# Patient Record
Sex: FEMALE | Race: WHITE | NOT HISPANIC OR LATINO | Employment: FULL TIME | URBAN - METROPOLITAN AREA
[De-identification: names, ages, dates, MRNs, and addresses within clinical notes are randomized per-mention and may not be internally consistent; named-entity substitution may affect disease eponyms.]

---

## 2017-05-26 ENCOUNTER — ALLSCRIPTS OFFICE VISIT (OUTPATIENT)
Dept: OTHER | Facility: OTHER | Age: 32
End: 2017-05-26

## 2017-05-26 DIAGNOSIS — F32.9 MAJOR DEPRESSIVE DISORDER, SINGLE EPISODE: ICD-10-CM

## 2017-05-26 DIAGNOSIS — E03.9 HYPOTHYROIDISM: ICD-10-CM

## 2017-05-26 DIAGNOSIS — F41.9 ANXIETY DISORDER: ICD-10-CM

## 2017-06-09 ENCOUNTER — GENERIC CONVERSION - ENCOUNTER (OUTPATIENT)
Dept: OTHER | Facility: OTHER | Age: 32
End: 2017-06-09

## 2017-07-07 DIAGNOSIS — F32.9 MAJOR DEPRESSIVE DISORDER, SINGLE EPISODE: ICD-10-CM

## 2017-07-07 DIAGNOSIS — E03.9 HYPOTHYROIDISM: ICD-10-CM

## 2017-07-07 DIAGNOSIS — F41.9 ANXIETY DISORDER: ICD-10-CM

## 2017-07-10 ENCOUNTER — GENERIC CONVERSION - ENCOUNTER (OUTPATIENT)
Dept: OTHER | Facility: OTHER | Age: 32
End: 2017-07-10

## 2017-08-10 LAB
EXTERNAL ABO GROUPING: NORMAL
EXTERNAL HEPATITIS B SURFACE ANTIGEN: NEGATIVE
EXTERNAL HIV-1 ANTIBODY: NEGATIVE
EXTERNAL RH FACTOR: POSITIVE
EXTERNAL RUBELLA IGG QUANTITATION: NORMAL
EXTERNAL SYPHILIS RPR SCREEN: NORMAL

## 2017-08-29 ENCOUNTER — GENERIC CONVERSION - ENCOUNTER (OUTPATIENT)
Dept: OTHER | Facility: OTHER | Age: 32
End: 2017-08-29

## 2017-08-29 ENCOUNTER — GENERIC CONVERSION - ENCOUNTER (OUTPATIENT)
Dept: OBGYN CLINIC | Facility: CLINIC | Age: 32
End: 2017-08-29

## 2017-09-12 ENCOUNTER — GENERIC CONVERSION - ENCOUNTER (OUTPATIENT)
Dept: OTHER | Facility: OTHER | Age: 32
End: 2017-09-12

## 2017-09-12 ENCOUNTER — ALLSCRIPTS OFFICE VISIT (OUTPATIENT)
Dept: PERINATAL CARE | Facility: CLINIC | Age: 32
End: 2017-09-12
Payer: COMMERCIAL

## 2017-09-12 PROCEDURE — 76811 OB US DETAILED SNGL FETUS: CPT | Performed by: OBSTETRICS & GYNECOLOGY

## 2017-09-14 ENCOUNTER — ALLSCRIPTS OFFICE VISIT (OUTPATIENT)
Dept: PERINATAL CARE | Facility: CLINIC | Age: 32
End: 2017-09-14
Payer: COMMERCIAL

## 2017-09-14 PROCEDURE — 59070 TRANSABDOM AMNIOINFUS W/US: CPT | Performed by: OBSTETRICS & GYNECOLOGY

## 2017-09-14 PROCEDURE — 76946 ECHO GUIDE FOR AMNIOCENTESIS: CPT | Performed by: OBSTETRICS & GYNECOLOGY

## 2017-09-18 ENCOUNTER — GENERIC CONVERSION - ENCOUNTER (OUTPATIENT)
Dept: OTHER | Facility: OTHER | Age: 32
End: 2017-09-18

## 2017-09-18 ENCOUNTER — ALLSCRIPTS OFFICE VISIT (OUTPATIENT)
Dept: PERINATAL CARE | Facility: CLINIC | Age: 32
End: 2017-09-18
Payer: COMMERCIAL

## 2017-09-18 PROCEDURE — 76816 OB US FOLLOW-UP PER FETUS: CPT | Performed by: OBSTETRICS & GYNECOLOGY

## 2017-09-21 ENCOUNTER — GENERIC CONVERSION - ENCOUNTER (OUTPATIENT)
Dept: OTHER | Facility: OTHER | Age: 32
End: 2017-09-21

## 2017-09-28 ENCOUNTER — GENERIC CONVERSION - ENCOUNTER (OUTPATIENT)
Dept: OTHER | Facility: OTHER | Age: 32
End: 2017-09-28

## 2017-09-28 DIAGNOSIS — R35.0 FREQUENCY OF MICTURITION: ICD-10-CM

## 2017-09-28 DIAGNOSIS — O41.02X0: ICD-10-CM

## 2017-09-29 ENCOUNTER — GENERIC CONVERSION - ENCOUNTER (OUTPATIENT)
Dept: OTHER | Facility: OTHER | Age: 32
End: 2017-09-29

## 2017-10-02 ENCOUNTER — GENERIC CONVERSION - ENCOUNTER (OUTPATIENT)
Dept: OTHER | Facility: OTHER | Age: 32
End: 2017-10-02

## 2017-10-03 ENCOUNTER — GENERIC CONVERSION - ENCOUNTER (OUTPATIENT)
Dept: OTHER | Facility: OTHER | Age: 32
End: 2017-10-03

## 2017-10-05 ENCOUNTER — GENERIC CONVERSION - ENCOUNTER (OUTPATIENT)
Dept: OTHER | Facility: OTHER | Age: 32
End: 2017-10-05

## 2017-10-12 ENCOUNTER — GENERIC CONVERSION - ENCOUNTER (OUTPATIENT)
Dept: OTHER | Facility: OTHER | Age: 32
End: 2017-10-12

## 2017-10-19 ENCOUNTER — GENERIC CONVERSION - ENCOUNTER (OUTPATIENT)
Dept: OTHER | Facility: OTHER | Age: 32
End: 2017-10-19

## 2017-10-25 ENCOUNTER — ALLSCRIPTS OFFICE VISIT (OUTPATIENT)
Dept: PERINATAL CARE | Facility: CLINIC | Age: 32
End: 2017-10-25
Payer: COMMERCIAL

## 2017-10-25 ENCOUNTER — GENERIC CONVERSION - ENCOUNTER (OUTPATIENT)
Dept: OTHER | Facility: OTHER | Age: 32
End: 2017-10-25

## 2017-10-25 PROCEDURE — 76816 OB US FOLLOW-UP PER FETUS: CPT | Performed by: OBSTETRICS & GYNECOLOGY

## 2017-11-07 ENCOUNTER — GENERIC CONVERSION - ENCOUNTER (OUTPATIENT)
Dept: OTHER | Facility: OTHER | Age: 32
End: 2017-11-07

## 2017-11-14 ENCOUNTER — GENERIC CONVERSION - ENCOUNTER (OUTPATIENT)
Dept: OTHER | Facility: OTHER | Age: 32
End: 2017-11-14

## 2017-11-21 ENCOUNTER — ALLSCRIPTS OFFICE VISIT (OUTPATIENT)
Dept: OTHER | Facility: OTHER | Age: 32
End: 2017-11-21

## 2017-11-21 ENCOUNTER — DOCUMENTATION (OUTPATIENT)
Dept: OTHER | Facility: HOSPITAL | Age: 32
End: 2017-11-21

## 2017-11-21 LAB
BILIRUB UR QL STRIP: NORMAL
CLARITY UR: NORMAL
COLOR UR: YELLOW
GLUCOSE (HISTORICAL): NORMAL
HGB UR QL STRIP.AUTO: NORMAL
KETONES UR STRIP-MCNC: NORMAL MG/DL
LEUKOCYTE ESTERASE UR QL STRIP: NORMAL
NITRITE UR QL STRIP: NORMAL
PH UR STRIP.AUTO: 6 [PH]
PROT UR STRIP-MCNC: NORMAL MG/DL
SP GR UR STRIP.AUTO: 1.01
UROBILINOGEN UR QL STRIP.AUTO: 0.2

## 2017-11-21 NOTE — PROGRESS NOTES
Neonatology Consult    26 yo [id number] mother presents at 31 weeks gestation for fetus with lethal congenital anomalies (BL renal agenesis, anhydramnios, and possible absent bladder )  Mother has seen MFM on multiple occasions  Mother's insurance did not approve  prior to 24 weeks so mother continued with pregnancy  Mother expresses understanding of incompatibility with life and wants no resuscitative measures at birth  She and her  desire comfort care only  Mother would like to do IOL prior to her due date  OB is discussing with ethics whether this will be possible  I spoke with the mother and father for ~40 minutes  Mother expressed understanding of diagnosis and her strong desire to deliver as soon as possible given prognosis  She has anxiety and expresses how difficult this pregnancy has been given baby's diagnosis  She has sought help with support groups from mothers who have had a diagnosis of Potter's sequence  Mother also has hashimoto's thyroiditis  Prenatal US on 10/25/17 shows DALI of 18  Fetus is in breech presentation and US shows anhydramnios, BL renal agenesis, pericardial effusion, and pulmonary hypoplasia     g

## 2017-11-29 ENCOUNTER — GENERIC CONVERSION - ENCOUNTER (OUTPATIENT)
Dept: OTHER | Facility: OTHER | Age: 32
End: 2017-11-29

## 2017-12-12 ENCOUNTER — ALLSCRIPTS OFFICE VISIT (OUTPATIENT)
Dept: OTHER | Facility: OTHER | Age: 32
End: 2017-12-12

## 2017-12-12 LAB
CLUE CELL (HISTORICAL): NORMAL
HYPHAL YEAST (HISTORICAL): NORMAL
KOH PREP (HISTORICAL): NORMAL
PH UR STRIP.AUTO: 4 [PH]
TRICHOMONAS (HISTORICAL): NORMAL
YEAST (HISTORICAL): NORMAL

## 2017-12-16 ENCOUNTER — HOSPITAL ENCOUNTER (OUTPATIENT)
Facility: HOSPITAL | Age: 32
Discharge: HOME | End: 2017-12-16
Attending: OBSTETRICS & GYNECOLOGY | Admitting: OBSTETRICS & GYNECOLOGY
Payer: COMMERCIAL

## 2017-12-16 VITALS
BODY MASS INDEX: 36.96 KG/M2 | OXYGEN SATURATION: 99 % | HEART RATE: 94 BPM | HEIGHT: 66 IN | RESPIRATION RATE: 18 BRPM | SYSTOLIC BLOOD PRESSURE: 131 MMHG | WEIGHT: 230 LBS | DIASTOLIC BLOOD PRESSURE: 61 MMHG | TEMPERATURE: 98.4 F

## 2017-12-16 PROCEDURE — 99203 OFFICE O/P NEW LOW 30 MIN: CPT

## 2017-12-16 RX ORDER — LORAZEPAM 0.5 MG/1
1 TABLET ORAL 3 TIMES DAILY PRN
COMMUNITY
Start: 2017-08-29 | End: 2021-05-19 | Stop reason: ALTCHOICE

## 2017-12-16 RX ORDER — LEVOTHYROXINE SODIUM 0.15 MG/1
150 TABLET ORAL
COMMUNITY
Start: 2017-11-20 | End: 2018-01-24

## 2017-12-16 NOTE — PROGRESS NOTES
Progress Note - OB/GYN   Kandis Acuna 27 y o  female MRN: [id number]  Unit/Bed#: L&D 323-01 Encounter: [id number]    Assessment:  Patient is a 28yo [id number] @ 33 5wga w/ known lethal fetal anomaly presents with contractions  Not in  labor  Plan:  D/C home with labor precautions  PO hydration  Tylenol PRN  Routine office appointment  D/W Dr Tracey Rodgers  Subjective/Objective   Chief Complaint: contractions    Subjective:   -contractions 4 times in a hour  -denies LOF, VB  -feels baby moving    Pregnancy pertinent hx:  -lethal fetal anomaly (anhydramnios, pericardial effusion, pulmonary hypoplasia, bilateral renal agenesis)    Objective:   -SSE: white-yellow discharge  -wet mount/KOH wnl  -TVUS: CL 2 2-2 6cm, breech  -SVE: 3-4/thick/high  -FHT: 120'sbpm reactive  -[place]: quiet    Vitals: Blood pressure 131/61, pulse 94, temperature 98 4 °F (36 9 °C), temperature source Oral, resp  rate 18, height 5' 6" (1 676 m), weight 104 kg (230 lb), SpO2 99 %  ,Body mass index is 37 12 kg/m²      No intake or output data in the 24 hours ending 17 0558    Invasive Devices          No matching active lines, drains, or airways

## 2017-12-21 ENCOUNTER — ANESTHESIA EVENT (INPATIENT)
Dept: LABOR AND DELIVERY | Facility: HOSPITAL | Age: 32
End: 2017-12-21
Payer: COMMERCIAL

## 2017-12-21 ENCOUNTER — ANESTHESIA (INPATIENT)
Dept: LABOR AND DELIVERY | Facility: HOSPITAL | Age: 32
End: 2017-12-21
Payer: COMMERCIAL

## 2017-12-21 ENCOUNTER — GENERIC CONVERSION - ENCOUNTER (OUTPATIENT)
Dept: OTHER | Facility: OTHER | Age: 32
End: 2017-12-21

## 2017-12-21 ENCOUNTER — HOSPITAL ENCOUNTER (INPATIENT)
Facility: HOSPITAL | Age: 32
LOS: 1 days | Discharge: HOME | End: 2017-12-22
Attending: OBSTETRICS & GYNECOLOGY | Admitting: OBSTETRICS & GYNECOLOGY
Payer: COMMERCIAL

## 2017-12-21 ENCOUNTER — HOSPITAL ENCOUNTER (OUTPATIENT)
Facility: HOSPITAL | Age: 32
Discharge: HOME | End: 2017-12-21
Attending: OBSTETRICS & GYNECOLOGY | Admitting: OBSTETRICS & GYNECOLOGY
Payer: COMMERCIAL

## 2017-12-21 VITALS
RESPIRATION RATE: 18 BRPM | WEIGHT: 230 LBS | DIASTOLIC BLOOD PRESSURE: 76 MMHG | SYSTOLIC BLOOD PRESSURE: 145 MMHG | HEART RATE: 103 BPM | TEMPERATURE: 98.7 F | BODY MASS INDEX: 36.1 KG/M2 | HEIGHT: 67 IN

## 2017-12-21 DIAGNOSIS — Z3A.34 34 WEEKS GESTATION OF PREGNANCY: ICD-10-CM

## 2017-12-21 DIAGNOSIS — Z3A.34 34 WEEKS GESTATION OF PREGNANCY: Primary | ICD-10-CM

## 2017-12-21 PROBLEM — O35.EXX0 RENAL AGENESIS, FETAL, AFFECTING CARE OF MOTHER, ANTEPARTUM: Status: ACTIVE | Noted: 2017-12-21

## 2017-12-21 PROBLEM — O35.8XX0 RENAL AGENESIS, FETAL, AFFECTING CARE OF MOTHER, ANTEPARTUM: Status: ACTIVE | Noted: 2017-12-21

## 2017-12-21 PROBLEM — O41.00X0 ANHYDRAMNIOS: Status: ACTIVE | Noted: 2017-12-21

## 2017-12-21 LAB
ABO GROUP BLD: NORMAL
ANISOCYTOSIS BLD QL SMEAR: PRESENT
BASE EXCESS BLDCOA CALC-SCNC: -1.2 MMOL/L (ref 3–11)
BASE EXCESS BLDCOV CALC-SCNC: -0.6 MMOL/L (ref 1–9)
BASOPHILS # BLD MANUAL: 0 THOUSAND/UL (ref 0–0.1)
BASOPHILS NFR MAR MANUAL: 0 % (ref 0–1)
BLD GP AB SCN SERPL QL: NEGATIVE
DACRYOCYTES BLD QL SMEAR: PRESENT
EOSINOPHIL # BLD MANUAL: 0.11 THOUSAND/UL (ref 0–0.4)
EOSINOPHIL NFR BLD MANUAL: 1 % (ref 0–6)
ERYTHROCYTE [DISTWIDTH] IN BLOOD BY AUTOMATED COUNT: 16.9 % (ref 11.6–15.1)
HCO3 BLDCOA-SCNC: 25.7 MMOL/L (ref 17.3–27.3)
HCO3 BLDCOV-SCNC: 22.9 MMOL/L (ref 12.2–28.6)
HCT VFR BLD AUTO: 33.4 % (ref 34.8–46.1)
HGB BLD-MCNC: 10.6 G/DL (ref 11.5–15.4)
LYMPHOCYTES # BLD AUTO: 1.51 THOUSAND/UL (ref 0.6–4.47)
LYMPHOCYTES # BLD AUTO: 14 % (ref 14–44)
MCH RBC QN AUTO: 25 PG (ref 26.8–34.3)
MCHC RBC AUTO-ENTMCNC: 31.7 G/DL (ref 31.4–37.4)
MCV RBC AUTO: 79 FL (ref 82–98)
MONOCYTES # BLD AUTO: 0.32 THOUSAND/UL (ref 0–1.22)
MONOCYTES NFR BLD: 3 % (ref 4–12)
NEUTROPHILS # BLD MANUAL: 8.87 THOUSAND/UL (ref 1.85–7.62)
NEUTS BAND NFR BLD MANUAL: 14 % (ref 0–8)
NEUTS SEG NFR BLD AUTO: 68 % (ref 43–75)
O2 CT VFR BLDCOA CALC: 4.9 ML/DL
OVALOCYTES BLD QL SMEAR: PRESENT
OXYHGB MFR BLDCOA: 28.8 %
OXYHGB MFR BLDCOV: 91 %
PCO2 BLDCOA: 52.5 MM[HG] (ref 30–60)
PCO2 BLDCOV: 34.1 MM HG (ref 27–43)
PH BLDCOA: 7.31 [PH] (ref 7.23–7.43)
PH BLDCOV: 7.45 [PH] (ref 7.19–7.49)
PLATELET # BLD AUTO: 199 THOUSANDS/UL (ref 149–390)
PLATELET BLD QL SMEAR: ADEQUATE
PMV BLD AUTO: 10.3 FL (ref 8.9–12.7)
PO2 BLDCOA: 16 MM HG (ref 5–25)
PO2 BLDCOV: 46.3 MM HG (ref 15–45)
RBC # BLD AUTO: 4.24 MILLION/UL (ref 3.81–5.12)
RH BLD: POSITIVE
SAO2 % BLDCOV: 15.6 ML/DL
SPECIMEN EXPIRATION DATE: NORMAL
TOTAL CELLS COUNTED SPEC: 100
WBC # BLD AUTO: 10.82 THOUSAND/UL (ref 4.31–10.16)

## 2017-12-21 PROCEDURE — 99203 OFFICE O/P NEW LOW 30 MIN: CPT

## 2017-12-21 PROCEDURE — 86901 BLOOD TYPING SEROLOGIC RH(D): CPT | Performed by: OBSTETRICS & GYNECOLOGY

## 2017-12-21 PROCEDURE — 10D17Z9 MANUAL EXTRACTION OF PRODUCTS OF CONCEPTION, RETAINED, VIA NATURAL OR ARTIFICIAL OPENING: ICD-10-PCS | Performed by: OBSTETRICS & GYNECOLOGY

## 2017-12-21 PROCEDURE — 86850 RBC ANTIBODY SCREEN: CPT | Performed by: OBSTETRICS & GYNECOLOGY

## 2017-12-21 PROCEDURE — 86592 SYPHILIS TEST NON-TREP QUAL: CPT | Performed by: OBSTETRICS & GYNECOLOGY

## 2017-12-21 PROCEDURE — 82805 BLOOD GASES W/O2 SATURATION: CPT | Performed by: OBSTETRICS & GYNECOLOGY

## 2017-12-21 PROCEDURE — 86900 BLOOD TYPING SEROLOGIC ABO: CPT | Performed by: OBSTETRICS & GYNECOLOGY

## 2017-12-21 PROCEDURE — 0UC97ZZ EXTIRPATION OF MATTER FROM UTERUS, VIA NATURAL OR ARTIFICIAL OPENING: ICD-10-PCS | Performed by: OBSTETRICS & GYNECOLOGY

## 2017-12-21 PROCEDURE — 88307 TISSUE EXAM BY PATHOLOGIST: CPT | Performed by: OBSTETRICS & GYNECOLOGY

## 2017-12-21 PROCEDURE — 85027 COMPLETE CBC AUTOMATED: CPT | Performed by: OBSTETRICS & GYNECOLOGY

## 2017-12-21 PROCEDURE — 85007 BL SMEAR W/DIFF WBC COUNT: CPT | Performed by: OBSTETRICS & GYNECOLOGY

## 2017-12-21 RX ORDER — ACETAMINOPHEN 325 MG/1
650 TABLET ORAL EVERY 6 HOURS PRN
Status: DISCONTINUED | OUTPATIENT
Start: 2017-12-21 | End: 2017-12-22 | Stop reason: HOSPADM

## 2017-12-21 RX ORDER — OXYTOCIN/RINGER'S LACTATE 30/500 ML
PLASTIC BAG, INJECTION (ML) INTRAVENOUS
Status: DISPENSED
Start: 2017-12-21 | End: 2017-12-22

## 2017-12-21 RX ORDER — ROPIVACAINE HYDROCHLORIDE 2 MG/ML
INJECTION, SOLUTION EPIDURAL; INFILTRATION; PERINEURAL AS NEEDED
Status: DISCONTINUED | OUTPATIENT
Start: 2017-12-21 | End: 2017-12-21 | Stop reason: SURG

## 2017-12-21 RX ORDER — DOCUSATE SODIUM 100 MG/1
100 CAPSULE, LIQUID FILLED ORAL 2 TIMES DAILY
COMMUNITY
End: 2018-01-24

## 2017-12-21 RX ORDER — OXYTOCIN/RINGER'S LACTATE 30/500 ML
62.5 PLASTIC BAG, INJECTION (ML) INTRAVENOUS CONTINUOUS
Status: ACTIVE | OUTPATIENT
Start: 2017-12-21 | End: 2017-12-22

## 2017-12-21 RX ORDER — SODIUM CHLORIDE, SODIUM LACTATE, POTASSIUM CHLORIDE, CALCIUM CHLORIDE 600; 310; 30; 20 MG/100ML; MG/100ML; MG/100ML; MG/100ML
125 INJECTION, SOLUTION INTRAVENOUS CONTINUOUS
Status: DISCONTINUED | OUTPATIENT
Start: 2017-12-21 | End: 2017-12-22 | Stop reason: HOSPADM

## 2017-12-21 RX ORDER — METHYLERGONOVINE MALEATE 0.2 MG/ML
INJECTION INTRAVENOUS
Status: COMPLETED
Start: 2017-12-21 | End: 2017-12-21

## 2017-12-21 RX ORDER — OXYTOCIN/RINGER'S LACTATE 30/500 ML
PLASTIC BAG, INJECTION (ML) INTRAVENOUS
Status: COMPLETED
Start: 2017-12-21 | End: 2017-12-21

## 2017-12-21 RX ORDER — OXYCODONE HYDROCHLORIDE AND ACETAMINOPHEN 5; 325 MG/1; MG/1
1 TABLET ORAL EVERY 4 HOURS PRN
Status: DISCONTINUED | OUTPATIENT
Start: 2017-12-21 | End: 2017-12-22 | Stop reason: HOSPADM

## 2017-12-21 RX ORDER — ONDANSETRON 2 MG/ML
4 INJECTION INTRAMUSCULAR; INTRAVENOUS EVERY 8 HOURS PRN
Status: DISCONTINUED | OUTPATIENT
Start: 2017-12-21 | End: 2017-12-22 | Stop reason: HOSPADM

## 2017-12-21 RX ORDER — CALCIUM CARBONATE 200(500)MG
1000 TABLET,CHEWABLE ORAL DAILY PRN
Status: DISCONTINUED | OUTPATIENT
Start: 2017-12-21 | End: 2017-12-22 | Stop reason: HOSPADM

## 2017-12-21 RX ORDER — IBUPROFEN 600 MG/1
600 TABLET ORAL EVERY 6 HOURS PRN
Status: DISCONTINUED | OUTPATIENT
Start: 2017-12-21 | End: 2017-12-22 | Stop reason: HOSPADM

## 2017-12-21 RX ORDER — LORAZEPAM 0.5 MG/1
0.5 TABLET ORAL 3 TIMES DAILY PRN
Status: DISCONTINUED | OUTPATIENT
Start: 2017-12-21 | End: 2017-12-22 | Stop reason: HOSPADM

## 2017-12-21 RX ORDER — LEVOTHYROXINE SODIUM 0.05 MG/1
150 TABLET ORAL
Status: DISCONTINUED | OUTPATIENT
Start: 2017-12-22 | End: 2017-12-22 | Stop reason: HOSPADM

## 2017-12-21 RX ORDER — DIAPER,BRIEF,INFANT-TODD,DISP
1 EACH MISCELLANEOUS AS NEEDED
Status: DISCONTINUED | OUTPATIENT
Start: 2017-12-21 | End: 2017-12-22 | Stop reason: HOSPADM

## 2017-12-21 RX ORDER — DIPHENHYDRAMINE HCL 25 MG
25 TABLET ORAL EVERY 6 HOURS PRN
Status: DISCONTINUED | OUTPATIENT
Start: 2017-12-21 | End: 2017-12-22 | Stop reason: HOSPADM

## 2017-12-21 RX ORDER — DOCUSATE SODIUM 100 MG/1
100 CAPSULE, LIQUID FILLED ORAL 2 TIMES DAILY
Status: DISCONTINUED | OUTPATIENT
Start: 2017-12-21 | End: 2017-12-22 | Stop reason: HOSPADM

## 2017-12-21 RX ORDER — ONDANSETRON 2 MG/ML
4 INJECTION INTRAMUSCULAR; INTRAVENOUS EVERY 6 HOURS PRN
Status: DISCONTINUED | OUTPATIENT
Start: 2017-12-21 | End: 2017-12-21

## 2017-12-21 RX ADMIN — IBUPROFEN 600 MG: 600 TABLET, FILM COATED ORAL at 20:50

## 2017-12-21 RX ADMIN — HYDROCORTISONE 1 APPLICATION: 1 CREAM TOPICAL at 20:50

## 2017-12-21 RX ADMIN — GENTAMICIN SULFATE 90 MG: 40 INJECTION, SOLUTION INTRAMUSCULAR; INTRAVENOUS at 18:16

## 2017-12-21 RX ADMIN — ROPIVACAINE HYDROCHLORIDE 5 ML: 2 INJECTION, SOLUTION EPIDURAL; INFILTRATION at 14:30

## 2017-12-21 RX ADMIN — DOCUSATE SODIUM 100 MG: 100 CAPSULE, LIQUID FILLED ORAL at 21:11

## 2017-12-21 RX ADMIN — METHYLERGONOVINE MALEATE 0.2 MG: 0.2 INJECTION, SOLUTION INTRAMUSCULAR; INTRAVENOUS at 17:07

## 2017-12-21 RX ADMIN — ROPIVACAINE HYDROCHLORIDE 5 ML: 2 INJECTION, SOLUTION EPIDURAL; INFILTRATION at 14:32

## 2017-12-21 RX ADMIN — SODIUM CHLORIDE, SODIUM LACTATE, POTASSIUM CHLORIDE, AND CALCIUM CHLORIDE 125 ML/HR: .6; .31; .03; .02 INJECTION, SOLUTION INTRAVENOUS at 14:12

## 2017-12-21 RX ADMIN — Medication 62.5 MILLI-UNITS/MIN: at 17:06

## 2017-12-21 RX ADMIN — BENZOCAINE AND MENTHOL: 20; .5 SPRAY TOPICAL at 20:50

## 2017-12-21 RX ADMIN — AMPICILLIN SODIUM 2000 MG: 2 INJECTION, POWDER, FOR SOLUTION INTRAMUSCULAR; INTRAVENOUS at 17:41

## 2017-12-21 RX ADMIN — WITCH HAZEL 1 PAD: 500 SOLUTION RECTAL; TOPICAL at 20:50

## 2017-12-21 NOTE — ANESTHESIA POSTPROCEDURE EVALUATION
Post-Op Assessment Note      CV Status:  Stable    Mental Status:  Alert and awake    Hydration Status:  Euvolemic    PONV Controlled:  Controlled    Airway Patency:  Patent    Post Op Vitals Reviewed: Yes          Staff: Anesthesiologist     Post-op block assessment: no complications and catheter intact        BP      Temp      Pulse     Resp      SpO2

## 2017-12-21 NOTE — L&D DELIVERY NOTE
DELIVERY NOTE  Elier Mullen 27 y o  female MRN: [id number]  Unit/Bed#: L&D 324-01 Encounter: [id number]    Obstetrician:   Marlon Michele    Assistant: Katie Snwo    Pre-Delivery Diagnosis:   Pregnancy at 34w3d  Known lethal fetal anomalies (renal agenesis, anhydramnios, pulmonary hypoplasia)  Breech presentation    Post-Delivery Diagnosis:   Same as above - Delivered  Suspected chorioamniotis    Procedure: Spontaneous vaginal delivery    Indications for instrumental delivery: none    Estimated Blood Loss:  831            Complications:  None    Description of Delivery:   Purulent, thick, foul smelling amniotic fluid was noted on examination suspicious for chorioamnionitis upon pushing  The buttocks, followed by the legs, delivered spontaneously  At the level of the fetal scapula, the left arm spontaneously delivered, the right arm was gently swept for delivery  The fetal head spontaneously delivered  After delivery of the , the umbilical cord was doubly clamped and cut  Umbilical cord blood and umbilical artery and venous gases were collected  With gentle traction of the umbilical cord, the cord spontaneously avulsed  On examination, the cord segment attached to the placenta was not able to be palpated therefore the placenta was manually extracted  Active management of the third stage of labor was undertaken with IV pitocin  Mild vaginal bleeding was noted, intrauterine examination revealed small clots at the lower uterine segment which were evacuated, and a single dose of 0 2mg Methergine was administered  Bleeding was noted to be stable  The uterus was noted to be firm, palpated at the umbilicus  Inspection of the perineum, vagina, labia, and urethra revealed a right periurethral abrasion which was hemostatic and did not require repair      APGARS: 2 (1 minute), 2 (5 minutes), 2 (10 minutes), 2 (15 minutes), 2 (20 minutes)    Blood gases: Arterial pH:7 307, Base excess: -1 2 ; Venous pH:7 445, Base excess: -0 6

## 2017-12-21 NOTE — H&P
[address] 27 y o  female MRN: [id number]  Unit/Bed#: L&D 324-01 Encounter: [id number]    Assessment: 27 y o  [id number] at 34w3d with known lethal fetal anomalies (bilateral renal agenesis, anhydramnios, absent bladder) admitted for labor  SVE on admission 10/100/+1, breech    Plan:   · Admit  · CBC, RPR, Type & Screen  · Epidural for pain management  · Expectant management    Patient seen & evaluated with Dr Makayla Mullins      Chief Complaint: Contractions    HPI: Aline Hoang is a 27 y o  [id number] with an DALI of 2018, by Patient Reported at 34w3d, with known lethal fetal anomalies (bilateral renal agenesis, anhydramnios, absent bladder) who is being admitted for labor  She reports worsening contractions "every 10 minutes", was evaluated last evening in Triage, cervix noted to be closed  She was seen in clinic today for evaluation and cervix was noted to be 6-7/70/-1 and she was sent to L&D for admission  She was extensively counseled in clinic regarding the lethal nature of the fetal anomalies and she understands that the baby will not survive  Of note, the fetus is breech position  The patient was counseled on the risks of breech vaginal delivery  and accepts the risks  Please see clinic noted for full details  Patient Active Problem List   Diagnosis    34 weeks gestation of pregnancy    Renal agenesis, fetal, affecting care of mother, antepartum    Anhydramnios       Baby complications/comments: none    Review of Systems   Constitutional: Negative for chills and fever  Respiratory: Negative for shortness of breath  Cardiovascular: Negative for chest pain  Gastrointestinal: Negative for abdominal pain, diarrhea, nausea and vomiting  Genitourinary: Negative for dysuria and flank pain  Neurological: Negative for numbness and headaches         OB History    Para Term  AB Living   8 6 5 1 1 6   SAB TAB Ectopic Multiple Live Births   1 0 0 1 6      # Outcome Date GA Lbr Janes/2nd Weight Sex Delivery Anes PTL Lv   8A             8B Current            7 Term 16 39w0d   M Vag-Spont   JURGEN   6 Term 14    M Vag-Spont   JURGEN   5 Term 12     Vag-Spont   JURGEN   4 Term 08/10/09 38w0d   F Vag-Spont   JURGEN   3 Term 04 39w0d   M Vag-Spont   JURGEN   2 SAB 02           1  10/17/01 36w0d   F Vag-Spont   JURGEN          Past Medical History:   Diagnosis Date    Anxiety     Depression     Gestational diabetes     Gestational hypertension     previous pregnancy    Hashimoto's thyroiditis     HPV (human papilloma virus) infection     Retained placenta        Past Surgical History:   Procedure Laterality Date    APPENDECTOMY      CHOLECYSTECTOMY      DILATION AND CURETTAGE OF UTERUS      HERNIA REPAIR      UMBILICAL HERNIA REPAIR         Social History   Substance Use Topics    Smoking status: Former Smoker    Smokeless tobacco: Never Used    Alcohol use No       Allergies   Allergen Reactions    Bactrim [Sulfamethoxazole-Trimethoprim] Hives    Other Hives     seafood       Prescriptions Prior to Admission   Medication    docusate sodium (COLACE) 100 mg capsule    Ferrous Fumarate-Vitamin C ER (TEMITOPE-SEQUELS) 65-25 MG TBCR    levothyroxine (SYNTHROID) 150 mcg tablet    LORazepam (ATIVAN) 0 5 mg tablet    Prenatal Multivit-Min-Fe-FA (PRENATAL #2 PO)       Objective:  Temp:  [98 7 °F (37 1 °C)] 98 7 °F (37 1 °C)  HR:  [103] 103  Resp:  [18] 18  BP: (145)/(76) 145/76  There is no height or weight on file to calculate BMI       Physical Exam:  Gen: NAD, AAOx3  Cv: RRR, No M/R/G  Resp: CTAB, No W/R/R  Abdomen: Gravid, non-tender  Ext: Symmetric, non-tender    SVE:  Dilation: 10  Effacement (%): 100  Station: 2   Breech, right sacrum posterior    TOCO: irregular, Q10 minutes       Lab Results   Component Value Date    WBC 10 82 (H) 2017    HGB 10 6 (L) 2017    HCT 33 4 (L) 2017     2017     No results found for: NA, K, CL, CO2, BUN, CREATININE, GLUCOSE, AST, ALT  Prenatal Labs: reviewed     Blood type: O +  Antibody: Negative  GBS: Unknown  HIV:Negative  Rubella: Immune  VDRL/RPR: Non reactive  HBsAg: Negative  Chlamydia: Negative  Gonorrhea: Negative  Platelets: 659  Hgb: 10 6  >2 Midnights  INPATIENT     Signature/Title: Edd Sweeney MD  Date: 12/21/2017  Time: 3:03 PM

## 2017-12-21 NOTE — PROGRESS NOTES
L&D Triage Note - OB/GYN  Chantale Branch 27 y o  female MRN: [id number]  Unit/Bed#: L&D 324-01 Encounter: [id number]    Patient is seen by [facility] [place]    SUBJECTIVE:  Chantale Branch 27 y o  [id number] at 34w3d with an Estimated Date of Delivery: 18 presents with increasingly frequent and strong contractions  Called resident phone stating "my baby is going to die" and "I can't take these contractions any longer"  She also states she may have leaked some fluid  She was told her fetus has no kidneys and that she has been diagnosed with "no fluid in her belly"  Her current obstetrical history is significant for known lethal fetal anomaly, renal agenesis, pulmonary hypoplasia, anhydramnios, breech presentation  Contractions: Present, every 6-8min  Leakage of fluid: Present, see above  Vaginal Bleeding: None  Fetal movement: present    OBJECTIVE:  Vitals:    17 0309   BP: 145/76   Pulse: 103   Resp: 18   Temp: 98 7 °F (37 1 °C)       Speculum: Cervical os is closed  SVE:  / 50% / -2; breech presentation    KOH/WTMT:     Infection:   - no clue cells    - no hyphae   - no trichomonads present    Membrane status   - negative ferning   - negative nitrazene   - no pooling    FHT:  145 / Moderate 6 - 25 bpm / reactive accelerations, no decelerations  [place]: q9min    ASSESSMENT:  Chantale Branch is a 27 y o   [id number] at 34w3d r/o  labor & rupture    PLAN:  1) R/o  labor and premature rupture in lethal fetal anomaly   - See above  2) Cervical exam   - /-2, breech presentation  3) Plan to continue with planned c/s on 18   - Pt will meet with Dr Warner Branham in clinic today at 80 for scheduled appointment, to discuss matters further  4) Continue routine prenatal care   - Pt advised to take her Ativan PRN as this will help treat her anxiety levels with this pregnancy  5) [place] from [place]age with  labor precautions   - Case discussed with Dr Jayla Nolan,   PGY-1 OB/GYN Resident 12/21/2017 4:16 AM

## 2017-12-21 NOTE — DISCHARGE INSTRUCTIONS
Vaginal Delivery   WHAT YOU SHOULD KNOW:   A vaginal delivery is the birth of your baby through your vagina (birth canal)  AFTER YOU LEAVE:   Medicines:  · NSAIDs  help decrease swelling and pain or fever  This medicine is available with or without a doctor's order  NSAIDs can cause stomach bleeding or kidney problems in certain people  If you take blood thinner medicine, always ask your healthcare provider if NSAIDs are safe for you  Always read the medicine label and follow directions  · Take your medicine as directed  Call your healthcare provider if you think your medicine is not helping or  ·   ·  if you have side effects  Tell him if you are allergic to any medicine  Keep a list of the medicines, vitamins, and herbs you take  Include the amounts, and when and why you take them  Bring the list or the pill bottles to follow-up visits  Carry your medicine list with you in case of an emergency  Follow up with your primary healthcare provider:  Most women need to return 6 weeks after a vaginal delivery  Ask about how to care for your wounds or stitches  Write down your questions so you remember to ask them during your visits  Activity:  Rest as much as possible  Try to keep all activities short  You may be able to do some exercise soon after you have your baby  Talk with your primary healthcare provider before you start exercising  If you work outside the home, ask when you can return to your job  Kegel exercises:  Kegel exercises may help your vaginal and rectal muscles heal faster  You can do Kegel exercises by tightening and relaxing the muscles around your vagina  Kegel exercises help make the muscles stronger  Breast care:  When your milk comes in, your breasts may feel full and hard  Ask how to care for your breasts, even if you are not breastfeeding  Constipation:  Do not try to push the bowel movement out if it is too hard   High-fiber foods, extra liquids, and regular exercise can help you prevent constipation  Examples of high-fiber foods are fruit and bran  Prune juice and water are good liquids to drink  Regular exercise helps your digestive system work  You may also be told to take over-the-counter fiber and stool softener medicines  Take these items as directed  Hemorrhoids:  Pregnancy can cause severe hemorrhoids  You may have rectal pain because of the hemorrhoids  Ask how to prevent or treat hemorrhoids  Perineum care: Your perineum is the area between your vagina and anus  Keep the area clean and dry to help it heal and to prevent infection  Wash the area gently with soap and water when you bathe or shower  Rinse your perineum with warm water when you use the toilet  Your primary healthcare provider may suggest you use a warm sitz bath to help decrease pain  A sitz bath is a bathtub or basin filled to hip level  Stay in the sitz bath for 20 to 30 minutes, or as directed  Vaginal discharge: You will have vaginal discharge, called lochia, after your delivery  The lochia is bright red the first day or two after the birth  By the fourth day, the amount decreases, and it turns red-brown  Use a sanitary pad rather than a tampon to prevent a vaginal infection  It is normal to have lochia up to 8 weeks after your baby is born  Monthly periods: Your period may start again within 7 to 12 weeks after your baby is born  If you are breastfeeding, it may take longer for your period to start again  You can still get pregnant again even though you do not have your monthly period  Talk with your primary healthcare provider about a birth control method that will be good for you if you do not want to get pregnant  Mood changes: Many new mothers have some kind of mood changes after delivery  Some of these changes occur because of lack of sleep, hormone changes, and caring for a new baby  Some mood changes can be more serious, such as postpartum depression   Talk with your primary healthcare provider if you feel unable to care for yourself or your baby  Sexual activity:  You may need to avoid sex for 6 to 7 weeks after you have your baby  You may notice you have a decreased desire for sex, or sex may be painful  You may need to use a vaginal lubricant (gel) to help make sex more comfortable  Contact your primary healthcare provider if:   · You have heavy vaginal bleeding that fills 1 or more sanitary pads in 1 hour  · You have a fever  · Your pain does not go away, or gets worse  · The skin between your vagina and rectum is swollen, warm, or red  · You have swollen, hard, or painful breasts  · You feel very sad or depressed  · You feel more tired than usual      · You have questions or concerns about your condition or care  Seek care immediately or call 911 if:   · You have pus or yellow drainage coming from your vagina or wound  · You are urinating very little, or not at all  · Your arm or leg feels warm, tender, and painful  It may look swollen and red  · You feel lightheaded, have sudden and worsening chest pain, or trouble breathing  You may have more pain when you take deep breaths or cough, or you may cough up blood  © 2014 [address] is for End User's use only and may not be sold, redistributed or otherwise used for commercial purposes  All illustrations and images included in CareNotes® are the copyrighted property of A D [employer] M [employer]  or [employer]  The above information is an  only  It is not intended as medical advice for individual conditions or treatments  Talk to your doctor, nurse or pharmacist before following any medical regimen to see if it is safe and effective for you  Postpartum Bleeding   WHAT YOU NEED TO KNOW:   Postpartum bleeding is vaginal bleeding after childbirth  This bleeding is normal, whether your baby was born vaginally or by    It contains blood and the tissue that lined the inside of your uterus when you were pregnant  DISCHARGE INSTRUCTIONS:   What to expect with postpartum bleeding:  Postpartum bleeding usually lasts at least 10 days, and may last longer than 6 weeks  Your bleeding may range from light (barely staining a pad) to heavy (soaking a pad in 1 hour)  Usually, you have heavier bleeding right after childbirth, which slows over the next few weeks until it stops  The bleeding is red or dark brown with clots for the first 1 to 3 days  It then turns pink for several days, and then becomes a white or yellow discharge until it ends  Follow up with your obstetrician as directed:  Do not have sex until your obstetrician says it is okay  Write down your questions so you remember to ask them during your visits  Contact your healthcare provider or obstetrician if:   Your bleeding increases, or you have heavy bleeding that soaks a pad in 1 hour for 2 hours in a row  You pass large blood clots  You are breathing faster than normal, or your heart is beating faster than normal     You are urinating less than usual, or not at all  You feel dizzy  You have questions or concerns about your condition or care  Seek immediate care or call 911 if:   You are suddenly short of breath and feel lightheaded  You have sudden chest pain  © 2017 [address] Information is for End User's use only and may not be sold, redistributed or otherwise used for commercial purposes  All illustrations and images included in CareNotes® are the copyrighted property of A D [employer] M [employer]  or [employer]  The above information is an  only  It is not intended as medical advice for individual conditions or treatments  Talk to your doctor, nurse or pharmacist before following any medical regimen to see if it is safe and effective for you

## 2017-12-21 NOTE — DISCHARGE INSTRUCTIONS
Pregnancy at [address] to [address]:   You are considered full term at the beginning of 37 weeks  Your breathing may be easier if your baby has moved down into a head-down position  You may need to urinate more often because the baby may be pressing on your bladder  You may also feel more discomfort and get tired easily  DISCHARGE INSTRUCTIONS:   Seek care immediately if:   · You develop a severe headache that does not go away  · You have new or increased vision changes, such as blurred or spotted vision  · You have new or increased swelling in your face or hands  · You have vaginal spotting or bleeding  · Your water broke or you feel warm water gushing or trickling from your vagina  Contact your healthcare provider if:   · You have more than 5 contractions in 1 hour  · You notice any changes in your baby's movements  · You have abdominal cramps, pressure, or tightening  · You have a change in vaginal discharge  · You have chills or a fever  · You have vaginal itching, burning, or pain  · You have yellow, green, white, or foul-smelling vaginal discharge  · You have pain or burning when you urinate, less urine than usual, or pink or bloody urine  · You have questions or concerns about your condition or care  How to care for yourself at this stage of your pregnancy:   · Eat a variety of healthy foods  Healthy foods include fruits, vegetables, whole-grain breads, low-fat dairy foods, beans, lean meats, and fish  Drink liquids as directed  Ask how much liquid to drink each day and which liquids are best for you  Limit caffeine to less than 200 milligrams each day  Limit your intake of fish to 2 servings each week  Choose fish low in mercury such as canned light tuna, shrimp, salmon, cod, or tilapia  Do not  eat fish high in mercury such as swordfish, tilefish, manpreet mackerel, and shark  · Take prenatal vitamins as directed    Your need for certain vitamins and minerals, such as folic acid, increases during pregnancy  Prenatal vitamins provide some of the extra vitamins and minerals you need  Prenatal vitamins may also help to decrease the risk of certain birth defects  · Rest as needed  Put your feet up if you have swelling in your ankles and feet  · Do not smoke  If you smoke, it is never too late to quit  Smoking increases your risk of a miscarriage and other health problems during your pregnancy  Smoking can cause your baby to be born too early or weigh less at birth  Ask your healthcare provider for information if you need help quitting  · Do not drink alcohol  Alcohol passes from your body to your baby through the placenta  It can affect your baby's brain development and cause fetal alcohol syndrome (FAS)  FAS is a group of conditions that causes mental, behavior, and growth problems  · Talk to your healthcare provider before you take any medicines  Many medicines may harm your baby if you take them when you are pregnant  Do not take any medicines, vitamins, herbs, or supplements without first talking to your healthcare provider  Never use illegal or street drugs (such as marijuana or cocaine) while you are pregnant  · Talk to your healthcare provider before you travel  You may not be able to travel in an airplane after 36 weeks  He may also recommend that you avoid long road trips  Safety tips:   · Avoid hot tubs and saunas  Do not use a hot tub or sauna while you are pregnant, especially during your first trimester  Hot tubs and saunas may raise your baby's temperature and increase the risk of birth defects  · Avoid toxoplasmosis  This is an infection caused by eating raw meat or being around infected cat feces  It can cause birth defects, miscarriages, and other problems  Wash your hands after you touch raw meat  Make sure any meat is well-cooked before you eat it  Avoid raw eggs and unpasteurized milk   Use gloves or ask someone else to clean your cat's litter box while you are pregnant  · Ask your healthcare provider about travel  The most comfortable time to travel is during the second trimester  Ask your healthcare provider if you can travel after 36 weeks  You may not be able to travel in an airplane after 36 weeks  He may also recommend that you avoid long road trips  Changes that are happening with your baby:  By 38 weeks, your baby may weigh between 6 and 9 pounds  Your baby may be about 14 inches long from the top of the head to the rump (baby's bottom)  Your baby hears well enough to know your voice  As your baby gets larger, you may feel fewer kicks and more stretching and rolling  Your baby may move into a head-down position  Your baby will also rest lower in your abdomen  What you need to know about prenatal care: Your healthcare provider will check your blood pressure and weight  You may also need the following:  · A urine test  may also be done to check for sugar and protein  These can be signs of gestational diabetes or infection  Protein in your urine may also be a sign of preeclampsia  Preeclampsia is a condition that can develop during week 20 or later of your pregnancy  It causes high blood pressure, and it can cause problems with your kidneys and other organs  · A blood test  may be done to check for anemia (low iron level)  · A Tdap vaccine  may be recommended by your healthcare provider  · A group B strep test  is a test that is done to check for group B strep infection  Group B strep is a type of bacteria that may be found in the vagina or rectum  It can be passed to your baby during delivery if you have it  Your healthcare provider will take swab your vagina or rectum and send the sample to the lab for tests  · Fundal height  is a measurement of your uterus to check your baby's growth  This number is usually the same as the number of weeks that you have been pregnant   Your healthcare provider may also check your baby's position  · Your baby's heart rate  will be checked  © 2017 [facility] Information is for End User's use only and may not be sold, redistributed or otherwise used for commercial purposes  All illustrations and images included in CareNotes® are the copyrighted property of A D A M , Inc  or Gary Blanco  The above information is an  only  It is not intended as medical advice for individual conditions or treatments  Talk to your doctor, nurse or pharmacist before following any medical regimen to see if it is safe and effective for you

## 2017-12-21 NOTE — ANESTHESIA PROCEDURE NOTES
Epidural Block    Patient location during procedure: OB  Start time: 12/21/2017 2:23 PM  Reason for block: at surgeon's request and primary anesthetic  Staffing  Anesthesiologist: Amparo Lackey  Performed: anesthesiologist   Preanesthetic Checklist  Completed: patient identified, site marked, surgical consent, pre-op evaluation, timeout performed, IV checked, risks and benefits discussed and monitors and equipment checked  Epidural  Patient position: sitting  Prep: Betadine  Patient monitoring: frequent blood pressure checks  Approach: midline  Location: lumbar (1-5)  Injection technique: JULISSA saline  Needle  Needle type: Tuohy   Needle gauge: 18 G  Catheter type: side hole  Catheter size: 20 G  Test dose: negative and lidocaine 1 5% with epinephrine 1-to-200,000  Assessment  Sensory level: [id number] aspiration for CSF, negative aspiration for heme and no paresthesia on injection  patient tolerated the procedure well with no immediate complications

## 2017-12-21 NOTE — DISCHARGE SUMMARY
Discharge Summary - OB/GYN  Balta Ye 27 y o  female MRN: [id number]  Unit/Bed#: L&D 324-01 Encounter: [id number]    Admission Date: 2017     Discharge Date: 2017    Admitting / Delivery Attending: Adamaris Kowalski MD   Discharge Attending: Jorge Alberto Crook MD    Principal Diagnosis: Pregnancy at 34w3d    Secondary Diagnosis: Known lethal fetal anomalies (renal agenesis, anhydramnios, pulmonary hypoplasia) ; Breech presentation    Procedures: spontaneous vaginal delivery    Anesthesia: epidural    Hospital course: Balta Ye is a 27 y o  [id number] at 34w3d who was initially admitted for labor  SVE on admission 10/100/+1     She delivered a viable female  on 17 at 1543  Weight 6lbs 5oz via breech vaginal delivery  The  passed away @1754  She sustained right periurethral abrasion which did not require repair  Her post-delivery course was uncomplicated  Case management consult was ordered  Her postpartum pain was well controlled with oral analgesics  On day of discharge, she was ambulating and able to reasonably perform all ADLs  She was voiding and had appropriate bowel function  Pain was well controlled  She was discharged home on postpartum day #1 without complications  Patient was instructed to follow up with her OB as an outpatient and was given appropriate warnings to call provider if she develops signs of infection or uncontrolled pain  Complications: none apparent    Condition at discharge: stable     Discharge instructions/Information to patient and family:   See after visit summary for information provided to patient and family  Provisions for Follow-Up Care:  See after visit summary for information related to follow-up care and any pertinent home health orders  Disposition: See After Visit Summary for discharge disposition information  Planned Readmission: No    Discharge Medications:   For a complete list of the patient's medications, please refer to her med rec      Elidia Kern DO  PGY-1 OB/GYN   12/22/2017 9:10 AM

## 2017-12-21 NOTE — ANESTHESIA PREPROCEDURE EVALUATION
Review of Systems/Medical History          Cardiovascular   Pulmonary       GI/Hepatic            Endo/Other  History of thyroid disease , hypothyroidism,      GYN       Hematology   Musculoskeletal       Neurology   Psychology   Anxiety, Depression ,            Physical Exam    Airway    Mallampati score: I  TM Distance: >3 FB  Neck ROM: full     Dental       Cardiovascular  Cardiovascular exam normal    Pulmonary  Pulmonary exam normal     Other Findings        Anesthesia Plan  ASA Score- 2     Anesthesia Type- epidural with ASA Monitors  Additional Monitors:   Airway Plan:         Plan Factors-    Induction-     Postoperative Plan-     Informed Consent- Anesthetic plan and risks discussed with patient

## 2017-12-22 VITALS
HEIGHT: 67 IN | WEIGHT: 238 LBS | BODY MASS INDEX: 37.35 KG/M2 | TEMPERATURE: 97.4 F | RESPIRATION RATE: 16 BRPM | DIASTOLIC BLOOD PRESSURE: 65 MMHG | HEART RATE: 81 BPM | SYSTOLIC BLOOD PRESSURE: 110 MMHG

## 2017-12-22 LAB — RPR SER QL: NORMAL

## 2017-12-22 RX ORDER — LEVOTHYROXINE SODIUM 0.1 MG/1
100 TABLET ORAL
Qty: 30 TABLET | Refills: 1 | Status: SHIPPED | OUTPATIENT
Start: 2017-12-23 | End: 2018-07-04

## 2017-12-22 RX ORDER — ACETAMINOPHEN AND CODEINE PHOSPHATE 120; 12 MG/5ML; MG/5ML
1 SOLUTION ORAL DAILY
Qty: 28 TABLET | Refills: 3 | Status: SHIPPED | OUTPATIENT
Start: 2017-12-22 | End: 2017-12-22

## 2017-12-22 RX ORDER — ACETAMINOPHEN AND CODEINE PHOSPHATE 120; 12 MG/5ML; MG/5ML
1 SOLUTION ORAL DAILY
Qty: 28 TABLET | Refills: 2 | Status: SHIPPED | OUTPATIENT
Start: 2017-12-22 | End: 2018-01-24

## 2017-12-22 RX ADMIN — IBUPROFEN 600 MG: 600 TABLET, FILM COATED ORAL at 05:12

## 2017-12-22 RX ADMIN — LEVOTHYROXINE SODIUM 150 MCG: 50 TABLET ORAL at 06:30

## 2017-12-22 NOTE — PLAN OF CARE
COPING     Pt/Family able to verbalize concerns and demonstrate effective coping strategies Progressing     Will report anxiety at manageable levels Progressing        DISCHARGE PLANNING     Discharge to home or other facility with appropriate resources Progressing        INFECTION - ADULT     Absence or prevention of progression during hospitalization Progressing        PAIN - ADULT     Verbalizes/displays adequate comfort level or baseline comfort level Progressing

## 2017-12-22 NOTE — PROGRESS NOTES
Progress Note - OB/GYN   Elsy Vargas 27 y o  female MRN: [id number]  Unit/Bed#: L&D 324-01 Encounter: [id number]    Elsy Vargas is a patient of [facility]    Assessment:  Post partum day #1 s/p  (known lethal fetal anomaly; Potter sequence), stable, and doing well    Plan:  1  Continue routine post partum care   - Encourage ambulation   - Encourage breastfeeding  2  Continue current meds   - See list below   - Pain well controlled with oral analgesics  3  Social work   - Inpatient consult to case management   - Pending review  4  Future contraception   - Patient desires Micronor, considering permanent sterilization tubal  5  Disposition   - Stable, vitals WNL, s/p Gentamicin/Ampicillin for suspected chorioamnionitis (noticeable prurulent discharge at delivery)    - Anticipate discharge home after seen by case management      Subjective/Objective     Chief Complaint:     Post partum day 1 from a  with no complaints today; pt denies suicidal/homicidal ideation, symptoms of depression, is eating and drinking well, otherwise feeling well    is in room with patient  Patient states she has made  plans already      Subjective:   Pain: no  Tolerating Oral Intake: yes  Voiding: yes  Flatus: yes  Bowel Movement: no  Ambulating: yes  Breastfeeding: n/a  Chest Pain: no  Shortness of Breath: no  Leg Pain/Discomfort: no  Lochia: minimal    Objective:   Vitals:    17 0045   BP: 116/57   Pulse: 74   Resp: 16   Temp: (!) 97 4 °F (36 3 °C)       Intake/Output Summary (Last 24 hours) at 17 0630  Last data filed at 17 0501   Gross per 24 hour   Intake                0 ml   Output             1500 ml   Net            -1500 ml       Physical Exam:  General: in no apparent distress, well developed and well nourished and non-toxic  Cardiovascular: Cor RRR  Lungs: clear to auscultation bilaterally  Abdomen: abdomen is soft without significant tenderness, masses, organomegaly or guarding  Fundus: Firm and non-tender, 2 below the umbilicus  Lower extremeties: nontender    Lexx Schilling,   PGY-1 OB/GYN   12/22/2017 6:30 AM

## 2017-12-26 ENCOUNTER — GENERIC CONVERSION - ENCOUNTER (OUTPATIENT)
Dept: OTHER | Facility: OTHER | Age: 32
End: 2017-12-26

## 2018-01-10 ENCOUNTER — GENERIC CONVERSION - ENCOUNTER (OUTPATIENT)
Dept: OTHER | Facility: OTHER | Age: 33
End: 2018-01-10

## 2018-01-10 NOTE — MISCELLANEOUS
Reason For Visit  Reason For Visit Free Text Note Form: Pregnancy with genetic abnormalities  Case Management Documentation [facility]:   Information obtained from the patient and patient's spouse  She is also dealing with additional issues such as family crisis  Patient is participating in [place] [employer]  Action Plan: supportive counseling/advocacy and information provided  plan reviewed  Progress Note  Met with pt and FOB  Pt  reports that after her level 2 U/S she was advised that her fetus has no kidneys or amniotic fluid and likely will die shortly after birth  Pt  is appropriately tearful during interview  She reports she is feeling angry and frustrated as she has been told that it is too late in her pregnancy to abort the baby and she will have to carry to full term and deliver her baby  Pt  reports she is seeking a second opinion regarding her situation  She reports that she is feeling depressed and anxious in regard to her situation and FOB reports this is affecting him and their other 6 children  Reviewed outpatient counseling as an option and pt  is agreeable and reports she has been previously involved with [facility] and will contact her counselor to resume treatment  FOB reports he will do the same and he has been involved with [facility] services  Pt  reports she has also alerted her children's school and her children will be receiving additional support at this time  Supportive counseling provided to pt  She denies further needs at this time  Will continue to be available to provide support  Active Problems    1  Abnormal Papanicolaou smear of cervix (795 00) (R87 619)   2  Anhydramnios (658 00) (O41 00X0)   3  Anxiety (300 00) (F41 9)   4  Anxiety and depression (300 00,311) (F41 8)   5  Cervical dysplasia (622 10) (N87 9)   6  Cervical intraepithelial neoplasia I (622 11) (N87 0)   7  Chlamydial infection (079 98) (A74 9)   8   Depression (311) (F32 9)   9  Encounter for fetal anatomic survey (V28 81) (Z36 89)   10  Fetal renal anomaly (655 80) (O35 8XX0)   11  Finding of frequency of urination (788 41) (R35 0)   12  Hashimoto's thyroiditis (245 2) (E06 3)   13  History of dysthymic disorder (V11 9) (Z86 59)   14  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)   15  Hyperglycemia (790 29) (R73 9)   16  Hypothyroidism (244 9) (E03 9)   17  Iron deficiency anemia (280 9) (D50 9)   18  Medication management (V58 69) (Z79 899)   19  Mitral valve prolapse (424 0) (I34 1)   20  Obesity complicating pregnancy, childbirth, or puerperium, antepartum (649 13,278 00)    (O99 210,E66 9)   21  Oligohydramnios in galvez pregnancy in second trimester (658 03) (O41 02X0)   22  Personal history of nicotine dependence (V15 82) (Z87 891)   23  Pregnancy Complicated By Thyroid Dysfunction - Antepartum Condition Or Prior    Complicated Delivery (048 02)   24  Pregnancy, obstetrical care (V22 1) (Z34 90)   25  Prenatal care in second trimester (V22 1) (Z34 92)   26  Renal agenesis, fetal, affecting care of mother, antepartum (655 83) (O35 8XX0)   32  Schlatter-Osgood disease (732 4) (M92 50)   28  Second trimester pregnancy (V22 2) (Z34 92)   29  Thyroid nodule (241 0) (E04 1)   30  Tuberculosis (011 90) (A15 9)   31  Urogenital trichomoniasis (131 00) (A59 00)   32  Visit: Prenatal Exam High-risk W/ History Of Pre-term Labor (V23 41)   33  Vulvovaginitis candida albicans (112 1) (B37 3)    Current Meds   1  [facility] Miconazole 7 2 % Vaginal Cream; INSERT 1 APPLICATORFUL INTRAVAGINALLY AT   BEDTIME NIGHTLY; Therapy: 52AYL5179 to ([phone])  Requested for: 19Oct2017; Last   Rx:19Oct2017 Ordered   2  Escitalopram Oxalate 5 MG Oral Tablet (Lexapro); TAKE 1 TABLET DAILY; Therapy: 76FDC3827 to (Evaluate:11Nov2017)  Requested for: 65LZP0865; Last   Rx:12Oct2017 Ordered   3  LORazepam 0 5 MG Oral Tablet; TAKE 1 TABLET 3 TIMES DAILY AS NEEDED;    Therapy: 89Tvb3300 to (03 17 74 30 53); Last Rx:87Eph2107 Ordered   4  Prenatal Vitamins TABS; Therapy: (Recorded:44Xod6017) to Recorded   5  Synthroid 150 MCG Oral Tablet (Levothyroxine Sodium); Therapy: (Netoneas Migdalia) to Recorded   6  Synthroid 75 MCG Oral Tablet (Levothyroxine Sodium); Therapy: 40RII4822 to (Last Rx:17Oct2011)  Requested for: 17Oct2011 Ordered    Allergies    1  Bactrim TABS   2  Bactrim DS TABS    3  Seasonal   4  Shellfish   5   Shellfish    Future Appointments    Date/Time Provider Specialty Site   11/16/2017 10:00 AM [facility] [address]     Signatures   Electronically signed by : ANT Faulkner; Oct 19 2017 12:14PM EST                       (Author)

## 2018-01-10 NOTE — MISCELLANEOUS
Message   Date: 09 Jun 2017 12:48 PM EST, Recorded By: Dale Grace For: Alejandro Pittman: Patrick Cantu, Self   Phone: [phone] (Home)   Reason: Other   New patient called to report +HPT, LMP 4/24/17, Gvii Pvi  taking PNV  Takes Lexapro for anxiety  Advised check with PCP  Has Hashimoto's syndrome but already informed endocrinologist about +HPT and had her medication dose adjusted  Advised transfer records from prior pregnancies  Advised schedule US and cc for end of June  Active Problems    1  Abnormal Papanicolaou smear of cervix (795 00) (R87 619)   2  Anemia complicating childbirth (648 21,285 9) (O99 02)   3  Anxiety (300 00) (F41 9)   4  Cervical dysplasia (622 10) (N87 9)   5  Cervical intraepithelial neoplasia I (622 11) (N87 0)   6  Chlamydial infection (079 98) (A74 9)   7  Chronic rhinitis (472 0) (J31 0)   8  Depression (311) (F32 9)   9  Fetal anomaly (759 7) (Q89 7)   10  Gestational diabetes (648 80) (O24 419)   11  History of dysthymic disorder (V11 9) (Z86 59)   12  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)   13  Hyperglycemia (790 29) (R73 9)   14  Hypothyroidism (244 9) (E03 9)   15  Iron deficiency anemia (280 9) (D50 9)   16  Mitral valve prolapse (424 0) (I34 1)   17  Personal history of nicotine dependence (V15 82) (Z87 891)   18  Schlatter-Osgood disease (732 4) (M92 50)   19  Single current episode of major depressive disorder, unspecified depression episode    severity (296 20) (F32 9)   20  Thyroid nodule (241 0) (E04 1)   21  Tuberculosis (011 90) (A15 9)   22  Urogenital trichomoniasis (131 00) (A59 00)    Current Meds   1  Levothyroxine Sodium 125 MCG Oral Tablet; TAKE 1 TABLET DAILY AS DIRECTED; Therapy: 01BCM2146 to (Evaluate:74Ctn2924)  Requested for: 88NXX3538; Last   Rx:14Zmn7666 Ordered   2  Mirtazapine 7 5 MG Oral Tablet; TAKE 1 TABLET AT BEDTIME;    Therapy: 37TMA6069 to (Evaluate:25Jun2017)  Requested for: 29VQK8253; Last UF:71IIZ7775 Ordered    Allergies    1  Bactrim TABS    2   Shellfish    Signatures   Electronically signed by : Gary Mcmullen, ; Jun 9 2017 12:54PM EST                       (Author)

## 2018-01-11 NOTE — MISCELLANEOUS
Message  NICU consult made for Tuesday 11/21 around 2 pm following appointment at [facility]  Patient notified and records faxed to [facility] [phone]      Active Problems    1  Abnormal Papanicolaou smear of cervix (795 00) (R87 619)   2  Anhydramnios (658 00) (O41 00X0)   3  Anxiety (300 00) (F41 9)   4  Anxiety and depression (300 00,311) (F41 8)   5  Cervical dysplasia (622 10) (N87 9)   6  Cervical intraepithelial neoplasia I (622 11) (N87 0)   7  Chlamydial infection (079 98) (A74 9)   8  Depression (311) (F32 9)   9  Encounter for fetal anatomic survey (V28 81) (Z36 89)   10  Fetal renal anomaly (655 80) (O35 8XX0)   11  Finding of frequency of urination (788 41) (R35 0)   12  Hashimoto's thyroiditis (245 2) (E06 3)   13  History of dysthymic disorder (V11 9) (Z86 59)   14  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)   15  Hyperglycemia (790 29) (R73 9)   16  Hypothyroidism (244 9) (E03 9)   17  Iron deficiency anemia (280 9) (D50 9)   18  Medication management (V58 69) (Z79 899)   19  Mitral valve prolapse (424 0) (I34 1)   20  Obesity complicating pregnancy, childbirth, or puerperium, antepartum (649 13,278 00)    (O99 210,E66 9)   21  Oligohydramnios in galvez pregnancy in second trimester (658 03) (O41 02X0)   22  Personal history of nicotine dependence (V15 82) (Z87 891)   23  Pregnancy Complicated By Thyroid Dysfunction - Antepartum Condition Or Prior    Complicated Delivery (632 91)   24  Pregnancy, obstetrical care (V22 1) (Z34 90)   25  Prenatal care in second trimester (V22 1) (Z34 92)   26  Prenatal care in third trimester (V22 1) (Z34 93)   27  Renal agenesis, fetal, affecting care of mother, antepartum (655 83) (O35 8XX0)   29  Schlatter-Osgood disease (732 4) (M92 50)   29  Second trimester pregnancy (V22 2) (Z34 92)   30  Thyroid nodule (241 0) (E04 1)   31  Tuberculosis (011 90) (A15 9)   32  Urogenital trichomoniasis (131 00) (A59 00)   33   Visit: Prenatal Exam High-risk W/ History Of Pre-term Labor (V23 41)   34  Vulvovaginitis candida albicans (112 1) (B37 3)    Current Meds   1  CVS Miconazole 7 2 % Vaginal Cream; INSERT 1 APPLICATORFUL INTRAVAGINALLY   AT BEDTIME NIGHTLY; Therapy: 31RBQ7262 to [phone])  Requested for: 19Oct2017; Last   Rx:19Oct2017 Ordered   2  Escitalopram Oxalate 5 MG Oral Tablet (Lexapro); TAKE 1 TABLET DAILY; Therapy: 19WHX0680 to (Evaluate:13Tzl6727)  Requested for: 17HBA6697; Last   Rx:07Nov2017 Ordered   3  Oleksandr-Sequels 65-25 MG Oral Tablet Extended Release; Take 1 tablet daily; Therapy: 38HQZ4177 to ([phone])  Requested for: 08BTF8017; Last   Rx:31Oct2017 Ordered   4  LORazepam 0 5 MG Oral Tablet; TAKE 1 TABLET 3 TIMES DAILY AS NEEDED; Therapy: 31Vrj3023 to (Evaluate:28Oct2017); Last Rx:94Kam3139 Ordered   5  LORazepam 1 MG Oral Tablet (Ativan); TAKE 1 TABLET 3 TIMES DAILY AS NEEDED; Therapy: 00BYW6268 to (Evaluate:17Nov2017); Last Rx:07Nov2017 Ordered   6  Prenatal Vitamins TABS; Therapy: (Recorded:02Apr2014) to Recorded   7  Synthroid 150 MCG Oral Tablet (Levothyroxine Sodium); Therapy: (Chani Cure) to Recorded   8  Synthroid 75 MCG Oral Tablet (Levothyroxine Sodium); Therapy: 10CVB6391 to (Last Rx:17Oct2011)  Requested for: 17Oct2011 Ordered    Allergies    1  Bactrim TABS   2  Bactrim DS TABS    3  Seasonal   4  Shellfish   5   Shellfish    Signatures   Electronically signed by : Mynor Rao OM; Nov 14 2017  1:19PM EST                       (Author)

## 2018-01-12 VITALS
BODY MASS INDEX: 33.49 KG/M2 | HEIGHT: 68 IN | WEIGHT: 221 LBS | DIASTOLIC BLOOD PRESSURE: 85 MMHG | SYSTOLIC BLOOD PRESSURE: 138 MMHG

## 2018-01-12 VITALS
WEIGHT: 225 LBS | HEIGHT: 68 IN | SYSTOLIC BLOOD PRESSURE: 134 MMHG | DIASTOLIC BLOOD PRESSURE: 81 MMHG | BODY MASS INDEX: 34.1 KG/M2

## 2018-01-12 VITALS
SYSTOLIC BLOOD PRESSURE: 130 MMHG | HEIGHT: 68 IN | DIASTOLIC BLOOD PRESSURE: 78 MMHG | BODY MASS INDEX: 33.49 KG/M2 | WEIGHT: 221 LBS

## 2018-01-12 NOTE — PROGRESS NOTES
Preliminary Nursing Report           Patient Information   Initial Encounter Entry Date:   10/2/2017 4:23 PM EST (Automated Transmission Automated Transmission)     Last Modified:   {Mely Jc}          Legal Name: Lorenzo Justin      Social  Number:      YOB: 1985      Age (years): 27      Gender: F      Body Mass Index (BMI): 34 kg/m2      Height: 67 in  Weight: 220 lbs (100 kgs)        Address:   [address] [place] [address]           Phone: [phone]   (consent to leave messages)      Email:      Ethnicity: Decline to State      Nondenominational:      Marital Status:      Preferred Language: English      Race: Other Race              Patient Insurance Information      Primary Insurance InformationCarrier Name: {Primary  CarrierName}        Carrier Address:   {Primary  CarrierAddress}           Carrier Phone: {Primary  CarrierPhone}        Group Number: {Primary  GroupNumber}        Policy Number: {Primary  PolicyNumber}        Insured Name: {Primary  InsuredName}        Insured : {Primary  InsuredDOB}        Relationship to Insured: {Primary  RelationshiptoInsured}          Secondary Insurance InformationCarrier Name: {Secondary  CarrierName}        Carrier Address:   {Secondary  CarrierAddress}           Carrier Phone: {Secondary  CarrierPhone}        Group Number: {Secondary  GroupNumber}        Policy Number: {Secondary  PolicyNumber}        Insured Name: {Secondary  InsuredName}        Insured : {Secondary  InsuredDOB}        Relationship to Insured: {Secondary  RelationshiptoInsured}                Health Profile   Booking #:   Jasen Cha #: [phone]           DOS: 10/05/2017    Surgery : Treatment of missed , completed surgically; second trimester     Add'l Procedures/Notes:     Surgery Risk: Minor       Precautions   Mitral valve prolapse               Allergies   Bactrim DS TABS     Clinical Comments: Reaction Type: , Reaction: , Severity:      Bactrim TABS     Clinical Comments: Reaction Type: , Reaction: , Severity:      Seasonal     Clinical Comments: Reaction Type: , Reaction: , Severity:      Shellfish          Medications   Iron TABS     LORazepam 0 5 MG Oral Tablet     Prenatal Vitamins TABS     Protonix 40 MG Oral Tablet Delayed Release     Sertraline HCl - 25 MG Oral Tablet     Synthroid 150 MCG Oral Tablet     Synthroid 75 MCG Oral Tablet            Conditions   Abnormal Papanicolaou smear of cervix     Anhydramnios     Anxiety     Anxiety and depression     Cervical intraepithelial neoplasia I     Chlamydial infection     Depression     Encounter for fetal anatomic survey     Fetal renal anomaly     Finding of frequency of urination     History of dysthymic disorder     HSV-2 seropositive     Hyperglycemia     Hypothyroidism     Iron deficiency anemia     Mitral valve prolapse     Obesity complicating pregnancy, childbirth, or puerperium, antepartum     Oligohydramnios in galvez pregnancy in second trimester     Personal history of nicotine dependence     Pregnancy Complicated By Thyroid Dysfunction - Antepartum Condition Or Prior Complicated Delivery     Pregnancy, obstetrical care     Renal agenesis, fetal, affecting care of mother, antepartum     Schlatter-Osgood disease     Second trimester pregnancy     Thyroid nodule     Tuberculosis     Urogenital trichomoniasis     Visit: Prenatal Exam High-risk W/ History Of Pre-term Labor            Family History   None          Surgical History   None          Social History   Former smoker     No alcohol use     No illicit drug use                       Patient Instructions     Medical Procedure Risk  NPO Instructions   The day before surgery it is recommended to have a light dinner at your usual time and you are allowed a light snack  early in the evening  Do not eat anything heavy or eat a big meal after 7pm  Do not eat or drink anything after midnight prior to your surgery   If you are supposed to take any of your medications, do so with a sip of water  Failure to follow these instructions  can lead to an increased risk of lung complications and may result in a delay or cancellation of your procedure  If you have any questions, contact your institution for further instructions  No candy, no gum, no mints, no chewing tobacco        LORazepam 0 5 MG Oral Tablet  Medication Instruction (Benzodiazepine)  15  Please continue the following medications, if needed, up to and including the day of surgery (with a sip of water)  Protonix 40 MG Oral Tablet Delayed Release  Medication Instruction (Antacids)  34, 35  Please continue to take this medication on your normal schedule  If this is an oral  medication and you take in the morning, you may do so with a sip of water  Sertraline HCl - 25 MG Oral Tablet  Medication Instruction (SSRI - Antidepressants)  80  Please continue to take this medication on your normal schedule  If this is an oral medication and you take in the morning, you may do so with a sip of water  Synthroid 150 MCG Oral Tablet  Medication Instruction (Thyroxine - Synthroid)   Please continue to take this medication on your normal schedule  If this is  an oral medication and you take in the morning, you may do so with a sip of water  Testing Considerations     No recommendations for this classification  Consultations      Cardiac Consult (Major Valve)   If the patient has moderate or severe valvular stenosis or regurgitation then  a cardiac consult is indicated   It is recommended that the patient undergo a preoperative echocardiography if there has been either:  1) no prior echocardiography within 1 year or   2) a significant change in clinical status  or physical examination since last evaluation   - For adults who meet standard indications for valvular intervention (replacement and repair) on the basis of symptoms and severity of stenosis or regurgitation, valvular intervention  before elective non-cardiac surgery is effective in reducing perioperative risk  - appropriate intraoperative and postoperative hemodynamic monitoring is reasonable in adults with asymptomatic severe valvular disease   - Antibiotic  prophylaxis will likely be required  Triggered by: Mitral valve prolapse      Pregnancy   Patient should be evaluated by OB/GYN in order to determine readiness and optimal timing of procedure  Special precautions may also be required, including fetal heart monitoring  Triggered by: Oligohydramnios in galvez pregnancy in second trimester, Anhydramnios, Renal agenesis, fetal, affecting care of mother, antepartum, Fetal renal anomaly        Miscellaneous Questions      Question: Are you able to walk up a flight of stairs, walk up a hill or do heavy housework WITHOUT having chest pain or shortness of breath? Answer: YES             Allergies/Conditions/Medications Not Found      The following were not recognized by our system when generating the recommendations  Please consider if this would impact any preoperative protocols  No alcohol use      No illicit drug use      Pregnancy Complicated By Thyroid Dysfunction - Antepartum Condition Or Prior Complicated Delivery      Visit: Prenatal Exam High-risk W/ History Of Pre-term Labor      Iron TABS      Prenatal Vitamins TABS      Synthroid 75 MCG Oral Tablet             Appointment Information      Date:    10/05/2017      Location:    [place]      Address:         Directions:                 Footnotes revision 14     Denotes a free-text entry  Legal Disclaimer: Any and all recommendations and services provided herein are designed to assist in the preoperative care of the patient  Nothing contained herein is designed to replace,  eliminate or alleviate the responsibility of the attending physician to supervise and determine the patients preoperative care and course of treatment   Failure to provide complete, accurate information may negatively impact the system s ability to recommend the proper preoperative protocol  THE ATTENDING PHYSICIAN IS RESPONSIBLE TO REVIEW THE SUGGESTED PREOPERATIVE PROTOCOLS/COURSE OF TREATMENT AND PRESCRIBE THE FINAL COURSE OF PREOPERATIVE TREATMENT IN CONSULTATION  WITH THE PATIENT  THE ePREOP SYSTEM AND ITS MATERIALS ARE PROVIDED AS IS WITHOUT WARRANTY OF ANY KIND, EXPRESS OR IMPLIED, INCLUDING, BUT NOT LIMITED TO, WARRANTIES OF PERFORMANCE OR MERCHANTABILITY OR FITNESS FOR  A PARTICULAR PURPOSE  PATIENT AND PHYSICIANS HEREBY AGREE THAT THEIR USE OF THE MATERIALS AND RESOURCES ACT AS A CONSENT TO RELEASE AND WAIVE ePREOP FROM ANY AND ALL CLAIMS OF WARRANTY, TORT OR CONTRACT LAW OF ANY KIND  Electronically signed Benigno WETZEL    Oct  4 2017  1:42PM EST Acknowledgement

## 2018-01-12 NOTE — PROGRESS NOTES
OCT 25 2017         RE: Alize Suarez                                   To: [address]   MR#: [id number]                                     [address] [place] [address]   : [address] [place] [address]  ENC: [id number]                             [place], [address]   (Exam #: [id number])                           Fax: [phone]      The LMP of this 27year old,  G8, P5-1-1-6 patient was 2017, giving   her an DALI of 2018 and a current gestational age of 29 weeks 2 days   by dates  A sonographic examination was performed on OCT 25 2017 using   real time equipment  The ultrasound examination was performed using   abdominal technique  The patient has a BMI of 34 2  Her blood pressure   today was 134/81  Earliest US on record: [facility] 17      Cardiac motion was observed at 129 bpm       INDICATIONS      oligohydramnios   renal anomaly      Exam Types      Level I      RESULTS      Fetus # 1 of 1   Breech presentation   Fetal growth appeared normal   Placenta Location = Anterior   No placenta previa   Placenta Grade = I      MEASUREMENTS (* Included In Average GA)      AC              22 3 cm        26 weeks 4 days* (52%)   BPD              6 6 cm        26 weeks 4 days* (48%)   HC              25 4 cm        27 weeks 2 days* (60%)   Femur            4 8 cm        26 weeks 2 days* (39%)      Cerebellum       3 2 cm        28 weeks 6 days      HC/AC           1 14   FL/AC           0 22   FL/BPD          0 73   EFW (Ac/Fl/Hc)   966 grams - 2 lbs 2 oz                 (52%)      THE AVERAGE GESTATIONAL AGE is 26 weeks 5 days +/- 14 days        AMNIOTIC FLUID         Amniotic Fluid: ABSENT      FETAL VESSELS                                     S/D   PI    RI    PSV   AEDV RF                                                    cm/s       Umbilical Artery           3 04  1 13  0 67       Middle Cerebral Artery     3 95  1 33  0 75 ANATOMY DETAILS      Visualized Appearing Sonographically Normal:   HEAD: (Calvarium, BPD Level, Cavum, Lateral Ventricles, Cerebellum);      TH  CAV : (Diaphragm);    ABD  CAV : (Liver)      Not Visualized:   HEAD: (Choroid Plexus, Cisterna Magna);    STOMACH      Abnormal:   TH  CAV  : (Lungs); HEART: (Four Chamber View)      IMPRESSION      Mireles IUP   26 weeks and 5 days by this ultrasound  (DALI=2018)   Breech presentation   Fetal growth appeared normal   Regular fetal heart rate of 129 bpm   Anhydramnios   Pericardial effusion   Pulmonary hypoplasia   Anterior placenta   No placenta previa      GENERAL COMMENT      Janette presents today for a follow-up ultrasound  She is carrying a fetus   known to have bilateral renal agenesis and anhydramnios  Today's   ultrasounds are consistent with bilateral renal agenesis and anhydramnios  The cardiothoracic ratio was abnormal which is suggestive of evolving   pulmonary hypoplasia  Ingrid Chase had desired a termination of pregnancy but   unfortunately it was not covered by her insurance and she had difficulty   in making that decision by the lawful gestational age of 23 weeks  She is   aware of the availability to seek care at another state with differing   laws  We discussed in detail the potential outcomes with regard to    fetal demise versus  demise  We discussed that this is a lethal   condition and that the  would not be expected to live  In that   regard, we discussed not performing  surveillance or performing a    section  Regardless of fetal position, she should be able to   deliver vaginally  To help the patient better understand and to provide    services, I recommend a  ICU hospice consult  We   discussed the need for further follow-up, and I recommend she follow-up as   clinically indicated  She is well aware and understanding of the   difficult situation        Thank you very much for allowing us to participate in the care of this   very nice patient  Should you have any questions, please do not hesitate   to contact our office  Please note, in addition to the time spent discussing the results of the   ultrasound, I spent approximately 15 minutes of face-to-face time with the   patient, greater than 50% of which was spent in counseling and the   coordination of care for this patient  Ferdinand Severs, R D M S Corrin Jude, M D     Electronically signed 10/26/17 12:15

## 2018-01-13 VITALS
HEIGHT: 68 IN | WEIGHT: 219.25 LBS | TEMPERATURE: 99 F | HEART RATE: 92 BPM | OXYGEN SATURATION: 99 % | BODY MASS INDEX: 33.23 KG/M2

## 2018-01-13 NOTE — MISCELLANEOUS
Message  spoke to patient on the phone, she decided she does not want to have the D&E on Thursday and would like to continue the pregnancy  patient to come in on Thursday 10/5 to have counseling with Dr Lyndsey Bonner    1  Abnormal Papanicolaou smear of cervix (795 00) (R87 619)   2  Anhydramnios (658 00) (O41 00X0)   3  Anxiety (300 00) (F41 9)   4  Anxiety and depression (300 00,311) (F41 8)   5  Cervical dysplasia (622 10) (N87 9)   6  Cervical intraepithelial neoplasia I (622 11) (N87 0)   7  Chlamydial infection (079 98) (A74 9)   8  Depression (311) (F32 9)   9  Encounter for fetal anatomic survey (V28 81) (Z36 89)   10  Fetal renal anomaly (655 80) (O35 8XX0)   11  Finding of frequency of urination (788 41) (R35 0)   12  History of dysthymic disorder (V11 9) (Z86 59)   13  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)   14  Hyperglycemia (790 29) (R73 9)   15  Hypothyroidism (244 9) (E03 9)   16  Iron deficiency anemia (280 9) (D50 9)   17  Mitral valve prolapse (424 0) (I34 1)   18  Obesity complicating pregnancy, childbirth, or puerperium, antepartum (649 13,278 00)    (O99 210,E66 9)   19  Oligohydramnios in galvez pregnancy in second trimester (658 03) (O41 02X0)   20  Personal history of nicotine dependence (V15 82) (Z87 891)   21  Pregnancy Complicated By Thyroid Dysfunction - Antepartum Condition Or Prior    Complicated Delivery (796 16)   22  Pregnancy, obstetrical care (V22 1) (Z34 90)   23  Renal agenesis, fetal, affecting care of mother, antepartum (655 83) (O35 8XX0)   25  Schlatter-Osgood disease (732 4) (M92 50)   25  Second trimester pregnancy (V22 2) (Z34 92)   26  Thyroid nodule (241 0) (E04 1)   27  Tuberculosis (011 90) (A15 9)   28  Urogenital trichomoniasis (131 00) (A59 00)   29  Visit: Prenatal Exam High-risk W/ History Of Pre-term Labor (V23 41)    Current Meds   1  Iron TABS; Therapy: (Recorded:04Jun2014) to Recorded   2   LORazepam 0 5 MG Oral Tablet; TAKE 1 TABLET 3 TIMES DAILY AS NEEDED; Therapy: 30Hme3344 to (Evaluate:28Oct2017); Last Rx:29Aug2017 Ordered   3  Prenatal Vitamins TABS; Therapy: (Recorded:02Apr2014) to Recorded   4  Protonix 40 MG Oral Tablet Delayed Release (Pantoprazole Sodium); Therapy: (Recorded:04Jun2014) to Recorded   5  Sertraline HCl - 25 MG Oral Tablet (Zoloft); TAKE 1 TABLET DAILY AS DIRECTED; Therapy: 29Aug2017 to (Evaluate:27Nov2017)  Requested for: 29Aug2017; Last   Rx:29Aug2017 Ordered   6  Synthroid 150 MCG Oral Tablet (Levothyroxine Sodium); Therapy: ([facility]) to Recorded   7  Synthroid 75 MCG Oral Tablet (Levothyroxine Sodium); Therapy: 42VZJ6625 to (Last Rx:17Oct2011)  Requested for: 17Oct2011 Ordered    Allergies    1  Bactrim TABS   2  Bactrim DS TABS    3  Seasonal   4  Shellfish   5   Shellfish    Signatures   Electronically signed by : Elmira Nicole RN; Oct  3 2017 10:43AM EST                       (Author)

## 2018-01-14 VITALS — HEIGHT: 68 IN | WEIGHT: 221 LBS | BODY MASS INDEX: 33.49 KG/M2

## 2018-01-14 VITALS — WEIGHT: 228 LBS | SYSTOLIC BLOOD PRESSURE: 124 MMHG | DIASTOLIC BLOOD PRESSURE: 79 MMHG | BODY MASS INDEX: 34.67 KG/M2

## 2018-01-14 NOTE — PROGRESS NOTES
SEP 12 2017         RE: Guanaco Gini                                   To: Vik   MR#: [id number]                                     [facility]   :  [address]  ENC: [id number]                             [place], [address]   (Exam #: [id number])                           Fax: [phone]      The LMP of this 27year old,  G8, P5-1-1-6 patient was 2017, giving   her an DALI of 2018 and a current gestational age of 25 weeks 1 day   by dates  A sonographic examination was performed on SEP 12 2017 using   real time equipment  The ultrasound examination was performed using   abdominal & vaginal techniques  The patient has a BMI of 34 6  Her blood   pressure today was 138/85  Earliest US on record: [facility] 17      Cardiac motion was observed at 147 bpm       INDICATIONS      fetal anatomical survey   obesity      Exam Types      LEVEL II      RESULTS      Fetus # 1 of 1   Breech presentation   Fetal growth appeared normal   Placenta Location = Anterior   No placenta previa   Placenta Grade = I      MEASUREMENTS (* Included In Average GA)      AC              14 7 cm        19 weeks 5 days* (44%)   BPD              4 6 cm        19 weeks 6 days* (47%)   HC              18 6 cm        20 weeks 6 days* (70%)   Femur            2 9 cm        19 weeks 0 days* (20%)      Nuchal Fold     10 3 mm      Humerus          3 0 cm        19 weeks 5 days  (46%)      Cerebellum       2 1 cm        20 weeks 5 days   CisternaMagna    5 2 mm      HC/AC           1 27   FL/AC           0 20   FL/BPD          0 64   EFW (Ac/Fl/Hc)   306 grams - 0 lbs 11 oz      THE AVERAGE GESTATIONAL AGE is 19 weeks 6 days +/- 10 days  AMNIOTIC FLUID      Amniotic Fluid: ABSENT      CERVICAL EVALUATION      The cervix appeared normal (Ultrasound Examination)        SUPINE      Cervical Length: 3 70 cm OTHER TEST RESULTS           Funneling?: No             Dynamic Changes?: No        Resp  To TFP?: No                      Debris?: No      ANATOMY      Head                                    Normal   Face/Neck                               Abnormal   Th  Cav  Normal   Heart                                   See Details   Stomach                                 Normal   Right Kidney                            Abnormal   Left Kidney                             Abnormal   Bladder                                 Abnormal   Abd  Wall                               Normal   Spine                                   Not Visualized   Extrems                                 See Details   Genitalia                               Not Visualized   Placenta                                Normal   Umbl  Cord                              Normal   Uterus                                  Normal   PCI                                     Not Visualized      ANATOMY DETAILS      Visualized Appearing Sonographically Normal:   HEAD: (Calvarium, BPD Level, Cavum, Lateral Ventricles, Choroid Plexus,   Cerebellum, Cisterna Magna);    TH  CAV  : (Lungs, Diaphragm); HEART:   (Four Chamber View, Proximal Left Outflow, Proximal Right Outflow, 3   Vessel Trachea, Cardiac Axis, Cardiac Position);    STOMACH, ABD  WALL,   EXTREMS: (Lt Humerus, Rt Humerus, Lt Forearm, Rt Forearm, Lt Femur, Rt   Femur, Lt Low Leg, Rt Low Leg);    PLACENTA, UMBL  CORD, UTERUS      Suboptimally Visualized:   HEART: (3VV)      Not Visualized:   FACE/NECK: (Neck, Profile, Orbits, Nose/Lips, Palate, Face); HEART:   (Short Ensign of Greater Vessels, Ductal Arch, Aortic Arch, Interventricular   Septum, Interatrial Septum, IVC, SVC);    SPINE, EXTREMS: (Lt Hand, Rt   Hand, Lt Foot, Rt Foot);    GENITALIA, PCI      Abnormal:   FACE/NECK: (Nuchal Fold); RIGHT KIDNEY, LEFT KIDNEY, BLADDER      ADNEXA      The left ovary was not visualized  The right ovary was not visualized  IMPRESSION      Mireles IUP   19 weeks and 6 days by this ultrasound  (DALI=2018)   Breech presentation   Fetal growth appeared normal   Regular fetal heart rate of 147 bpm   Anhydramnios   Absent   Prominent nuchal skin fold   Renal agenesis   Renal agenesis   Anterior placenta   No placenta previa      CONSULT COMMENT      Thank you very much for your kind referral of Preston Kim to the [facility] in [facility] on 2017 for level II ultrasound   evaluation and MFM consult  Ajith Ly is a 68-year-old white female  8   para 5116 who is currently at 20-1/7 weeks gestation by an estimated due   date of 2018 which is based upon menstrual dating  With the   exception of a small amount first trimester vaginal bleeding, her prenatal   course has been unremarkable so far  Ajith Ly has no complaints  She reports   fetal movement  She did not have genetic screening obtained earlier in the   pregnancy  Ajith Ly denies leakage of amniotic fluid from the vagina  Ajith Ly has a history of 6 prior vaginal deliveries, 5 at term and the other   at 39 weeks gestation, following apparently uncomplicated prenatal   courses  Each of her children is currently healthy  She also has a history   of one first trimester spontaneous pregnancy loss  She has a history of   depression, Hashimoto's thyroiditis, and an anxiety disorder  Her current   medications include Lexapro, Synthroid, and Ativan  Her past surgical   history is significant for a cholecystectomy  She has allergies to Bactrim   and seafood  She denies tobacco, alcohol, or illicit drug use during the   pregnancy  Her mom has hypertension and thyroid disease  The family   medical history is negative with respect to first degree relatives with   diabetes or venous thromboembolism  The family genetic history is negative   with respect to genetic abnormalities, birth defects, or mental   retardation  Anyhdramnios is present  We cannot identify the fetal kidneys  The bladder   is not identified  The nuchal skin fold thickness is increased at 10 2   mm  No fetal structural abnormality or ultrasound marker for aneuploidy is   otherwise identified in a severely limited study secondary to absent   amniotic fluid volume  Multiple anatomic targets cannot be imaged  The   placenta is normal in appearance  The cervix is normal in appearance by transvaginal sonography  The   cervical length is normal   Cervical debris is not present  Cervical   funneling is not present  Neither provocative nor dynamic change is   appreciated  Today's ultrasound findings and suggested follow-up were discussed in   detail with Janette  We discussed my concerns related to anhydramnios and   suspected bilateral renal agenesis given inability to image the fetal   kidneys and bladder on today's study  We discussed the limitations of the   ultrasound study, including definitive diagnosis of bilateral renal   agenesis, given the presence of anhydramnios  We discussed that, if   present, bilateral renal agenesis is a lethal condition  We discussed my   recommendation for transabdominal amnioinfusion to normalize the amniotic   fluid volume and allow better visualization of fetal anatomy and confirm   whether or not the kidneys are present  Neri Glass has agreed to have   amnioinfusion performed, which has been scheduled for the [address] [place] [address]   with Yobany Sutton on Thursday, September 14, 2017, at 12:00 PM  If   technically possible, a placental biopsy might be helpful at the time of   amnioinfusion to rule out underlying aneuploidy which is not common in   association with bilateral renal agenesis  The increased nuchal skin fold   thickness, however, is associated with an increased risk for aneuploidy,   including Down syndrome        The face to face time, in addition to time spent discussing ultrasound   results, was approximately 15 minutes, greater than 50% of which was spent   during counseling and coordination of care  JANE Savage M D     Maternal-Fetal Medicine   Electronically signed 09/12/17 13:17

## 2018-01-15 NOTE — MISCELLANEOUS
Message   Date: 10 Jul 2017 11:05 AM EST, Recorded By: Jaydon Izquierdo For: Prem Cantor: Lisbeth Casas, Self   Phone: [phone] (Home)   Reason: Medical Complaint   New OB patient called, appointment today  C/o N&V, increased anxiety  Advised discuss with MD today  Active Problems    1  Abnormal Papanicolaou smear of cervix (795 00) (R87 619)   2  Anemia complicating childbirth (648 21,285 9) (O99 02)   3  Anxiety (300 00) (F41 9)   4  Cervical dysplasia (622 10) (N87 9)   5  Cervical intraepithelial neoplasia I (622 11) (N87 0)   6  Chlamydial infection (079 98) (A74 9)   7  Chronic rhinitis (472 0) (J31 0)   8  Depression (311) (F32 9)   9  Fetal anomaly (759 7) (Q89 7)   10  Gestational diabetes (648 80) (O24 419)   11  History of dysthymic disorder (V11 9) (Z86 59)   12  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)   13  Hyperglycemia (790 29) (R73 9)   14  Hypothyroidism (244 9) (E03 9)   15  Iron deficiency anemia (280 9) (D50 9)   16  Mitral valve prolapse (424 0) (I34 1)   17  Personal history of nicotine dependence (V15 82) (Z87 891)   18  Pregnancy (V22 2) (Z33 1)   19  Schlatter-Osgood disease (732 4) (M92 50)   20  Single current episode of major depressive disorder, unspecified depression episode    severity (296 20) (F32 9)   21  Thyroid nodule (241 0) (E04 1)   22  Tuberculosis (011 90) (A15 9)   23  Urogenital trichomoniasis (131 00) (A59 00)    Current Meds   1  Levothyroxine Sodium 125 MCG Oral Tablet; TAKE 1 TABLET DAILY AS DIRECTED; Therapy: 08OEW1453 to (Evaluate:98Udn7132)  Requested for: 35MFQ1627; Last   Rx:[id number] Ordered    Allergies    1  Bactrim TABS    2   Shellfish    Signatures   Electronically signed by : Frank Garay, ; Jul 10 2017 11:06AM EST                       (Author)

## 2018-01-15 NOTE — PROGRESS NOTES
SEP 18 2017         RE: Sandee Mccartney                                   To: Simona Hanks   MR#: [id number]                                     [facility]   :  [address]  ENC: [id number]                             [place], [address]   (Exam #: [id number])                           Fax: [phone]      The LMP of this 27year old,  G8, P5-1-1-6 patient was 2017, giving   her an DALI of 2018 and a current gestational age of 22 weeks 0 days   by dates  A sonographic examination was performed on SEP 18 2017 using   real time equipment  The ultrasound examination was performed using   abdominal technique  The patient has a BMI of 34 6  Her blood pressure   today was 130/78  Earliest US on record: [facility] 17      Beronica Asencio has no complaints today  She denies vaginal bleeding  An attempt at   transabdominal amnioinfusion, scheduled for the indication of anhydramnios   and suspected bilateral renal agenesis, was unsuccessful last week  Cardiac motion was observed at 153 bpm       INDICATIONS      oligohydramnios      Exam Types      Level I      RESULTS      Fetus # 1 of 1   Breech presentation   Placenta Location = Anterior   Placenta Grade = I      AMNIOTIC FLUID      Amniotic Fluid: ABSENT      IMPRESSION      Mireles IUP   Breech presentation   Regular fetal heart rate of 153 bpm   Anhydramnios   Anterior placenta      GENERAL COMMENT      Anhydramnios is present  The anterior placenta is normal in appearance  The kidneys appear absent bilaterally  The bladder is also not identified  Bilateral renal agenesis is suspected  Detailed evaluation of fetal growth   and anatomy was otherwise not performed at the visit today  Today's ultrasound findings were discussed in detail with Janette  She is   aware that bilateral renal agenesis is a lethal condition    She has an   appointment in the [facility] in [facility] later today  At this point,   she is strongly considering pregnancy termination given the recent   ultrasound findings  No further ultrasound appointment has been scheduled   for Janette in the [facility]  pending her discussion at her prenatal   visit later this afternoon  The face to face time, in addition to time spent discussing ultrasound   results, was approximately 10 minutes, greater than 50% of which was spent   during counseling and coordination of care  JANE Larson M D     Maternal-Fetal Medicine   Electronically signed 09/18/17 12:38

## 2018-01-17 NOTE — MISCELLANEOUS
Alex 8,  spoke with patient's insurance and they are willing to cover code [id number] and bill additional codes under that code  Rep said that KCL code was not on the fee schedule but to bill under amnio  Billing code is  and [address] number is [phone] and is good until 11/28/17  Ronald Leigh and patient to be notified  Tentative plan is to perform KCL on Monday at 8:30 in [place] is okay with Dr Viktoriya Barriga  Active Problems    1  Abnormal Papanicolaou smear of cervix (795 00) (R87 619)   2  Anhydramnios (658 00) (O41 00X0)   3  Anxiety (300 00) (F41 9)   4  Anxiety and depression (300 00,311) (F41 8)   5  Cervical dysplasia (622 10) (N87 9)   6  Cervical intraepithelial neoplasia I (622 11) (N87 0)   7  Chlamydial infection (079 98) (A74 9)   8  Depression (311) (F32 9)   9  Encounter for fetal anatomic survey (V28 81) (Z36)   10  Fetal renal anomaly (655 80) (O35 8XX0)   11  Finding of frequency of urination (788 41) (R35 0)   12  History of dysthymic disorder (V11 9) (Z86 59)   13  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)   14  Hyperglycemia (790 29) (R73 9)   15  Hypothyroidism (244 9) (E03 9)   16  Iron deficiency anemia (280 9) (D50 9)   17  Mitral valve prolapse (424 0) (I34 1)   18  Obesity complicating pregnancy, childbirth, or puerperium, antepartum (649 13,278 00)    (O99 210,E66 9)   19  Oligohydramnios in [place] pregnancy in second trimester (658 03) (O41 02X0)   20  Personal history of nicotine dependence (V15 82) (Z87 891)   21  Pregnancy Complicated By Thyroid Dysfunction - Antepartum Condition Or Prior    Complicated Delivery (249 61)   22  Pregnancy, obstetrical care (V22 1) (Z34 90)   23  Renal agenesis, fetal, affecting care of mother, antepartum (655 83) (O35 8XX0)   25  Schlatter-Osgood disease (732 4) (M92 50)   25  Second trimester pregnancy (V22 2) (Z34 92)   26  Thyroid nodule (241 0) (E04 1)   27  Tuberculosis (011 90) (A15 9)   28   Urogenital trichomoniasis (131 00) (A59 00)   29  Visit: Prenatal Exam High-risk W/ History Of Pre-term Labor (V23 41)    Current Meds   1  Iron TABS; Therapy: (Recorded:04Jun2014) to Recorded   2  LORazepam 0 5 MG Oral Tablet; TAKE 1 TABLET 3 TIMES DAILY AS NEEDED; Therapy: 69Tqi4429 to (Evaluate:28Oct2017); Last Rx:38Jhf7053 Ordered   3  Prenatal Vitamins TABS; Therapy: (Recorded:02Apr2014) to Recorded   4  Protonix 40 MG Oral Tablet Delayed Release (Pantoprazole Sodium); Therapy: (Recorded:04Jun2014) to Recorded   5  Sertraline HCl - 25 MG Oral Tablet (Zoloft); TAKE 1 TABLET DAILY AS DIRECTED; Therapy: 35Qeq6691 to (Evaluate:27Nov2017)  Requested for: 29Aug2017; Last   Rx:75Sbv6438 Ordered   6  Synthroid 150 MCG Oral Tablet (Levothyroxine Sodium); Therapy: (Annemarie Marts) to Recorded   7  Synthroid 75 MCG Oral Tablet (Levothyroxine Sodium); Therapy: 51JGD2529 to (Last Rx:17Oct2011)  Requested for: 17Oct2011 Ordered    Allergies    1  Bactrim TABS   2  Bactrim DS TABS    3  Seasonal   4  Shellfish   5   Shellfish    Signatures   Electronically signed by : Bianca Martins OM; Sep 29 2017 12:04PM EST                       (Author)

## 2018-01-17 NOTE — MISCELLANEOUS
Active Problems    1  Abnormal Papanicolaou smear of cervix (795 00) (R87 619)   2  Anhydramnios (658 00) (O41 00X0)   3  Anxiety (300 00) (F41 9)   4  Anxiety and depression (300 00,311) (F41 8)   5  Cervical dysplasia (622 10) (N87 9)   6  Cervical intraepithelial neoplasia I (622 11) (N87 0)   7  Chlamydial infection (079 98) (A74 9)   8  Depression (311) (F32 9)   9  Encounter for fetal anatomic survey (V28 81) (Z36 89)   10  Fetal renal anomaly (655 80) (O35 8XX0)   11  Finding of frequency of urination (788 41) (R35 0)   12  Hashimoto's thyroiditis (245 2) (E06 3)   13  History of dysthymic disorder (V11 9) (Z86 59)   14  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)   15  Hyperglycemia (790 29) (R73 9)   16  Hypothyroidism (244 9) (E03 9)   17  Iron deficiency anemia (280 9) (D50 9)   18  Medication management (V58 69) (Z79 899)   19  Mitral valve prolapse (424 0) (I34 1)   20  Obesity complicating pregnancy, childbirth, or puerperium, antepartum (649 13,278 00)    (O99 210,E66 9)   21  Oligohydramnios in [place] pregnancy in second trimester (658 03) (O41 02X0)   22  Personal history of nicotine dependence (V15 82) (Z87 891)   23  Pregnancy Complicated By Thyroid Dysfunction - Antepartum Condition Or Prior    Complicated Delivery (432 30)   24  Pregnancy, obstetrical care (V22 1) (Z34 90)   25  Prenatal care in second trimester (V22 1) (Z34 92)   26  Prenatal care in third trimester (V22 1) (Z34 93)   27  Renal agenesis, fetal, affecting care of mother, antepartum (655 83) (O35 8XX0)   29  Schlatter-Osgood disease (732 4) (M92 50)   29  Second trimester pregnancy (V22 2) (Z34 92)   30  Thyroid nodule (241 0) (E04 1)   31  Tuberculosis (011 90) (A15 9)   32  Urogenital trichomoniasis (131 00) (A59 00)   33  Visit: Prenatal Exam High-risk W/ History Of Pre-term Labor (V23 41)   34  Vulvovaginitis candida albicans (112 1) (B37 3)    Current Meds   1   [facility] Miconazole 7 2 % Vaginal Cream; INSERT 1 APPLICATORFUL INTRAVAGINALLY   AT BEDTIME NIGHTLY; Therapy: 90DER9203 to [phone])  Requested for: 19Oct2017; Last   Rx:19Oct2017 Ordered   2  Escitalopram Oxalate 5 MG Oral Tablet; TAKE 1 TABLET DAILY; Therapy: 62YLP5302 to (Evaluate:01Rsn3492)  Requested for: 02AZA2546; Last   Rx:07Nov2017 Ordered   3  Oleksandr-Sequels 65-25 MG Oral Tablet Extended Release; Take 1 tablet daily; Therapy: 01TSQ3697 to (Verlyn Landau)  Requested for: 08ARY7470; Last   Rx:31Oct2017 Ordered   4  LORazepam 0 5 MG Oral Tablet; TAKE 1 TABLET 3 TIMES DAILY AS NEEDED; Therapy: 63Eel2912 to (Evaluate:28Oct2017); Last Rx:43Bdm2982 Ordered   5  LORazepam 1 MG Oral Tablet; TAKE 1 TABLET 3 TIMES DAILY AS NEEDED; Therapy: 96ELR6710 to (Evaluate:17Nov2017); Last Rx:07Nov2017 Ordered   6  Prenatal Vitamins TABS; Therapy: (Recorded:02Apr2014) to Recorded   7  Synthroid 150 MCG Oral Tablet; Therapy: (Tiago Villegas) to Recorded   8  Synthroid 75 MCG Oral Tablet; Therapy: 88HOG7079 to (Last Rx:17Oct2011)  Requested for: 17Oct2011 Ordered    Allergies    1  Bactrim TABS   2  Bactrim DS TABS    3  Seasonal   4  Shellfish   5   Shellfish    Signatures   Electronically signed by : Nestor Finley OM; Nov 16 2017  8:58AM EST                       (Author)

## 2018-01-22 VITALS — HEIGHT: 68 IN | WEIGHT: 221 LBS | BODY MASS INDEX: 33.49 KG/M2

## 2018-01-22 VITALS — WEIGHT: 222 LBS | SYSTOLIC BLOOD PRESSURE: 130 MMHG | DIASTOLIC BLOOD PRESSURE: 80 MMHG | BODY MASS INDEX: 34.26 KG/M2

## 2018-01-22 VITALS
DIASTOLIC BLOOD PRESSURE: 87 MMHG | BODY MASS INDEX: 33.8 KG/M2 | HEIGHT: 68 IN | HEART RATE: 104 BPM | SYSTOLIC BLOOD PRESSURE: 140 MMHG | WEIGHT: 223 LBS

## 2018-01-22 VITALS
DIASTOLIC BLOOD PRESSURE: 74 MMHG | SYSTOLIC BLOOD PRESSURE: 147 MMHG | WEIGHT: 223.2 LBS | BODY MASS INDEX: 33.83 KG/M2 | HEART RATE: 96 BPM | HEIGHT: 68 IN

## 2018-01-22 VITALS — SYSTOLIC BLOOD PRESSURE: 127 MMHG | DIASTOLIC BLOOD PRESSURE: 72 MMHG

## 2018-01-22 VITALS
SYSTOLIC BLOOD PRESSURE: 136 MMHG | HEIGHT: 68 IN | HEART RATE: 102 BPM | BODY MASS INDEX: 34.86 KG/M2 | WEIGHT: 230 LBS | DIASTOLIC BLOOD PRESSURE: 85 MMHG

## 2018-01-22 VITALS — HEIGHT: 68 IN

## 2018-01-22 VITALS — BODY MASS INDEX: 33.95 KG/M2 | WEIGHT: 220 LBS

## 2018-01-22 VITALS — SYSTOLIC BLOOD PRESSURE: 140 MMHG | DIASTOLIC BLOOD PRESSURE: 80 MMHG

## 2018-01-23 VITALS — WEIGHT: 230 LBS | DIASTOLIC BLOOD PRESSURE: 79 MMHG | BODY MASS INDEX: 34.97 KG/M2 | SYSTOLIC BLOOD PRESSURE: 126 MMHG

## 2018-01-23 NOTE — MISCELLANEOUS
Message  spoke to patient on the phone  she had a  on , fetal demise due to anomalies  patient states she is doing well at this time, she sees a therapist weekly  if she would like to see Fareed Dow our  she can call here   she has a f/u apt on , if she needs to be sooner or to talk to me she is to call here for a sooner apt      Active Problems    1  Abnormal Papanicolaou smear of cervix (795 00) (R87 619)   2  Anhydramnios (658 00) (O41 00X0)   3  Anxiety (300 00) (F41 9)   4  Anxiety and depression (300 00,311) (F41 8)   5  Cervical dysplasia (622 10) (N87 9)   6  Cervical intraepithelial neoplasia I (622 11) (N87 0)   7  Chlamydial infection (079 98) (A74 9)   8  Depression (311) (F32 9)   9  Fetal renal anomaly (655 80) (O35 8XX0)   10  Finding of frequency of urination (788 41) (R35 0)   11  Hashimoto's thyroiditis (245 2) (E06 3)   12  History of dysthymic disorder (V11 9) (Z86 59)   13  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)   14  Hyperglycemia (790 29) (R73 9)   15  Hypothyroidism (244 9) (E03 9)   16  Iron deficiency anemia (280 9) (D50 9)   17  Medication management (V58 69) (Z79 899)   18  Mitral valve prolapse (424 0) (I34 1)   19  Obesity complicating pregnancy, childbirth, or puerperium, antepartum (649 13,278 00)    (O99 210,E66 9)   20  Oligohydramnios in [place] pregnancy in second trimester (658 03) (O41 02X0)   21  Personal history of nicotine dependence (V15 82) (Z87 891)   22  Pregnancy Complicated By Thyroid Dysfunction - Antepartum Condition Or Prior    Complicated Delivery (639 79)   23  Pregnancy, obstetrical care (V22 1) (Z34 90)   24  Prenatal care in second trimester (V22 1) (Z34 92)   25  Prenatal care in third trimester (V22 1) (Z34 93)   26  Renal agenesis, fetal, affecting care of mother, antepartum (655 83) (O35 8XX0)   32  Schlatter-Osgood disease (732 4) (M92 50)   28  Second trimester pregnancy (V22 2) (Z34 92)   29  Thyroid nodule (241 0) (E04 1)   30  Tuberculosis (011 90) (A15 9)   31  Urogenital trichomoniasis (131 00) (A59 00)   32  Visit: Prenatal Exam High-risk W/ History Of Pre-term Labor (V23 41)   33  Vulvovaginitis candida albicans (112 1) (B37 3)    Current Meds   1  CVS Miconazole 7 2 % Vaginal Cream; INSERT 1 APPLICATORFUL INTRAVAGINALLY   AT BEDTIME NIGHTLY; Therapy: 78BJY1584 to (03 17 74 30 53)  Requested for: 19Oct2017; Last   Rx:19Oct2017 Ordered   2  Escitalopram Oxalate 5 MG Oral Tablet (Lexapro); TAKE 1 TABLET DAILY; Therapy: 59BYX8000 to (Evaluate:85Nnr9919)  Requested for: 43GDV4217; Last   Rx:07Nov2017 Ordered   3  Oleksandr-Sequels 65-25 MG Oral Tablet Extended Release; Take 1 tablet daily; Therapy: 92WAK4355 to (Steph Chaudhary)  Requested for: 21PMB4361; Last   Rx:31Oct2017 Ordered   4  Fluconazole 150 MG Oral Tablet; TAKE 1 TABLET DAILY AS DIRECTED; Therapy: 17Imv3387 to (Evaluate:08Ghm1389)  Requested for: 37Gtt3727; Last   Rx:84Vgv3201 Ordered   5  LORazepam 0 5 MG Oral Tablet; TAKE 1 TABLET 3 TIMES DAILY AS NEEDED; Therapy: 90Gkt1785 to (Evaluate:28Oct2017); Last Rx:17Cbw7299 Ordered   6  LORazepam 1 MG Oral Tablet (Ativan); TAKE 1 TABLET 3 TIMES DAILY AS NEEDED; Therapy: 53MIB0921 to (Evaluate:17Nov2017); Last Rx:07Nov2017 Ordered   7  Monistat 7 Simply Cure 2 % Vaginal Cream; Apply thin coat to vulva BID as directed, for   external use only; Therapy: 73Jek7158 to (Evaluate:09Jan2018)  Requested for: 32Fis0608; Last   Rx:76Hrj8650 Ordered   8  Prenatal Vitamins TABS; Therapy: (Recorded:02Apr2014) to Recorded   9  Synthroid 150 MCG Oral Tablet (Levothyroxine Sodium); Therapy: (Rina Randolph) to Recorded   10  Synthroid 75 MCG Oral Tablet (Levothyroxine Sodium); Therapy: 26WDY1496 to (Last Rx:15Dhg5948)  Requested for: 17Oct2011 Ordered    Allergies    1  Bactrim TABS   2  Bactrim DS TABS    3  Seasonal   4  Shellfish   5   Shellfish    Signatures   Electronically signed by : Campbell Urena RN; Dec 26 2017 11: 30AM EST                       (Author)

## 2018-01-24 ENCOUNTER — HOSPITAL ENCOUNTER (EMERGENCY)
Facility: HOSPITAL | Age: 33
Discharge: HOME | End: 2018-01-24
Payer: COMMERCIAL

## 2018-01-24 VITALS
HEART RATE: 78 BPM | OXYGEN SATURATION: 97 % | RESPIRATION RATE: 18 BRPM | DIASTOLIC BLOOD PRESSURE: 65 MMHG | TEMPERATURE: 98.5 F | SYSTOLIC BLOOD PRESSURE: 125 MMHG

## 2018-01-24 VITALS — SYSTOLIC BLOOD PRESSURE: 125 MMHG | HEART RATE: 105 BPM | DIASTOLIC BLOOD PRESSURE: 79 MMHG

## 2018-01-24 DIAGNOSIS — J06.9 URI WITH COUGH AND CONGESTION: Primary | ICD-10-CM

## 2018-01-24 PROCEDURE — 99283 EMERGENCY DEPT VISIT LOW MDM: CPT

## 2018-01-24 RX ORDER — BENZONATATE 100 MG/1
100 CAPSULE ORAL 3 TIMES DAILY PRN
Qty: 15 CAPSULE | Refills: 0 | Status: SHIPPED | OUTPATIENT
Start: 2018-01-24 | End: 2018-01-29

## 2018-01-24 RX ORDER — ESCITALOPRAM OXALATE 10 MG/1
5 TABLET ORAL DAILY
COMMUNITY
End: 2018-11-06

## 2018-01-24 RX ORDER — ALBUTEROL SULFATE 90 UG/1
2 AEROSOL, METERED RESPIRATORY (INHALATION) EVERY 6 HOURS PRN
Qty: 1 INHALER | Refills: 0 | Status: SHIPPED | OUTPATIENT
Start: 2018-01-24 | End: 2018-07-04

## 2018-01-24 NOTE — DISCHARGE INSTRUCTIONS
Acute Bronchitis   WHAT YOU NEED TO KNOW:   What is acute bronchitis? Acute bronchitis is swelling and irritation in the air passages of your lungs  This irritation may cause you to cough or have other breathing problems  Acute bronchitis often starts because of another illness, such as a cold or the flu  The illness spreads from your nose and throat to your windpipe and airways  Bronchitis is often called a chest cold  Acute bronchitis lasts about 3 to 6 weeks and is usually not a serious illness  What causes acute bronchitis? · Infection  caused by a virus, bacteria, or a fungus    · Polluted air  caused by chemical fumes, dust, smoke, allergens, or pollution  What increases my risk for acute bronchitis? · Age, usually older adults    · Smoking cigarettes or being around cigarette smoke    · Chronic lung diseases or chronic sinus infections    · Weakened immune system    · Gastroesophageal reflux disease    · Allergies and environmental changes  What are the signs and symptoms of acute bronchitis? · A cough with sputum that may be clear, yellow, or green    · Feeling more tired than usual, and body aches    · A fever and chills    · Wheezing when you breathe    · A tight chest or pain when you breathe or cough  How is acute bronchitis diagnosed? Your healthcare provider may diagnose bronchitis by your symptoms  If he is not sure, you may need the following:  · Blood tests  will be done to see if your symptoms are caused by an infection  · X-ray  pictures of your lungs and heart may show signs of infection, such as pneumonia  Chest x-rays may also show fluid around your heart and lungs  How is acute bronchitis treated? Your healthcare provider will treat any condition that has caused your acute bronchitis  He may also give you any of the following:  · Ibuprofen or acetaminophen  are medicines that help lower your fever  They are available without a doctor's order   Ask your healthcare provider which medicine is right for you  Ask how much to take and how often to take it  Follow directions  These medicines can cause stomach bleeding if not taken correctly  Ibuprofen can cause kidney damage  Do not take ibuprofen if you have kidney disease, an ulcer, or allergies to aspirin  Acetaminophen can cause liver damage  Do not take more than 4,000 milligrams in 24 hours  · Decongestants  help loosen mucus in your lungs and make it easier to cough up  This can help you breathe easier  · Cough suppressants  decrease your urge to cough  If your cough produces mucus, do not take a cough suppressant unless your healthcare provider tells you to  Your healthcare provider may suggest that you take a cough suppressant at night so you can rest     · Inhalers  may be given  Your healthcare provider may give you one or more inhalers to help you breathe easier and cough less  An inhaler gives your medicine to open your airways  Ask your healthcare provider to show you how to use your inhaler correctly  How can I care for myself when I have acute bronchitis? · Get more rest   Rest helps your body to heal  Slowly start to do more each day  Rest when you feel it is needed  · Avoid irritants in the air  Avoid chemicals, fumes, and dust  Wear a face mask if you must work around dust or fumes  Stay inside on days when air pollution levels are high  If you have allergies, stay inside when pollen counts are high  Do not use aerosol products, such as spray-on deodorant, bug spray, and hair spray  · Do not smoke or be around others who smoke  Nicotine and other chemicals in cigarettes and cigars damages the cilia that move mucus out of your lungs  Ask your healthcare provider for information if you currently smoke and need help to quit  E-cigarettes or smokeless tobacco still contain nicotine  Talk to your healthcare provider before you use these products  · Drink liquids as directed    Liquids help keep your air passages moist and help you cough up mucus  You may need to drink more liquids when you have acute bronchitis  Ask how much liquid to drink each day and which liquids are best for you  · Use a humidifier or vaporizer  Use a cool mist humidifier or a vaporizer to increase air moisture in your home  This may make it easier for you to breathe and help decrease your cough  How can I decrease my risk for acute bronchitis? · Get the vaccinations you need  Ask your healthcare provider if you should get vaccinated against the flu or pneumonia  · Prevent the spread of germs  You can decrease your risk of acute bronchitis and other illnesses by doing the following:     [facility] your hands often with soap and water  Carry germ-killing hand lotion or gel with you  You can use the lotion or gel to clean your hands when soap and water are not available  ¨ Do not touch your eyes, nose, or mouth unless you have washed your hands first     ¨ Always cover your mouth when you cough to prevent the spread of germs  It is best to cough into a tissue or your shirt sleeve instead of into your hand  Ask those around you cover their mouths when they cough  ¨ Try to avoid people who have a cold or the flu  If you are sick, stay away from others as much as possible  When should I seek immediate care? · You cough up blood  · Your lips or fingernails turn blue  · You feel like you are not getting enough air when you breathe  When should I contact my healthcare provider? · You have a fever  · Your breathing problems do not go away or get worse  · Your cough does not get better within 4 weeks  · You have questions or concerns about your condition or care  CARE AGREEMENT:   You have the right to help plan your care  Learn about your health condition and how it may be treated  Discuss treatment options with your caregivers to decide what care you want to receive  You always have the right to refuse treatment  The above information is an  only  It is not intended as medical advice for individual conditions or treatments  Talk to your doctor, nurse or pharmacist before following any medical regimen to see if it is safe and effective for you  © 2017 [address] [employer] Information is for End User's use only and may not be sold, redistributed or otherwise used for commercial purposes  All illustrations and images included in CareNotes® are the copyrighted property of [employer]  or Gary Blanco  Upper Respiratory Infection   WHAT YOU NEED TO KNOW:   What is an upper respiratory infection? An upper respiratory infection is also called a common cold  It can affect your nose, throat, ears, and sinuses  What causes a cold? The common cold is caused by a virus  There are many different cold viruses, and each is contagious  This means the virus can be easily spread to another person when the sick person coughs or sneezes  The virus can also be spread if you touch something that a person with a cold has touched  You are more likely to get a cold in the winter  Your risk of getting a cold may be increased if you smoke cigarettes or have allergies, such as hay fever  What are the signs and symptoms of a cold? Cold symptoms are usually worst for the first 3 to 5 days  You may have any of the following:  · Runny or stuffy nose    · Sneezing and coughing    · Sore throat or hoarseness    · Red, watery, and sore eyes    · Fatigue     · Chills and fever    · Headache, body aches, or sore muscles  How is a cold treated? There is no cure for the common cold  Colds are caused by viruses and do not get better with antibiotics  Most people get better in 7 to 14 days  You may continue to cough for 2 to 3 weeks  The following may help decrease your symptoms:  · Decongestants  help reduce nasal congestion and help you breathe more easily   If you take decongestant pills, they may make you feel restless or cause problems with your sleep  Do not use decongestant sprays for more than a few days  · Cough suppressants  help reduce coughing  Ask your healthcare provider which type of cough medicine is best for you  · NSAIDs , such as ibuprofen, help decrease swelling, pain, and fever  NSAIDs can cause stomach bleeding or kidney problems in certain people  If you take blood thinner medicine, always ask your healthcare provider if NSAIDs are safe for you  Always read the medicine label and follow directions  · Acetaminophen  decreases pain and fever  It is available without a doctor's order  Ask how much to take and how often to take it  Follow directions  Read the labels of all other medicines you are using to see if they also contain acetaminophen, or ask your doctor or pharmacist  Acetaminophen can cause liver damage if not taken correctly  Do not use more than 4 grams (4,000 milligrams) total of acetaminophen in one day  How can I manage my cold? · Rest as much as possible  Slowly start to do more each day  · Drink more liquids as directed  Liquids will help thin and loosen mucus so you can cough it up  Liquids will also help prevent dehydration  Liquids that help prevent dehydration include water, fruit juice, and broth  Do not drink liquids that contain caffeine  Caffeine can increase your risk for dehydration  Ask your healthcare provider how much liquid to drink each day  · Soothe a sore throat  Gargle with warm salt water  This helps your sore throat feel better  Make salt water by dissolving ¼ teaspoon salt in 1 cup warm water  You may also suck on hard candy or throat lozenges  You may use a sore throat spray  · Use a humidifier or vaporizer  Use a cool mist humidifier or a vaporizer to increase air moisture in your home  This may make it easier for you to breathe and help decrease your cough  · Use saline nasal drops as directed  These help relieve congestion  · Apply petroleum-based jelly around the outside of your nostrils  This can decrease irritation from blowing your nose  · Do not smoke  Nicotine and other chemicals in cigarettes and cigars can make your symptoms worse  They can also cause infections such as bronchitis or pneumonia  Ask your healthcare provider for information if you currently smoke and need help to quit  E-cigarettes or smokeless tobacco still contain nicotine  Talk to your healthcare provider before you use these products  What can I do to prevent the spread of the common cold? · Try to stay away from other people during the first 2 to 3 days of your cold when it is more easily spread  · Do not share food or drinks  · Do not share hand towels with household members  · Wash your hands often, especially after you blow your nose  Turn away from other people and cover your mouth and nose with a tissue when you sneeze or cough  When should I seek immediate care? · You have chest pain or trouble breathing  When should I contact my healthcare provider? · You have a fever over 102ºF (39ºC)  · Your sore throat gets worse or you see white or yellow spots in your throat  · Your symptoms get worse after 3 to 5 days or your cold is not better in 14 days  · You have a rash anywhere on your skin  · You have large, tender lumps in your neck  · You have thick, green, or yellow drainage from your nose  · You cough up thick yellow, green, or bloody mucus  · You are vomiting for more than 24 hours and cannot keep fluids down  · You have a bad earache  · You have questions or concerns about your condition or care  CARE AGREEMENT:   You have the right to help plan your care  Learn about your health condition and how it may be treated  Discuss treatment options with your caregivers to decide what care you want to receive  You always have the right to refuse treatment   The above information is an  only  It is not intended as medical advice for individual conditions or treatments  Talk to your doctor, nurse or pharmacist before following any medical regimen to see if it is safe and effective for you  © 2017 [employer] [employer] Information is for End User's use only and may not be sold, redistributed or otherwise used for commercial purposes  All illustrations and images included in CareNotes® are the copyrighted property of [employer]  or Gary Blanco

## 2018-01-24 NOTE — ED PROVIDER NOTES
History  Chief Complaint   Patient presents with    Flu Symptoms     12month old diagnosed with pneumonia  3 days ago developed fever, sore throat, cough, nasal congestion  This is a 68-year-old female presents for evaluation of cough, nasal congestion and subjective fevers for the past 3 days  Patient reports that she is around similar symptoms at home including her son who was diagnosed with pneumonia a few weeks ago  Patient reports that she has been having a cough, using Robitussin DM with relief  Patient states that she is also having subjective fevers in taking Motrin  She denies nausea, vomiting, chest pain, difficulty breathing or shortness of breath  Does admit to occasional chest tightness  She is eating and drinking normally  Patient reports that she has Hashimoto's which is controlled as well as anxiety and depression which is also well controlled  Prior to Admission Medications   Prescriptions Last Dose Informant Patient Reported? Taking? LORazepam (ATIVAN) 0 5 mg tablet   Yes Yes   Sig: Take 1 tablet by mouth 3 (three) times a day as needed   escitalopram (LEXAPRO) 10 mg tablet   Yes Yes   Sig: Take 5 mg by mouth daily   levothyroxine 100 mcg tablet   No Yes   Sig: Take 1 tablet by mouth daily in the early morning      Facility-Administered Medications: None       Past Medical History:   Diagnosis Date    Anxiety     Depression     Gestational diabetes     Gestational hypertension     previous pregnancy    Hashimoto's thyroiditis     HPV (human papilloma virus) infection     Retained placenta        Past Surgical History:   Procedure Laterality Date    APPENDECTOMY      CHOLECYSTECTOMY      DILATION AND CURETTAGE OF UTERUS      HERNIA REPAIR      UMBILICAL HERNIA REPAIR         History reviewed  No pertinent family history  I have reviewed and agree with the history as documented      Social History   Substance Use Topics    Smoking status: Former Smoker    Smokeless tobacco: Never Used    Alcohol use No        Review of Systems   Constitutional: Positive for chills and fever  Negative for appetite change  HENT: Positive for congestion  Negative for rhinorrhea, sinus pain, sinus pressure and sore throat  Respiratory: Positive for cough and chest tightness  Cardiovascular: Negative for chest pain  Gastrointestinal: Negative for abdominal pain, constipation, diarrhea, nausea and vomiting  Genitourinary: Negative for dysuria  Musculoskeletal: Positive for myalgias  Skin: Negative  Physical Exam  ED Triage Vitals [01/24/18 1323]   Temperature Pulse Respirations Blood Pressure SpO2   98 5 °F (36 9 °C) 78 18 125/65 97 %      Temp src Heart Rate Source Patient Position - Orthostatic VS BP Location FiO2 (%)   -- Monitor Sitting -- --      Pain Score       5           Orthostatic Vital Signs  Vitals:    01/24/18 1323   BP: 125/65   Pulse: 78   Patient Position - Orthostatic VS: Sitting       Physical Exam   Constitutional: She is oriented to person, place, and time  Vital signs are normal  She appears well-developed and well-nourished  She is cooperative  No distress  HENT:   Head: Normocephalic and atraumatic  Right Ear: Tympanic membrane, external ear and ear canal normal    Left Ear: Tympanic membrane, external ear and ear canal normal    Mouth/Throat: Uvula is midline, oropharynx is clear and moist and mucous membranes are normal  No oropharyngeal exudate  Eyes: Conjunctivae are normal    Neck: Normal range of motion  Neck supple  Cardiovascular: Normal rate and normal heart sounds  No murmur heard  Pulmonary/Chest: Effort normal and breath sounds normal  She has no wheezes  She exhibits no tenderness  Musculoskeletal: Normal range of motion  Neurological: She is alert and oriented to person, place, and time  Skin: Skin is warm  No rash noted  She is not diaphoretic  No erythema  Psychiatric: She has a normal mood and affect  ED Medications  Medications - No data to display    Diagnostic Studies  Results Reviewed     None                 No orders to display              Procedures  Procedures       Phone Contacts  ED Phone Contact    ED Course  ED Course                                MDM  Number of Diagnoses or Management Options  Diagnosis management comments: This is a 75-year-old female presents with nasal congestion, fever and cough for the past 3 days  Patient is in no acute distress in room, vital signs not concerning  Will advise Tessalon, albuterol inhaler for supportive care  Follow up with family doctor if symptoms do not improve in 1 week  RTED precautions given  CritCare Time    Disposition  Final diagnoses:   URI with cough and congestion     Time reflects when diagnosis was documented in both MDM as applicable and the Disposition within this note     Time User Action Codes Description Comment    1/24/2018  5:01 PM Damaso Teresa Add [J06 [id number]] URI with cough and congestion       ED Disposition     ED Disposition Condition Comment    Discharge  Sherin Brendan discharge to home/self care  Condition at discharge: Good        Follow-up Information     Follow up With Specialties Details Why Contact Info Additional Gilberto Guerra MD Family Medicine Schedule an appointment as soon as possible for a visit in 1 week Follow up for recheck of symptoms [address]  [address] [facility] Emergency Department Emergency Medicine  If symptoms worsen such as difficulty breathing, shortness of breath [address] [facility] ED, [address], [place], [address]        Patient's Medications   Discharge Prescriptions    ALBUTEROL (PROVENTIL HFA,VENTOLIN HFA) 90 MCG/ACT INHALER    Inhale 2 puffs every 6 (six) hours as needed for wheezing       Start Date: 1/24/2018 End Date: -- Order Dose: 2 puffs       Quantity: 1 Inhaler    Refills: 0    BENZONATATE (TESSALON PERLES) 100 MG CAPSULE    Take 1 capsule by mouth 3 (three) times a day as needed for cough for up to 5 days       Start Date: 1/24/2018 End Date: 1/29/2018       Order Dose: 100 mg       Quantity: 15 capsule    Refills: 0     No discharge procedures on file      ED Provider  Electronically Signed by           Harrison Maynard PA-C  01/24/18 1102

## 2018-02-01 ENCOUNTER — OFFICE VISIT (OUTPATIENT)
Dept: OBGYN CLINIC | Facility: CLINIC | Age: 33
End: 2018-02-01
Payer: COMMERCIAL

## 2018-02-01 VITALS — WEIGHT: 208.6 LBS | BODY MASS INDEX: 32.67 KG/M2

## 2018-02-01 DIAGNOSIS — O36.4XX0 FETAL DEMISE AFFECTING DELIVERY: ICD-10-CM

## 2018-02-01 PROCEDURE — 99214 OFFICE O/P EST MOD 30 MIN: CPT | Performed by: NURSE PRACTITIONER

## 2018-02-01 RX ORDER — ETONOGESTREL AND ETHINYL ESTRADIOL 11.7; 2.7 MG/1; MG/1
1 INSERT, EXTENDED RELEASE VAGINAL
COMMUNITY
End: 2018-07-04

## 2018-02-01 RX ORDER — AZITHROMYCIN 1 G
1 PACKET (EA) ORAL ONCE
COMMUNITY
End: 2018-07-04

## 2018-02-01 NOTE — PROGRESS NOTES
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  She's generally doing well, denies current pain or bleeding issues  Pt was here 1/10/2018 and was prescribed Zoloft for depression  Pt states she did not like how she felt on Zolof and restarted the Lexapro she had at home and she feels better  Marisol Odell has an appointment with [facility] next week to start psychiatric care  Pt would like to continue Lexapro until that time  On Amanuel  10, 2018 she discussed contraception and wanted to start [employer]  She has resumed sexual activity  She was prescribed Nu[facility] by [facility] because of cost  Marisol Odell denies problems with BM's  She describes stress incontinence when she coughs, she has bronchititis and is being treated with antibiotic by her family Dr  Discussed kegal exercises and is to call with continued stress incontinence after bronchitis resolves      Physicial exam:    Pt is aler, orient and appears happier  Breast pt denies pain, redness or lumps  Lower extremities denies pain redness or edema    Assessment    Postpartum    Hx of Vero suárez, 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

## 2018-02-01 NOTE — PATIENT INSTRUCTIONS
Continue Lexapro as ordered  Return in 1 month for annual exam and PAP  Call with increased depression, other needs or concers

## 2018-03-19 ENCOUNTER — OFFICE VISIT (OUTPATIENT)
Dept: OBGYN CLINIC | Facility: CLINIC | Age: 33
End: 2018-03-19
Payer: COMMERCIAL

## 2018-03-19 VITALS
WEIGHT: 207 LBS | SYSTOLIC BLOOD PRESSURE: 116 MMHG | DIASTOLIC BLOOD PRESSURE: 77 MMHG | HEART RATE: 93 BPM | HEIGHT: 67 IN | BODY MASS INDEX: 32.49 KG/M2

## 2018-03-19 DIAGNOSIS — Z01.419 ENCOUNTER FOR ROUTINE GYNECOLOGICAL EXAMINATION WITH PAPANICOLAOU SMEAR OF CERVIX: ICD-10-CM

## 2018-03-19 DIAGNOSIS — Z30.09 BIRTH CONTROL COUNSELING: ICD-10-CM

## 2018-03-19 DIAGNOSIS — Z30.013 ENCOUNTER FOR INITIAL PRESCRIPTION OF INJECTABLE CONTRACEPTIVE: ICD-10-CM

## 2018-03-19 DIAGNOSIS — Z01.419 ENCOUNTER FOR ANNUAL ROUTINE GYNECOLOGICAL EXAMINATION: Primary | ICD-10-CM

## 2018-03-19 PROBLEM — O35.EXX0 RENAL AGENESIS, FETAL, AFFECTING CARE OF MOTHER, ANTEPARTUM: Status: RESOLVED | Noted: 2017-12-21 | Resolved: 2018-03-19

## 2018-03-19 PROBLEM — O35.8XX0 RENAL AGENESIS, FETAL, AFFECTING CARE OF MOTHER, ANTEPARTUM: Status: RESOLVED | Noted: 2017-12-21 | Resolved: 2018-03-19

## 2018-03-19 PROBLEM — Z3A.34 34 WEEKS GESTATION OF PREGNANCY: Status: RESOLVED | Noted: 2017-12-21 | Resolved: 2018-03-19

## 2018-03-19 PROBLEM — O41.00X0 ANHYDRAMNIOS: Status: RESOLVED | Noted: 2017-12-21 | Resolved: 2018-03-19

## 2018-03-19 LAB — SL AMB POCT URINE HCG: NEGATIVE

## 2018-03-19 PROCEDURE — 81025 URINE PREGNANCY TEST: CPT | Performed by: NURSE PRACTITIONER

## 2018-03-19 PROCEDURE — G0145 SCR C/V CYTO,THINLAYER,RESCR: HCPCS | Performed by: NURSE PRACTITIONER

## 2018-03-19 PROCEDURE — 99395 PREV VISIT EST AGE 18-39: CPT | Performed by: NURSE PRACTITIONER

## 2018-03-19 PROCEDURE — 87624 HPV HI-RISK TYP POOLED RSLT: CPT | Performed by: NURSE PRACTITIONER

## 2018-03-19 RX ORDER — MEDROXYPROGESTERONE ACETATE 150 MG/ML
150 INJECTION, SUSPENSION INTRAMUSCULAR
Qty: 1 ML | Refills: 0 | Status: SHIPPED | OUTPATIENT
Start: 2018-03-19 | End: 2018-07-04

## 2018-03-19 NOTE — PATIENT INSTRUCTIONS
No unprotected intercourse x2 weeks  Return in 2 weeks for a follow up pregnancy test and Depoprovera if negative  Discuss Medical necessity for Sterilization with Psychiatrist and Endocrinologist  Call with needs or concerns

## 2018-03-19 NOTE — PROGRESS NOTES
Assessment   Patient Active Problem List   Diagnosis    Encounter for annual routine gynecological examination    Encounter for initial prescription of injectable contraceptive     Thin Prep PAP with HPV testing ordered  Depoprovera ordered          Plan      All questions answered  Breast self exam technique reviewed and patient encouraged to perform self-exam monthly  Contraception: none  Discussed healthy lifestyle modifications  Follow up in 1 year  Thin prep Pap smear  No unprotected intercourse x2 weeks  Return in 2 weeks for a follow up pregnancy test and Depoprovera if negative  Discuss Medical necessity for Sterilization with Psychiatrist and Endocrinologist  Call with needs or concerns  Pt verbalized understanding of all discussed  Subjective      Estephania Bryan is a 27 y o  female who presents for annual exam  Periods are regular every 28-30 days, lasting 6 days  Dysmenorrhea:mild, occurring premenstrually  Cyclic symptoms include bloating and pelvic pain  No intermenstrual bleeding, spotting, or discharge  The patient's partner was present and so domestic violence was not assessed  Ernestina Gagnon has been having unprotected intercourse since she stopped Nuva Ring she had a period 3/14/2018, pt stopped Nuva Ring mid cycle, had unprotected intercourse 2 days ago  Discussed pregnancy risk  Partner hates condoms and is allergic to Latex  He was present during the visit  Discussed vasectomy, he refuses  Ernestina Gagnon was on Depo in the past and states she did well  Her insurance requires a letter of medical need for a Sterilization, she plans to discuss with her psychiatrist and endoctrinologist  She met with Nora Cox from [facility] today      Current contraception: none, was on Nuva Ring, noted increased depression, wants Depoprovera and then a Sterilization  History of abnormal Pap smear: yes - 2016 Normal pos    High Risk HPV 18 , states WNL colpo  Family history of uterine or ovarian cancer: no  Regular self breast exam: yes  History of abnormal mammogram: N/A  Family history of breast cancer: no  History of abnormal lipids: unknown  Menstrual History:  OB History      Para Term  AB Living    8 7 5 2 1 6    SAB TAB Ectopic Multiple Live Births    1 0 0 0 7         Menarche age: 4  Patient's last menstrual period was 2018 (approximate)  The following portions of the patient's history were reviewed and updated as appropriate: allergies, current medications, past family history, past medical history, past social history, past surgical history and problem list     Review of Systems  Pertinent items are noted in HPI  Objective      /77   Pulse 93   Ht 5' 7 32" (1 71 m)   Wt 93 9 kg (207 lb)   LMP 2018 (Approximate)   BMI 32 11 kg/m²     General:   alert and oriented, in no acute distress, alert, appears stated age and cooperative   Heart: regular rate and rhythm, S1, S2 normal, no murmur, click, rub or gallop   Lungs: clear to auscultation bilaterally   Abdomen: soft, non-tender, without masses or organomegaly   Vulva: normal   Vagina: normal mucosa   Cervix: no bleeding following Pap, no cervical motion tenderness and no lesions   Uterus: normal size, normal shape and consistency   Adnexa: normal adnexa   Breasts NT, negative dimpling, masses and small amt   Of breast milk noted

## 2018-03-22 LAB — HPV RRNA GENITAL QL NAA+PROBE: NORMAL

## 2018-03-23 LAB
LAB AP GYN PRIMARY INTERPRETATION: NORMAL
LAB AP LMP: NORMAL
Lab: NORMAL

## 2018-04-03 ENCOUNTER — CLINICAL SUPPORT (OUTPATIENT)
Dept: OBGYN CLINIC | Facility: CLINIC | Age: 33
End: 2018-04-03
Payer: COMMERCIAL

## 2018-04-03 DIAGNOSIS — Z30.013 ENCOUNTER FOR INITIAL PRESCRIPTION OF INJECTABLE CONTRACEPTIVE: Primary | ICD-10-CM

## 2018-04-03 LAB — SL AMB POCT URINE HCG: NORMAL

## 2018-04-03 PROCEDURE — 81025 URINE PREGNANCY TEST: CPT

## 2018-04-03 PROCEDURE — 96372 THER/PROPH/DIAG INJ SC/IM: CPT

## 2018-04-03 RX ORDER — MEDROXYPROGESTERONE ACETATE 150 MG/ML
150 INJECTION, SUSPENSION INTRAMUSCULAR ONCE
Status: COMPLETED | OUTPATIENT
Start: 2018-04-03 | End: 2018-04-03

## 2018-04-03 RX ADMIN — MEDROXYPROGESTERONE ACETATE 150 MG: 150 INJECTION, SUSPENSION INTRAMUSCULAR at 14:15

## 2018-04-03 NOTE — PROGRESS NOTES
Patient is her for first depo injection , UPT negative today   PT had sex 3 days ago but used condoms   Patient has her menses today

## 2018-05-24 ENCOUNTER — TELEPHONE (OUTPATIENT)
Dept: OBGYN CLINIC | Facility: CLINIC | Age: 33
End: 2018-05-24

## 2018-05-24 NOTE — TELEPHONE ENCOUNTER
I spoke to Dr Deepali Hahn and Val Hahn ([facility]) suggests SISI instead of Depo due to less risk of low bone mineral density  Val Bailey says she would prefer SISI  I explained safety of Depo in the short term, benefits of ease of use and reliability  Val Bailey still prefers SISI  I will therefore arrange her 6/19/18 appointment to be WITH DOCTOR to discuss contraception options instead of just a Depo shot     Kael Whiting MD  OB/GYN  5/24/2018 4:00 PM

## 2018-06-19 ENCOUNTER — OFFICE VISIT (OUTPATIENT)
Dept: OBGYN CLINIC | Facility: CLINIC | Age: 33
End: 2018-06-19
Payer: COMMERCIAL

## 2018-06-19 VITALS
WEIGHT: 209 LBS | DIASTOLIC BLOOD PRESSURE: 84 MMHG | HEART RATE: 74 BPM | SYSTOLIC BLOOD PRESSURE: 134 MMHG | BODY MASS INDEX: 32.42 KG/M2

## 2018-06-19 DIAGNOSIS — Z30.42 ENCOUNTER FOR SURVEILLANCE OF INJECTABLE CONTRACEPTIVE: Primary | ICD-10-CM

## 2018-06-19 LAB — SL AMB POCT URINE HCG: NORMAL

## 2018-06-19 PROCEDURE — 99213 OFFICE O/P EST LOW 20 MIN: CPT | Performed by: NURSE PRACTITIONER

## 2018-06-19 PROCEDURE — 81025 URINE PREGNANCY TEST: CPT | Performed by: NURSE PRACTITIONER

## 2018-06-19 PROCEDURE — 96372 THER/PROPH/DIAG INJ SC/IM: CPT | Performed by: NURSE PRACTITIONER

## 2018-06-19 RX ORDER — MEDROXYPROGESTERONE ACETATE 150 MG/ML
150 INJECTION, SUSPENSION INTRAMUSCULAR
Qty: 1 ML | Refills: 5 | Status: SHIPPED | OUTPATIENT
Start: 2018-06-19 | End: 2018-11-06

## 2018-06-19 RX ORDER — MEDROXYPROGESTERONE ACETATE 150 MG/ML
150 INJECTION, SUSPENSION INTRAMUSCULAR ONCE
Status: COMPLETED | OUTPATIENT
Start: 2018-06-19 | End: 2018-06-19

## 2018-06-19 RX ADMIN — MEDROXYPROGESTERONE ACETATE 150 MG: 150 INJECTION, SUSPENSION INTRAMUSCULAR at 14:11

## 2018-06-19 NOTE — PROGRESS NOTES
Assessment/Plan:         Diagnoses and all orders for this visit:    Encounter for surveillance of injectable contraceptive  -     medroxyPROGESTERone (DEPO-PROVERA) 150 mg/mL injection; Inject 1 mL (150 mg total) into the shoulder, thigh, or buttocks every 3 (three) months      Plan  Return for next Depoprovera  Remember daily Calcium  Call with needs or concerns  Pt verbalized understanding of all discussed  Subjective:      Patient ID: Jenn Ashraf is a 27 y o  female  HPI   Pt presents today to continue Depoprovera  Pt states her Thyroid hormone levels changed since she started Depo her endocriologist at first thought she should switch to OCP's pt states she will not remember pills  Her doctor stated she can continue Depo as long as she stays on the same form of birth control so her medication can be adjusted as needed  i    The following portions of the patient's history were reviewed and updated as appropriate: allergies, current medications, past family history, past medical history, past social history, past surgical history and problem list     Review of Systems    Negative today    Objective:      /84   Pulse 74   Wt 94 8 kg (209 lb)   BMI 32 42 kg/m²          Physical Exam    Alert and oriented  Denies pain  Annual exam and WNL PAP with negative HPV was completed in March 2018

## 2018-07-04 ENCOUNTER — HOSPITAL ENCOUNTER (EMERGENCY)
Facility: HOSPITAL | Age: 33
Discharge: HOME | End: 2018-07-04
Attending: EMERGENCY MEDICINE | Admitting: EMERGENCY MEDICINE
Payer: COMMERCIAL

## 2018-07-04 VITALS
OXYGEN SATURATION: 100 % | TEMPERATURE: 96.9 F | WEIGHT: 207.45 LBS | HEART RATE: 70 BPM | SYSTOLIC BLOOD PRESSURE: 120 MMHG | BODY MASS INDEX: 32.18 KG/M2 | RESPIRATION RATE: 16 BRPM | DIASTOLIC BLOOD PRESSURE: 64 MMHG

## 2018-07-04 DIAGNOSIS — N76.0 BACTERIAL VAGINOSIS: ICD-10-CM

## 2018-07-04 DIAGNOSIS — N39.0 UTI (URINARY TRACT INFECTION): Primary | ICD-10-CM

## 2018-07-04 DIAGNOSIS — B96.89 BACTERIAL VAGINOSIS: ICD-10-CM

## 2018-07-04 LAB
BACTERIA UR QL AUTO: ABNORMAL /HPF
BILIRUB UR QL STRIP: NEGATIVE
CLARITY UR: ABNORMAL
COLOR UR: ABNORMAL
EXT PREG TEST URINE: NEGATIVE
GLUCOSE UR STRIP-MCNC: NEGATIVE MG/DL
HGB UR QL STRIP.AUTO: 250
KETONES UR STRIP-MCNC: NEGATIVE MG/DL
LEUKOCYTE ESTERASE UR QL STRIP: 500
NITRITE UR QL STRIP: NEGATIVE
NON-SQ EPI CELLS URNS QL MICRO: ABNORMAL /HPF
PH UR STRIP.AUTO: 6 [PH] (ref 4.5–8)
PROT UR STRIP-MCNC: ABNORMAL MG/DL
RBC #/AREA URNS AUTO: ABNORMAL /HPF
SP GR UR STRIP.AUTO: 1.02 (ref 1–1.04)
UROBILINOGEN UA: NEGATIVE MG/DL
WBC #/AREA URNS AUTO: ABNORMAL /HPF

## 2018-07-04 PROCEDURE — 87660 TRICHOMONAS VAGIN DIR PROBE: CPT | Performed by: PHYSICIAN ASSISTANT

## 2018-07-04 PROCEDURE — 87186 SC STD MICRODIL/AGAR DIL: CPT | Performed by: PHYSICIAN ASSISTANT

## 2018-07-04 PROCEDURE — 87077 CULTURE AEROBIC IDENTIFY: CPT | Performed by: PHYSICIAN ASSISTANT

## 2018-07-04 PROCEDURE — 87086 URINE CULTURE/COLONY COUNT: CPT | Performed by: PHYSICIAN ASSISTANT

## 2018-07-04 PROCEDURE — 81001 URINALYSIS AUTO W/SCOPE: CPT | Performed by: PHYSICIAN ASSISTANT

## 2018-07-04 PROCEDURE — 81003 URINALYSIS AUTO W/O SCOPE: CPT | Performed by: PHYSICIAN ASSISTANT

## 2018-07-04 PROCEDURE — 87491 CHLMYD TRACH DNA AMP PROBE: CPT | Performed by: PHYSICIAN ASSISTANT

## 2018-07-04 PROCEDURE — 87480 CANDIDA DNA DIR PROBE: CPT | Performed by: PHYSICIAN ASSISTANT

## 2018-07-04 PROCEDURE — 87591 N.GONORRHOEAE DNA AMP PROB: CPT | Performed by: PHYSICIAN ASSISTANT

## 2018-07-04 PROCEDURE — 87510 GARDNER VAG DNA DIR PROBE: CPT | Performed by: PHYSICIAN ASSISTANT

## 2018-07-04 PROCEDURE — 81025 URINE PREGNANCY TEST: CPT | Performed by: PHYSICIAN ASSISTANT

## 2018-07-04 PROCEDURE — 87147 CULTURE TYPE IMMUNOLOGIC: CPT | Performed by: PHYSICIAN ASSISTANT

## 2018-07-04 PROCEDURE — 99283 EMERGENCY DEPT VISIT LOW MDM: CPT

## 2018-07-04 RX ORDER — NITROFURANTOIN 25; 75 MG/1; MG/1
100 CAPSULE ORAL 2 TIMES DAILY
Qty: 20 CAPSULE | Refills: 0 | Status: SHIPPED | OUTPATIENT
Start: 2018-07-04 | End: 2018-11-06

## 2018-07-04 RX ORDER — METRONIDAZOLE 500 MG/1
500 TABLET ORAL EVERY 12 HOURS SCHEDULED
Qty: 14 TABLET | Refills: 0 | Status: SHIPPED | OUTPATIENT
Start: 2018-07-04 | End: 2018-07-11

## 2018-07-04 RX ORDER — LEVOTHYROXINE SODIUM 125 UG/1
137 TABLET ORAL DAILY
Refills: 3 | COMMUNITY
Start: 2018-05-21

## 2018-07-04 NOTE — ED PROVIDER NOTES
History  Chief Complaint   Patient presents with    Possible UTI     pt states that she has burning and urgency when she urinates  has had symptoms for 2 days  History provided by:  Patient   used: No    Medical Problem   Location:  Pt with burning with urination and vaginal d/c  Quality:  Mild  Severity:  Mild  Onset quality:  Gradual  Duration:  3 days  Timing:  Constant  Chronicity:  New  Associated symptoms: no abdominal pain, no chest pain, no congestion, no cough, no diarrhea, no ear pain, no fatigue, no fever, no headaches, no loss of consciousness, no myalgias, no nausea, no rash, no rhinorrhea, no shortness of breath, no sore throat, no vomiting and no wheezing        Prior to Admission Medications   Prescriptions Last Dose Informant Patient Reported? Taking? LORazepam (ATIVAN) 0 5 mg tablet   Yes No   Sig: Take 1 tablet by mouth 3 (three) times a day as needed   SYNTHROID 125 MCG tablet   Yes No   Sig: Take 125 mcg by mouth daily   escitalopram (LEXAPRO) 10 mg tablet   Yes No   Sig: Take 5 mg by mouth daily   medroxyPROGESTERone (DEPO-PROVERA) 150 mg/mL injection   No No   Sig: Inject 1 mL (150 mg total) into the shoulder, thigh, or buttocks every 3 (three) months      Facility-Administered Medications: None       Past Medical History:   Diagnosis Date    Anxiety     Depression     Gestational diabetes     Gestational hypertension     previous pregnancy    Hashimoto's thyroiditis     HPV (human papilloma virus) infection     Retained placenta        Past Surgical History:   Procedure Laterality Date    CHOLECYSTECTOMY      DILATION AND CURETTAGE OF UTERUS      HERNIA REPAIR      UMBILICAL HERNIA REPAIR         Family History   Problem Relation Age of Onset    Anuerysm Mother     Thyroid disease Mother     Lung cancer Maternal Grandmother      I have reviewed and agree with the history as documented      Social History   Substance Use Topics    Smoking status: Former Smoker    Smokeless tobacco: Never Used    Alcohol use No        Review of Systems   Constitutional: Negative  Negative for fatigue and fever  HENT: Negative  Negative for congestion, ear pain, rhinorrhea and sore throat  Eyes: Negative  Respiratory: Negative  Negative for cough, shortness of breath and wheezing  Cardiovascular: Negative  Negative for chest pain  Gastrointestinal: Negative  Negative for abdominal pain, diarrhea, nausea and vomiting  Endocrine: Negative  Genitourinary: Positive for dysuria and vaginal discharge  Musculoskeletal: Negative  Negative for myalgias  Skin: Negative  Negative for rash  Allergic/Immunologic: Negative  Neurological: Negative  Negative for loss of consciousness and headaches  Hematological: Negative  Psychiatric/Behavioral: Negative  All other systems reviewed and are negative  Physical Exam  Physical Exam   Constitutional: She is oriented to person, place, and time  She appears well-developed and well-nourished  HENT:   Head: Normocephalic and atraumatic  Right Ear: External ear normal    Left Ear: External ear normal    Nose: Nose normal    Mouth/Throat: Oropharynx is clear and moist    Eyes: Conjunctivae and EOM are normal  Pupils are equal, round, and reactive to light  Neck: Normal range of motion  Neck supple  Cardiovascular: Normal rate, regular rhythm, normal heart sounds and intact distal pulses  Pulmonary/Chest: Effort normal and breath sounds normal    Abdominal: Soft  Bowel sounds are normal    Genitourinary:   Genitourinary Comments: External exam wnl  Minor white d/c  No cmt no uterine tender no ovary tend    Musculoskeletal: Normal range of motion  Neurological: She is alert and oriented to person, place, and time  Skin: Skin is warm  Psychiatric: She has a normal mood and affect  Her behavior is normal  Judgment and thought content normal    Nursing note and vitals reviewed        Vital Signs  ED Triage Vitals [07/04/18 0852]   Temperature Pulse Respirations Blood Pressure SpO2   (!) 96 9 °F (36 1 °C) 70 16 120/64 100 %      Temp Source Heart Rate Source Patient Position - Orthostatic VS BP Location FiO2 (%)   Temporal -- Sitting Left arm --      Pain Score       --           Vitals:    07/04/18 0852   BP: 120/64   Pulse: 70   Patient Position - Orthostatic VS: Sitting       Visual Acuity      ED Medications  Medications - No data to display    Diagnostic Studies  Results Reviewed     Procedure Component Value Units Date/Time    Urine culture [[id number]]  (Abnormal) Collected:  07/04/18 0922    Lab Status:  Preliminary result Specimen:  Urine from Urine, Clean Catch Updated:  07/06/18 1154     Urine Culture >100,000 cfu/ml Gram Negative Aldair Enteric Like (A)    VAGINOSIS DNA PROBE (AFFIRM) [[id number]] Collected:  07/04/18 0922    Lab Status:  Final result Specimen:  Genital from Vaginal Updated:  07/06/18 0818     Candida Species Negative     Gardnerella vaginalis Negative     Trichomonas vaginalis Negative    Narrative:       Performed at:  [address] [place] [address]  [id number]  : Celina Hong MD, Phone:  [phone]    LAB RESULTS [[id number]] Resulted:  07/05/18 1344    Lab Status:  Final result Updated:  07/05/18 1344    UA w Reflex to Microscopic w Reflex to Culture [[id number]]  (Abnormal) Collected:  07/04/18 8203    Lab Status:  Final result Specimen:  Urine from Urine, Clean Catch Updated:  07/04/18 1131     Color, UA Ekaterina (A)     Clarity, UA Cloudy (A)     Specific Gravity, UA 1 025     pH, UA 6 0     Leukocytes,  0 (A)     Nitrite, UA Negative     Protein,  (2+) (A) mg/dl      Glucose, UA Negative mg/dl      Ketones, UA Negative mg/dl      Bilirubin, UA Negative     Blood,  0 (A)     UROBILINOGEN UA Negative mg/dL     Urine Microscopic [[id number]]  (Abnormal) Collected:  07/04/18 0922    Lab Status:  Final result Specimen:  Urine from Urine, Clean Catch Updated:  07/04/18 1131     RBC, UA 30-50 (A) /hpf      WBC, UA 30-50 (A) /hpf      Epithelial Cells Occasional /hpf      Bacteria, UA Occasional /hpf     Chlamydia/GC amplified DNA by PCR [[id number]] Collected:  07/04/18 0923    Lab Status: In process Specimen:  Genital from Cervix Updated:  07/04/18 0938    POCT pregnancy, urine [[id number]]  (Normal) Resulted:  07/04/18 0923    Lab Status:  Final result Updated:  07/04/18 0923     EXT PREG TEST UR (Ref: Negative) negative                 No orders to display              Procedures  Procedures       Phone Contacts  ED Phone Contact    ED Course                               MDM  CritCare Time    Disposition  Final diagnoses:   UTI (urinary tract infection)   Bacterial vaginosis     Time reflects when diagnosis was documented in both MDM as applicable and the Disposition within this note     Time User Action Codes Description Comment    7/4/2018  9:18 AM Lakeisha Kirkpatrick Add [N39 0] UTI (urinary tract infection)     7/4/2018  9:18 AM Zhen Martinez Add [N76 0,  B96 89] Bacterial vaginosis       ED Disposition     ED Disposition Condition Comment    Discharge  Zulay Jane discharge to home/self care      Condition at discharge: Good        Follow-up Information     Follow up With Specialties Details Why [address] Medicine Schedule an appointment as soon as possible for a visit return if condition worsens  [address] [place] [address], [address] [place] [id number] [phone]          Discharge Medication List as of 7/4/2018  9:21 AM      START taking these medications    Details   metroNIDAZOLE (FLAGYL) 500 mg tablet Take 1 tablet (500 mg total) by mouth every 12 (twelve) hours for 7 days, Starting Wed 7/4/2018, Until Wed 7/11/2018, Print      nitrofurantoin (MACROBID) 100 mg capsule Take 1 capsule (100 mg total) by mouth 2 (two) times a day, Starting Wed 7/4/2018, Print         CONTINUE these medications which have NOT CHANGED    Details   escitalopram (LEXAPRO) 10 mg tablet Take 5 mg by mouth daily, Historical Med      LORazepam (ATIVAN) 0 5 mg tablet Take 1 tablet by mouth 3 (three) times a day as needed, Starting Tue 8/29/2017, Historical Med      medroxyPROGESTERone (DEPO-PROVERA) 150 mg/mL injection Inject 1 mL (150 mg total) into the shoulder, thigh, or buttocks every 3 (three) months, Starting Tue 6/19/2018, Normal      SYNTHROID 125 MCG tablet Take 125 mcg by mouth daily, Starting Mon 5/21/2018, Historical Med           No discharge procedures on file      ED Provider  Electronically Signed by           Mary Hoff PA-C  07/06/18 3458

## 2018-07-04 NOTE — DISCHARGE INSTRUCTIONS
Bacterial Vaginosis   WHAT YOU NEED TO KNOW:   Bacterial vaginosis (BV) is an infection in the vagina  It may cause vaginitis, which is irritation and inflammation of the vagina  DISCHARGE INSTRUCTIONS:   Medicines:   · Antibiotics: These are given to kill the bacteria that cause BV  They may be given as a pill or a cream to put in your vagina  Take or use as directed  · Take your medicine as directed  Contact your healthcare provider if you think your medicine is not helping or if you have side effects  Tell him of her if you are allergic to any medicine  Keep a list of the medicines, vitamins, and herbs you take  Include the amounts, and when and why you take them  Bring the list or the pill bottles to follow-up visits  Carry your medicine list with you in case of an emergency  Prevent bacterial vaginosis:   · Keep your vaginal area clean and dry:  Wear underwear and pantyhose with a cotton crotch  Wipe from front to back after you urinate or have a bowel movement  After bathing, rinse soap from your vaginal area to decrease your risk for irritation  · Do not use products that cause irritation:  Always use unscented tampons or sanitary pads  Do not use feminine sprays, powders, or scented tampons because they may cause irritation and increase your risk of BV  Detergents and fabric softeners may also cause irritation  · Do not douche: This can cause an imbalance in healthy vaginal bacteria  · Use latex condoms: This helps prevent another infection and keeps your partner from getting the infection  Contact your healthcare provider if:   · Your symptoms come back or do not improve with treatment  · You have vaginal bleeding that is not your monthly period  · You have questions or concerns about your condition or care  © 2017 Bette Maradiaga Information is for End User's use only and may not be sold, redistributed or otherwise used for commercial purposes   All illustrations and images included in CareNotes® are the copyrighted property of [employer]  or [address]  The above information is an  only  It is not intended as medical advice for individual conditions or treatments  Talk to your doctor, nurse or pharmacist before following any medical regimen to see if it is safe and effective for you  Urinary Tract Infection in Women, Misbah Nia Alvarez [address] of Obstetricians and Gynecologists: ACOG Practice Bulletin No  91: Treatment of urinary tract infections in nonpregnant women  Obstet Gynecol 2008; 111(3):785-794  [facility]: Recurrent Lower Urinary Tract Infections in Women  [facility]  [facility]  2008  Available from URL: [email or web address] [email or web address]  aspx  As accessed 2009-04-07  Apolinar LARA & Jasmina WETZEL : Use of Lactobacillus probiotics for bacterial genitourinary infections in women: a review  Clin Ther 2008; 30(3):453-468  Car J : Urinary tract infections in women: diagnosis and management in primary care  BMJ 2006; 332(1838):94-97  Falagas ME , Papo GI , Anaid T , et al: Probiotics for prevention of recurrent urinary tract infections in women: a review of the evidence from microbiological and clinical studies  Drugs 2006; 66(9):0743-8548  Foster RT : Uncomplicated urinary tract infections in women  Obstet Gynecol Clin [place] 2008; 35(2):235-48, viii  [facility] [employer]: Uncomplicated Urinary Tract Infrection in Women  [facility] [employer]  [place], [place]  2006  Available from URL: Morgan cameron  As accessed 2009-04-07  Robinson Christian MA : Evidence-based practice for evaluation and management of female urinary tract infection   Urol Nurs 2007; 27(2):133-136  Nette Telles , Yash Gutiérrez , et al: Cranberry or trimethoprim for the prevention of recurrent urinary tract infections? A randomized controlled trial in older women  J Antimicrob Chemother 2009; 63(2):389-395  Katerine DESHPANDE , Rylie J , et al: Uncomplicated urinary tract infection in women  Current practice and the effect of antibiotic resistance on empiric treatment  Can Fam Physician 2006; 05 14 56 71 73  Vinicius SINGH , Kosta M , Julio R , et al: Oestrogens for preventing recurrent urinary tract infection in postmenopausal women  Eduard Database Syst Rev 2008; 2008(2):1-  Luci C , Nato SILVA , [facility]  et al: Differences in the pattern of antibiotic prescription profile and recurrence rate for possible urinary tract infections in women with and without diabetes  Diabetes Care 2008; 31(7):9270-7119  [place] JS & Sonali FG: Urinary tract infection in women  Obstet Gynecol 2005; 106(5 Pt 1):4870-7540  © 2014 [address] is for End User's use only and may not be sold, redistributed or otherwise used for commercial purposes  All illustrations and images included in CareNotes® are the copyrighted property of [employer]  or Gary Blanco  The above information is an  only  It is not intended as medical advice for individual conditions or treatments  Talk to your doctor, nurse or pharmacist before following any medical regimen to see if it is safe and effective for you

## 2018-07-06 LAB
CANDIDA RRNA VAG QL PROBE: NEGATIVE
CHLAMYDIA DNA CVX QL NAA+PROBE: NORMAL
G VAGINALIS RRNA GENITAL QL PROBE: NEGATIVE
N GONORRHOEA DNA GENITAL QL NAA+PROBE: NORMAL
T VAGINALIS RRNA GENITAL QL PROBE: NEGATIVE

## 2018-07-07 LAB
BACTERIA UR CULT: ABNORMAL
BACTERIA UR CULT: ABNORMAL

## 2018-09-07 ENCOUNTER — OFFICE VISIT (OUTPATIENT)
Dept: OBGYN CLINIC | Facility: CLINIC | Age: 33
End: 2018-09-07
Payer: COMMERCIAL

## 2018-09-07 VITALS
WEIGHT: 207 LBS | SYSTOLIC BLOOD PRESSURE: 127 MMHG | BODY MASS INDEX: 32.11 KG/M2 | DIASTOLIC BLOOD PRESSURE: 77 MMHG | HEART RATE: 93 BPM

## 2018-09-07 DIAGNOSIS — Z30.011 ENCOUNTER FOR INITIAL PRESCRIPTION OF CONTRACEPTIVE PILLS: Primary | ICD-10-CM

## 2018-09-07 PROCEDURE — 99213 OFFICE O/P EST LOW 20 MIN: CPT | Performed by: NURSE PRACTITIONER

## 2018-09-07 RX ORDER — NORGESTIMATE AND ETHINYL ESTRADIOL 0.25-0.035
1 KIT ORAL DAILY
Qty: 28 TABLET | Refills: 3 | Status: SHIPPED | OUTPATIENT
Start: 2018-09-07 | End: 2021-04-18

## 2018-09-07 NOTE — PATIENT INSTRUCTIONS
Start birth control pills today  Missed or Late Pills: For combined (Estrogen and Progestin) pills:    If one hormonal pill is LATE (<24 hours since a pill should have been taken): Take the late or missed pill as soon as possible  Continue taking the remaining pills at the usual time (even if it means taking two pills on the same day)  No additional contraceptive protection is needed  Emergency contraception is not usually needed but can be considered if hormonal pills were missed earlier in the cycle or in the last week of the previous cycle  If one hormonal pill has been MISSED (24 to <48 hours since a pill should have been taken): Take the late or missed pill as soon as possible  Continue taking the remaining pills at the usual time (even if it means taking two pills on the same day)  No additional contraceptive protection is needed  Emergency contraception is not usually needed but can be considered if hormonal pills were missed earlier in the cycle or in the last week of the previous cycle  If two or more consecutive hormonal pills have been missed: (less than or equal to 48 hours since a pill should have been taken): Take the most recent missed pill as soon as possible  (Any other missed pills should be discarded ) Continue taking the remaining pills at the usual time (even if it means taking two pills on the same day)  Use back-up contraception (e g , condoms) or avoid sexual intercourse until hormonal pills have been taken for 7 consecutive days  If pills were missed in the last week of hormonal pills (e g , days 15-21 for 28-day pill packs): Omit the hormone-free interval by finishing the horomal pills in the current pack and starting a new pack the next day  If unable to start a new pack immediately, use back-up contraception (e g , condoms) or avoid sexual intercourse until hormonal pills from a new pack have been taken for 7 consecutive days   Emergency contraception should be considered if hormonal pills were missed during the first week and unprotected sexual intercourse occurred in the previous 5 days  Emergency contraception may also be considered at other times as appropriate  Source: U S  Selected Practice Recommendations for Contraceptive Use, 2013    Return in 3 months to follow up on birth control pill start  Annual exam is due after 3/19/2019

## 2018-09-07 NOTE — PROGRESS NOTES
Assessment/Plan:         Diagnoses and all orders for this visit:    Encounter for initial prescription of contraceptive pills  -     norgestimate-ethinyl estradiol (ORTHO-CYCLEN) 0 25-35 MG-MCG per tablet; Take 1 tablet by mouth daily    Other orders  -     sertraline (ZOLOFT) 50 mg tablet; TAKE HALF A TABLET BY MOUTH DAILY FOR 2 WEEKS THEN 1 TABLET EVERY DAY      Plan  Start birth control pills today  Missed or Late Pills: For combined (Estrogen and Progestin) pills:    If one hormonal pill is LATE (<24 hours since a pill should have been taken): Take the late or missed pill as soon as possible  Continue taking the remaining pills at the usual time (even if it means taking two pills on the same day)  No additional contraceptive protection is needed  Emergency contraception is not usually needed but can be considered if hormonal pills were missed earlier in the cycle or in the last week of the previous cycle  If one hormonal pill has been MISSED (24 to <48 hours since a pill should have been taken): Take the late or missed pill as soon as possible  Continue taking the remaining pills at the usual time (even if it means taking two pills on the same day)  No additional contraceptive protection is needed  Emergency contraception is not usually needed but can be considered if hormonal pills were missed earlier in the cycle or in the last week of the previous cycle  If two or more consecutive hormonal pills have been missed: (less than or equal to 48 hours since a pill should have been taken): Take the most recent missed pill as soon as possible  (Any other missed pills should be discarded ) Continue taking the remaining pills at the usual time (even if it means taking two pills on the same day)  Use back-up contraception (e g , condoms) or avoid sexual intercourse until hormonal pills have been taken for 7 consecutive days   If pills were missed in the last week of hormonal pills (e g , days 15-21 for 28-day pill packs): Omit the hormone-free interval by finishing the horomal pills in the current pack and starting a new pack the next day  If unable to start a new pack immediately, use back-up contraception (e g , condoms) or avoid sexual intercourse until hormonal pills from a new pack have been taken for 7 consecutive days  Emergency contraception should be considered if hormonal pills were missed during the first week and unprotected sexual intercourse occurred in the previous 5 days  Emergency contraception may also be considered at other times as appropriate  Source: U S  Selected Practice Recommendations for Contraceptive Use, 2013  Return in 3 months to follow up on birth control pill start  Annual exam is due after 3/19/2019  Pt verbalized understanding of all discussed  Subjective:      Patient ID: Jeremy Vazquez is a 27 y o  female  HPI  Pt presents to switch to OCP's  Pt states she is gaining too much weight on Depo  Explained her wegt is 2 lbs  less than her [facility] appointment  Pt states her doctor who is monitoring hr thyroid thought OCP's would be better, safe and effective use was provided verbally and in writing        The following portions of the patient's history were reviewed and updated as appropriate: allergies, current medications, past family history, past medical history, past social history, past surgical history and problem list     Review of Systems    Negative today    Objective:      /77 (BP Location: Right arm, Patient Position: Sitting, Cuff Size: Standard)   Pulse 93   Wt 93 9 kg (207 lb)   BMI 32 11 kg/m²          Physical Exam    Alert and oriented  Denies pain  Annual GYN exam is due after 3/2019

## 2018-09-14 ENCOUNTER — HOSPITAL ENCOUNTER (EMERGENCY)
Facility: HOSPITAL | Age: 33
Discharge: HOME | End: 2018-09-14
Attending: EMERGENCY MEDICINE | Admitting: EMERGENCY MEDICINE
Payer: COMMERCIAL

## 2018-09-14 VITALS
TEMPERATURE: 96.4 F | OXYGEN SATURATION: 100 % | RESPIRATION RATE: 18 BRPM | HEART RATE: 68 BPM | DIASTOLIC BLOOD PRESSURE: 64 MMHG | WEIGHT: 209.25 LBS | BODY MASS INDEX: 32.46 KG/M2 | SYSTOLIC BLOOD PRESSURE: 133 MMHG

## 2018-09-14 DIAGNOSIS — N39.0 URINARY TRACT INFECTION: Primary | ICD-10-CM

## 2018-09-14 LAB
BACTERIA UR QL AUTO: ABNORMAL /HPF
BILIRUB UR QL STRIP: NEGATIVE
CHLAMYDIA DNA CVX QL NAA+PROBE: NORMAL
CLARITY UR: ABNORMAL
COLOR UR: ABNORMAL
EXT PREG TEST URINE: NEGATIVE
GLUCOSE UR STRIP-MCNC: NEGATIVE MG/DL
HGB UR QL STRIP.AUTO: 150
KETONES UR STRIP-MCNC: NEGATIVE MG/DL
LEUKOCYTE ESTERASE UR QL STRIP: 500
N GONORRHOEA DNA GENITAL QL NAA+PROBE: NORMAL
NITRITE UR QL STRIP: NEGATIVE
NON-SQ EPI CELLS URNS QL MICRO: ABNORMAL /HPF
PH UR STRIP.AUTO: 5 [PH] (ref 4.5–8)
PROT UR STRIP-MCNC: ABNORMAL MG/DL
RBC #/AREA URNS AUTO: ABNORMAL /HPF
SP GR UR STRIP.AUTO: 1.02 (ref 1–1.04)
UROBILINOGEN UA: NEGATIVE MG/DL
WBC #/AREA URNS AUTO: ABNORMAL /HPF

## 2018-09-14 PROCEDURE — 87077 CULTURE AEROBIC IDENTIFY: CPT | Performed by: PHYSICIAN ASSISTANT

## 2018-09-14 PROCEDURE — 81025 URINE PREGNANCY TEST: CPT | Performed by: PHYSICIAN ASSISTANT

## 2018-09-14 PROCEDURE — 99284 EMERGENCY DEPT VISIT MOD MDM: CPT

## 2018-09-14 PROCEDURE — 87186 SC STD MICRODIL/AGAR DIL: CPT | Performed by: PHYSICIAN ASSISTANT

## 2018-09-14 PROCEDURE — 87591 N.GONORRHOEAE DNA AMP PROB: CPT | Performed by: PHYSICIAN ASSISTANT

## 2018-09-14 PROCEDURE — 87086 URINE CULTURE/COLONY COUNT: CPT | Performed by: PHYSICIAN ASSISTANT

## 2018-09-14 PROCEDURE — 81001 URINALYSIS AUTO W/SCOPE: CPT | Performed by: PHYSICIAN ASSISTANT

## 2018-09-14 PROCEDURE — 81003 URINALYSIS AUTO W/O SCOPE: CPT | Performed by: PHYSICIAN ASSISTANT

## 2018-09-14 PROCEDURE — 87491 CHLMYD TRACH DNA AMP PROBE: CPT | Performed by: PHYSICIAN ASSISTANT

## 2018-09-14 RX ORDER — CEPHALEXIN 500 MG/1
500 CAPSULE ORAL 4 TIMES DAILY
Qty: 28 CAPSULE | Refills: 0 | Status: SHIPPED | OUTPATIENT
Start: 2018-09-14 | End: 2018-09-21

## 2018-09-14 NOTE — DISCHARGE INSTRUCTIONS
Urinary Tract Infection in Women, Ambulatory Care   GENERAL INFORMATION:   A urinary tract infection (UTI)  is caused by bacteria that get inside your urinary tract  Most bacteria that enter your urinary tract are expelled when you urinate  If the bacteria stay in your urinary tract, you may get an infection  Your urinary tract includes your kidneys, ureters, bladder, and urethra  Urine is made in your kidneys, and it flows from the ureters to the bladder  Urine leaves the bladder through the urethra  A UTI is more common in your lower urinary tract, which includes your bladder and urethra  Common symptoms include the following:   · Urinating more often or waking from sleep to urinate    · Pain or burning when you urinate    · Pain or pressure in your lower abdomen     · Urine that smells bad    · Blood in your urine    · Leaking urine  Seek immediate care for the following symptoms:   · Urinating very little or not at all    · Vomiting    · Shaking chills with a fever    · Side or back pain that gets worse  Treatment for a UTI  may include medicines to treat a bacterial infection  You may also need medicines to decrease pain and burning, or decrease the urge to urinate often  Prevent a UTI:   · Urinate when you feel the urge  Do not hold your urine  Urinate as soon as you feel you have to  · Drink liquids as directed  Ask how much liquid to drink each day and which liquids are best for you  You may need to drink more fluids than usual to help flush out the bacteria  Do not drink alcohol, caffeine, and citrus juices  These can irritate your bladder and increase your symptoms  · Apply heat  on your abdomen for 20 to 30 minutes every 2 hours for as many days as directed  Heat helps decrease discomfort and pressure in your bladder  Follow up with your healthcare provider as directed:  Write down your questions so you remember to ask them during your visits     CARE AGREEMENT:   You have the right to help plan your care  Learn about your health condition and how it may be treated  Discuss treatment options with your caregivers to decide what care you want to receive  You always have the right to refuse treatment  The above information is an  only  It is not intended as medical advice for individual conditions or treatments  Talk to your doctor, nurse or pharmacist before following any medical regimen to see if it is safe and effective for you  © 2014 [address] is for End User's use only and may not be sold, redistributed or otherwise used for commercial purposes  All illustrations and images included in CareNotes® are the copyrighted property of [employer]  or Gary Blanco

## 2018-09-14 NOTE — ED PROVIDER NOTES
History  Chief Complaint   Patient presents with    Painful Urination     Sept 9th I started with burning with urination; I was also seen by OBGYN and urine was tested-states it was ok; s/s continue and are worse now  49-year-old female presenting with dysuria since 09/09  Patient reports she has recently seen a gynecologist office on 09/09 in told the doctor that she had complaints of dysuria  Patient states that they did not test her urine and told her that 'i would be fine, that im good'  Patient reports continued dysuria along with urinary urgency and frequency and suprapubic pressure  Denies hematuria fevers chills sweats  No upper abdominal pain nausea vomiting diarrhea  Patient is not trying get home for symptoms  Patient denies any vaginal discharge or vaginal bleeding  Patient recently changed birth control to OCP from depo  has been on Depo shot since December 2017 and not had menstrual period since that time  No concern for STD  Prior to Admission Medications   Prescriptions Last Dose Informant Patient Reported? Taking?    LORazepam (ATIVAN) 0 5 mg tablet   Yes No   Sig: Take 1 tablet by mouth 3 (three) times a day as needed   SYNTHROID 125 MCG tablet   Yes No   Sig: Take 125 mcg by mouth daily   escitalopram (LEXAPRO) 10 mg tablet   Yes No   Sig: Take 5 mg by mouth daily   medroxyPROGESTERone (DEPO-PROVERA) 150 mg/mL injection   No No   Sig: Inject 1 mL (150 mg total) into the shoulder, thigh, or buttocks every 3 (three) months   nitrofurantoin (MACROBID) 100 mg capsule   No No   Sig: Take 1 capsule (100 mg total) by mouth 2 (two) times a day   Patient not taking: Reported on 9/7/2018    norgestimate-ethinyl estradiol (ORTHO-CYCLEN) 0 25-35 MG-MCG per tablet   No No   Sig: Take 1 tablet by mouth daily   sertraline (ZOLOFT) 50 mg tablet   Yes No   Sig: TAKE HALF A TABLET BY MOUTH DAILY FOR 2 WEEKS THEN 1 TABLET EVERY DAY      Facility-Administered Medications: None       Past Medical History:   Diagnosis Date    Anxiety     Depression     Gestational diabetes     Gestational hypertension     previous pregnancy    Hashimoto's thyroiditis     HPV (human papilloma virus) infection     Retained placenta        Past Surgical History:   Procedure Laterality Date    CHOLECYSTECTOMY      DILATION AND CURETTAGE OF UTERUS      HERNIA REPAIR      UMBILICAL HERNIA REPAIR         Family History   Problem Relation Age of Onset    Anuerysm Mother     Thyroid disease Mother     Lung cancer Maternal Grandmother      I have reviewed and agree with the history as documented  Social History   Substance Use Topics    Smoking status: Former Smoker    Smokeless tobacco: Never Used    Alcohol use No        Review of Systems   All other systems reviewed and are negative  Physical Exam  Physical Exam   Constitutional: She is oriented to person, place, and time  She appears well-developed and well-nourished  No distress  HENT:   Head: Normocephalic and atraumatic  Eyes: Conjunctivae are normal    EOM grossly intact   Neck: Normal range of motion  Neck supple  No JVD present  Cardiovascular: Normal rate  Pulmonary/Chest: Effort normal    Abdominal: Soft  Genitourinary:   Genitourinary Comments: deferred   Musculoskeletal:   FROM, steady gait, cap refill brisk, strength and sensation grossly intact throughout   Neurological: She is alert and oriented to person, place, and time  Skin: Skin is warm and dry  Capillary refill takes less than 2 seconds  Psychiatric: She has a normal mood and affect  Her behavior is normal    Nursing note and vitals reviewed        Vital Signs  ED Triage Vitals [09/14/18 1019]   Temperature Pulse Respirations Blood Pressure SpO2   (!) 96 4 °F (35 8 °C) 68 18 133/64 100 %      Temp Source Heart Rate Source Patient Position - Orthostatic VS BP Location FiO2 (%)   Temporal Monitor Lying Left arm --      Pain Score       No Pain           Vitals: 09/14/18 1019   BP: 133/64   Pulse: 68   Patient Position - Orthostatic VS: Lying       Visual Acuity      ED Medications  Medications - No data to display    Diagnostic Studies  Results Reviewed     Procedure Component Value Units Date/Time    UA w Reflex to Microscopic w Reflex to Culture [[id number]]  (Abnormal) Collected:  09/14/18 1036    Lab Status:  Final result Specimen:  Urine from Urine, Clean Catch Updated:  09/14/18 1052     Color, UA Ekaterina (A)     Clarity, UA Cloudy (A)     Specific Gravity, UA 1 020     pH, UA 5 0     Leukocytes,  0 (A)     Nitrite, UA Negative     Protein,  (2+) (A) mg/dl      Glucose, UA Negative mg/dl      Ketones, UA Negative mg/dl      Bilirubin, UA Negative     Blood,  0 (A)     UROBILINOGEN UA Negative mg/dL     Urine Microscopic [[id number]] Collected:  09/14/18 1036    Lab Status: In process Specimen:  Urine from Urine, Clean Catch Updated:  09/14/18 1047    Chlamydia/GC amplified DNA by PCR [[id number]] Collected:  09/14/18 1036    Lab Status: In process Specimen:  Urine from Urine, Other Updated:  09/14/18 1038    POCT pregnancy, urine [[id number]]  (Normal) Resulted:  09/14/18 1034    Lab Status:  Final result Updated:  09/14/18 1035     EXT PREG TEST UR (Ref: Negative) negative                 No orders to display              Procedures  Procedures       Phone Contacts  ED Phone Contact    ED Course                               MDM  Number of Diagnoses or Management Options  Diagnosis management comments: A 75-year-old female presenting with dysuria times approximately 5 days, will treat patient for UTI, otherwise patient appears well sitting on the bed playing with son, nontoxic appearing, f/u with pcp    All labs and imaging discussed with patient, strict return to ED precautions discussed  Pt verbalizes understanding and agrees with plan  Pt is stable for discharge  Portions of the record may have been created with voice recognition software   Occasional wrong word or "sound a like" substitutions may have occurred due to the inherent limitations of voice recognition software  Read the chart carefully and recognize, using context, where substitutions have occurred  CritCare Time    Disposition  Final diagnoses:   Urinary tract infection     Time reflects when diagnosis was documented in both MDM as applicable and the Disposition within this note     Time User Action Codes Description Comment    9/14/2018 10:56 AM Anthony SINGH Add [N39 0] Urinary tract infection       ED Disposition     ED Disposition Condition Comment    Discharge  Verner Ebony discharge to home/self care  Condition at discharge: Good        Follow-up Information     Follow up With Specialties Details Why Contact Info    Doc Torres MD Internal Medicine Schedule an appointment as soon as possible for a visit If symptoms worsen [address] [place] [address]  [phone]            Patient's Medications   Discharge Prescriptions    No medications on file     No discharge procedures on file      ED Provider  Electronically Signed by           Leodan Lloyd PA-C  09/14/18 4583

## 2018-09-16 LAB
BACTERIA UR CULT: ABNORMAL
BACTERIA UR CULT: ABNORMAL

## 2018-11-06 ENCOUNTER — HOSPITAL ENCOUNTER (EMERGENCY)
Facility: HOSPITAL | Age: 33
Discharge: HOME | End: 2018-11-07
Attending: EMERGENCY MEDICINE | Admitting: EMERGENCY MEDICINE
Payer: COMMERCIAL

## 2018-11-06 VITALS
WEIGHT: 206.79 LBS | DIASTOLIC BLOOD PRESSURE: 72 MMHG | RESPIRATION RATE: 18 BRPM | OXYGEN SATURATION: 100 % | TEMPERATURE: 97.9 F | BODY MASS INDEX: 32.08 KG/M2 | SYSTOLIC BLOOD PRESSURE: 144 MMHG | HEART RATE: 90 BPM

## 2018-11-06 DIAGNOSIS — N39.0 URINARY TRACT INFECTION: Primary | ICD-10-CM

## 2018-11-06 LAB
BACTERIA UR QL AUTO: ABNORMAL /HPF
BILIRUB UR QL STRIP: NEGATIVE
CLARITY UR: ABNORMAL
COLOR UR: ABNORMAL
EXT PREG TEST URINE: NEGATIVE
GLUCOSE UR STRIP-MCNC: NEGATIVE MG/DL
HGB UR QL STRIP.AUTO: 250
KETONES UR STRIP-MCNC: NEGATIVE MG/DL
LEUKOCYTE ESTERASE UR QL STRIP: 500
NITRITE UR QL STRIP: NEGATIVE
NON-SQ EPI CELLS URNS QL MICRO: ABNORMAL /HPF
PH UR STRIP.AUTO: 5 [PH] (ref 4.5–8)
PROT UR STRIP-MCNC: ABNORMAL MG/DL
RBC #/AREA URNS AUTO: ABNORMAL /HPF
SP GR UR STRIP.AUTO: 1.01 (ref 1–1.04)
UROBILINOGEN UA: NEGATIVE MG/DL
WBC #/AREA URNS AUTO: ABNORMAL /HPF

## 2018-11-06 PROCEDURE — 99283 EMERGENCY DEPT VISIT LOW MDM: CPT

## 2018-11-06 PROCEDURE — 81003 URINALYSIS AUTO W/O SCOPE: CPT

## 2018-11-06 PROCEDURE — 81001 URINALYSIS AUTO W/SCOPE: CPT

## 2018-11-06 PROCEDURE — 87086 URINE CULTURE/COLONY COUNT: CPT

## 2018-11-06 PROCEDURE — 81025 URINE PREGNANCY TEST: CPT | Performed by: EMERGENCY MEDICINE

## 2018-11-06 PROCEDURE — 87077 CULTURE AEROBIC IDENTIFY: CPT

## 2018-11-07 RX ORDER — CEPHALEXIN 500 MG/1
500 CAPSULE ORAL EVERY 12 HOURS SCHEDULED
Qty: 14 CAPSULE | Refills: 0 | Status: SHIPPED | OUTPATIENT
Start: 2018-11-07 | End: 2018-11-14

## 2018-11-07 RX ORDER — CEPHALEXIN 500 MG/1
500 CAPSULE ORAL ONCE
Status: COMPLETED | OUTPATIENT
Start: 2018-11-07 | End: 2018-11-07

## 2018-11-07 RX ORDER — CEPHALEXIN 500 MG/1
CAPSULE ORAL
Status: COMPLETED
Start: 2018-11-07 | End: 2018-11-07

## 2018-11-07 RX ORDER — CEPHALEXIN 250 MG/5ML
500 POWDER, FOR SUSPENSION ORAL ONCE
Status: DISCONTINUED | OUTPATIENT
Start: 2018-11-07 | End: 2018-11-07

## 2018-11-07 RX ADMIN — CEPHALEXIN 500 MG: 500 CAPSULE ORAL at 00:09

## 2018-11-07 NOTE — ED PROVIDER NOTES
History  Chief Complaint   Patient presents with    Painful Urination     "I has been burning for a couple of days when I pee, and my back started hurting 2 days ago  I thought maybe I hurt my back from my son "     49-year-old female past medical history of depression presents for evaluation of dysuria x2 days  Patient states that she also has been having achy bilateral low back pain today which is worse with movement and better with rest   She did not take any medications for this  Otherwise denies fevers, chills denies nausea vomiting or diarrhea  She had a urinary tract infection 1 month ago for which he was treated with similar symptoms            Prior to Admission Medications   Prescriptions Last Dose Informant Patient Reported? Taking? LORazepam (ATIVAN) 0 5 mg tablet   Yes No   Sig: Take 1 tablet by mouth 3 (three) times a day as needed   SYNTHROID 125 MCG tablet   Yes No   Sig: Take 125 mcg by mouth daily   norgestimate-ethinyl estradiol (ORTHO-CYCLEN) 0 25-35 MG-MCG per tablet   No No   Sig: Take 1 tablet by mouth daily   sertraline (ZOLOFT) 50 mg tablet   Yes No   Sig: TAKE HALF A TABLET BY MOUTH DAILY FOR 2 WEEKS THEN 1 TABLET EVERY DAY      Facility-Administered Medications: None       Past Medical History:   Diagnosis Date    Anxiety     Depression     Gestational diabetes     Gestational hypertension     previous pregnancy    Hashimoto's thyroiditis     HPV (human papilloma virus) infection     Retained placenta        Past Surgical History:   Procedure Laterality Date    CHOLECYSTECTOMY      DILATION AND CURETTAGE OF UTERUS      HERNIA REPAIR      UMBILICAL HERNIA REPAIR         Family History   Problem Relation Age of Onset    Anuerysm Mother     Thyroid disease Mother     Lung cancer Maternal Grandmother      I have reviewed and agree with the history as documented      Social History   Substance Use Topics    Smoking status: Former Smoker    Smokeless tobacco: Never Used  Alcohol use No        Review of Systems   Constitutional: Negative for chills and fever  HENT: Negative for rhinorrhea and sore throat  Respiratory: Negative for cough  Cardiovascular: Negative for chest pain and palpitations  Gastrointestinal: Negative for abdominal pain, nausea and vomiting  Genitourinary: Positive for dysuria  Negative for frequency, urgency, vaginal bleeding, vaginal discharge and vaginal pain  Musculoskeletal: Positive for back pain  Neurological: Negative for weakness, light-headedness and headaches  Physical Exam  Physical Exam   Constitutional: She is oriented to person, place, and time  She appears well-developed and well-nourished  HENT:   Head: Normocephalic and atraumatic  Cardiovascular: Normal rate and regular rhythm  Exam reveals no gallop and no friction rub  No murmur heard  Pulmonary/Chest: Effort normal  She has no wheezes  She has no rales  She exhibits no tenderness  Abdominal: Soft  She exhibits no distension and no mass  There is no tenderness  There is no rebound and no guarding  Neurological: She is alert and oriented to person, place, and time  Skin: Skin is warm and dry  Psychiatric: She has a normal mood and affect  Nursing note and vitals reviewed        Vital Signs  ED Triage Vitals [11/06/18 2330]   Temperature Pulse Respirations Blood Pressure SpO2   97 9 °F (36 6 °C) 90 18 144/72 100 %      Temp Source Heart Rate Source Patient Position - Orthostatic VS BP Location FiO2 (%)   Tympanic Monitor Lying Left arm --      Pain Score       5           Vitals:    11/06/18 2330   BP: 144/72   Pulse: 90   Patient Position - Orthostatic VS: Lying       Visual Acuity      ED Medications  Medications   cephalexin (KEFLEX) oral suspension 500 mg (not administered)   cephalexin (KEFLEX) 500 mg capsule **ADS Override Pull** (500 mg  Given 11/7/18 0009)       Diagnostic Studies  Results Reviewed     Procedure Component Value Units Date/Time Urine Microscopic [[id number]]  (Abnormal) Collected:  11/06/18 2342    Lab Status:  Final result Specimen:  Urine from Urine, Clean Catch Updated:  11/06/18 2357     RBC, UA Innumerable (A) /hpf      WBC, UA 30-50 (A) /hpf      Epithelial Cells Occasional /hpf      Bacteria, UA Moderate (A) /hpf     Urine culture [[id number]] Collected:  11/06/18 2342    Lab Status: In process Specimen:  Urine from Urine, Clean Catch Updated:  11/06/18 2357    UA w Reflex to Microscopic w Reflex to Culture [[id number]]  (Abnormal) Collected:  11/06/18 2342    Lab Status:  Final result Specimen:  Urine from Urine, Clean Catch Updated:  11/06/18 2351     Color, UA Straw     Clarity, UA Cloudy (A)     Specific Gravity, UA 1 015     pH, UA 5 0     Leukocytes,  0 (A)     Nitrite, UA Negative     Protein, UA 30 (1+) (A) mg/dl      Glucose, UA Negative mg/dl      Ketones, UA Negative mg/dl      Bilirubin, UA Negative     Blood,  0 (A)     UROBILINOGEN UA Negative mg/dL     POCT pregnancy, urine [[id number]]  (Normal) Resulted:  11/06/18 2344    Lab Status:  Final result Updated:  11/06/18 2344     EXT PREG TEST UR (Ref: Negative) negative                 No orders to display              Procedures  Procedures       Phone Contacts  ED Phone Contact    ED Course                               MDM  Number of Diagnoses or Management Options  Urinary tract infection:   Diagnosis management comments: 78-year-old female presents for evaluation of dysuria, low back pain, evidence of UTI on urinalysis, pregnancy test negative, does not appear clinically to be pyelonephritis so will treat with short course of Keflex given her last culture grew pansensitive E coli    Did advise patient follow up with Urology given recurrent UTIs    CritCare Time    Disposition  Final diagnoses:   Urinary tract infection     Time reflects when diagnosis was documented in both MDM as applicable and the Disposition within this note     Time User Action Codes Description Comment    11/7/2018 12:02 AM Yas Bolanos Add [N39 0] Urinary tract infection       ED Disposition     ED Disposition Condition Comment    Discharge  Teodoro Reveal discharge to home/self care  Condition at discharge: Stable        Follow-up Information     Follow up With Specialties Details Why [address] Emergency Department Emergency Medicine  If symptoms worsen [address] [address]  MD Srinivasan Internal Medicine  As needed [address] [facility]  [phone]       [facility] Schedule an appointment as soon as possible for a visit  [address]  [phone]  9  [facility]rology [place],  [place] [facility] [place], [place], [address]          Patient's Medications   Discharge Prescriptions    CEPHALEXIN (KEFLEX) 500 MG CAPSULE    Take 1 capsule (500 mg total) by mouth every 12 (twelve) hours for 7 days       Start Date: 11/7/2018 End Date: 11/14/2018       Order Dose: 500 mg       Quantity: 14 capsule    Refills: 0     No discharge procedures on file      ED Provider  Electronically Signed by           Avni Hair MD  11/07/18 0010

## 2018-11-07 NOTE — DISCHARGE INSTRUCTIONS
Please follow-up with the primary care provider for further care, if symptoms worsen please return to the emergency department  Given that you had multiple UTIs please follow up with Urology at the number provided for further care  Urinary Tract Infection in Women   WHAT Lana:   A urinary tract infection (UTI) is caused by bacteria that get inside your urinary tract  Most bacteria that enter your urinary tract come out when you urinate  If the bacteria stay in your urinary tract, you may get an infection  Your urinary tract includes your kidneys, ureters, bladder, and urethra  Urine is made in your kidneys, and it flows from the ureters to the bladder  Urine leaves the bladder through the urethra  A UTI is more common in your lower urinary tract, which includes your bladder and urethra  DISCHARGE INSTRUCTIONS:   Return to the emergency department if:   · You are urinating very little or not at all  · You have a high fever with shaking chills  · You have side or back pain that gets worse  Contact your healthcare provider if:   · You have a fever  · You do not feel better after 2 days of taking antibiotics  · You are vomiting  · You have questions or concerns about your condition or care  Medicines:   · Antibiotics  help fight a bacterial infection  · Medicines  may be given to decrease pain and burning when you urinate  They will also help decrease the feeling that you need to urinate often  These medicines will make your urine orange or red  · Take your medicine as directed  Contact your healthcare provider if you think your medicine is not helping or if you have side effects  Tell him or her if you are allergic to any medicine  Keep a list of the medicines, vitamins, and herbs you take  Include the amounts, and when and why you take them  Bring the list or the pill bottles to follow-up visits  Carry your medicine list with you in case of an emergency    Follow up with your healthcare provider as directed:  Write down your questions so you remember to ask them during your visits  Prevent another UTI:   · Empty your bladder often  Urinate and empty your bladder as soon as you feel the need  Do not hold your urine for long periods of time  · Wipe from front to back after you urinate or have a bowel movement  This will help prevent germs from getting into your urinary tract through your urethra  · Drink liquids as directed  Ask how much liquid to drink each day and which liquids are best for you  You may need to drink more liquids than usual to help flush out the bacteria  Do not drink alcohol, caffeine, or citrus juices  These can irritate your bladder and increase your symptoms  Your healthcare provider may recommend cranberry juice to help prevent a UTI  · Urinate after you have sex  This can help flush out bacteria passed during sex  · Do not douche or use feminine deodorants  These can change the chemical balance in your vagina  · Change sanitary pads or tampons often  This will help prevent germs from getting into your urinary tract  · Do pelvic muscle exercises often  Pelvic muscle exercises may help you start and stop urinating  Strong pelvic muscles may help you empty your bladder easier  Squeeze these muscles tightly for 5 seconds like you are trying to hold back urine  Then relax for 5 seconds  Gradually work up to squeezing for 10 seconds  Do 3 sets of 15 repetitions a day, or as directed  © 2017 [address] Information is for End User's use only and may not be sold, redistributed or otherwise used for commercial purposes  All illustrations and images included in CareNotes® are the copyrighted property of A D A M [employer]  or [employer]anco  The above information is an  only  It is not intended as medical advice for individual conditions or treatments   Talk to your doctor, nurse or pharmacist before following any medical regimen to see if it is safe and effective for you

## 2018-11-08 LAB
BACTERIA UR CULT: ABNORMAL
BACTERIA UR CULT: ABNORMAL

## 2018-11-12 DIAGNOSIS — Z30.41 ENCOUNTER FOR SURVEILLANCE OF CONTRACEPTIVE PILLS: Primary | ICD-10-CM

## 2018-11-12 RX ORDER — NORGESTIMATE AND ETHINYL ESTRADIOL 0.25-0.035
1 KIT ORAL DAILY
Qty: 28 TABLET | Refills: 0 | Status: SHIPPED | OUTPATIENT
Start: 2018-11-12 | End: 2021-04-18

## 2019-01-04 ENCOUNTER — OFFICE VISIT (OUTPATIENT)
Dept: OBGYN CLINIC | Facility: CLINIC | Age: 34
End: 2019-01-04

## 2019-01-04 VITALS — WEIGHT: 209.6 LBS | BODY MASS INDEX: 32.51 KG/M2

## 2019-01-04 DIAGNOSIS — Z30.09 ENCOUNTER FOR COUNSELING REGARDING CONTRACEPTION: Primary | ICD-10-CM

## 2019-01-04 DIAGNOSIS — N92.6 MISSED MENSES: ICD-10-CM

## 2019-01-04 PROBLEM — Z30.42 ENCOUNTER FOR SURVEILLANCE OF INJECTABLE CONTRACEPTIVE: Status: RESOLVED | Noted: 2018-06-19 | Resolved: 2019-01-04

## 2019-01-04 PROBLEM — Z30.013 ENCOUNTER FOR INITIAL PRESCRIPTION OF INJECTABLE CONTRACEPTIVE: Status: RESOLVED | Noted: 2018-03-19 | Resolved: 2019-01-04

## 2019-01-04 LAB — SL AMB POCT URINE HCG: NEGATIVE

## 2019-01-04 PROCEDURE — 99213 OFFICE O/P EST LOW 20 MIN: CPT | Performed by: NURSE PRACTITIONER

## 2019-01-04 PROCEDURE — 81025 URINE PREGNANCY TEST: CPT | Performed by: NURSE PRACTITIONER

## 2019-01-04 NOTE — PATIENT INSTRUCTIONS
Start birth control pill if desired   Call with needs or concerns  Annual GYN exam is due after 3/2019

## 2019-01-04 NOTE — PROGRESS NOTES
Assessment/Plan:         Diagnoses and all orders for this visit:    Encounter for counseling regarding contraception    Missed menses  -     POCT urine HCG        Explained UPT was negative  Reinforced safe and effective use of OCP's if she decides to restart  Discussed possibility of missed periods when a contraceptive method is stopped  Pt plans another UPT if no period in the next 2 weeks   Subjective:     Plan  Start birth control pill if desired   Call with needs or concerns  Annual GYN exam is due after 3/2019  Pt verbalized understanding of all discussed  Patient ID: Mala Neri is a 26 y o  female  HPI  Pt states she did not  her last pack of OCP's because she could not afford them  Pt has been having unprotected intercourse and is considering another baby, pt is aware of risks  Last WNL PAP and negative HPV was 3/2018  The following portions of the patient's history were reviewed and updated as appropriate: allergies, current medications, past family history, past medical history, past social history, past surgical history and problem list     Review of Systems    Negative today except for her missed period x 1 month  Objective:       Wt 95 1 kg (209 lb 9 6 oz)   BMI 32 51 kg/m²          Physical Exam    Alert and oriented  Denies pain  UPT was negative  Annual exam due in March

## 2019-03-15 ENCOUNTER — APPOINTMENT (EMERGENCY)
Dept: RADIOLOGY | Facility: HOSPITAL | Age: 34
End: 2019-03-15
Payer: COMMERCIAL

## 2019-03-15 ENCOUNTER — HOSPITAL ENCOUNTER (EMERGENCY)
Facility: HOSPITAL | Age: 34
Discharge: HOME | End: 2019-03-15
Attending: EMERGENCY MEDICINE | Admitting: EMERGENCY MEDICINE
Payer: COMMERCIAL

## 2019-03-15 VITALS
DIASTOLIC BLOOD PRESSURE: 64 MMHG | OXYGEN SATURATION: 98 % | BODY MASS INDEX: 33.32 KG/M2 | RESPIRATION RATE: 14 BRPM | HEART RATE: 78 BPM | SYSTOLIC BLOOD PRESSURE: 134 MMHG | HEIGHT: 67 IN | WEIGHT: 212.3 LBS | TEMPERATURE: 98.6 F

## 2019-03-15 DIAGNOSIS — T14.8XXA WOUND INFECTION: ICD-10-CM
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
## 2019-03-15 DIAGNOSIS — L08.9 WOUND INFECTION: ICD-10-CM

## 2019-03-15 DIAGNOSIS — S06.0X9A CONCUSSION: ICD-10-CM

## 2019-03-15 PROCEDURE — 99283 EMERGENCY DEPT VISIT LOW MDM: CPT

## 2019-03-15 PROCEDURE — 73564 X-RAY EXAM KNEE 4 OR MORE: CPT

## 2019-03-15 RX ORDER — GINSENG 100 MG
1 CAPSULE ORAL ONCE
Status: COMPLETED | OUTPATIENT
Start: 2019-03-15 | End: 2019-03-15

## 2019-03-15 RX ORDER — CEPHALEXIN 500 MG/1
500 CAPSULE ORAL 4 TIMES DAILY
Qty: 40 CAPSULE | Refills: 0 | Status: SHIPPED | OUTPATIENT
Start: 2019-03-15 | End: 2019-03-25

## 2019-03-15 RX ORDER — ESCITALOPRAM OXALATE 10 MG/1
10 TABLET ORAL DAILY
COMMUNITY
End: 2021-04-18

## 2019-03-15 RX ADMIN — BACITRACIN ZINC 1 SMALL APPLICATION: 500 OINTMENT TOPICAL at 09:50

## 2019-03-15 NOTE — ED PROVIDER NOTES
History  Chief Complaint   Patient presents with    Fall     Patient fell 2 days ago and has left knee pain, swelling, laceration  Concerned it may be infected, difficult to walk     This is a 40-year-old female patient approximately 2 days ago she tripped and fell with her son in her arms landing on her left knee causing a deep abrasion over her tibial plateau  She states it was more swollen but today seems to be a little bit reddened and has discomfort when she walks but she is able  Last tetanus approximately 4 years ago  She is fearful that is getting infected  No numbness or tingling no weakness  Nothing makes it better or worse  At this time she will have an x-ray to rule out fracture and will consider antibiotics due to the redness of the wound  Prior to Admission Medications   Prescriptions Last Dose Informant Patient Reported? Taking?    LORazepam (ATIVAN) 0 5 mg tablet   Yes Yes   Sig: Take 1 tablet by mouth 3 (three) times a day as needed   SYNTHROID 125 MCG tablet   Yes Yes   Sig: Take 125 mcg by mouth daily   escitalopram (LEXAPRO) 10 mg tablet   Yes Yes   Sig: Take 10 mg by mouth daily   norgestimate-ethinyl estradiol (ORTHO-CYCLEN) 0 25-35 MG-MCG per tablet   No No   Sig: Take 1 tablet by mouth daily   Patient not taking: Reported on 1/4/2019    norgestimate-ethinyl estradiol (ORTHO-CYCLEN) 0 25-35 MG-MCG per tablet   No No   Sig: Take 1 tablet by mouth daily   Patient not taking: Reported on 1/4/2019    sertraline (ZOLOFT) 50 mg tablet   Yes No   Sig: TAKE HALF A TABLET BY MOUTH DAILY FOR 2 WEEKS THEN 1 TABLET EVERY DAY      Facility-Administered Medications: None       Past Medical History:   Diagnosis Date    Anxiety     Depression     Gestational diabetes     Gestational hypertension     previous pregnancy    Hashimoto's thyroiditis     HPV (human papilloma virus) infection     Retained placenta        Past Surgical History:   Procedure Laterality Date    CHOLECYSTECTOMY  DILATION AND CURETTAGE OF UTERUS      HERNIA REPAIR      UMBILICAL HERNIA REPAIR         Family History   Problem Relation Age of Onset   Bruce Bertin Anuerysm Mother     Thyroid disease Mother     Lung cancer Maternal Grandmother      I have reviewed and agree with the history as documented  Social History     Tobacco Use    Smoking status: Never Smoker    Smokeless tobacco: Never Used   Substance Use Topics    Alcohol use: No    Drug use: No        Review of Systems   All other systems reviewed and are negative  Physical Exam  Physical Exam   Constitutional: She appears well-developed and well-nourished  HENT:   Head: Normocephalic and atraumatic  Right Ear: External ear normal    Left Ear: External ear normal    Nose: Nose normal    Mouth/Throat: Oropharynx is clear and moist    Eyes: Pupils are equal, round, and reactive to light  Conjunctivae are normal    Neck: Normal range of motion  Neck supple  Cardiovascular: Normal rate and regular rhythm  Pulmonary/Chest: Effort normal and breath sounds normal    Abdominal: Soft  Bowel sounds are normal  There is no tenderness  Musculoskeletal: Normal range of motion  Legs:  Neurological: She is alert  Skin: Skin is warm  Psychiatric: She has a normal mood and affect  Her behavior is normal    Nursing note and vitals reviewed        Vital Signs  ED Triage Vitals [03/15/19 0854]   Temperature Pulse Respirations Blood Pressure SpO2   98 6 °F (37 °C) 78 14 134/64 98 %      Temp Source Heart Rate Source Patient Position - Orthostatic VS BP Location FiO2 (%)   Oral Monitor Lying Right arm --      Pain Score       6           Vitals:    03/15/19 0854   BP: 134/64   Pulse: 78   Patient Position - Orthostatic VS: Lying       qSOFA     Row Name 03/15/19 0854                Altered mental status GCS < 15  --        Respiratory Rate > / =72  0        Systolic BP < / =608  0        Q Sofa Score  0              Visual Acuity      ED Medications  Medications   bacitracin topical ointment 1 small application (has no administration in time range)       Diagnostic Studies  Results Reviewed     None                 XR knee 4+ views left injury   ED Interpretation by Kendra Rocha PA-C (03/15 2791)   nad                 Procedures  Procedures       Phone Contacts  ED Phone Contact    ED Course                               MDM    Disposition  Final diagnoses:   Fall, initial encounter   Concussion   Wound infection     Time reflects when diagnosis was documented in both MDM as applicable and the Disposition within this note     Time User Action Codes Description Comment    3/15/2019  9:32 AM Andrew Ornelas Add [C91  IKZP] Fall, initial encounter     3/15/2019  9:32 AM Araceli Marloncking Add [S06 0X9A] Concussion     3/15/2019  9:32 AM Andrew Ornelas Add [T14  8XXA,  L08 9] Wound infection       ED Disposition     ED Disposition Condition Date/Time Comment    Discharge Stable Fri Mar 15, 2019  9:32 AM Madeleine Isbell discharge to home/self care  Follow-up Information     Follow up With Specialties Details Why Contact Info Additional Information    [facility]  Schedule an appointment as soon as possible for a visit   [address] [place] [address]  [phone] [facility], [address] [place] [address], [place], [place], [address]          Patient's Medications   Discharge Prescriptions    CEPHALEXIN (KEFLEX) 500 MG CAPSULE    Take 1 capsule (500 mg total) by mouth 4 (four) times a day for 10 days       Start Date: 3/15/2019 End Date: 3/25/2019       Order Dose: 500 mg       Quantity: 40 capsule    Refills: 0     No discharge procedures on file      ED Provider  Electronically Signed by           Kendra Rocha PA-C  03/15/19 3660

## 2019-04-21 ENCOUNTER — HOSPITAL ENCOUNTER (EMERGENCY)
Facility: HOSPITAL | Age: 34
Discharge: HOME | End: 2019-04-21
Attending: EMERGENCY MEDICINE | Admitting: EMERGENCY MEDICINE
Payer: COMMERCIAL

## 2019-04-21 ENCOUNTER — APPOINTMENT (EMERGENCY)
Dept: RADIOLOGY | Facility: HOSPITAL | Age: 34
End: 2019-04-21
Payer: COMMERCIAL

## 2019-04-21 VITALS
SYSTOLIC BLOOD PRESSURE: 134 MMHG | RESPIRATION RATE: 20 BRPM | WEIGHT: 213 LBS | DIASTOLIC BLOOD PRESSURE: 78 MMHG | TEMPERATURE: 97.6 F | HEIGHT: 67 IN | BODY MASS INDEX: 33.43 KG/M2 | HEART RATE: 84 BPM

## 2019-04-21 DIAGNOSIS — B34.9 VIRAL SYNDROME: ICD-10-CM

## 2019-04-21 DIAGNOSIS — N39.0 UTI (URINARY TRACT INFECTION): ICD-10-CM

## 2019-04-21 DIAGNOSIS — J06.9 URI (UPPER RESPIRATORY INFECTION): ICD-10-CM

## 2019-04-21 DIAGNOSIS — R50.9 FEVER: Primary | ICD-10-CM

## 2019-04-21 LAB
ANION GAP SERPL CALCULATED.3IONS-SCNC: 9 MMOL/L (ref 5–14)
BACTERIA UR QL AUTO: ABNORMAL /HPF
BASOPHILS # BLD AUTO: 0 THOUSANDS/ΜL (ref 0–0.1)
BASOPHILS NFR BLD AUTO: 1 % (ref 0–1)
BILIRUB UR QL STRIP: NEGATIVE
BUN SERPL-MCNC: 8 MG/DL (ref 5–25)
CALCIUM SERPL-MCNC: 9.5 MG/DL (ref 8.4–10.2)
CHLORIDE SERPL-SCNC: 102 MMOL/L (ref 97–108)
CLARITY UR: CLEAR
CO2 SERPL-SCNC: 26 MMOL/L (ref 22–30)
COLOR UR: ABNORMAL
CREAT SERPL-MCNC: 0.6 MG/DL (ref 0.6–1.2)
EOSINOPHIL # BLD AUTO: 0.1 THOUSAND/ΜL (ref 0–0.4)
EOSINOPHIL NFR BLD AUTO: 2 % (ref 0–6)
ERYTHROCYTE [DISTWIDTH] IN BLOOD BY AUTOMATED COUNT: 15.5 %
EXT PREG TEST URINE: NEGATIVE
GFR SERPL CREATININE-BSD FRML MDRD: 118 ML/MIN/1.73SQ M
GLUCOSE SERPL-MCNC: 96 MG/DL (ref 70–99)
GLUCOSE UR STRIP-MCNC: NEGATIVE MG/DL
HCT VFR BLD AUTO: 38.4 % (ref 36–46)
HGB BLD-MCNC: 12.5 G/DL (ref 12–16)
HGB UR QL STRIP.AUTO: NEGATIVE
KETONES UR STRIP-MCNC: NEGATIVE MG/DL
LEUKOCYTE ESTERASE UR QL STRIP: 100
LYMPHOCYTES # BLD AUTO: 1.6 THOUSANDS/ΜL (ref 0.5–4)
LYMPHOCYTES NFR BLD AUTO: 24 % (ref 25–45)
MCH RBC QN AUTO: 25.7 PG (ref 26–34)
MCHC RBC AUTO-ENTMCNC: 32.5 G/DL (ref 31–36)
MCV RBC AUTO: 79 FL (ref 80–100)
MONOCYTES # BLD AUTO: 0.5 THOUSAND/ΜL (ref 0.2–0.9)
MONOCYTES NFR BLD AUTO: 8 % (ref 1–10)
NEUTROPHILS # BLD AUTO: 4.4 THOUSANDS/ΜL (ref 1.8–7.8)
NEUTS SEG NFR BLD AUTO: 66 % (ref 45–65)
NITRITE UR QL STRIP: NEGATIVE
NON-SQ EPI CELLS URNS QL MICRO: ABNORMAL /HPF
PH UR STRIP.AUTO: 5 [PH]
PLATELET # BLD AUTO: 213 THOUSANDS/UL (ref 150–450)
PLATELET BLD QL SMEAR: ADEQUATE
PMV BLD AUTO: 9 FL (ref 8.9–12.7)
POTASSIUM SERPL-SCNC: 4.2 MMOL/L (ref 3.6–5)
PROT UR STRIP-MCNC: NEGATIVE MG/DL
RBC # BLD AUTO: 4.85 MILLION/UL (ref 4–5.2)
RBC #/AREA URNS AUTO: ABNORMAL /HPF
RBC MORPH BLD: NORMAL
SODIUM SERPL-SCNC: 137 MMOL/L (ref 137–147)
SP GR UR STRIP.AUTO: 1.01 (ref 1–1.04)
TSH SERPL DL<=0.05 MIU/L-ACNC: 4.58 UIU/ML (ref 0.47–4.68)
UROBILINOGEN UA: NEGATIVE MG/DL
WBC # BLD AUTO: 6.7 THOUSAND/UL (ref 4.5–11)
WBC #/AREA URNS AUTO: ABNORMAL /HPF

## 2019-04-21 PROCEDURE — 71046 X-RAY EXAM CHEST 2 VIEWS: CPT

## 2019-04-21 PROCEDURE — 99283 EMERGENCY DEPT VISIT LOW MDM: CPT | Performed by: EMERGENCY MEDICINE

## 2019-04-21 PROCEDURE — 99284 EMERGENCY DEPT VISIT MOD MDM: CPT

## 2019-04-21 PROCEDURE — 84443 ASSAY THYROID STIM HORMONE: CPT | Performed by: EMERGENCY MEDICINE

## 2019-04-21 PROCEDURE — 81025 URINE PREGNANCY TEST: CPT | Performed by: EMERGENCY MEDICINE

## 2019-04-21 PROCEDURE — 81001 URINALYSIS AUTO W/SCOPE: CPT | Performed by: EMERGENCY MEDICINE

## 2019-04-21 PROCEDURE — 36415 COLL VENOUS BLD VENIPUNCTURE: CPT | Performed by: EMERGENCY MEDICINE

## 2019-04-21 PROCEDURE — 85025 COMPLETE CBC W/AUTO DIFF WBC: CPT | Performed by: EMERGENCY MEDICINE

## 2019-04-21 PROCEDURE — 87086 URINE CULTURE/COLONY COUNT: CPT | Performed by: EMERGENCY MEDICINE

## 2019-04-21 PROCEDURE — 80048 BASIC METABOLIC PNL TOTAL CA: CPT | Performed by: EMERGENCY MEDICINE

## 2019-04-21 RX ORDER — NITROFURANTOIN 25; 75 MG/1; MG/1
100 CAPSULE ORAL 2 TIMES DAILY
Qty: 14 CAPSULE | Refills: 0 | Status: SHIPPED | OUTPATIENT
Start: 2019-04-21 | End: 2019-04-28

## 2019-04-22 LAB — BACTERIA UR CULT: NORMAL

## 2019-07-19 ENCOUNTER — HOSPITAL ENCOUNTER (EMERGENCY)
Facility: HOSPITAL | Age: 34
Discharge: HOME | End: 2019-07-19
Attending: EMERGENCY MEDICINE | Admitting: EMERGENCY MEDICINE
Payer: COMMERCIAL

## 2019-07-19 ENCOUNTER — APPOINTMENT (EMERGENCY)
Dept: RADIOLOGY | Facility: HOSPITAL | Age: 34
End: 2019-07-19
Payer: COMMERCIAL

## 2019-07-19 VITALS
DIASTOLIC BLOOD PRESSURE: 73 MMHG | TEMPERATURE: 97 F | HEART RATE: 76 BPM | SYSTOLIC BLOOD PRESSURE: 144 MMHG | RESPIRATION RATE: 16 BRPM | BODY MASS INDEX: 33.6 KG/M2 | WEIGHT: 214.51 LBS | OXYGEN SATURATION: 97 %

## 2019-07-19 DIAGNOSIS — M25.529 ELBOW PAIN: Primary | ICD-10-CM

## 2019-07-19 DIAGNOSIS — S51.019A LACERATION OF ELBOW: ICD-10-CM

## 2019-07-19 PROCEDURE — 99283 EMERGENCY DEPT VISIT LOW MDM: CPT | Performed by: PHYSICIAN ASSISTANT

## 2019-07-19 PROCEDURE — 12001 RPR S/N/AX/GEN/TRNK 2.5CM/<: CPT | Performed by: PHYSICIAN ASSISTANT

## 2019-07-19 PROCEDURE — 99283 EMERGENCY DEPT VISIT LOW MDM: CPT

## 2019-07-19 PROCEDURE — 73080 X-RAY EXAM OF ELBOW: CPT

## 2019-07-19 RX ORDER — ACETAMINOPHEN 325 MG/1
650 TABLET ORAL ONCE
Status: COMPLETED | OUTPATIENT
Start: 2019-07-19 | End: 2019-07-19

## 2019-07-19 RX ORDER — LIDOCAINE HYDROCHLORIDE 10 MG/ML
5 INJECTION, SOLUTION EPIDURAL; INFILTRATION; INTRACAUDAL; PERINEURAL ONCE
Status: COMPLETED | OUTPATIENT
Start: 2019-07-19 | End: 2019-07-19

## 2019-07-19 RX ORDER — CEPHALEXIN 500 MG/1
500 CAPSULE ORAL ONCE
Status: COMPLETED | OUTPATIENT
Start: 2019-07-19 | End: 2019-07-19

## 2019-07-19 RX ORDER — CEPHALEXIN 500 MG/1
500 CAPSULE ORAL EVERY 8 HOURS SCHEDULED
Qty: 30 CAPSULE | Refills: 0 | Status: SHIPPED | OUTPATIENT
Start: 2019-07-19 | End: 2019-07-29

## 2019-07-19 RX ADMIN — LIDOCAINE HYDROCHLORIDE 5 ML: 10 INJECTION, SOLUTION EPIDURAL; INFILTRATION; INTRACAUDAL; PERINEURAL at 15:08

## 2019-07-19 RX ADMIN — CEPHALEXIN 500 MG: 500 CAPSULE ORAL at 15:17

## 2019-07-19 RX ADMIN — ACETAMINOPHEN 650 MG: 325 TABLET ORAL at 15:17

## 2019-07-19 NOTE — ED PROVIDER NOTES
History  Chief Complaint   Patient presents with    Elbow Injury     Railing broke on stairs; right elbow got a puncture injury  No bleeding at this time  Full ROM noted  History provided by:  Patient   used: No    Medical Problem   Location:  Pt cut right elbow on sharp metal edge   Severity:  Mild  Onset quality:  Sudden  Duration:  1 hour  Timing:  Constant  Progression:  Unchanged  Chronicity:  New  Associated symptoms: no abdominal pain, no chest pain, no congestion, no cough, no diarrhea, no ear pain, no fatigue, no fever, no headaches, no loss of consciousness, no myalgias, no nausea, no rash, no rhinorrhea, no shortness of breath, no sore throat, no vomiting and no wheezing        Prior to Admission Medications   Prescriptions Last Dose Informant Patient Reported? Taking?    LORazepam (ATIVAN) 0 5 mg tablet   Yes No   Sig: Take 1 tablet by mouth 3 (three) times a day as needed   SYNTHROID 125 MCG tablet   Yes No   Sig: Take 125 mcg by mouth daily   escitalopram (LEXAPRO) 10 mg tablet   Yes No   Sig: Take 10 mg by mouth daily   norgestimate-ethinyl estradiol (ORTHO-CYCLEN) 0 25-35 MG-MCG per tablet   No No   Sig: Take 1 tablet by mouth daily   Patient not taking: Reported on 1/4/2019    norgestimate-ethinyl estradiol (ORTHO-CYCLEN) 0 25-35 MG-MCG per tablet   No No   Sig: Take 1 tablet by mouth daily   Patient not taking: Reported on 1/4/2019    sertraline (ZOLOFT) 50 mg tablet   Yes No   Sig: TAKE HALF A TABLET BY MOUTH DAILY FOR 2 WEEKS THEN 1 TABLET EVERY DAY      Facility-Administered Medications: None       Past Medical History:   Diagnosis Date    Anxiety     Depression     Gestational diabetes     Gestational hypertension     previous pregnancy    Hashimoto's thyroiditis     HPV (human papilloma virus) infection     Retained placenta        Past Surgical History:   Procedure Laterality Date    CHOLECYSTECTOMY      DILATION AND CURETTAGE OF UTERUS      HERNIA REPAIR  UMBILICAL HERNIA REPAIR         Family History   Problem Relation Age of Onset   Trevon Romanoysm Mother     Thyroid disease Mother     Lung cancer Maternal Grandmother      I have reviewed and agree with the history as documented  Social History     Tobacco Use    Smoking status: Never Smoker    Smokeless tobacco: Never Used   Substance Use Topics    Alcohol use: No    Drug use: No        Review of Systems   Constitutional: Negative  Negative for fatigue and fever  HENT: Negative  Negative for congestion, ear pain, rhinorrhea and sore throat  Eyes: Negative  Respiratory: Negative  Negative for cough, shortness of breath and wheezing  Cardiovascular: Negative  Negative for chest pain  Gastrointestinal: Negative  Negative for abdominal pain, diarrhea, nausea and vomiting  Endocrine: Negative  Genitourinary: Negative  Musculoskeletal: Negative  Negative for myalgias  Skin: Negative  Negative for rash  Allergic/Immunologic: Negative  Neurological: Negative  Negative for loss of consciousness and headaches  Hematological: Negative  Psychiatric/Behavioral: Negative  All other systems reviewed and are negative  Physical Exam  Physical Exam   Constitutional: She is oriented to person, place, and time  She appears well-developed and well-nourished  HENT:   Head: Normocephalic and atraumatic  Right Ear: External ear normal    Left Ear: External ear normal    Nose: Nose normal    Mouth/Throat: Oropharynx is clear and moist    Eyes: Pupils are equal, round, and reactive to light  Conjunctivae and EOM are normal    Neck: Normal range of motion  Neck supple  Cardiovascular: Normal rate, regular rhythm, normal heart sounds and intact distal pulses  Pulmonary/Chest: Effort normal and breath sounds normal    Abdominal: Soft  Bowel sounds are normal    Musculoskeletal: Normal range of motion  Right elbow   5cm laceration superficial   Neurological: She is alert and oriented to person, place, and time  Skin: Skin is warm  Capillary refill takes less than 2 seconds  Psychiatric: She has a normal mood and affect  Nursing note and vitals reviewed  Vital Signs  ED Triage Vitals [07/19/19 1452]   Temperature Pulse Respirations Blood Pressure SpO2   (!) 97 °F (36 1 °C) 76 16 144/73 97 %      Temp Source Heart Rate Source Patient Position - Orthostatic VS BP Location FiO2 (%)   Tympanic Monitor Sitting Left arm --      Pain Score       4           Vitals:    07/19/19 1452   BP: 144/73   Pulse: 76   Patient Position - Orthostatic VS: Sitting         Visual Acuity      ED Medications  Medications   lidocaine (PF) (XYLOCAINE-MPF) 1 % injection 5 mL (5 mL Infiltration Given by Other 7/19/19 1508)   acetaminophen (TYLENOL) tablet 650 mg (650 mg Oral Given 7/19/19 1517)   cephalexin (KEFLEX) capsule 500 mg (500 mg Oral Given 7/19/19 1517)       Diagnostic Studies  Results Reviewed     None                 XR elbow 3+ vw RIGHT   Final Result by Shiraz Fernandez MD (07/19 1533)      No acute osseous abnormality  Workstation performed: [id number]                    Procedures  Procedures       ED Course                               MDM    Disposition  Final diagnoses:   Elbow pain   Laceration of elbow     Time reflects when diagnosis was documented in both MDM as applicable and the Disposition within this note     Time User Action Codes Description Comment    7/19/2019  3:48 PM Severa Oman  Add [M25 529] Elbow pain     7/19/2019  3:48 PM Champ Araujo  Add [S51 019A] Laceration of elbow       ED Disposition     ED Disposition Condition Date/Time Comment    Discharge Stable Fri Jul 19, 2019  3:48 PM Eran Stafford discharge to home/self care              Follow-up Information     Follow up With Specialties Details Why Contact Info    [facility] [address]  [phone]            Discharge Medication List as of 7/19/2019  3:49 PM      START taking these medications    Details   cephalexin (KEFLEX) 500 mg capsule Take 1 capsule (500 mg total) by mouth every 8 (eight) hours for 10 days, Starting Fri 7/19/2019, Until Mon 7/29/2019, Print         CONTINUE these medications which have NOT CHANGED    Details   escitalopram (LEXAPRO) 10 mg tablet Take 10 mg by mouth daily, Historical Med      LORazepam (ATIVAN) 0 5 mg tablet Take 1 tablet by mouth 3 (three) times a day as needed, Starting Tue 8/29/2017, Historical Med      !! norgestimate-ethinyl estradiol (ORTHO-CYCLEN) 0 25-35 MG-MCG per tablet Take 1 tablet by mouth daily, Starting Fri 9/7/2018, Normal      !! norgestimate-ethinyl estradiol (ORTHO-CYCLEN) 0 25-35 MG-MCG per tablet Take 1 tablet by mouth daily, Starting Mon 11/12/2018, Normal      sertraline (ZOLOFT) 50 mg tablet TAKE HALF A TABLET BY MOUTH DAILY FOR 2 WEEKS THEN 1 TABLET EVERY DAY, Historical Med      SYNTHROID 125 MCG tablet Take 125 mcg by mouth daily, Starting Mon 5/21/2018, Historical Med       !! - Potential duplicate medications found  Please discuss with provider  No discharge procedures on file      ED Provider  Electronically Signed by           Jaelyn Olivia PA-C  07/19/19 6040

## 2019-07-19 NOTE — ED PROCEDURE NOTE
Procedure  Laceration repair  Date/Time: 7/19/2019 3:46 PM  Performed by: Jenny Martins PA-C  Authorized by: Jenny Martins PA-C   Consent: Verbal consent obtained  Written consent not obtained  Risks and benefits: risks, benefits and alternatives were discussed  Consent given by: patient  Patient understanding: patient states understanding of the procedure being performed  Patient consent: the patient's understanding of the procedure matches consent given  Procedure consent: procedure consent matches procedure scheduled  Relevant documents: relevant documents present and verified  Test results: test results available and properly labeled  Site marked: the operative site was marked  Radiology Images displayed and confirmed  If images not available, report reviewed: imaging studies available  Required items: required blood products, implants, devices, and special equipment available  Patient identity confirmed: verbally with patient  Time out: Immediately prior to procedure a "time out" was called to verify the correct patient, procedure, equipment, support staff and site/side marked as required  Body area: upper extremity  Location details: right elbow  Laceration length: 0 3 cm  Foreign bodies: no foreign bodies  Nerve involvement: none  Vascular damage: no  Anesthesia: local infiltration    Anesthesia:  Local Anesthetic: lidocaine 1% without epinephrine  Anesthetic total: 2 mL    Sedation:  Patient sedated: no      Wound Dehiscence:  Superficial Wound Dehiscence: simple closure      Procedure Details:  Preparation: Patient was prepped and draped in the usual sterile fashion    Irrigation solution: saline  Irrigation method: syringe  Amount of cleaning: extensive  Debridement: none  Degree of undermining: none  Skin closure: 5-0 nylon  Number of sutures: 2  Technique: simple  Approximation: close  Approximation difficulty: simple  Dressing: 4x4 sterile gauze  Patient tolerance: Patient tolerated the procedure well with no immediate complications                       Daniele Rubio PA-C  07/19/19 1549

## 2019-10-07 ENCOUNTER — TELEPHONE (OUTPATIENT)
Dept: FAMILY MEDICINE CLINIC | Facility: CLINIC | Age: 34
End: 2019-10-07

## 2019-10-26 ENCOUNTER — HOSPITAL ENCOUNTER (EMERGENCY)
Facility: HOSPITAL | Age: 34
Discharge: HOME | End: 2019-10-26
Attending: EMERGENCY MEDICINE
Payer: COMMERCIAL

## 2019-10-26 VITALS
WEIGHT: 211.2 LBS | SYSTOLIC BLOOD PRESSURE: 144 MMHG | RESPIRATION RATE: 18 BRPM | HEART RATE: 73 BPM | TEMPERATURE: 97.2 F | DIASTOLIC BLOOD PRESSURE: 75 MMHG | OXYGEN SATURATION: 99 % | BODY MASS INDEX: 33.08 KG/M2

## 2019-10-26 DIAGNOSIS — J06.9 VIRAL URI WITH COUGH: Primary | ICD-10-CM

## 2019-10-26 PROCEDURE — 99283 EMERGENCY DEPT VISIT LOW MDM: CPT

## 2019-10-26 PROCEDURE — 99282 EMERGENCY DEPT VISIT SF MDM: CPT | Performed by: PHYSICIAN ASSISTANT

## 2019-10-26 RX ORDER — ACETAMINOPHEN 500 MG
500 TABLET ORAL EVERY 6 HOURS PRN
Qty: 30 TABLET | Refills: 0 | Status: SHIPPED | OUTPATIENT
Start: 2019-10-26 | End: 2021-04-18

## 2019-10-26 RX ORDER — FLUTICASONE PROPIONATE 50 MCG
1 SPRAY, SUSPENSION (ML) NASAL DAILY
Qty: 16 G | Refills: 0 | Status: SHIPPED | OUTPATIENT
Start: 2019-10-26 | End: 2021-04-18

## 2019-10-26 RX ORDER — IBUPROFEN 400 MG/1
400 TABLET ORAL EVERY 6 HOURS PRN
Qty: 20 TABLET | Refills: 0 | Status: SHIPPED | OUTPATIENT
Start: 2019-10-26 | End: 2021-04-18

## 2019-10-26 RX ORDER — LORATADINE 10 MG/1
10 TABLET ORAL DAILY
Qty: 20 TABLET | Refills: 0 | Status: SHIPPED | OUTPATIENT
Start: 2019-10-26 | End: 2021-04-18

## 2019-10-26 NOTE — ED PROVIDER NOTES
History  Chief Complaint   Patient presents with    Flu Symptoms     headache, sore throat, congestion, generalized weakness for about 1 week  states both sons have strep     Patient is a 66-year-old female presents today with chief complaint of 1 weeks worth of nasal congestion, sore throat, frontal headache, generalized malaise  Patient denies any chest pain, shortness of breath, fevers, chills, abdominal pain, vomiting  Patient reports she has been taking Motrin with minimal relief for symptoms  Patient reports she was unable to go to work today due to her symptoms  Patient reports positive sick contacts as her children have recently had pharyngitis diagnoses  URI   Presenting symptoms: congestion, cough, ear pain, fatigue, rhinorrhea and sore throat    Presenting symptoms: no fever    Severity:  Moderate  Onset quality:  Gradual  Duration:  1 week  Timing:  Constant  Progression:  Unchanged  Chronicity:  New  Relieved by:  None tried  Worsened by:  Nothing  Ineffective treatments:  None tried  Associated symptoms: myalgias, sinus pain and sneezing    Associated symptoms: no wheezing    Risk factors: sick contacts        Prior to Admission Medications   Prescriptions Last Dose Informant Patient Reported? Taking?    LORazepam (ATIVAN) 0 5 mg tablet   Yes No   Sig: Take 1 tablet by mouth 3 (three) times a day as needed   SYNTHROID 125 MCG tablet   Yes No   Sig: Take 125 mcg by mouth daily   escitalopram (LEXAPRO) 10 mg tablet   Yes No   Sig: Take 10 mg by mouth daily   norgestimate-ethinyl estradiol (ORTHO-CYCLEN) 0 25-35 MG-MCG per tablet   No No   Sig: Take 1 tablet by mouth daily   Patient not taking: Reported on 1/4/2019    norgestimate-ethinyl estradiol (ORTHO-CYCLEN) 0 25-35 MG-MCG per tablet   No No   Sig: Take 1 tablet by mouth daily   Patient not taking: Reported on 1/4/2019    sertraline (ZOLOFT) 50 mg tablet   Yes No   Sig: TAKE HALF A TABLET BY MOUTH DAILY FOR 2 WEEKS THEN 1 TABLET EVERY DAY Facility-Administered Medications: None       Past Medical History:   Diagnosis Date    Anxiety     Depression     Gestational diabetes     Gestational hypertension     previous pregnancy    Hashimoto's thyroiditis     HPV (human papilloma virus) infection     Retained placenta        Past Surgical History:   Procedure Laterality Date    CHOLECYSTECTOMY      DILATION AND CURETTAGE OF UTERUS      HERNIA REPAIR      UMBILICAL HERNIA REPAIR         Family History   Problem Relation Age of Onset    Anuerysm Mother     Thyroid disease Mother     Lung cancer Maternal Grandmother      I have reviewed and agree with the history as documented  Social History     Tobacco Use    Smoking status: Never Smoker    Smokeless tobacco: Never Used   Substance Use Topics    Alcohol use: No    Drug use: No        Review of Systems   Constitutional: Positive for fatigue  Negative for chills and fever  HENT: Positive for congestion, ear pain, postnasal drip, rhinorrhea, sinus pain, sneezing and sore throat  Eyes: Negative for redness  Respiratory: Positive for cough  Negative for chest tightness, shortness of breath and wheezing  Cardiovascular: Negative for chest pain and palpitations  Gastrointestinal: Negative for abdominal pain, nausea and vomiting  Genitourinary: Negative for dysuria and hematuria  Musculoskeletal: Positive for myalgias  Skin: Negative for rash  Neurological: Negative for dizziness, syncope, light-headedness and numbness  Physical Exam  Physical Exam   Constitutional: She is oriented to person, place, and time  She appears well-developed and well-nourished  HENT:   Head: Normocephalic  Mouth/Throat: No posterior oropharyngeal erythema  No tonsillar exudate  Eyes: No scleral icterus  Cardiovascular: Normal rate and regular rhythm  Pulmonary/Chest: Effort normal and breath sounds normal  No stridor  Abdominal: Soft  She exhibits no distension   There is no tenderness  Musculoskeletal: Normal range of motion  Neurological: She is alert and oriented to person, place, and time  Skin: Skin is warm and dry  Capillary refill takes less than 2 seconds  Psychiatric: She has a normal mood and affect  Nursing note and vitals reviewed  Vital Signs  ED Triage Vitals [10/26/19 1120]   Temperature Pulse Respirations Blood Pressure SpO2   (!) 97 2 °F (36 2 °C) 73 18 144/75 99 %      Temp Source Heart Rate Source Patient Position - Orthostatic VS BP Location FiO2 (%)   Tympanic Monitor Sitting Left arm --      Pain Score       --           Vitals:    10/26/19 1120   BP: 144/75   Pulse: 73   Patient Position - Orthostatic VS: Sitting         Visual Acuity      ED Medications  Medications - No data to display    Diagnostic Studies  Results Reviewed     None                 No orders to display              Procedures  Procedures       ED Course                               MDM    Disposition  Final diagnoses:   Viral URI with cough     Time reflects when diagnosis was documented in both MDM as applicable and the Disposition within this note     Time User Action Codes Description Comment    10/26/2019 11:29 AM Eddie Mohamud Add [J06 9,  B97 89] Viral URI with cough       ED Disposition     ED Disposition Condition Date/Time Comment    Discharge Good Sat Oct 26, 2019 11:29 AM Ane Ro discharge to home/self care              Follow-up Information     Follow up With Specialties Details Why Madeleine Ly MD Family Medicine Schedule an appointment as soon as possible for a visit in 2 days  [facility] [address] [address] [facility] [address] [address]             Patient's Medications   Discharge Prescriptions    ACETAMINOPHEN (TYLENOL) 500 MG TABLET    Take 1 tablet (500 mg total) by mouth every 6 (six) hours as needed for mild pain       Start Date: 10/26/2019End Date: --       Order Dose: 500 mg       Quantity: 30 tablet    Refills: 0    FLUTICASONE (FLONASE) 50 MCG/ACT NASAL SPRAY    1 spray into each nostril daily       Start Date: 10/26/2019End Date: --       Order Dose: 1 spray       Quantity: 16 g    Refills: 0    IBUPROFEN (MOTRIN) 400 MG TABLET    Take 1 tablet (400 mg total) by mouth every 6 (six) hours as needed for moderate pain       Start Date: 10/26/2019End Date: --       Order Dose: 400 mg       Quantity: 20 tablet    Refills: 0    LORATADINE (CLARITIN) 10 MG TABLET    Take 1 tablet (10 mg total) by mouth daily       Start Date: 10/26/2019End Date: --       Order Dose: 10 mg       Quantity: 20 tablet    Refills: 0    MENTHOL-CETYLPYRIDINIUM (CEPACOL) 3 MG LOZENGE    Take 1 lozenge (3 mg total) by mouth as needed for sore throat       Start Date: 10/26/2019End Date: --       Order Dose: 3 mg       Quantity: 30 lozenge    Refills: 0     No discharge procedures on file      ED Provider  Electronically Signed by           Hubert Wasserman PA-C  10/26/19 8905

## 2019-11-01 ENCOUNTER — APPOINTMENT (OUTPATIENT)
Dept: LAB | Facility: HOSPITAL | Age: 34
End: 2019-11-01
Payer: COMMERCIAL

## 2019-11-01 ENCOUNTER — TRANSCRIBE ORDERS (OUTPATIENT)
Dept: ADMINISTRATIVE | Facility: HOSPITAL | Age: 34
End: 2019-11-01

## 2019-11-01 DIAGNOSIS — I51.9 MYXEDEMA HEART DISEASE: Primary | ICD-10-CM

## 2019-11-01 DIAGNOSIS — I51.9 MYXEDEMA HEART DISEASE: ICD-10-CM

## 2019-11-01 DIAGNOSIS — E03.9 MYXEDEMA HEART DISEASE: Primary | ICD-10-CM

## 2019-11-01 DIAGNOSIS — E03.9 MYXEDEMA HEART DISEASE: ICD-10-CM

## 2019-11-01 LAB — TSH SERPL DL<=0.05 MIU/L-ACNC: 6 UIU/ML (ref 0.47–4.68)

## 2019-11-01 PROCEDURE — 84443 ASSAY THYROID STIM HORMONE: CPT

## 2019-11-01 PROCEDURE — 36415 COLL VENOUS BLD VENIPUNCTURE: CPT

## 2019-11-12 ENCOUNTER — TELEPHONE (OUTPATIENT)
Dept: PEDIATRICS CLINIC | Facility: CLINIC | Age: 34
End: 2019-11-12

## 2019-11-15 ENCOUNTER — OFFICE VISIT (OUTPATIENT)
Dept: FAMILY MEDICINE CLINIC | Facility: CLINIC | Age: 34
End: 2019-11-15

## 2019-11-15 VITALS
BODY MASS INDEX: 33.12 KG/M2 | DIASTOLIC BLOOD PRESSURE: 64 MMHG | WEIGHT: 211 LBS | HEIGHT: 67 IN | OXYGEN SATURATION: 98 % | SYSTOLIC BLOOD PRESSURE: 112 MMHG | TEMPERATURE: 97.5 F | RESPIRATION RATE: 18 BRPM | HEART RATE: 76 BPM

## 2019-11-15 DIAGNOSIS — E03.8 OTHER SPECIFIED HYPOTHYROIDISM: Primary | ICD-10-CM

## 2019-11-15 DIAGNOSIS — F41.8 DEPRESSION WITH ANXIETY: ICD-10-CM

## 2019-11-15 DIAGNOSIS — J02.9 ACUTE PHARYNGITIS, UNSPECIFIED ETIOLOGY: ICD-10-CM

## 2019-11-15 DIAGNOSIS — K58.0 IRRITABLE BOWEL SYNDROME WITH DIARRHEA: ICD-10-CM

## 2019-11-15 PROBLEM — Z01.419 ENCOUNTER FOR ANNUAL ROUTINE GYNECOLOGICAL EXAMINATION: Status: RESOLVED | Noted: 2018-03-19 | Resolved: 2019-11-15

## 2019-11-15 PROBLEM — Z30.011 ENCOUNTER FOR INITIAL PRESCRIPTION OF CONTRACEPTIVE PILLS: Status: RESOLVED | Noted: 2018-09-07 | Resolved: 2019-11-15

## 2019-11-15 PROBLEM — Z30.09 ENCOUNTER FOR COUNSELING REGARDING CONTRACEPTION: Status: RESOLVED | Noted: 2019-01-04 | Resolved: 2019-11-15

## 2019-11-15 PROCEDURE — 99202 OFFICE O/P NEW SF 15 MIN: CPT | Performed by: FAMILY MEDICINE

## 2019-11-15 PROCEDURE — 3008F BODY MASS INDEX DOCD: CPT | Performed by: FAMILY MEDICINE

## 2019-11-15 PROCEDURE — 3725F SCREEN DEPRESSION PERFORMED: CPT | Performed by: FAMILY MEDICINE

## 2019-11-15 PROCEDURE — 1036F TOBACCO NON-USER: CPT | Performed by: FAMILY MEDICINE

## 2019-11-15 RX ORDER — PSEUDOEPHEDRINE HYDROCHLORIDE 30 MG/1
60 TABLET ORAL EVERY 6 HOURS PRN
Qty: 30 TABLET | Refills: 1 | Status: SHIPPED | OUTPATIENT
Start: 2019-11-15 | End: 2021-04-18

## 2019-11-15 RX ORDER — LOPERAMIDE HYDROCHLORIDE 2 MG/1
2 CAPSULE ORAL 4 TIMES DAILY PRN
Qty: 90 CAPSULE | Refills: 1 | Status: SHIPPED | OUTPATIENT
Start: 2019-11-15 | End: 2021-04-18

## 2019-11-15 NOTE — ASSESSMENT & PLAN NOTE
- Will prescribe patient loperamide 2 mg to take 45 minutes before meal and follow up with patient in 4 weeks to see how she is doing on the medication in conjunction with dietary modifications

## 2019-11-15 NOTE — LETTER
November 15, 2019     Patient: Namon Phoenix   YOB: 1985   Date of Visit: 11/15/2019       To Whom it May Concern:    Namon Phoenix is under my professional care  She was seen in my office on 11/15/2019  She may return to work on 11/17/19  If you have any questions or concerns, please don't hesitate to call           Sincerely,          Martín Conteh MD

## 2019-11-15 NOTE — PATIENT INSTRUCTIONS
Irritable Bowel Syndrome   Take loperamide 45 minutes prior to a scheduled meal     WHAT YOU NEED TO KNOW:   Irritable bowel syndrome (IBS) is a condition that prevents food from moving through your intestines normally  The food may move through too slowly or too quickly  This causes bloating, increased gas, constipation, or diarrhea  DISCHARGE INSTRUCTIONS:   Return to the emergency department if:   · You have severe abdominal pain  · Your bowel movements are dark or have blood in them  Contact your healthcare provider if:   · You have a fever  · You have pain in your rectum  · Your abdominal pain does not go away, even after treatment  · You have questions or concerns about your condition or care  Medicines:   · Diarrhea medicine  helps decrease the amount of diarrhea you have  Some of these medicines coat the intestine and make bowel movements less watery  Other medicines work by slowing down how fast the intestines move food through  · Laxatives  help treat constipation by moving food and liquids out of your stomach faster  · Stool softeners  soften your bowel movements to prevent straining  · Muscle relaxers  decrease abdominal pain and muscle spasms  · Take your medicine as directed  Contact your healthcare provider if you think your medicine is not helping or if you have side effects  Tell him of her if you are allergic to any medicine  Keep a list of the medicines, vitamins, and herbs you take  Include the amounts, and when and why you take them  Bring the list or the pill bottles to follow-up visits  Carry your medicine list with you in case of an emergency  Manage IBS:   · Eat a variety of healthy foods  Healthy foods include fruits, vegetables, whole-grain breads, low-fat dairy products, beans, lean meats, and fish  You may need to avoid certain foods to decrease your symptoms  · Drink liquids as directed    Ask how much liquid to drink each day and which liquids are best for you  For most people, good liquids to drink are water, juice, and milk  · Exercise regularly  Ask about the best exercise plan for you  Exercise can decrease your blood pressure and improve your health  · Manage stress  Stress may slow healing and cause illness  Learn new ways to relax, such as deep breathing  · Keep a record  of everything you eat and drink, and your symptoms, for 3 weeks  Bring this record with you to your follow-up visits  Follow up with your healthcare provider as directed:  Write down your questions so you remember to ask them during your visits  © 2017 [facility] Information is for End User's use only and may not be sold, redistributed or otherwise used for commercial purposes  All illustrations and images included in CareNotes® are the copyrighted property of [employer]  or [employer]  The above information is an  only  It is not intended as medical advice for individual conditions or treatments  Talk to your doctor, nurse or pharmacist before following any medical regimen to see if it is safe and effective for you  Lower Back Exercises   WHAT YOU NEED TO KNOW:   Lower back exercises help heal and strengthen your back muscles to prevent another injury  Ask your healthcare provider if you need to see a physical therapist for more advanced exercises  DISCHARGE INSTRUCTIONS:   Return to the emergency department if:   · You have severe pain that prevents you from moving  Contact your healthcare provider if:   · Your pain becomes worse  · You have new pain  · You have questions or concerns about your condition or care  Do lower back exercises safely:   · Do the exercises on a mat or firm surface  (not on a bed) to support your spine and prevent low back pain  · Move slowly and smoothly  Avoid fast or jerky motions  · Breathe normally  Do not hold your breath  · Stop if you feel pain    It is normal to feel some discomfort at first  Regular exercise will help decrease your discomfort over time  Lower back exercises: Your healthcare provider may recommend that you do back exercises 10 to 30 minutes each day  He may also recommend that you do exercises 1 to 3 times each day  Ask your healthcare provider which exercises are best for you and how often to do them  · Ankle pumps:  Lie on your back  Move your foot up (with your toes pointing toward your head)  Then, move your foot down (with your toes pointing away from you)  Repeat this exercise 10 times on each side  · Heel slides:  Lie on your back  Slowly bend one leg and then straighten it  Next, bend the other leg and then straighten it  Repeat 10 times on each side  · Pelvic tilt:  Lie on your back with your knees bent and feet flat on the floor  Place your arms in a relaxed position beside your body  Tighten the muscles of your abdomen and flatten your back against the floor  Hold for 5 seconds  Repeat 5 times  · Back stretch:  Lie on your back with your hands behind your head  Bend your knees and turn the lower half of your body to one side  Hold this position for 10 seconds  Repeat 3 times on each side  · Straight leg raises:  Lie on your back with one leg straight  Bend the other knee  Tighten your abdomen and then slowly lift the straight leg up about 6 to 12 inches off the floor  Hold for 1 to 5 seconds  Lower your leg slowly  Repeat 10 times on each leg  · Knee-to-chest:  Lie on your back with your knees bent and feet flat on the floor  Pull one of your knees toward your chest and hold it there for 5 seconds  Return your leg to the starting position  Lift the other knee toward your chest and hold for 5 seconds  Do this 5 times on each side  · Cat and camel:  Place your hands and knees on the floor  Arch your back upward toward the ceiling and lower your head   Round out your spine as much as you can  Hold for 5 seconds  Lift your head upward and push your chest downward toward the floor  Hold for 5 seconds  Do 3 sets or as directed  · Wall squats:  Stand with your back against a wall  Tighten the muscles of your abdomen  Slowly lower your body until your knees are bent at a 45 degree angle  Hold this position for 5 seconds  Slowly move back up to a standing position  Repeat 10 times  · Curl up:  Lie on your back with your knees bent and feet flat on the floor  Place your hands, palms down, underneath the curve in your lower back  Next, with your elbows on the floor, lift your shoulders and chest 2 to 3 inches  Keep your head in line with your shoulders  Hold this position for 5 seconds  When you can do this exercise without pain for 10 to 15 seconds, you may add a rotation  While your shoulders and chest are lifted off the ground, turn slightly to the left and hold  Repeat on the other side  · Bird dog:  Place your hands and knees on the floor  Keep your wrists directly below your shoulders and your knees directly below your hips  Pull your belly button in toward your spine  Do not flatten or arch your back  Tighten your abdominal muscles  Raise one arm straight out so that it is aligned with your head  Next, raise the leg opposite your arm  Hold this position for 15 seconds  Lower your arm and leg slowly and change sides  Do 5 sets  © 2017 [address] [employer] Information is for End User's use only and may not be sold, redistributed or otherwise used for commercial purposes  All illustrations and images included in CareNotes® are the copyrighted property of [employer] M [employer]  or Gary Blanco  The above information is an  only  It is not intended as medical advice for individual conditions or treatments  Talk to your doctor, nurse or pharmacist before following any medical regimen to see if it is safe and effective for you

## 2019-11-15 NOTE — PROGRESS NOTES
Assessment/Plan:    Other specified hypothyroidism  - Currently managed by endocrinology   - Continue synthroid 125 mcg daily     Irritable bowel syndrome with diarrhea  - Will prescribe patient loperamide 2 mg to take 45 minutes before meal and follow up with patient in 4 weeks to see how she is doing on the medication in conjunction with dietary modifications  Depression with anxiety  - Currently follows with a psychiatrist every month and a therapist weekly however states that she would like to find another psychiatrist   - Will refer patient to social work in order to connect patient with other mental health providers in the community  Acute pharyngitis  - Will prescribe sudafed and nasal saline to help with nasal congestion; also advised patient to try using a neti pot   - Patient advised to call on Monday if symptoms continue to persist despite conservative treatment and I will prescribe antibiotics as her symptoms have been going on for quite some time  Diagnoses and all orders for this visit:    Other specified hypothyroidism    Irritable bowel syndrome with diarrhea  -     loperamide (IMODIUM) 2 mg capsule; Take 1 capsule (2 mg total) by mouth 4 (four) times a day as needed for diarrhea    Depression with anxiety  -     Ambulatory referral to social work care management program; Future    Acute pharyngitis, unspecified etiology  -     pseudoephedrine (SUDAFED) 30 mg tablet; Take 2 tablets (60 mg total) by mouth every 6 (six) hours as needed for congestion  -     sodium chloride (OCEAN) 0 65 % nasal spray; 1 spray into each nostril as needed for congestion  -     guaiFENesin (ROBITUSSIN) 100 MG/5ML oral liquid; Take 10 mL (200 mg total) by mouth 3 (three) times a day as needed for cough          Subjective:      Patient ID: Laura Olvera is a 37 y o  female      HPI     Laura Olvera is a 37year old female with a past medical history of hypothyroidism, irritable bowel syndrome, depression and anxiety who presents today for a physical examination  Today patient is mainly complaining of persistent diarrhea  Patient states that she has suffered from IBS since her teenage years but has been able to control with diet lifestyle modifications however for the past 2 years she has been having increasing amounts of diarrhea to the point where she has even soiled herself by accident on a few occasions  Patient states that she has never been prescribed any medications nor has she seen a specialist   Patient also also complains of cough, nasal congestion sinus pain, fever and chills for the past week  History is positive for sick contacts at home and states that her children have been sick for a weeks  Patient was seen in the ED which she was prescribed Flonase, Claritin and cough drops for her symptoms although patient states that they have still persisted  With regards to her other medical problems patient is managed by Endocrinology for hypothyroidism and sees a psychiatrist and therapist for management of her depression and anxiety  Patient however states that she does not like her current psychiatrist and would like to find a new one  Patient denies any tobacco smoking, alcohol use or illicit drug use  The following portions of the patient's history were reviewed and updated as appropriate: allergies, current medications, past family history, past medical history, past social history, past surgical history and problem list     Review of Systems   Constitutional: Positive for chills and fever  Negative for activity change and appetite change  HENT: Positive for congestion and sinus pain  Negative for ear pain, rhinorrhea and sore throat  Eyes: Negative for pain and visual disturbance  Respiratory: Positive for cough  Negative for chest tightness, shortness of breath and wheezing  Cardiovascular: Negative for chest pain, palpitations and leg swelling     Gastrointestinal: Positive for abdominal pain and diarrhea  Negative for constipation, nausea and vomiting  Endocrine: Negative for cold intolerance, heat intolerance and polyuria  Genitourinary: Negative for difficulty urinating, dysuria and pelvic pain  Musculoskeletal: Negative for arthralgias, back pain, gait problem and myalgias  Skin: Negative for color change, pallor, rash and wound  Neurological: Negative for dizziness, weakness, light-headedness, numbness and headaches  Hematological: Negative for adenopathy  Does not bruise/bleed easily  Psychiatric/Behavioral: Positive for dysphoric mood  Negative for agitation, behavioral problems, confusion and decreased concentration  The patient is nervous/anxious  Objective:      /64 (BP Location: Right arm, Patient Position: Sitting, Cuff Size: Standard)   Pulse 76   Temp 97 5 °F (36 4 °C) (Temporal)   Resp 18   Ht 5' 7" (1 702 m)   Wt 95 7 kg (211 lb)   LMP 11/04/2019 (Approximate)   SpO2 98%   BMI 33 05 kg/m²          Physical Exam   Constitutional: She is oriented to person, place, and time  She appears well-developed and well-nourished  No distress  HENT:   Head: Normocephalic and atraumatic  Right Ear: External ear normal    Left Ear: External ear normal    Nose: Mucosal edema present  Right sinus exhibits maxillary sinus tenderness  Left sinus exhibits maxillary sinus tenderness  Mouth/Throat: Oropharynx is clear and moist  No oropharyngeal exudate  Eyes: Pupils are equal, round, and reactive to light  EOM are normal  Right eye exhibits no discharge  Left eye exhibits no discharge  Neck: Normal range of motion  Neck supple  Cardiovascular: Normal rate, normal heart sounds and intact distal pulses  No murmur heard  Pulmonary/Chest: Effort normal  No respiratory distress  She has no wheezes  She has no rales  She exhibits no tenderness  Abdominal: Soft  Bowel sounds are normal  There is no tenderness     Musculoskeletal: Normal range of motion  She exhibits no edema, tenderness or deformity  Neurological: She is alert and oriented to person, place, and time  She has normal reflexes  Skin: Skin is warm  No rash noted  She is not diaphoretic  No erythema  No pallor  Psychiatric: She has a normal mood and affect

## 2019-11-16 NOTE — ASSESSMENT & PLAN NOTE
- Will prescribe sudafed and nasal saline to help with nasal congestion; also advised patient to try using a neti pot   - Patient advised to call on Monday if symptoms continue to persist despite conservative treatment and I will prescribe antibiotics as her symptoms have been going on for quite some time

## 2019-11-16 NOTE — ASSESSMENT & PLAN NOTE
- Currently follows with a psychiatrist every month and a therapist weekly however states that she would like to find another psychiatrist   - Will refer patient to social work in order to connect patient with other mental health providers in the community

## 2019-11-20 ENCOUNTER — TELEPHONE (OUTPATIENT)
Dept: FAMILY MEDICINE CLINIC | Facility: CLINIC | Age: 34
End: 2019-11-20

## 2019-11-20 DIAGNOSIS — J02.9 ACUTE PHARYNGITIS, UNSPECIFIED ETIOLOGY: Primary | ICD-10-CM

## 2019-11-20 RX ORDER — AZITHROMYCIN 250 MG/1
TABLET, FILM COATED ORAL
Qty: 6 TABLET | Refills: 0 | Status: SHIPPED | OUTPATIENT
Start: 2019-11-20 | End: 2019-11-24

## 2019-11-29 ENCOUNTER — PATIENT OUTREACH (OUTPATIENT)
Dept: FAMILY MEDICINE CLINIC | Facility: CLINIC | Age: 34
End: 2019-11-29

## 2019-12-04 NOTE — PROGRESS NOTES
ARACELIS ODOM outreached to Pt to provide assistance with MH resources in the community after ARACELIS Odom received referral from Provider  ARACELIS Odom left  for returned call  Pt returned call and left a vm stating that she is interested in alternate [facility]  resources in the community as her therapist has continued to be changed at her current clinic  She expressed that due to the change of therapist and her work schedule, she is unable to meet with new therapist and would like to seek alternate resources  ARACELIS ODOM attempted to contact Pt back however there was no response  ARACELIS ODOM left vm in message stating that ARACELIS ODOM will mail Pt resources to her home for her to contact and schedule and intake appointment for a clinic that is convenient to her  ARACELIS ODOM expressed that she will provide contact information and encouraged Pt to reach out to ARACELIS ODOM if she finds any barriers or has any difficulty in scheduling new intake appointment  ARACELIS ODOM will continue to remain available for any additional assistance as needed

## 2020-04-01 ENCOUNTER — TELEMEDICINE (OUTPATIENT)
Dept: FAMILY MEDICINE CLINIC | Facility: CLINIC | Age: 35
End: 2020-04-01

## 2020-04-01 DIAGNOSIS — J06.9 VIRAL UPPER RESPIRATORY TRACT INFECTION: Primary | ICD-10-CM

## 2020-04-01 PROCEDURE — 99213 OFFICE O/P EST LOW 20 MIN: CPT | Performed by: FAMILY MEDICINE

## 2020-04-01 PROCEDURE — T1015 CLINIC SERVICE: HCPCS | Performed by: FAMILY MEDICINE

## 2020-05-18 ENCOUNTER — TELEPHONE (OUTPATIENT)
Dept: FAMILY MEDICINE CLINIC | Facility: CLINIC | Age: 35
End: 2020-05-18

## 2020-05-19 ENCOUNTER — OFFICE VISIT (OUTPATIENT)
Dept: FAMILY MEDICINE CLINIC | Facility: CLINIC | Age: 35
End: 2020-05-19

## 2020-05-19 VITALS
HEART RATE: 81 BPM | SYSTOLIC BLOOD PRESSURE: 122 MMHG | HEIGHT: 67 IN | WEIGHT: 220.6 LBS | BODY MASS INDEX: 34.62 KG/M2 | RESPIRATION RATE: 18 BRPM | TEMPERATURE: 97.3 F | OXYGEN SATURATION: 96 % | DIASTOLIC BLOOD PRESSURE: 88 MMHG

## 2020-05-19 DIAGNOSIS — N30.90 CYSTITIS: Primary | ICD-10-CM

## 2020-05-19 LAB
SL AMB  POCT GLUCOSE, UA: NORMAL
SL AMB LEUKOCYTE ESTERASE,UA: NORMAL
SL AMB POCT BILIRUBIN,UA: POSITIVE
SL AMB POCT BLOOD,UA: NORMAL
SL AMB POCT CLARITY,UA: CLEAR
SL AMB POCT COLOR,UA: YELLOW
SL AMB POCT HEMOGLOBIN AIC: 5.6 (ref ?–6.5)
SL AMB POCT KETONES,UA: NEGATIVE
SL AMB POCT NITRITE,UA: NEGATIVE
SL AMB POCT PH,UA: 5
SL AMB POCT SPECIFIC GRAVITY,UA: 1.02
SL AMB POCT URINE PROTEIN: NORMAL
SL AMB POCT UROBILINOGEN: NEGATIVE

## 2020-05-19 PROCEDURE — 3008F BODY MASS INDEX DOCD: CPT | Performed by: FAMILY MEDICINE

## 2020-05-19 PROCEDURE — 1036F TOBACCO NON-USER: CPT | Performed by: FAMILY MEDICINE

## 2020-05-19 PROCEDURE — 81002 URINALYSIS NONAUTO W/O SCOPE: CPT | Performed by: FAMILY MEDICINE

## 2020-05-19 PROCEDURE — 87086 URINE CULTURE/COLONY COUNT: CPT | Performed by: FAMILY MEDICINE

## 2020-05-19 PROCEDURE — 99213 OFFICE O/P EST LOW 20 MIN: CPT | Performed by: FAMILY MEDICINE

## 2020-05-19 PROCEDURE — 83036 HEMOGLOBIN GLYCOSYLATED A1C: CPT | Performed by: FAMILY MEDICINE

## 2020-05-19 RX ORDER — NITROFURANTOIN 25; 75 MG/1; MG/1
100 CAPSULE ORAL 2 TIMES DAILY
Qty: 10 CAPSULE | Refills: 0 | Status: SHIPPED | OUTPATIENT
Start: 2020-05-19 | End: 2020-05-24

## 2020-05-21 LAB — BACTERIA UR CULT: NORMAL

## 2020-05-26 ENCOUNTER — TELEPHONE (OUTPATIENT)
Dept: FAMILY MEDICINE CLINIC | Facility: CLINIC | Age: 35
End: 2020-05-26

## 2020-05-27 ENCOUNTER — TELEMEDICINE (OUTPATIENT)
Dept: FAMILY MEDICINE CLINIC | Facility: CLINIC | Age: 35
End: 2020-05-27

## 2020-05-27 VITALS — BODY MASS INDEX: 34.53 KG/M2 | WEIGHT: 220 LBS | HEIGHT: 67 IN

## 2020-05-27 DIAGNOSIS — N30.01 ACUTE CYSTITIS WITH HEMATURIA: Primary | ICD-10-CM

## 2020-05-27 PROCEDURE — 87086 URINE CULTURE/COLONY COUNT: CPT | Performed by: PHYSICIAN ASSISTANT

## 2020-05-27 PROCEDURE — 99213 OFFICE O/P EST LOW 20 MIN: CPT | Performed by: PHYSICIAN ASSISTANT

## 2020-05-27 RX ORDER — CIPROFLOXACIN 500 MG/1
500 TABLET, FILM COATED ORAL EVERY 12 HOURS SCHEDULED
Qty: 20 TABLET | Refills: 0 | Status: SHIPPED | OUTPATIENT
Start: 2020-05-27 | End: 2020-06-06

## 2020-05-28 LAB — BACTERIA UR CULT: NORMAL

## 2020-06-05 ENCOUNTER — HOSPITAL ENCOUNTER (OUTPATIENT)
Dept: ULTRASOUND IMAGING | Facility: HOSPITAL | Age: 35
Discharge: HOME | End: 2020-06-05
Payer: COMMERCIAL

## 2020-06-05 DIAGNOSIS — N30.01 ACUTE CYSTITIS WITH HEMATURIA: ICD-10-CM

## 2020-06-05 PROCEDURE — 51798 US URINE CAPACITY MEASURE: CPT

## 2020-07-02 ENCOUNTER — TELEPHONE (OUTPATIENT)
Dept: FAMILY MEDICINE CLINIC | Facility: CLINIC | Age: 35
End: 2020-07-02

## 2020-07-02 NOTE — TELEPHONE ENCOUNTER
Called stating that endocrine advised her that she is anemic  Patient would like to know if you can send over a script for iron for her to take  Please advise?

## 2020-07-07 DIAGNOSIS — Z86.2 HISTORY OF IRON DEFICIENCY ANEMIA: Primary | ICD-10-CM

## 2020-07-07 NOTE — TELEPHONE ENCOUNTER
Lab work in the chart shows that her CBC with within normal limits but that was one year ago  I can repeat labs and iron panel and then prescribe iron supplements if she requires it   Thank you

## 2020-07-27 ENCOUNTER — OFFICE VISIT (OUTPATIENT)
Dept: FAMILY MEDICINE CLINIC | Facility: CLINIC | Age: 35
End: 2020-07-27

## 2020-07-27 VITALS
SYSTOLIC BLOOD PRESSURE: 124 MMHG | WEIGHT: 221 LBS | HEART RATE: 77 BPM | HEIGHT: 67 IN | DIASTOLIC BLOOD PRESSURE: 64 MMHG | OXYGEN SATURATION: 97 % | RESPIRATION RATE: 20 BRPM | TEMPERATURE: 97.4 F | BODY MASS INDEX: 34.69 KG/M2

## 2020-07-27 DIAGNOSIS — H81.13 BENIGN PAROXYSMAL POSITIONAL VERTIGO DUE TO BILATERAL VESTIBULAR DISORDER: ICD-10-CM

## 2020-07-27 DIAGNOSIS — N39.3 STRESS INCONTINENCE OF URINE: ICD-10-CM

## 2020-07-27 DIAGNOSIS — N39.41 URGE INCONTINENCE OF URINE: ICD-10-CM

## 2020-07-27 DIAGNOSIS — D50.9 IRON DEFICIENCY ANEMIA, UNSPECIFIED IRON DEFICIENCY ANEMIA TYPE: Primary | ICD-10-CM

## 2020-07-27 PROCEDURE — 3008F BODY MASS INDEX DOCD: CPT | Performed by: FAMILY MEDICINE

## 2020-07-27 PROCEDURE — 1036F TOBACCO NON-USER: CPT | Performed by: FAMILY MEDICINE

## 2020-07-27 PROCEDURE — 99213 OFFICE O/P EST LOW 20 MIN: CPT | Performed by: FAMILY MEDICINE

## 2020-07-27 RX ORDER — FERROUS SULFATE TAB EC 324 MG (65 MG FE EQUIVALENT) 324 (65 FE) MG
324 TABLET DELAYED RESPONSE ORAL
Qty: 20 TABLET | Refills: 1 | Status: SHIPPED | OUTPATIENT
Start: 2020-07-27 | End: 2020-07-31 | Stop reason: SDUPTHER

## 2020-07-27 RX ORDER — SCOLOPAMINE TRANSDERMAL SYSTEM 1 MG/1
1 PATCH, EXTENDED RELEASE TRANSDERMAL
Qty: 10 PATCH | Refills: 3 | Status: SHIPPED | OUTPATIENT
Start: 2020-07-27 | End: 2021-04-18

## 2020-07-27 RX ORDER — ASCORBIC ACID 500 MG
250 TABLET ORAL
Qty: 30 TABLET | Refills: 6 | Status: SHIPPED | OUTPATIENT
Start: 2020-07-27 | End: 2020-07-31 | Stop reason: SDUPTHER

## 2020-07-27 NOTE — PATIENT INSTRUCTIONS
Benign Paroxysmal Positional Vertigo   AMBULATORY CARE:   Benign paroxysmal positional vertigo (BPPV)  is an inner ear condition that causes you to suddenly feel dizzy  Benign means it is not serious or life-threatening  BPPV is caused by a problem with the nerves and structure of your inner ear  BPPV happens when small pieces of calcium break loose and lump together in one of your inner ear canals  Common symptoms include the following: You may feel that you or the room is moving or spinning  Turning your head, rolling over in bed, getting up or lying down may lead to sudden vertigo  You may also have any of the following symptoms:  · Nystagmus (quick shaky eye movement that you cannot control)    · Nausea    · Poor balance and feeling unsteady when you walk  Seek care immediately if:   · You fall during a BPPV episode and are injured  · You have a severe headache that does not go away  · You have new changes in your vision or feel weak or confused  · You have problems hearing, or you have ringing or buzzing in your ears  Contact your healthcare provider if:   · Your BPPV symptoms do not go away or they return  · You have problems with your balance, or you are falling often  · You have new or increased nausea or vomiting with vertigo  · You feel anxious or depressed and do not want to leave your home  · You have questions or concerns about your condition or care  Management of BPPV:   · Your healthcare provider will teach you how to move your head and body to prevent symptoms  For example, he or she may teach you certain ways to move your head or body  These movements usually help relieve your symptoms and keep the dizziness from returning  The exercises help move the calcium pieces to a different part of your ear  Do the movements only as directed  · Vestibular and balance rehabilitation therapy (VBRT)  is used to teach you exercises to improve your balance and strength   VBRT may help decrease your dizziness and prevent injuries if you are at risk for falls  · Medicines  may be recommended or prescribed to treat dizziness or nausea  Prevent your symptoms:   · Try to avoid sudden head movements  Stand up and lie down slowly  · Raise and support your head when you lie down  Place pillows under your upper back and head or rest in a recliner  · Change your position often when you are lying down  Try not to lie with your head on the same side for long periods of time  Roll over slowly  · Wear protective gear  when you ride a bike or play sports  A helmet helps protect your head from injury  Follow up with your healthcare provider as directed: You may need to return in 1 month to check the progress of your treatment  Write down your questions so you remember to ask them during your visits  © 2017 [facility] Information is for End User's use only and may not be sold, redistributed or otherwise used for commercial purposes  All illustrations and images included in CareNotes® are the copyrighted property of [employer] M [employer]  or Gary Blanco  The above information is an  only  It is not intended as medical advice for individual conditions or treatments  Talk to your doctor, nurse or pharmacist before following any medical regimen to see if it is safe and effective for you

## 2020-07-27 NOTE — ASSESSMENT & PLAN NOTE
Denied dysuria, frequency or urgency  This is a chronic problem  [address] multiparous X7      - Counseled to perform kegel exercises  - Patient with anemia secondary to abnormal uterine bleeding and also desires hysterectomy  - Referral to urogynecology placed

## 2020-07-27 NOTE — PROGRESS NOTES
Assessment/Plan:    Benign paroxysmal positional vertigo due to bilateral vestibular disorder  Vertigo induced by simple wall test and turning of head in one direction while seated  Patient unable to tolerate Tyron-Rich pike maneuver due to excessive nausea and dizziness  - Referral for vestibular rehabilitation  - Scopolamine patch every 72 hours for vertigo, motion sickness and nausea  - Adequate Po water intake advised  Stress incontinence of urine  Denied dysuria, frequency or urgency  This is a chronic problem  [address] multiparous X7      - Counseled to perform kegel exercises  - Patient with anemia secondary to abnormal uterine bleeding and also desires hysterectomy  - Referral to urogynecology placed  Diagnoses and all orders for this visit:    Iron deficiency anemia, unspecified iron deficiency anemia type  -     ferrous sulfate 324 (65 Fe) mg; Take 1 tablet (324 mg total) by mouth 2 (two) times a day before meals for 10 days  -     ascorbic acid (VITAMIN C) 500 mg tablet; Take 0 5 tablets (250 mg total) by mouth every other day    Benign paroxysmal positional vertigo due to bilateral vestibular disorder  -     scopolamine (TRANSDERM-SCOP) 1 5 mg/3 days TD 72 hr patch; Place 1 patch on the skin every third day  -     Ambulatory referral to Physical Therapy; Future    Urge incontinence of urine  -     Ambulatory referral to Urogynecology; Future    Stress incontinence of urine          Subjective:      Patient ID: Crow Orozco is a 37 y o  female  Crow Orozco is a 37 y o  Here for evaluation of bilateral ear fullness, dizziness and nausea  Patient had a similar presentation 15 years ago that spontaneously resolved and again 3 years ago when pregnant that was associated with hypokalemia  She reports that this episode began three days ago and has been persistent  She feels like the room is spinning around her   This sensation is worsened with movement of her head for instance when turning in bed or looking sideways while seated  She has not had any falls or near falls, however has been very nauseous  She denied vomiting or a headache  She denied ear pain or drainage, however has bilateral ear fullness  Patient has never been to vestibular rehabilitation and is not currently taking any medications to help with her symptoms  She denied tobacco, alcohol or recreational drug use  Patient is employed as a CNA and her symptoms make it difficult for her to work  The following portions of the patient's history were reviewed and updated as appropriate:   She  has a past medical history of Anxiety, Depression, Gestational diabetes, Gestational hypertension, Hashimoto's thyroiditis, HPV (human papilloma virus) infection, and Retained placenta  She   Patient Active Problem List    Diagnosis Date Noted    Benign paroxysmal positional vertigo due to bilateral vestibular disorder 07/27/2020    Iron deficiency anemia 07/27/2020    Stress incontinence of urine 07/27/2020    Cystitis 05/19/2020    Other specified hypothyroidism 11/15/2019    Irritable bowel syndrome with diarrhea 11/15/2019    Depression with anxiety 11/15/2019    Viral upper respiratory tract infection 11/15/2019     She  has a past surgical history that includes Cholecystectomy; Dilation and curettage of uterus; Hernia repair; and Umbilical hernia repair  Her family history includes Anuerysm in her mother; Lung cancer in her maternal grandmother; Thyroid disease in her mother  She  reports that she has never smoked  She has never used smokeless tobacco  She reports that she does not drink alcohol or use drugs    Current Outpatient Medications   Medication Sig Dispense Refill    escitalopram (LEXAPRO) 10 mg tablet Take 10 mg by mouth daily      sertraline (ZOLOFT) 50 mg tablet TAKE HALF A TABLET BY MOUTH DAILY FOR 2 WEEKS THEN 1 TABLET EVERY DAY  1    SYNTHROID 125 MCG tablet Take 125 mcg by mouth daily  3    acetaminophen (TYLENOL) 500 mg tablet Take 1 tablet (500 mg total) by mouth every 6 (six) hours as needed for mild pain (Patient not taking: Reported on 11/15/2019) 30 tablet 0    ascorbic acid (VITAMIN C) 500 mg tablet Take 0 5 tablets (250 mg total) by mouth every other day 30 tablet 6    ferrous sulfate 324 (65 Fe) mg Take 1 tablet (324 mg total) by mouth 2 (two) times a day before meals for 10 days 20 tablet 1    fluticasone (FLONASE) 50 mcg/act nasal spray 1 spray into each nostril daily (Patient not taking: Reported on 11/15/2019) 16 g 0    guaiFENesin (ROBITUSSIN) 100 MG/5ML oral liquid Take 10 mL (200 mg total) by mouth 3 (three) times a day as needed for cough (Patient not taking: Reported on 5/19/2020) 120 mL 0    ibuprofen (MOTRIN) 400 mg tablet Take 1 tablet (400 mg total) by mouth every 6 (six) hours as needed for moderate pain (Patient not taking: Reported on 11/15/2019) 20 tablet 0    loperamide (IMODIUM) 2 mg capsule Take 1 capsule (2 mg total) by mouth 4 (four) times a day as needed for diarrhea (Patient not taking: Reported on 5/19/2020) 90 capsule 1    loratadine (CLARITIN) 10 mg tablet Take 1 tablet (10 mg total) by mouth daily (Patient not taking: Reported on 11/15/2019) 20 tablet 0    LORazepam (ATIVAN) 0 5 mg tablet Take 1 tablet by mouth 3 (three) times a day as needed      menthol-cetylpyridinium (CEPACOL) 3 MG lozenge Take 1 lozenge (3 mg total) by mouth as needed for sore throat (Patient not taking: Reported on 11/15/2019) 30 lozenge 0    norgestimate-ethinyl estradiol (ORTHO-CYCLEN) 0 25-35 MG-MCG per tablet Take 1 tablet by mouth daily (Patient not taking: Reported on 1/4/2019 ) 28 tablet 3    norgestimate-ethinyl estradiol (ORTHO-CYCLEN) 0 25-35 MG-MCG per tablet Take 1 tablet by mouth daily (Patient not taking: Reported on 1/4/2019 ) 28 tablet 0    pseudoephedrine (SUDAFED) 30 mg tablet Take 2 tablets (60 mg total) by mouth every 6 (six) hours as needed for congestion (Patient not taking: Reported on 5/19/2020) 30 tablet 1    scopolamine (TRANSDERM-SCOP) 1 5 mg/3 days TD 72 hr patch Place 1 patch on the skin every third day 10 patch 3    sodium chloride (OCEAN) 0 65 % nasal spray 1 spray into each nostril as needed for congestion (Patient not taking: Reported on 5/19/2020) 104 mL 0     No current facility-administered medications for this visit        Current Outpatient Medications on File Prior to Visit   Medication Sig    escitalopram (LEXAPRO) 10 mg tablet Take 10 mg by mouth daily    sertraline (ZOLOFT) 50 mg tablet TAKE HALF A TABLET BY MOUTH DAILY FOR 2 WEEKS THEN 1 TABLET EVERY DAY    SYNTHROID 125 MCG tablet Take 125 mcg by mouth daily    acetaminophen (TYLENOL) 500 mg tablet Take 1 tablet (500 mg total) by mouth every 6 (six) hours as needed for mild pain (Patient not taking: Reported on 11/15/2019)    fluticasone (FLONASE) 50 mcg/act nasal spray 1 spray into each nostril daily (Patient not taking: Reported on 11/15/2019)    guaiFENesin (ROBITUSSIN) 100 MG/5ML oral liquid Take 10 mL (200 mg total) by mouth 3 (three) times a day as needed for cough (Patient not taking: Reported on 5/19/2020)    ibuprofen (MOTRIN) 400 mg tablet Take 1 tablet (400 mg total) by mouth every 6 (six) hours as needed for moderate pain (Patient not taking: Reported on 11/15/2019)    loperamide (IMODIUM) 2 mg capsule Take 1 capsule (2 mg total) by mouth 4 (four) times a day as needed for diarrhea (Patient not taking: Reported on 5/19/2020)    loratadine (CLARITIN) 10 mg tablet Take 1 tablet (10 mg total) by mouth daily (Patient not taking: Reported on 11/15/2019)    LORazepam (ATIVAN) 0 5 mg tablet Take 1 tablet by mouth 3 (three) times a day as needed    menthol-cetylpyridinium (CEPACOL) 3 MG lozenge Take 1 lozenge (3 mg total) by mouth as needed for sore throat (Patient not taking: Reported on 11/15/2019)    norgestimate-ethinyl estradiol (ORTHO-CYCLEN) 0 25-35 MG-MCG per tablet Take 1 tablet by mouth daily (Patient not taking: Reported on 1/4/2019 )    norgestimate-ethinyl estradiol (ORTHO-CYCLEN) 0 25-35 MG-MCG per tablet Take 1 tablet by mouth daily (Patient not taking: Reported on 1/4/2019 )    pseudoephedrine (SUDAFED) 30 mg tablet Take 2 tablets (60 mg total) by mouth every 6 (six) hours as needed for congestion (Patient not taking: Reported on 5/19/2020)    sodium chloride (OCEAN) 0 65 % nasal spray 1 spray into each nostril as needed for congestion (Patient not taking: Reported on 5/19/2020)     No current facility-administered medications on file prior to visit  She is allergic to bactrim [sulfamethoxazole-trimethoprim]; other; shellfish-derived products; sulfamethoxazole; and trimethoprim       Review of Systems   Constitutional: Negative  HENT: Negative for dental problem, ear discharge, ear pain, hearing loss, sinus pressure, sinus pain, tinnitus, trouble swallowing and voice change  Bilateral ear fullness   Eyes: Negative  Cardiovascular: Negative  Gastrointestinal: Negative  Genitourinary: Negative for difficulty urinating, dysuria, enuresis, flank pain, frequency, genital sores, hematuria, menstrual problem and urgency  Stress incontinence (with cough, sneeze or laughter)   Neurological: Positive for dizziness  Negative for tremors, seizures, syncope, facial asymmetry, speech difficulty, weakness, light-headedness, numbness and headaches  Psychiatric/Behavioral: Negative  Objective:      /64 (BP Location: Left arm, Patient Position: Sitting, Cuff Size: Large)   Pulse 77   Temp (!) 97 4 °F (36 3 °C) (Temporal)   Resp 20   Ht 5' 7" (1 702 m)   Wt 100 kg (221 lb)   LMP 07/25/2020   SpO2 97%   BMI 34 61 kg/m²          Physical Exam   Constitutional: She is oriented to person, place, and time  She appears well-developed and well-nourished  No distress  HENT:   Head: Normocephalic and atraumatic     Right Ear: External ear and ear canal normal  No drainage, swelling or tenderness  No mastoid tenderness  No middle ear effusion  Left Ear: Tympanic membrane, external ear and ear canal normal  No drainage, swelling or tenderness  No mastoid tenderness  No middle ear effusion  Nose: Nose normal    Mouth/Throat: Oropharynx is clear and moist  No oropharyngeal exudate  Neck: Neck supple  No JVD present  No thyromegaly present  Cardiovascular: Normal rate, regular rhythm, normal heart sounds and intact distal pulses  No murmur heard  Pulmonary/Chest: Effort normal and breath sounds normal  No respiratory distress  She has no wheezes  She has no rales  Abdominal: Soft  Bowel sounds are normal  She exhibits no distension  There is no tenderness  Lymphadenopathy:     She has no cervical adenopathy  Neurological: She is alert and oriented to person, place, and time  She displays normal reflexes  She exhibits normal muscle tone  Skin: Skin is warm and dry  No rash noted  She is not diaphoretic  No erythema  No pallor

## 2020-07-27 NOTE — ASSESSMENT & PLAN NOTE
Vertigo induced by simple wall test and turning of head in one direction while seated  Patient unable to tolerate Tyron-Rich pike maneuver due to excessive nausea and dizziness  - Referral for vestibular rehabilitation  - Scopolamine patch every 72 hours for vertigo, motion sickness and nausea  - Adequate Po water intake advised

## 2020-07-29 ENCOUNTER — APPOINTMENT (OUTPATIENT)
Dept: LAB | Facility: HOSPITAL | Age: 35
End: 2020-07-29
Payer: COMMERCIAL

## 2020-07-29 DIAGNOSIS — Z86.2 HISTORY OF IRON DEFICIENCY ANEMIA: ICD-10-CM

## 2020-07-29 LAB
ERYTHROCYTE [DISTWIDTH] IN BLOOD BY AUTOMATED COUNT: 19.7 %
FERRITIN SERPL-MCNC: 4 NG/ML (ref 8–388)
HCT VFR BLD AUTO: 32.7 % (ref 36–46)
HGB BLD-MCNC: 10.3 G/DL (ref 12–16)
IRON SATN MFR SERPL: 5 %
IRON SERPL-MCNC: 23 UG/DL (ref 50–170)
MCH RBC QN AUTO: 21.9 PG (ref 26–34)
MCHC RBC AUTO-ENTMCNC: 31.6 G/DL (ref 31–36)
MCV RBC AUTO: 69 FL (ref 80–100)
PLATELET # BLD AUTO: 219 THOUSANDS/UL (ref 150–450)
PMV BLD AUTO: 9.7 FL (ref 8.9–12.7)
RBC # BLD AUTO: 4.72 MILLION/UL (ref 4–5.2)
TIBC SERPL-MCNC: 509 UG/DL (ref 250–450)
WBC # BLD AUTO: 5.1 THOUSAND/UL (ref 4.5–11)

## 2020-07-29 PROCEDURE — 83550 IRON BINDING TEST: CPT

## 2020-07-29 PROCEDURE — 85027 COMPLETE CBC AUTOMATED: CPT

## 2020-07-29 PROCEDURE — 83540 ASSAY OF IRON: CPT

## 2020-07-29 PROCEDURE — 36415 COLL VENOUS BLD VENIPUNCTURE: CPT

## 2020-07-29 PROCEDURE — 82728 ASSAY OF FERRITIN: CPT

## 2020-07-31 DIAGNOSIS — D50.9 IRON DEFICIENCY ANEMIA, UNSPECIFIED IRON DEFICIENCY ANEMIA TYPE: ICD-10-CM

## 2020-07-31 RX ORDER — FERROUS SULFATE TAB EC 324 MG (65 MG FE EQUIVALENT) 324 (65 FE) MG
324 TABLET DELAYED RESPONSE ORAL
Qty: 60 TABLET | Refills: 1 | Status: SHIPPED | OUTPATIENT
Start: 2020-07-31 | End: 2021-02-18 | Stop reason: SDUPTHER

## 2020-07-31 RX ORDER — MULTIVIT WITH MINERALS/LUTEIN
500 TABLET ORAL 2 TIMES DAILY
Qty: 60 TABLET | Refills: 1 | Status: SHIPPED | OUTPATIENT
Start: 2020-07-31 | End: 2021-02-18

## 2020-08-08 ENCOUNTER — APPOINTMENT (EMERGENCY)
Dept: RADIOLOGY | Facility: HOSPITAL | Age: 35
End: 2020-08-08
Payer: COMMERCIAL

## 2020-08-08 ENCOUNTER — HOSPITAL ENCOUNTER (EMERGENCY)
Facility: HOSPITAL | Age: 35
Discharge: HOME | End: 2020-08-08
Attending: EMERGENCY MEDICINE | Admitting: EMERGENCY MEDICINE
Payer: COMMERCIAL

## 2020-08-08 VITALS
OXYGEN SATURATION: 99 % | HEART RATE: 97 BPM | DIASTOLIC BLOOD PRESSURE: 85 MMHG | RESPIRATION RATE: 18 BRPM | SYSTOLIC BLOOD PRESSURE: 129 MMHG | BODY MASS INDEX: 34.7 KG/M2 | WEIGHT: 221.56 LBS | TEMPERATURE: 99.8 F

## 2020-08-08 DIAGNOSIS — J06.9 VIRAL URI WITH COUGH: ICD-10-CM

## 2020-08-08 DIAGNOSIS — J06.9 URI (UPPER RESPIRATORY INFECTION): Primary | ICD-10-CM

## 2020-08-08 DIAGNOSIS — R05.9 COUGH: ICD-10-CM

## 2020-08-08 LAB
EXT PREG TEST URINE: NEGATIVE
EXT. CONTROL ED NAV: NORMAL
S PYO DNA THROAT QL NAA+PROBE: NORMAL
SARS-COV-2 RNA RESP QL NAA+PROBE: NEGATIVE

## 2020-08-08 PROCEDURE — 71045 X-RAY EXAM CHEST 1 VIEW: CPT

## 2020-08-08 PROCEDURE — 99284 EMERGENCY DEPT VISIT MOD MDM: CPT

## 2020-08-08 PROCEDURE — 99284 EMERGENCY DEPT VISIT MOD MDM: CPT | Performed by: EMERGENCY MEDICINE

## 2020-08-08 PROCEDURE — 81025 URINE PREGNANCY TEST: CPT | Performed by: EMERGENCY MEDICINE

## 2020-08-08 PROCEDURE — 94640 AIRWAY INHALATION TREATMENT: CPT

## 2020-08-08 PROCEDURE — 87635 SARS-COV-2 COVID-19 AMP PRB: CPT | Performed by: EMERGENCY MEDICINE

## 2020-08-08 PROCEDURE — 87651 STREP A DNA AMP PROBE: CPT | Performed by: EMERGENCY MEDICINE

## 2020-08-08 RX ORDER — LIDOCAINE HYDROCHLORIDE 20 MG/ML
15 SOLUTION OROPHARYNGEAL ONCE
Status: COMPLETED | OUTPATIENT
Start: 2020-08-08 | End: 2020-08-08

## 2020-08-08 RX ORDER — IPRATROPIUM BROMIDE AND ALBUTEROL SULFATE 2.5; .5 MG/3ML; MG/3ML
3 SOLUTION RESPIRATORY (INHALATION) ONCE
Status: COMPLETED | OUTPATIENT
Start: 2020-08-08 | End: 2020-08-08

## 2020-08-08 RX ORDER — BENZONATATE 100 MG/1
100 CAPSULE ORAL EVERY 8 HOURS
Qty: 6 CAPSULE | Refills: 0 | Status: SHIPPED | OUTPATIENT
Start: 2020-08-08 | End: 2020-08-10

## 2020-08-08 RX ORDER — ALBUTEROL SULFATE 90 UG/1
2 AEROSOL, METERED RESPIRATORY (INHALATION) EVERY 6 HOURS PRN
Qty: 1 INHALER | Refills: 0 | Status: SHIPPED | OUTPATIENT
Start: 2020-08-08 | End: 2020-08-10

## 2020-08-08 RX ORDER — BENZONATATE 100 MG/1
100 CAPSULE ORAL ONCE
Status: COMPLETED | OUTPATIENT
Start: 2020-08-08 | End: 2020-08-08

## 2020-08-08 RX ADMIN — BENZONATATE 100 MG: 100 CAPSULE ORAL at 19:51

## 2020-08-08 RX ADMIN — LIDOCAINE HYDROCHLORIDE 15 ML: 20 SOLUTION ORAL; TOPICAL at 19:51

## 2020-08-08 RX ADMIN — IPRATROPIUM BROMIDE AND ALBUTEROL SULFATE 3 ML: 2.5; .5 SOLUTION RESPIRATORY (INHALATION) at 19:16

## 2020-08-08 NOTE — ED PROVIDER NOTES
History  Chief Complaint   Patient presents with    Sore Throat     Pt reports a sore throat with a cough x 1 day after seeing her niece yesterday who is sick with similar symptoms  Pt reports last taking motrin appr  5 hours ago   Cough     Patient is a 41-year-old female with a history of IBS and Hashimoto's coming in today with complaints of sore throat, stuffy nose and nonproductive cough over the past several days  Patient states that she was watching her knees with similar symptoms  She has no fevers, chills, nausea, vomiting or diarrhea  She has no exposure COVID, no recent travel no recent antibiotic use  She states that today she did decrease in p o  Intake otherwise has been healthy  She took Motrin before coming in as well as been using lot of falls  She has no neck pain, abdominal pain, chest pain  She has no headache  She has no ear pain        History provided by:  Patient   used: No    Sore Throat   Location:  Generalized  Quality:  Sore  Severity:  Moderate  Onset quality:  Gradual  Timing:  Constant  Progression:  Unchanged  Chronicity:  New  Relieved by:  Nothing  Worsened by:  Nothing  Ineffective treatments:  NSAIDs  Associated symptoms: cough and sinus congestion    Associated symptoms: no abdominal pain, no adenopathy, no chest pain, no chills, no drooling, no ear discharge, no ear pain, no epistaxis, no eye discharge, no fever, no headaches, no neck stiffness, no night sweats, no plugged ear sensation, no postnasal drip, no rash, no rhinorrhea, no shortness of breath, no stridor, no trouble swallowing and no voice change    Risk factors: sick contacts    Risk factors: no exposure to strep, no exposure to mono, no recent dental procedure, no recent endoscopy and no recent ENT procedure    Cough   Cough characteristics:  Non-productive  Sputum characteristics:  Nondescript  Severity:  Mild  Onset quality:  Gradual  Timing:  Intermittent  Progression:  Waxing and waning  Chronicity:  New  Smoker: no    Context: not animal exposure, not exposure to allergens, not fumes, not occupational exposure, not sick contacts, not smoke exposure, not upper respiratory infection, not weather changes and not with activity    Relieved by:  None tried  Worsened by:  Nothing  Ineffective treatments:  None tried  Associated symptoms: sinus congestion and sore throat    Associated symptoms: no chest pain, no chills, no diaphoresis, no ear fullness, no ear pain, no eye discharge, no fever, no headaches, no myalgias, no rash, no rhinorrhea, no shortness of breath, no weight loss and no wheezing    Risk factors: no chemical exposure, no recent infection and no recent travel        Prior to Admission Medications   Prescriptions Last Dose Informant Patient Reported? Taking?    LORazepam (ATIVAN) 0 5 mg tablet   Yes No   Sig: Take 1 tablet by mouth 3 (three) times a day as needed   SYNTHROID 125 MCG tablet   Yes No   Sig: Take 125 mcg by mouth daily   acetaminophen (TYLENOL) 500 mg tablet   No No   Sig: Take 1 tablet (500 mg total) by mouth every 6 (six) hours as needed for mild pain   Patient not taking: Reported on 11/15/2019   ascorbic acid (VITAMIN C) 250 mg tablet   No No   Sig: Take 2 tablets (500 mg total) by mouth 2 (two) times a day   escitalopram (LEXAPRO) 10 mg tablet   Yes No   Sig: Take 10 mg by mouth daily   ferrous sulfate 324 (65 Fe) mg   No No   Sig: Take 1 tablet (324 mg total) by mouth 2 (two) times a day before meals   fluticasone (FLONASE) 50 mcg/act nasal spray   No No   Si spray into each nostril daily   Patient not taking: Reported on 11/15/2019   guaiFENesin (ROBITUSSIN) 100 MG/5ML oral liquid   No No   Sig: Take 10 mL (200 mg total) by mouth 3 (three) times a day as needed for cough   Patient not taking: Reported on 2020   ibuprofen (MOTRIN) 400 mg tablet   No No   Sig: Take 1 tablet (400 mg total) by mouth every 6 (six) hours as needed for moderate pain   Patient not taking: Reported on 11/15/2019   loperamide (IMODIUM) 2 mg capsule   No No   Sig: Take 1 capsule (2 mg total) by mouth 4 (four) times a day as needed for diarrhea   Patient not taking: Reported on 2020   loratadine (CLARITIN) 10 mg tablet   No No   Sig: Take 1 tablet (10 mg total) by mouth daily   Patient not taking: Reported on 11/15/2019   menthol-cetylpyridinium (CEPACOL) 3 MG lozenge   No No   Sig: Take 1 lozenge (3 mg total) by mouth as needed for sore throat   Patient not taking: Reported on 11/15/2019   norgestimate-ethinyl estradiol (ORTHO-CYCLEN) 0 25-35 MG-MCG per tablet   No No   Sig: Take 1 tablet by mouth daily   Patient not taking: Reported on 2019    norgestimate-ethinyl estradiol (ORTHO-CYCLEN) 0 25-35 MG-MCG per tablet   No No   Sig: Take 1 tablet by mouth daily   Patient not taking: Reported on 2019    pseudoephedrine (SUDAFED) 30 mg tablet   No No   Sig: Take 2 tablets (60 mg total) by mouth every 6 (six) hours as needed for congestion   Patient not taking: Reported on 2020   scopolamine (TRANSDERM-SCOP) 1 5 mg/3 days TD 72 hr patch   No No   Sig: Place 1 patch on the skin every third day   sertraline (ZOLOFT) 50 mg tablet   Yes No   Sig: TAKE HALF A TABLET BY MOUTH DAILY FOR 2 WEEKS THEN 1 TABLET EVERY DAY   sodium chloride (OCEAN) 0 65 % nasal spray   No No   Si spray into each nostril as needed for congestion   Patient not taking: Reported on 2020      Facility-Administered Medications: None       Past Medical History:   Diagnosis Date    Anxiety     Depression     Gestational diabetes     Gestational hypertension     previous pregnancy    Hashimoto's thyroiditis     HPV (human papilloma virus) infection     Retained placenta        Past Surgical History:   Procedure Laterality Date    CHOLECYSTECTOMY      DILATION AND CURETTAGE OF UTERUS      HERNIA REPAIR      UMBILICAL HERNIA REPAIR         Family History   Problem Relation Age of Onset    Anuerysm Mother     Thyroid disease Mother     Lung cancer Maternal Grandmother      I have reviewed and agree with the history as documented  E-Cigarette/Vaping     E-Cigarette/Vaping Substances     Social History     Tobacco Use    Smoking status: Never Smoker    Smokeless tobacco: Never Used   Substance Use Topics    Alcohol use: No    Drug use: No       Review of Systems   Constitutional: Negative  Negative for chills, diaphoresis, fever, night sweats and weight loss  HENT: Positive for sore throat  Negative for drooling, ear discharge, ear pain, nosebleeds, postnasal drip, rhinorrhea, trouble swallowing and voice change  Eyes: Negative  Negative for discharge and visual disturbance  Respiratory: Positive for cough  Negative for chest tightness, shortness of breath, wheezing and stridor  Cardiovascular: Negative for chest pain and palpitations  Gastrointestinal: Negative  Negative for abdominal pain, nausea and vomiting  Genitourinary: Negative  Negative for difficulty urinating and dysuria  Musculoskeletal: Negative  Negative for back pain, myalgias, neck pain and neck stiffness  Skin: Negative for rash  Neurological: Negative  Negative for weakness and headaches  Hematological: Negative  Negative for adenopathy  Psychiatric/Behavioral: Negative  Negative for confusion  All other systems reviewed and are negative  Physical Exam  Physical Exam  Vitals signs and nursing note reviewed  Constitutional:       General: She is not in acute distress  Appearance: She is well-developed  She is not diaphoretic  HENT:      Head: Normocephalic and atraumatic  Right Ear: Tympanic membrane and ear canal normal       Left Ear: Tympanic membrane and ear canal normal       Nose: Nose normal       Mouth/Throat:      Comments: Patient maintaining airway maintaining secretions  Uvula midline without edema  There is no posterior pharyngeal exudates    Mild posterior pharyngeal erythema without any trauma or vesicular lesions  No brawniness under the tongue  No stridor  Eyes:      Conjunctiva/sclera: Conjunctivae normal       Pupils: Pupils are equal, round, and reactive to light  Neck:      Musculoskeletal: Normal range of motion and neck supple  Trachea: Trachea normal       Comments: Negative for meningismus  Cardiovascular:      Rate and Rhythm: Normal rate and regular rhythm  Heart sounds: Normal heart sounds  No murmur  Pulmonary:      Effort: Pulmonary effort is normal  No respiratory distress  Breath sounds: Normal breath sounds  No stridor  Abdominal:      General: Bowel sounds are normal  There is no distension  Palpations: Abdomen is soft  Tenderness: There is no abdominal tenderness  Musculoskeletal: Normal range of motion  Skin:     General: Skin is warm  Capillary Refill: Capillary refill takes less than 2 seconds  Neurological:      General: No focal deficit present  Mental Status: She is alert and oriented to person, place, and time  GCS: GCS eye subscore is 4  GCS verbal subscore is 5  GCS motor subscore is 6  Cranial Nerves: Cranial nerves are intact  No cranial nerve deficit  Motor: Motor function is intact  Coordination: Coordination is intact  Gait: Gait is intact     Psychiatric:         Attention and Perception: Attention normal          Mood and Affect: Mood normal          Speech: Speech normal          Vital Signs  ED Triage Vitals [08/08/20 1854]   Temperature Pulse Respirations Blood Pressure SpO2   99 8 °F (37 7 °C) 97 18 129/85 99 %      Temp Source Heart Rate Source Patient Position - Orthostatic VS BP Location FiO2 (%)   Tympanic Monitor Sitting Left arm --      Pain Score       4           Vitals:    08/08/20 1854   BP: 129/85   Pulse: 97   Patient Position - Orthostatic VS: Sitting         Visual Acuity      ED Medications  Medications   ipratropium-albuterol (DUO-NEB) 0 5-2 5 mg/3 mL inhalation solution 3 mL (3 mL Nebulization Given 8/8/20 1916)   benzonatate (TESSALON PERLES) capsule 100 mg (100 mg Oral Given 8/8/20 1951)   Lidocaine Viscous HCl (XYLOCAINE) 2 % mucosal solution 15 mL (15 mL Swish & Swallow Given 8/8/20 1951)       Diagnostic Studies  Results Reviewed     Procedure Component Value Units Date/Time    Novel Coronavirus [facility] [[id number]]  (Normal) Collected:  08/08/20 1915    Lab Status:  Final result Specimen:  Nares from Nose Updated:  08/08/20 2029     SARS-CoV-2 Negative    Narrative: The specimen collection materials, transport medium, and/or testing methodology utilized in the production of these test results have been proven to be reliable in a limited validation with an abbreviated program under the Emergency Utilization Authorization provided by the FDA  Testing reported as "Presumptive positive" will be confirmed with secondary testing with a reference laboratory to ensure result accuracy  Clinical caution and judgement should be used with the interpretation of these results with consideration of the clinical impression and other laboratory testing  Testing reported as "Positive" or "Negative" has been proven to be accurate according to standard laboratory validation requirements  All testing is performed with control materials showing appropriate reactivity at standard intervals        Strep A PCR [[id number]]  (Normal) Collected:  08/08/20 1908    Lab Status:  Final result Specimen:  Throat Updated:  08/08/20 1948     STREP A PCR None Detected    POCT pregnancy, urine [[id number]]  (Normal) Resulted:  08/08/20 1924    Lab Status:  Final result Updated:  08/08/20 1924     EXT PREG TEST UR (Ref: Negative) negative     Control valid                 XR chest 1 view portable    (Results Pending)              Procedures  Procedures         ED Course  ED Course as of Aug 08 2057   Sat Aug 08, 2020   1914 Patient 37year old female coming in with sore throat, cough, and stuffed nose  On exam, patient is well appearing in no distress  She is neuro intact with no focal deficits and hemodynamically stable with no respiratory distress    Portions of the record may have been created with voice recognition software  Occasional wrong word or "sound a like" substitutions may have occurred due to the inherent limitations of voice recognition software  Read the chart carefully and recognize, using context, where substitutions have occurred  1950 Strep negative  Pregnancy negative  Pending CXR and COVID       2030 COVID negative  2030 Long discussion with patient  Patient updated pregnancy, x-ray as well as COVID and strep  She is resting in bed  She states that the albuterol did feel like she was helped by this  Will send a prescription see PA home  She is in no respiratory distress  US AUDIT      Most Recent Value   Initial Alcohol Screen: US AUDIT-C    1  How often do you have a drink containing alcohol?  0 Filed at: 08/08/2020 1854   2  How many drinks containing alcohol do you have on a typical day you are drinking? 0 Filed at: 08/08/2020 1854   3b  FEMALE Any Age, or MALE 65+: How often do you have 4 or more drinks on one occassion? 0 Filed at: 08/08/2020 1854   Audit-C Score  0 Filed at: 08/08/2020 1854                  LAKESHA/DAST-10      Most Recent Value   How many times in the past year have you    Used an illegal drug or used a prescription medication for non-medical reasons?   Never Filed at: 08/08/2020 1854                                MDM      Disposition  Final diagnoses:   URI (upper respiratory infection)   Viral URI with cough   Cough     Time reflects when diagnosis was documented in both MDM as applicable and the Disposition within this note     Time User Action Codes Description Comment    8/8/2020  8:32 PM Vannesa Green Add [J06 9] URI (upper respiratory infection)     8/8/2020  8:32 PM Vannesa Green Add [J06 9] Viral URI with cough     8/8/2020  8:32 PM Timothy Green Add [R05] Cough       ED Disposition     ED Disposition Condition Date/Time Comment    Discharge Stable Sat Aug 8, 2020  8:32 PM [facility] discharge to home/self care              Follow-up Information     Follow up With Specialties Details Why Contact Info Additional [address] Schedule an appointment as soon as possible for a visit in 1 week  [address], [address] [facility] [address]  [address], [address], [address] [place], [place], [address]          Discharge Medication List as of 8/8/2020  8:37 PM      START taking these medications    Details   albuterol (PROVENTIL HFA,VENTOLIN HFA) 90 mcg/act inhaler Inhale 2 puffs every 6 (six) hours as needed for wheezing for up to 2 days, Starting Sat 8/8/2020, Until Mon 8/10/2020, Normal      benzonatate (TESSALON PERLES) 100 mg capsule Take 1 capsule (100 mg total) by mouth every 8 (eight) hours for 2 days, Starting Sat 8/8/2020, Until Mon 8/10/2020, Normal         CONTINUE these medications which have NOT CHANGED    Details   acetaminophen (TYLENOL) 500 mg tablet Take 1 tablet (500 mg total) by mouth every 6 (six) hours as needed for mild pain, Starting Sat 10/26/2019, Print      ascorbic acid (VITAMIN C) 250 mg tablet Take 2 tablets (500 mg total) by mouth 2 (two) times a day, Starting Fri 7/31/2020, Normal      escitalopram (LEXAPRO) 10 mg tablet Take 10 mg by mouth daily, Historical Med      ferrous sulfate 324 (65 Fe) mg Take 1 tablet (324 mg total) by mouth 2 (two) times a day before meals, Starting Fri 7/31/2020, Normal      fluticasone (FLONASE) 50 mcg/act nasal spray 1 spray into each nostril daily, Starting Sat 10/26/2019, Print      guaiFENesin (ROBITUSSIN) 100 MG/5ML oral liquid Take 10 mL (200 mg total) by mouth 3 (three) times a day as needed for cough, Starting Fri 11/15/2019, Normal      ibuprofen (MOTRIN) 400 mg tablet Take 1 tablet (400 mg total) by mouth every 6 (six) hours as needed for moderate pain, Starting Sat 10/26/2019, Print      loperamide (IMODIUM) 2 mg capsule Take 1 capsule (2 mg total) by mouth 4 (four) times a day as needed for diarrhea, Starting Fri 11/15/2019, Normal      loratadine (CLARITIN) 10 mg tablet Take 1 tablet (10 mg total) by mouth daily, Starting Sat 10/26/2019, Print      LORazepam (ATIVAN) 0 5 mg tablet Take 1 tablet by mouth 3 (three) times a day as needed, Starting Tue 8/29/2017, Historical Med      menthol-cetylpyridinium (CEPACOL) 3 MG lozenge Take 1 lozenge (3 mg total) by mouth as needed for sore throat, Starting Sat 10/26/2019, Print      !! norgestimate-ethinyl estradiol (ORTHO-CYCLEN) 0 25-35 MG-MCG per tablet Take 1 tablet by mouth daily, Starting Fri 9/7/2018, Normal      !! norgestimate-ethinyl estradiol (ORTHO-CYCLEN) 0 25-35 MG-MCG per tablet Take 1 tablet by mouth daily, Starting Mon 11/12/2018, Normal      pseudoephedrine (SUDAFED) 30 mg tablet Take 2 tablets (60 mg total) by mouth every 6 (six) hours as needed for congestion, Starting Fri 11/15/2019, Normal      scopolamine (TRANSDERM-SCOP) 1 5 mg/3 days TD 72 hr patch Place 1 patch on the skin every third day, Starting Mon 7/27/2020, Normal      sertraline (ZOLOFT) 50 mg tablet TAKE HALF A TABLET BY MOUTH DAILY FOR 2 WEEKS THEN 1 TABLET EVERY DAY, Historical Med      sodium chloride (OCEAN) 0 65 % nasal spray 1 spray into each nostril as needed for congestion, Starting Fri 11/15/2019, Normal      SYNTHROID 125 MCG tablet Take 125 mcg by mouth daily, Starting Mon 5/21/2018, Historical Med       !! - Potential duplicate medications found  Please discuss with provider  No discharge procedures on file      PDMP Review     None          ED Provider  Electronically Signed by           Emmanuelle Reyes DO  08/08/20 6711

## 2020-08-11 ENCOUNTER — TELEMEDICINE (OUTPATIENT)
Dept: FAMILY MEDICINE CLINIC | Facility: CLINIC | Age: 35
End: 2020-08-11

## 2020-08-11 DIAGNOSIS — J06.9 VIRAL UPPER RESPIRATORY TRACT INFECTION: Primary | ICD-10-CM

## 2020-08-11 DIAGNOSIS — B99.9 SUPERIMPOSED INFECTION: ICD-10-CM

## 2020-08-11 PROCEDURE — 99213 OFFICE O/P EST LOW 20 MIN: CPT | Performed by: FAMILY MEDICINE

## 2020-08-11 RX ORDER — AZITHROMYCIN 250 MG/1
TABLET, FILM COATED ORAL
Qty: 6 TABLET | Refills: 0 | Status: SHIPPED | OUTPATIENT
Start: 2020-08-11 | End: 2020-08-16

## 2020-08-11 NOTE — ASSESSMENT & PLAN NOTE
Nasal congestion, sore throat and now with productive cough and fever worsening over the last 7 to 9 days  Previously seen in the ER 3 days ago, where she tested negative for COVID-19, was advised to take albuterol inhaler as needed, Tessalon Perles and symptomatic relief with DayQuil/NyQuil   - Due to worsening symptoms, and fever of 101 F at home, who prescribed a Z-Andrea for possible superimposed bacterial infection

## 2020-08-11 NOTE — PROGRESS NOTES
Virtual Brief Visit    Assessment/Plan:    Problem List Items Addressed This Visit        Respiratory    Viral upper respiratory tract infection - Primary     Nasal congestion, sore throat and now with productive cough and fever worsening over the last 7 to 9 days  Previously seen in the ER 3 days ago, where she tested negative for COVID-19, was advised to take albuterol inhaler as needed, Tessalon Perles and symptomatic relief with DayQuil/NyQuil   - Due to worsening symptoms, and fever of 101 F at home, who prescribed a Z-Andrea for possible superimposed bacterial infection  Other Visit Diagnoses     Superimposed infection        Relevant Medications    azithromycin (Zithromax) 250 mg tablet                Reason for visit is   Chief Complaint   Patient presents with    Virtual Brief Visit        Encounter provider Pool Alexis MD    Provider located at [address] [place] [id number] [phone]    Recent Visits  No visits were found meeting these conditions  Showing recent visits within past 7 days and meeting all other requirements     Today's Visits  Date Type Provider Dept   08/11/20 Telemedicine Pool Alexis MD  [facility]   Showing today's visits and meeting all other requirements     Future Appointments  No visits were found meeting these conditions  Showing future appointments within next 150 days and meeting all other requirements        After connecting through telephone, the patient was identified by name and date of birth  Giulia Das was informed that this is a telemedicine visit and that the visit is being conducted through telephone  My office door was closed  No one else was in the room  She acknowledged consent and understanding of privacy and security of the platform  The patient has agreed to participate and understands she can discontinue the visit at any time      Patient is aware this is a billable service  It was my intent to perform this visit via video technology but the patient was not able to do a video connection so the visit was completed via audio telephone only  Stephania Benjamin is a 37 y o  female  Patient is a pleasant 49-year-old female who presents for virtual visit regardin URI  She states that symptoms began approximately 7-9 days ago that included nasal congestion, sore throat and mild cough  Sick contacts included her niece who had similar symptoms  She was seen emergency room 3 days ago where she was tested for coronavirus and was negative  She was also tested for strep throat and was negative as well  It was thought to be viral URI, therefore she was started on symptomatic relief with Kole Agreste, DayQuil/NyQuil and albuterol inhaler  She admits to having only minimal improvement with medications and states that now her cough is worsening and becoming more productive, she also now developed a fever with T-max of 101 F  She denies any shortness of breath or GI symptoms  She is now more congested and also having purulent nasal discharge         Past Medical History:   Diagnosis Date    Anxiety     Depression     Gestational diabetes     Gestational hypertension     previous pregnancy    Hashimoto's thyroiditis     HPV (human papilloma virus) infection     Retained placenta        Past Surgical History:   Procedure Laterality Date    CHOLECYSTECTOMY      DILATION AND CURETTAGE OF UTERUS      HERNIA REPAIR      UMBILICAL HERNIA REPAIR         Current Outpatient Medications   Medication Sig Dispense Refill    acetaminophen (TYLENOL) 500 mg tablet Take 1 tablet (500 mg total) by mouth every 6 (six) hours as needed for mild pain (Patient not taking: Reported on 11/15/2019) 30 tablet 0    ascorbic acid (VITAMIN C) 250 mg tablet Take 2 tablets (500 mg total) by mouth 2 (two) times a day 60 tablet 1    azithromycin (Zithromax) 250 mg tablet Take 2 tablets (500 mg total) by mouth daily for 1 day, THEN 1 tablet (250 mg total) daily for 4 days   6 tablet 0    escitalopram (LEXAPRO) 10 mg tablet Take 10 mg by mouth daily      ferrous sulfate 324 (65 Fe) mg Take 1 tablet (324 mg total) by mouth 2 (two) times a day before meals 60 tablet 1    fluticasone (FLONASE) 50 mcg/act nasal spray 1 spray into each nostril daily (Patient not taking: Reported on 11/15/2019) 16 g 0    guaiFENesin (ROBITUSSIN) 100 MG/5ML oral liquid Take 10 mL (200 mg total) by mouth 3 (three) times a day as needed for cough (Patient not taking: Reported on 5/19/2020) 120 mL 0    ibuprofen (MOTRIN) 400 mg tablet Take 1 tablet (400 mg total) by mouth every 6 (six) hours as needed for moderate pain (Patient not taking: Reported on 11/15/2019) 20 tablet 0    loperamide (IMODIUM) 2 mg capsule Take 1 capsule (2 mg total) by mouth 4 (four) times a day as needed for diarrhea (Patient not taking: Reported on 5/19/2020) 90 capsule 1    loratadine (CLARITIN) 10 mg tablet Take 1 tablet (10 mg total) by mouth daily (Patient not taking: Reported on 11/15/2019) 20 tablet 0    LORazepam (ATIVAN) 0 5 mg tablet Take 1 tablet by mouth 3 (three) times a day as needed      menthol-cetylpyridinium (CEPACOL) 3 MG lozenge Take 1 lozenge (3 mg total) by mouth as needed for sore throat (Patient not taking: Reported on 11/15/2019) 30 lozenge 0    norgestimate-ethinyl estradiol (ORTHO-CYCLEN) 0 25-35 MG-MCG per tablet Take 1 tablet by mouth daily (Patient not taking: Reported on 1/4/2019 ) 28 tablet 3    norgestimate-ethinyl estradiol (ORTHO-CYCLEN) 0 25-35 MG-MCG per tablet Take 1 tablet by mouth daily (Patient not taking: Reported on 1/4/2019 ) 28 tablet 0    pseudoephedrine (SUDAFED) 30 mg tablet Take 2 tablets (60 mg total) by mouth every 6 (six) hours as needed for congestion (Patient not taking: Reported on 5/19/2020) 30 tablet 1    scopolamine (TRANSDERM-SCOP) 1 5 mg/3 days TD 72 hr patch Place 1 patch on the skin every third day 10 patch 3    sertraline (ZOLOFT) 50 mg tablet TAKE HALF A TABLET BY MOUTH DAILY FOR 2 WEEKS THEN 1 TABLET EVERY DAY  1    sodium chloride (OCEAN) 0 65 % nasal spray 1 spray into each nostril as needed for congestion (Patient not taking: Reported on 5/19/2020) 104 mL 0    SYNTHROID 125 MCG tablet Take 125 mcg by mouth daily  3     No current facility-administered medications for this visit  Allergies   Allergen Reactions    Bactrim [Sulfamethoxazole-Trimethoprim] Hives    Other Hives     seafood    Shellfish-Derived Products     Sulfamethoxazole Hives    Trimethoprim Hives       Review of Systems   Constitutional: Positive for appetite change, chills, fatigue and fever  HENT: Positive for congestion, rhinorrhea and sore throat  Respiratory: Positive for cough and chest tightness  Negative for shortness of breath and wheezing  Cardiovascular: Negative for chest pain  Gastrointestinal: Negative for abdominal distention, abdominal pain, constipation, diarrhea, nausea and vomiting  Musculoskeletal: Negative for arthralgias  Skin: Negative for rash  Neurological: Positive for headaches  There were no vitals filed for this visit  Objective:  Coughing during encounter, however, did not have SOB or conversational dyspnea  I spent 20 minutes directly with the patient during this visit    VIRTUAL VISIT DISCLAIMER    Urbano Luke acknowledges that she has consented to an online visit or consultation  She understands that the online visit is based solely on information provided by her, and that, in the absence of a face-to-face physical evaluation by the physician, the diagnosis she receives is both limited and provisional in terms of accuracy and completeness  This is not intended to replace a full medical face-to-face evaluation by the physician  Urbano Butler understands and accepts these terms

## 2020-08-31 ENCOUNTER — TELEPHONE (OUTPATIENT)
Dept: FAMILY MEDICINE CLINIC | Facility: CLINIC | Age: 35
End: 2020-08-31

## 2020-08-31 NOTE — TELEPHONE ENCOUNTER
----- Message from [facility] sent at 8/31/2020  1:18 PM EDT -----  Please schedule patient with PCP for follow-up of URI symptoms

## 2020-10-08 ENCOUNTER — TELEMEDICINE (OUTPATIENT)
Dept: FAMILY MEDICINE CLINIC | Facility: CLINIC | Age: 35
End: 2020-10-08

## 2020-10-08 DIAGNOSIS — M79.10 MYALGIA: Primary | ICD-10-CM

## 2020-10-08 PROCEDURE — G2012 BRIEF CHECK IN BY MD/QHP: HCPCS | Performed by: FAMILY MEDICINE

## 2020-10-09 PROBLEM — M79.10 MYALGIA: Status: ACTIVE | Noted: 2020-10-09

## 2020-10-12 ENCOUNTER — TELEPHONE (OUTPATIENT)
Dept: FAMILY MEDICINE CLINIC | Facility: CLINIC | Age: 35
End: 2020-10-12

## 2020-11-12 ENCOUNTER — TELEMEDICINE (OUTPATIENT)
Dept: FAMILY MEDICINE CLINIC | Facility: CLINIC | Age: 35
End: 2020-11-12

## 2020-11-12 DIAGNOSIS — Z20.822 EXPOSURE TO COVID-19 VIRUS: ICD-10-CM

## 2020-11-12 DIAGNOSIS — B34.9 VIRAL INFECTION, UNSPECIFIED: ICD-10-CM

## 2020-11-12 PROCEDURE — G2012 BRIEF CHECK IN BY MD/QHP: HCPCS | Performed by: PHYSICIAN ASSISTANT

## 2020-11-13 DIAGNOSIS — B34.9 VIRAL INFECTION, UNSPECIFIED: ICD-10-CM

## 2020-11-13 DIAGNOSIS — Z20.822 EXPOSURE TO COVID-19 VIRUS: ICD-10-CM

## 2020-11-13 PROCEDURE — U0003 INFECTIOUS AGENT DETECTION BY NUCLEIC ACID (DNA OR RNA); SEVERE ACUTE RESPIRATORY SYNDROME CORONAVIRUS 2 (SARS-COV-2) (CORONAVIRUS DISEASE [COVID-19]), AMPLIFIED PROBE TECHNIQUE, MAKING USE OF HIGH THROUGHPUT TECHNOLOGIES AS DESCRIBED BY CMS-2020-01-R: HCPCS | Performed by: PHYSICIAN ASSISTANT

## 2020-11-15 LAB — SARS-COV-2 RNA SPEC QL NAA+PROBE: NOT DETECTED

## 2020-11-21 ENCOUNTER — HOSPITAL ENCOUNTER (EMERGENCY)
Facility: HOSPITAL | Age: 35
Discharge: HOME | End: 2020-11-21
Attending: EMERGENCY MEDICINE
Payer: COMMERCIAL

## 2020-11-21 VITALS
HEART RATE: 79 BPM | SYSTOLIC BLOOD PRESSURE: 134 MMHG | RESPIRATION RATE: 17 BRPM | TEMPERATURE: 97.8 F | DIASTOLIC BLOOD PRESSURE: 71 MMHG | OXYGEN SATURATION: 97 %

## 2020-11-21 DIAGNOSIS — N39.0 UTI (URINARY TRACT INFECTION): Primary | ICD-10-CM

## 2020-11-21 LAB
BACTERIA UR QL AUTO: ABNORMAL /HPF
BILIRUB UR QL STRIP: NEGATIVE
CLARITY UR: ABNORMAL
COLOR UR: YELLOW
EXT PREG TEST URINE: NEGATIVE
EXT. CONTROL ED NAV: NORMAL
GLUCOSE UR STRIP-MCNC: NEGATIVE MG/DL
HGB UR QL STRIP.AUTO: ABNORMAL
KETONES UR STRIP-MCNC: NEGATIVE MG/DL
LEUKOCYTE ESTERASE UR QL STRIP: ABNORMAL
NITRITE UR QL STRIP: POSITIVE
NON-SQ EPI CELLS URNS QL MICRO: ABNORMAL /HPF
PH UR STRIP.AUTO: 6 [PH] (ref 4.5–8)
PROT UR STRIP-MCNC: ABNORMAL MG/DL
RBC #/AREA URNS AUTO: ABNORMAL /HPF
SP GR UR STRIP.AUTO: >=1.03 (ref 1–1.03)
UROBILINOGEN UR QL STRIP.AUTO: 0.2 E.U./DL
WBC #/AREA URNS AUTO: ABNORMAL /HPF

## 2020-11-21 PROCEDURE — 87077 CULTURE AEROBIC IDENTIFY: CPT

## 2020-11-21 PROCEDURE — 81025 URINE PREGNANCY TEST: CPT | Performed by: EMERGENCY MEDICINE

## 2020-11-21 PROCEDURE — 81001 URINALYSIS AUTO W/SCOPE: CPT

## 2020-11-21 PROCEDURE — 87186 SC STD MICRODIL/AGAR DIL: CPT

## 2020-11-21 PROCEDURE — 99283 EMERGENCY DEPT VISIT LOW MDM: CPT

## 2020-11-21 PROCEDURE — 99284 EMERGENCY DEPT VISIT MOD MDM: CPT | Performed by: EMERGENCY MEDICINE

## 2020-11-21 PROCEDURE — 87086 URINE CULTURE/COLONY COUNT: CPT

## 2020-11-21 RX ORDER — PHENAZOPYRIDINE HYDROCHLORIDE 200 MG/1
200 TABLET, FILM COATED ORAL 3 TIMES DAILY
Qty: 6 TABLET | Refills: 0 | Status: SHIPPED | OUTPATIENT
Start: 2020-11-21 | End: 2021-04-18

## 2020-11-21 RX ORDER — CEPHALEXIN 500 MG/1
500 CAPSULE ORAL 2 TIMES DAILY
Qty: 20 CAPSULE | Refills: 0 | Status: SHIPPED | OUTPATIENT
Start: 2020-11-21 | End: 2020-12-01

## 2020-11-23 LAB — BACTERIA UR CULT: ABNORMAL

## 2021-01-20 ENCOUNTER — OFFICE VISIT (OUTPATIENT)
Dept: FAMILY MEDICINE CLINIC | Facility: CLINIC | Age: 36
End: 2021-01-20

## 2021-01-20 VITALS
RESPIRATION RATE: 16 BRPM | HEART RATE: 69 BPM | DIASTOLIC BLOOD PRESSURE: 72 MMHG | SYSTOLIC BLOOD PRESSURE: 118 MMHG | WEIGHT: 223 LBS | HEIGHT: 67 IN | TEMPERATURE: 98 F | BODY MASS INDEX: 35 KG/M2 | OXYGEN SATURATION: 99 %

## 2021-01-20 DIAGNOSIS — Z82.49 FAMILY HISTORY OF BRAIN ANEURYSM: Primary | ICD-10-CM

## 2021-01-20 DIAGNOSIS — L08.9 SKIN INFECTION: ICD-10-CM

## 2021-01-20 PROCEDURE — 1036F TOBACCO NON-USER: CPT | Performed by: NURSE PRACTITIONER

## 2021-01-20 PROCEDURE — 99214 OFFICE O/P EST MOD 30 MIN: CPT | Performed by: NURSE PRACTITIONER

## 2021-01-20 PROCEDURE — 3008F BODY MASS INDEX DOCD: CPT | Performed by: NURSE PRACTITIONER

## 2021-01-20 RX ORDER — CEPHALEXIN 500 MG/1
500 CAPSULE ORAL EVERY 8 HOURS SCHEDULED
Qty: 21 CAPSULE | Refills: 0 | Status: SHIPPED | OUTPATIENT
Start: 2021-01-20 | End: 2021-01-27

## 2021-01-20 NOTE — PATIENT INSTRUCTIONS
Skin Yeast Infection   AMBULATORY CARE:   What do I need to know about a skin yeast infection? Yeast is normally present on the skin  Infection happens when you have too much yeast, or when it gets into a cut on your skin  Certain types of mold and fungus can cause a yeast infection  A skin yeast infection can appear anywhere on your skin or nail beds  Skin yeast infections are usually found on warm, moist parts of the body  Examples include between skin folds or under the breasts  Common symptoms include the following:  Signs and symptoms will depend on the type of yeast causing the infection, and where the infection is located  · Red, scaly skin    · Changes in skin color, especially a beefy red color    · Itching, dry skin    · Painful, cracking skin at the corners of your mouth    · Thick, discolored, chipping nails    · Skin lesions that may be red or purple and round    · Pus bumps    Seek care immediately if:   · You have signs of infection, such as pus, warmth or red streaks coming from the wound, or a fever  Contact your healthcare provider if:   · Your symptoms worsen or do not get better within 7 to 10 days  · You have new or returning signs of a skin yeast infection after treatment  · You have questions or concerns about your condition or care  Treatment for a skin yeast infection  may include antifungal medicine to treat the fungal infection  The medicine be given as a cream, ointment, or pill  Care for the skin near the infection:  You may only have discolored patches of skin, or areas that are dry and flaking  Care for these skin problems as directed by your healthcare provider  If you have painful skin or an open sore, you will need to protect the skin and prevent damage  You will also need to keep the skin dry as much as possible  Ask your healthcare provider how to care for your skin while the infection clears   The following are general guidelines for caring for painful or open skin:  · Keep the skin clean  Ask your healthcare provider if you should wash with mild soap and water  Do not use soap that contains alcohol  Alcohol can dry and irritate the skin and make symptoms worse  Your baby's healthcare provider may tell you to use diaper cream or ointment when you change his diaper  This will protect the skin and prevent moisture from collecting  · Keep the skin dry  Pat the area dry with a towel  Do not rub, because this may irritate the skin  If you have a skin yeast infection between skin folds, lift the top part gently and hold it while you dry between your skin folds  Always dry your feet completely after you swim or bathe, including between your toes  Dry your skin if you are sweating from exercise or exposure to heat  Use a clean towel each time to prevent spreading or continuing the infection  · Keep the skin protected  Ask your healthcare provider if you should cover the area with a bandage or leave it open  Check your skin each day to make sure you do not have new or worsening problems  You may need to have someone check the skin if you cannot see the area easily  Prevent another skin yeast infection:   · Do not share clothing or towels    · Wear shower shoes if you need to use a public shower    · Dry your feet completely after you bathe, and apply antifungal powder or cream as directed    · Put on socks before you get dressed so you do not spread fungus from your feet    · Wear light clothing that allows air to get to your skin    · Manage your weight to prevent skin folds where yeast can collect    · Manage diabetes    · Change your baby's diaper often, and keep the area clean and dry as much as possible    · Use a diaper cream or ointment that contains zinc oxide or dimethicone on your baby's diaper area as directed    Follow up with your healthcare provider as directed:  Write down your questions so you remember to ask them during your visits     © Copyright [employer] [facility] Information is for [employer] use only and may not be sold, redistributed or otherwise used for commercial purposes  All illustrations and images included in CareNotes® are the copyrighted property of A D A M , Inc  or Wesley Goldman   The above information is an  only  It is not intended as medical advice for individual conditions or treatments  Talk to your doctor, nurse or pharmacist before following any medical regimen to see if it is safe and effective for you

## 2021-01-20 NOTE — PROGRESS NOTES
Assessment/Plan:    Jane Flores was seen today for mass  Diagnoses and all orders for this visit:    Family history of brain aneurysm  -     Ambulatory referral to Neurology; Future  -     CTA head w wo contrast; Future    Skin infection  -     cephalexin (KEFLEX) 500 mg capsule; Take 1 capsule (500 mg total) by mouth every 8 (eight) hours for 7 days      Apply warm compresses QID  RTC if any erythema or fluctuance  Return if symptoms worsen or fail to improve, for Annual physical       Patient Instructions     Skin Yeast Infection   AMBULATORY CARE:   What do I need to know about a skin yeast infection? Yeast is normally present on the skin  Infection happens when you have too much yeast, or when it gets into a cut on your skin  Certain types of mold and fungus can cause a yeast infection  A skin yeast infection can appear anywhere on your skin or nail beds  Skin yeast infections are usually found on warm, moist parts of the body  Examples include between skin folds or under the breasts  Common symptoms include the following:  Signs and symptoms will depend on the type of yeast causing the infection, and where the infection is located  · Red, scaly skin    · Changes in skin color, especially a beefy red color    · Itching, dry skin    · Painful, cracking skin at the corners of your mouth    · Thick, discolored, chipping nails    · Skin lesions that may be red or purple and round    · Pus bumps    Seek care immediately if:   · You have signs of infection, such as pus, warmth or red streaks coming from the wound, or a fever  Contact your healthcare provider if:   · Your symptoms worsen or do not get better within 7 to 10 days  · You have new or returning signs of a skin yeast infection after treatment  · You have questions or concerns about your condition or care  Treatment for a skin yeast infection  may include antifungal medicine to treat the fungal infection   The medicine be given as a cream, ointment, or pill  Care for the skin near the infection:  You may only have discolored patches of skin, or areas that are dry and flaking  Care for these skin problems as directed by your healthcare provider  If you have painful skin or an open sore, you will need to protect the skin and prevent damage  You will also need to keep the skin dry as much as possible  Ask your healthcare provider how to care for your skin while the infection clears  The following are general guidelines for caring for painful or open skin:  · Keep the skin clean  Ask your healthcare provider if you should wash with mild soap and water  Do not use soap that contains alcohol  Alcohol can dry and irritate the skin and make symptoms worse  Your baby's healthcare provider may tell you to use diaper cream or ointment when you change his diaper  This will protect the skin and prevent moisture from collecting  · Keep the skin dry  Pat the area dry with a towel  Do not rub, because this may irritate the skin  If you have a skin yeast infection between skin folds, lift the top part gently and hold it while you dry between your skin folds  Always dry your feet completely after you swim or bathe, including between your toes  Dry your skin if you are sweating from exercise or exposure to heat  Use a clean towel each time to prevent spreading or continuing the infection  · Keep the skin protected  Ask your healthcare provider if you should cover the area with a bandage or leave it open  Check your skin each day to make sure you do not have new or worsening problems  You may need to have someone check the skin if you cannot see the area easily      Prevent another skin yeast infection:   · Do not share clothing or towels    · Wear shower shoes if you need to use a public shower    · Dry your feet completely after you bathe, and apply antifungal powder or cream as directed    · Put on socks before you get dressed so you do not spread fungus from your feet    · Wear light clothing that allows air to get to your skin    · Manage your weight to prevent skin folds where yeast can collect    · Manage diabetes    · Change your baby's diaper often, and keep the area clean and dry as much as possible    · Use a diaper cream or ointment that contains zinc oxide or dimethicone on your baby's diaper area as directed    Follow up with your healthcare provider as directed:  Write down your questions so you remember to ask them during your visits  © Copyright [address] Information is for End User's use only and may not be sold, redistributed or otherwise used for commercial purposes  All illustrations and images included in CareNotes® are the copyrighted property of A D A M , Inc  or [employer]   The above information is an  only  It is not intended as medical advice for individual conditions or treatments  Talk to your doctor, nurse or pharmacist before following any medical regimen to see if it is safe and effective for you  Subjective:     Olu Sportsman is a 38 y o  female who  has a past medical history of Anxiety, Depression, Disease of thyroid gland, Gestational diabetes, Gestational hypertension, Hashimoto's thyroiditis, HPV (human papilloma virus) infection, and Retained placenta  who presented to the office today for mass under the arm  She has a boil under the left axillae for about a week  The area is tender to touch  There was a small amount of purulent drainage that came out of it  She has not tried any tx  She also reports that she is concerned because her mother and maternal aunt both recently found out that they have cerebral aneurysms  The patient would like to be screened         The following portions of the patient's history were reviewed and updated as appropriate: allergies, current medications, past family history, past medical history, past social history, past surgical history and problem list     Current Outpatient Medications on File Prior to Visit   Medication Sig Dispense Refill    ascorbic acid (VITAMIN C) 250 mg tablet Take 2 tablets (500 mg total) by mouth 2 (two) times a day 60 tablet 1    escitalopram (LEXAPRO) 10 mg tablet Take 10 mg by mouth daily      ferrous sulfate 324 (65 Fe) mg Take 1 tablet (324 mg total) by mouth 2 (two) times a day before meals 60 tablet 1    LORazepam (ATIVAN) 0 5 mg tablet Take 1 tablet by mouth 3 (three) times a day as needed      scopolamine (TRANSDERM-SCOP) 1 5 mg/3 days TD 72 hr patch Place 1 patch on the skin every third day 10 patch 3    sertraline (ZOLOFT) 50 mg tablet TAKE HALF A TABLET BY MOUTH DAILY FOR 2 WEEKS THEN 1 TABLET EVERY DAY  1    SYNTHROID 125 MCG tablet Take 125 mcg by mouth daily  3    acetaminophen (TYLENOL) 500 mg tablet Take 1 tablet (500 mg total) by mouth every 6 (six) hours as needed for mild pain (Patient not taking: Reported on 11/15/2019) 30 tablet 0    fluticasone (FLONASE) 50 mcg/act nasal spray 1 spray into each nostril daily (Patient not taking: Reported on 11/15/2019) 16 g 0    guaiFENesin (ROBITUSSIN) 100 MG/5ML oral liquid Take 10 mL (200 mg total) by mouth 3 (three) times a day as needed for cough (Patient not taking: Reported on 5/19/2020) 120 mL 0    ibuprofen (MOTRIN) 400 mg tablet Take 1 tablet (400 mg total) by mouth every 6 (six) hours as needed for moderate pain (Patient not taking: Reported on 11/15/2019) 20 tablet 0    loperamide (IMODIUM) 2 mg capsule Take 1 capsule (2 mg total) by mouth 4 (four) times a day as needed for diarrhea (Patient not taking: Reported on 5/19/2020) 90 capsule 1    loratadine (CLARITIN) 10 mg tablet Take 1 tablet (10 mg total) by mouth daily (Patient not taking: Reported on 11/15/2019) 20 tablet 0    menthol-cetylpyridinium (CEPACOL) 3 MG lozenge Take 1 lozenge (3 mg total) by mouth as needed for sore throat (Patient not taking: Reported on 11/15/2019) 30 lozenge 0    norgestimate-ethinyl estradiol (ORTHO-CYCLEN) 0 25-35 MG-MCG per tablet Take 1 tablet by mouth daily (Patient not taking: Reported on 1/4/2019 ) 28 tablet 3    norgestimate-ethinyl estradiol (ORTHO-CYCLEN) 0 25-35 MG-MCG per tablet Take 1 tablet by mouth daily (Patient not taking: Reported on 1/4/2019 ) 28 tablet 0    phenazopyridine (PYRIDIUM) 200 mg tablet Take 1 tablet (200 mg total) by mouth 3 (three) times a day (Patient not taking: Reported on 1/20/2021) 6 tablet 0    pseudoephedrine (SUDAFED) 30 mg tablet Take 2 tablets (60 mg total) by mouth every 6 (six) hours as needed for congestion (Patient not taking: Reported on 5/19/2020) 30 tablet 1    sodium chloride (OCEAN) 0 65 % nasal spray 1 spray into each nostril as needed for congestion (Patient not taking: Reported on 5/19/2020) 104 mL 0     No current facility-administered medications on file prior to visit  Review of Systems   Constitutional: Negative for chills and fever  HENT: Negative for ear pain and sore throat  Eyes: Negative for pain and visual disturbance  Respiratory: Negative for cough and shortness of breath  Cardiovascular: Negative for chest pain and palpitations  Gastrointestinal: Negative for abdominal pain and vomiting  Genitourinary: Negative for dysuria and hematuria  Musculoskeletal: Negative for arthralgias and back pain  Skin: Positive for wound  Negative for color change and rash  Neurological: Negative for dizziness, seizures, syncope and headaches  All other systems reviewed and are negative  Objective:    /72 (BP Location: Right arm, Patient Position: Sitting, Cuff Size: Large)   Pulse 69   Temp 98 °F (36 7 °C) (Temporal)   Resp 16   Ht 5' 7" (1 702 m)   Wt 101 kg (223 lb)   SpO2 99%   BMI 34 93 kg/m²     Physical Exam  Vitals signs and nursing note reviewed  Constitutional:       General: She is not in acute distress  Appearance: She is well-developed   She is not diaphoretic  HENT:      Head: Normocephalic and atraumatic  Right Ear: External ear normal       Left Ear: External ear normal    Eyes:      General:         Right eye: No discharge  Left eye: No discharge  Conjunctiva/sclera: Conjunctivae normal    Neck:      Musculoskeletal: Normal range of motion and neck supple  Cardiovascular:      Rate and Rhythm: Normal rate and regular rhythm  Pulses: Normal pulses  Heart sounds: Normal heart sounds  Pulmonary:      Effort: Pulmonary effort is normal  No respiratory distress  Breath sounds: Normal breath sounds  No wheezing  Abdominal:      General: Bowel sounds are normal  There is no distension  Palpations: Abdomen is soft  Tenderness: There is no abdominal tenderness  Musculoskeletal: Normal range of motion  General: No deformity  Lymphadenopathy:      Cervical: No cervical adenopathy  Skin:     General: Skin is warm and dry  Capillary Refill: Capillary refill takes less than 2 seconds  Findings: No rash  Neurological:      Mental Status: She is alert and oriented to person, place, and time     Psychiatric:         Behavior: Behavior normal          PÉREZ Zelaya  01/20/21  4:22 PM

## 2021-02-15 ENCOUNTER — OFFICE VISIT (OUTPATIENT)
Dept: FAMILY MEDICINE CLINIC | Facility: CLINIC | Age: 36
End: 2021-02-15

## 2021-02-15 VITALS
OXYGEN SATURATION: 98 % | BODY MASS INDEX: 35.55 KG/M2 | HEART RATE: 74 BPM | SYSTOLIC BLOOD PRESSURE: 110 MMHG | TEMPERATURE: 97 F | WEIGHT: 227 LBS | RESPIRATION RATE: 18 BRPM | DIASTOLIC BLOOD PRESSURE: 78 MMHG

## 2021-02-15 DIAGNOSIS — L73.2 HIDRADENITIS: ICD-10-CM

## 2021-02-15 DIAGNOSIS — K58.0 IRRITABLE BOWEL SYNDROME WITH DIARRHEA: ICD-10-CM

## 2021-02-15 DIAGNOSIS — F41.8 DEPRESSION WITH ANXIETY: ICD-10-CM

## 2021-02-15 DIAGNOSIS — R53.83 OTHER FATIGUE: ICD-10-CM

## 2021-02-15 DIAGNOSIS — M79.10 MYALGIA: ICD-10-CM

## 2021-02-15 DIAGNOSIS — D50.9 IRON DEFICIENCY ANEMIA, UNSPECIFIED IRON DEFICIENCY ANEMIA TYPE: Primary | ICD-10-CM

## 2021-02-15 PROBLEM — J06.9 VIRAL UPPER RESPIRATORY TRACT INFECTION: Status: RESOLVED | Noted: 2019-11-15 | Resolved: 2021-02-15

## 2021-02-15 PROCEDURE — 1036F TOBACCO NON-USER: CPT | Performed by: NURSE PRACTITIONER

## 2021-02-15 PROCEDURE — 99214 OFFICE O/P EST MOD 30 MIN: CPT | Performed by: NURSE PRACTITIONER

## 2021-02-15 RX ORDER — CHLORHEXIDINE GLUCONATE 4 G/100ML
1 SOLUTION TOPICAL DAILY PRN
Qty: 120 ML | Refills: 0 | Status: SHIPPED | OUTPATIENT
Start: 2021-02-15 | End: 2021-04-18

## 2021-02-15 RX ORDER — CEPHALEXIN 500 MG/1
500 CAPSULE ORAL EVERY 12 HOURS SCHEDULED
Qty: 14 CAPSULE | Refills: 0 | Status: SHIPPED | OUTPATIENT
Start: 2021-02-15 | End: 2021-02-22

## 2021-02-15 RX ORDER — DICYCLOMINE HYDROCHLORIDE 10 MG/1
10 CAPSULE ORAL
Qty: 120 CAPSULE | Refills: 0 | Status: SHIPPED | OUTPATIENT
Start: 2021-02-15 | End: 2021-04-18

## 2021-02-15 NOTE — PROGRESS NOTES
Assessment/Plan:    Irritable bowel syndrome with diarrhea  Discussed dietary modifications with patient   Recommend follow up with GI  Will trial dicyclomine     Other specified hypothyroidism  Managed by [facility]  Last TSH 1/21/21 WNL - current dose levothyroxine 125 mcg QD     Depression with anxiety  Managed by psychiatry     Iron deficiency anemia  Patient unable to tolerate daily iron supplementation, has been taking inconsistently   Repeat iron panel   Recommend she take ferrous sulfate and vitamin c every other day for better tolerance     Hidradenitis  Recurring cysts to groin folds and axillae   Will send keflex at this time and hibiclens   Referral to dermatology for long term management     Pb was seen today for fatigue  Diagnoses and all orders for this visit:    Iron deficiency anemia, unspecified iron deficiency anemia type    Hidradenitis  -     Ambulatory referral to Dermatology; Future  -     chlorhexidine (HIBICLENS) 4 % external liquid; Apply 1 application topically daily as needed for wound care  -     cephalexin (KEFLEX) 500 mg capsule; Take 1 capsule (500 mg total) by mouth every 12 (twelve) hours for 7 days    Irritable bowel syndrome with diarrhea  -     dicyclomine (BENTYL) 10 mg capsule; Take 1 capsule (10 mg total) by mouth 4 (four) times a day (before meals and at bedtime)  -     Ambulatory referral to Gastroenterology; Future    Other fatigue  -     CBC and differential; Future  -     Comprehensive metabolic panel; Future  -     Hemoglobin A1C; Future  -     Lipid panel; Future  -     Vitamin D 25 hydroxy; Future  -     C-reactive protein; Future  -     Celiac Disease Comprehensive Panel; Future    Depression with anxiety    Myalgia  -     C-reactive protein; Future  -     Celiac Disease Comprehensive Panel; Future      Return in about 4 weeks (around 3/15/2021) for fatigue, body pain         Patient Instructions     Heart Healthy Diet   WHAT YOU NEED TO KNOW:   A heart healthy diet is an eating plan low in unhealthy fats and sodium (salt)  The plan is high in healthy fats and fiber  A heart healthy diet helps improve your cholesterol levels and lowers your risk for heart disease and stroke  A dietitian will teach you how to read and understand food labels  DISCHARGE INSTRUCTIONS:   Heart healthy diet guidelines to follow:   · Choose foods that contain healthy fats  ? Unsaturated fats  include monounsaturated and polyunsaturated fats  Unsaturated fat is found in foods such as soybean, canola, olive, corn, and safflower oils  It is also found in soft tub margarine that is made with liquid vegetable oil  ? Omega-3 fat  is found in certain fish, such as salmon, tuna, and trout, and in walnuts and flaxseed  Eat fish high in omega-3 fats at least 2 times a week  · Get 20 to 30 grams of fiber each day  Fruits, vegetables, whole-grain foods, and legumes (cooked beans) are good sources of fiber  · Limit or do not have unhealthy fats  ? Cholesterol  is found in animal foods, such as eggs and lobster, and in dairy products made from whole milk  Limit cholesterol to less than 200 mg each day  ? Saturated fat  is found in meats, such as ochoa and hamburger  It is also found in chicken or turkey skin, whole milk, and butter  Limit saturated fat to less than 7% of your total daily calories  ? Trans fat  is found in packaged foods, such as potato chips and cookies  It is also in hard margarine, some fried foods, and shortening  Do not eat foods that contain trans fats  · Limit sodium as directed  You may be told to limit sodium to 2,000 to 2,300 mg each day  Choose low-sodium or no-salt-added foods  Add little or no salt to food you prepare  Use herbs and spices in place of salt         Include the following in your heart healthy plan:  Ask your dietitian or healthcare provider how many servings to have from each of the following food groups:  · Grains: ? Whole-wheat breads, cereals, and pastas, and brown rice    ? Low-fat, low-sodium crackers and chips    · Vegetables:      ? Broccoli, green beans, green peas, and spinach    ? Collards, kale, and lima beans    ? Carrots, sweet potatoes, tomatoes, and peppers    ? Canned vegetables with no salt added    · Fruits:      ? Bananas, peaches, pears, and pineapple    ? Grapes, raisins, and dates    ? Oranges, tangerines, grapefruit, orange juice, and grapefruit juice    ? Apricots, mangoes, melons, and papaya    ? Raspberries and strawberries    ? Canned fruit with no added sugar    · Low-fat dairy:      ? Nonfat (skim) milk, 1% milk, and low-fat almond, cashew, or soy milks fortified with calcium    ? Low-fat cheese, regular or frozen yogurt, and cottage cheese    · Meats and proteins:      ? Lean cuts of beef and pork (loin, leg, round), skinless chicken and turkey    ? Legumes, soy products, egg whites, or nuts    Limit or do not include the following in your heart healthy plan:   · Unhealthy fats and oils:      ? Whole or 2% milk, cream cheese, sour cream, or cheese    ? High-fat cuts of beef (T-bone steaks, ribs), chicken or turkey with skin, and organ meats such as liver    ? Butter, stick margarine, shortening, and cooking oils such as coconut or palm oil    · Foods and liquids high in sodium:      ? Packaged foods, such as frozen dinners, cookies, macaroni and cheese, and cereals with more than 300 mg of sodium per serving    ? Vegetables with added sodium, such as instant potatoes, vegetables with added sauces, or regular canned vegetables    ? Cured or smoked meats, such as hot dogs, ochoa, and sausage    ? High-sodium ketchup, barbecue sauce, salad dressing, pickles, olives, soy sauce, or miso    · Foods and liquids high in sugar:      ? Candy, cake, cookies, pies, or doughnuts    ? Soft drinks (soda), sports drinks, or sweetened tea    ?  Canned or dry mixes for cakes, soups, sauces, or gravies    Other healthy heart guidelines:   · Do not smoke  Nicotine and other chemicals in cigarettes and cigars can cause lung and heart damage  Ask your healthcare provider for information if you currently smoke and need help to quit  E-cigarettes or smokeless tobacco still contain nicotine  Talk to your healthcare provider before you use these products  · Limit or do not drink alcohol as directed  Alcohol can damage your heart and raise your blood pressure  Your healthcare provider may give you specific daily and weekly limits  The general recommended limit is 1 drink a day for women 21 or older and for men 72 or older  Do not have more than 3 drinks in a day or 7 in a week  The recommended limit is 2 drinks a day for men 24to 59years of age  Do not have more than 4 drinks in a day or 14 in a week  A drink of alcohol is 12 ounces of beer, 5 ounces of wine, or 1½ ounces of liquor  · Exercise regularly  Exercise can help you maintain a healthy weight and improve your blood pressure and cholesterol levels  Regular exercise can also decrease your risk for heart problems  Ask your healthcare provider about the best exercise plan for you  Do not start an exercise program without asking your healthcare provider  Follow up with your doctor or cardiologist as directed:  Write down your questions so you remember to ask them during your visits  © Copyright [employer] [place] Information is for End User's use only and may not be sold, redistributed or otherwise used for commercial purposes  All illustrations and images included in CareNotes® are the copyrighted property of A D A M , Inc  or [address] [place] [address]  The above information is an  only  It is not intended as medical advice for individual conditions or treatments  Talk to your doctor, nurse or pharmacist before following any medical regimen to see if it is safe and effective for you                Subjective:     Monique Varela is a 38 y o  female who  has a past medical history of Anxiety, Depression, Disease of thyroid gland, Gestational diabetes, Gestational hypertension, Hashimoto's thyroiditis, HPV (human papilloma virus) infection, and Retained placenta  who presented to the office today for follow up  Patient reports that she has a hx of IBS-D  She states that w/in 20 minutes of eating she has to have liquid bowel movement  States that not long ago she was out for a walk with her son and soiled herself  Reports that she has ongoing chronic fatigue and body aches that started months ago and has not improved  She does have hx of Hashimoto's but TSH was WNL several weeks ago  She also reports that the boil she had to the left axillae area has improved but is still present  She also notes that she has several boils to the groin area       The following portions of the patient's history were reviewed and updated as appropriate: allergies, current medications, past family history, past medical history, past social history, past surgical history and problem list     Current Outpatient Medications on File Prior to Visit   Medication Sig Dispense Refill    sertraline (ZOLOFT) 50 mg tablet TAKE HALF A TABLET BY MOUTH DAILY FOR 2 WEEKS THEN 1 TABLET EVERY DAY  1    SYNTHROID 125 MCG tablet Take 125 mcg by mouth daily  3    acetaminophen (TYLENOL) 500 mg tablet Take 1 tablet (500 mg total) by mouth every 6 (six) hours as needed for mild pain (Patient not taking: Reported on 11/15/2019) 30 tablet 0    ascorbic acid (VITAMIN C) 250 mg tablet Take 2 tablets (500 mg total) by mouth 2 (two) times a day 60 tablet 1    escitalopram (LEXAPRO) 10 mg tablet Take 10 mg by mouth daily      ferrous sulfate 324 (65 Fe) mg Take 1 tablet (324 mg total) by mouth 2 (two) times a day before meals (Patient not taking: Reported on 2/15/2021) 60 tablet 1    fluticasone (FLONASE) 50 mcg/act nasal spray 1 spray into each nostril daily (Patient not taking: Reported on 11/15/2019) 16 g 0    guaiFENesin (ROBITUSSIN) 100 MG/5ML oral liquid Take 10 mL (200 mg total) by mouth 3 (three) times a day as needed for cough (Patient not taking: Reported on 5/19/2020) 120 mL 0    ibuprofen (MOTRIN) 400 mg tablet Take 1 tablet (400 mg total) by mouth every 6 (six) hours as needed for moderate pain (Patient not taking: Reported on 11/15/2019) 20 tablet 0    loperamide (IMODIUM) 2 mg capsule Take 1 capsule (2 mg total) by mouth 4 (four) times a day as needed for diarrhea (Patient not taking: Reported on 5/19/2020) 90 capsule 1    loratadine (CLARITIN) 10 mg tablet Take 1 tablet (10 mg total) by mouth daily (Patient not taking: Reported on 11/15/2019) 20 tablet 0    LORazepam (ATIVAN) 0 5 mg tablet Take 1 tablet by mouth 3 (three) times a day as needed      menthol-cetylpyridinium (CEPACOL) 3 MG lozenge Take 1 lozenge (3 mg total) by mouth as needed for sore throat (Patient not taking: Reported on 11/15/2019) 30 lozenge 0    norgestimate-ethinyl estradiol (ORTHO-CYCLEN) 0 25-35 MG-MCG per tablet Take 1 tablet by mouth daily (Patient not taking: Reported on 1/4/2019 ) 28 tablet 3    norgestimate-ethinyl estradiol (ORTHO-CYCLEN) 0 25-35 MG-MCG per tablet Take 1 tablet by mouth daily (Patient not taking: Reported on 1/4/2019 ) 28 tablet 0    phenazopyridine (PYRIDIUM) 200 mg tablet Take 1 tablet (200 mg total) by mouth 3 (three) times a day (Patient not taking: Reported on 1/20/2021) 6 tablet 0    pseudoephedrine (SUDAFED) 30 mg tablet Take 2 tablets (60 mg total) by mouth every 6 (six) hours as needed for congestion (Patient not taking: Reported on 5/19/2020) 30 tablet 1    scopolamine (TRANSDERM-SCOP) 1 5 mg/3 days TD 72 hr patch Place 1 patch on the skin every third day (Patient not taking: Reported on 2/15/2021) 10 patch 3    sodium chloride (OCEAN) 0 65 % nasal spray 1 spray into each nostril as needed for congestion (Patient not taking: Reported on 5/19/2020) 104 mL 0     No current facility-administered medications on file prior to visit  Review of Systems   Constitutional: Positive for activity change and fatigue  Negative for chills and fever  HENT: Negative for ear pain and sore throat  Eyes: Negative for pain and visual disturbance  Respiratory: Negative for cough and shortness of breath  Cardiovascular: Negative for chest pain and palpitations  Gastrointestinal: Positive for diarrhea  Negative for abdominal pain and vomiting  Genitourinary: Negative for dysuria and hematuria  Musculoskeletal: Positive for myalgias  Negative for arthralgias and back pain  Skin: Negative for color change and rash  Neurological: Negative for seizures and syncope  All other systems reviewed and are negative  BMI Counseling: Body mass index is 35 55 kg/m²  The BMI is above normal  Nutrition recommendations include decreasing portion sizes, encouraging healthy choices of fruits and vegetables, decreasing fast food intake, consuming healthier snacks and limiting drinks that contain sugar  Objective:    /78 (BP Location: Left arm, Patient Position: Sitting, Cuff Size: Adult)   Pulse 74   Temp (!) 97 °F (36 1 °C) (Temporal)   Resp 18   Wt 103 kg (227 lb)   LMP 02/11/2021   SpO2 98%   BMI 35 55 kg/m²     Physical Exam  Vitals signs and nursing note reviewed  Constitutional:       General: She is not in acute distress  Appearance: She is well-developed  She is not diaphoretic  HENT:      Head: Normocephalic and atraumatic  Eyes:      General:         Right eye: No discharge  Left eye: No discharge  Conjunctiva/sclera: Conjunctivae normal       Pupils: Pupils are equal, round, and reactive to light  Neck:      Musculoskeletal: Normal range of motion and neck supple  Cardiovascular:      Rate and Rhythm: Normal rate and regular rhythm  Pulses: Normal pulses  Heart sounds: Normal heart sounds     Pulmonary:      Effort: Pulmonary effort is normal  No respiratory distress  Breath sounds: Normal breath sounds  No wheezing  Abdominal:      General: Bowel sounds are normal  There is no distension  Palpations: Abdomen is soft  Tenderness: There is no abdominal tenderness  Musculoskeletal: Normal range of motion  General: No deformity  Lymphadenopathy:      Cervical: No cervical adenopathy  Skin:     General: Skin is warm and dry  Capillary Refill: Capillary refill takes less than 2 seconds  Findings: No rash  Neurological:      General: No focal deficit present  Mental Status: She is alert and oriented to person, place, and time        Deep Tendon Reflexes: Reflexes normal    Psychiatric:         Mood and Affect: Mood normal          Behavior: Behavior normal          PÉREZ Bejarano  02/15/21  2:23 PM

## 2021-02-15 NOTE — ASSESSMENT & PLAN NOTE
Patient unable to tolerate daily iron supplementation, has been taking inconsistently   Repeat iron panel   Recommend she take ferrous sulfate and vitamin c every other day for better tolerance

## 2021-02-15 NOTE — PATIENT INSTRUCTIONS
Heart Healthy Diet   WHAT YOU NEED TO KNOW:   A heart healthy diet is an eating plan low in unhealthy fats and sodium (salt)  The plan is high in healthy fats and fiber  A heart healthy diet helps improve your cholesterol levels and lowers your risk for heart disease and stroke  A dietitian will teach you how to read and understand food labels  DISCHARGE INSTRUCTIONS:   Heart healthy diet guidelines to follow:   · Choose foods that contain healthy fats  ? Unsaturated fats  include monounsaturated and polyunsaturated fats  Unsaturated fat is found in foods such as soybean, canola, olive, corn, and safflower oils  It is also found in soft tub margarine that is made with liquid vegetable oil  ? Omega-3 fat  is found in certain fish, such as salmon, tuna, and trout, and in walnuts and flaxseed  Eat fish high in omega-3 fats at least 2 times a week  · Get 20 to 30 grams of fiber each day  Fruits, vegetables, whole-grain foods, and legumes (cooked beans) are good sources of fiber  · Limit or do not have unhealthy fats  ? Cholesterol  is found in animal foods, such as eggs and lobster, and in dairy products made from whole milk  Limit cholesterol to less than 200 mg each day  ? Saturated fat  is found in meats, such as ochoa and hamburger  It is also found in chicken or turkey skin, whole milk, and butter  Limit saturated fat to less than 7% of your total daily calories  ? Trans fat  is found in packaged foods, such as potato chips and cookies  It is also in hard margarine, some fried foods, and shortening  Do not eat foods that contain trans fats  · Limit sodium as directed  You may be told to limit sodium to 2,000 to 2,300 mg each day  Choose low-sodium or no-salt-added foods  Add little or no salt to food you prepare  Use herbs and spices in place of salt         Include the following in your heart healthy plan:  Ask your dietitian or healthcare provider how many servings to have from each of the following food groups:  · Grains:      ? Whole-wheat breads, cereals, and pastas, and brown rice    ? Low-fat, low-sodium crackers and chips    · Vegetables:      ? Broccoli, green beans, green peas, and spinach    ? Collards, kale, and lima beans    ? Carrots, sweet potatoes, tomatoes, and peppers    ? Canned vegetables with no salt added    · Fruits:      ? Bananas, peaches, pears, and pineapple    ? Grapes, raisins, and dates    ? Oranges, tangerines, grapefruit, orange juice, and grapefruit juice    ? Apricots, mangoes, melons, and papaya    ? Raspberries and strawberries    ? Canned fruit with no added sugar    · Low-fat dairy:      ? Nonfat (skim) milk, 1% milk, and low-fat almond, cashew, or soy milks fortified with calcium    ? Low-fat cheese, regular or frozen yogurt, and cottage cheese    · Meats and proteins:      ? Lean cuts of beef and pork (loin, leg, round), skinless chicken and turkey    ? Legumes, soy products, egg whites, or nuts    Limit or do not include the following in your heart healthy plan:   · Unhealthy fats and oils:      ? Whole or 2% milk, cream cheese, sour cream, or cheese    ? High-fat cuts of beef (T-bone steaks, ribs), chicken or turkey with skin, and organ meats such as liver    ? Butter, stick margarine, shortening, and cooking oils such as coconut or palm oil    · Foods and liquids high in sodium:      ? Packaged foods, such as frozen dinners, cookies, macaroni and cheese, and cereals with more than 300 mg of sodium per serving    ? Vegetables with added sodium, such as instant potatoes, vegetables with added sauces, or regular canned vegetables    ? Cured or smoked meats, such as hot dogs, ochoa, and sausage    ? High-sodium ketchup, barbecue sauce, salad dressing, pickles, olives, soy sauce, or miso    · Foods and liquids high in sugar:      ? Candy, cake, cookies, pies, or doughnuts    ? Soft drinks (soda), sports drinks, or sweetened tea    ?  Canned or dry mixes for cakes, soups, sauces, or gravies    Other healthy heart guidelines:   · Do not smoke  Nicotine and other chemicals in cigarettes and cigars can cause lung and heart damage  Ask your healthcare provider for information if you currently smoke and need help to quit  E-cigarettes or smokeless tobacco still contain nicotine  Talk to your healthcare provider before you use these products  · Limit or do not drink alcohol as directed  Alcohol can damage your heart and raise your blood pressure  Your healthcare provider may give you specific daily and weekly limits  The general recommended limit is 1 drink a day for women 21 or older and for men 72 or older  Do not have more than 3 drinks in a day or 7 in a week  The recommended limit is 2 drinks a day for men 24to 59years of age  Do not have more than 4 drinks in a day or 14 in a week  A drink of alcohol is 12 ounces of beer, 5 ounces of wine, or 1½ ounces of liquor  · Exercise regularly  Exercise can help you maintain a healthy weight and improve your blood pressure and cholesterol levels  Regular exercise can also decrease your risk for heart problems  Ask your healthcare provider about the best exercise plan for you  Do not start an exercise program without asking your healthcare provider  Follow up with your doctor or cardiologist as directed:  Write down your questions so you remember to ask them during your visits  © Copyright [address] Information is for End User's use only and may not be sold, redistributed or otherwise used for commercial purposes  All illustrations and images included in CareNotes® are the copyrighted property of A D A M , Inc  or [address] [place] [address]  The above information is an  only  It is not intended as medical advice for individual conditions or treatments  Talk to your doctor, nurse or pharmacist before following any medical regimen to see if it is safe and effective for you

## 2021-02-15 NOTE — ASSESSMENT & PLAN NOTE
Recurring cysts to groin folds and axillae   Will send keflex at this time and hibiclens   Referral to dermatology for long term management

## 2021-02-16 ENCOUNTER — LAB (OUTPATIENT)
Dept: LAB | Facility: HOSPITAL | Age: 36
End: 2021-02-16
Payer: COMMERCIAL

## 2021-02-16 DIAGNOSIS — D64.9 ANEMIA, UNSPECIFIED TYPE: Primary | ICD-10-CM

## 2021-02-16 DIAGNOSIS — R53.83 OTHER FATIGUE: ICD-10-CM

## 2021-02-16 DIAGNOSIS — M79.10 MYALGIA: ICD-10-CM

## 2021-02-16 LAB
25(OH)D3 SERPL-MCNC: 20.8 NG/ML (ref 30–100)
ALBUMIN SERPL BCP-MCNC: 4.3 G/DL (ref 3–5.2)
ALP SERPL-CCNC: 103 U/L (ref 43–122)
ALT SERPL W P-5'-P-CCNC: 17 U/L (ref 9–52)
ANION GAP SERPL CALCULATED.3IONS-SCNC: 9 MMOL/L (ref 5–14)
AST SERPL W P-5'-P-CCNC: 29 U/L (ref 14–36)
BASOPHILS # BLD AUTO: 0.1 THOUSANDS/ΜL (ref 0–0.1)
BASOPHILS NFR BLD AUTO: 1 % (ref 0–1)
BILIRUB SERPL-MCNC: 0.7 MG/DL
BUN SERPL-MCNC: 8 MG/DL (ref 5–25)
CALCIUM SERPL-MCNC: 9.7 MG/DL (ref 8.4–10.2)
CHLORIDE SERPL-SCNC: 102 MMOL/L (ref 97–108)
CHOLEST SERPL-MCNC: 180 MG/DL
CK SERPL-CCNC: 37 U/L (ref 30–135)
CO2 SERPL-SCNC: 27 MMOL/L (ref 22–30)
CREAT SERPL-MCNC: 0.55 MG/DL (ref 0.6–1.2)
CRP SERPL QL: 16.9 MG/L
EOSINOPHIL # BLD AUTO: 0.1 THOUSAND/ΜL (ref 0–0.4)
EOSINOPHIL NFR BLD AUTO: 2 % (ref 0–6)
ERYTHROCYTE [DISTWIDTH] IN BLOOD BY AUTOMATED COUNT: 19.2 %
ERYTHROCYTE [SEDIMENTATION RATE] IN BLOOD: 39 MM/HOUR (ref 0–19)
EST. AVERAGE GLUCOSE BLD GHB EST-MCNC: 114 MG/DL
GFR SERPL CREATININE-BSD FRML MDRD: 120 ML/MIN/1.73SQ M
GLUCOSE P FAST SERPL-MCNC: 123 MG/DL (ref 70–99)
HBA1C MFR BLD: 5.6 %
HCT VFR BLD AUTO: 33.1 % (ref 36–46)
HDLC SERPL-MCNC: 33 MG/DL
HGB BLD-MCNC: 10.1 G/DL (ref 12–16)
LDLC SERPL CALC-MCNC: 127 MG/DL
LYMPHOCYTES # BLD AUTO: 1.7 THOUSANDS/ΜL (ref 0.5–4)
LYMPHOCYTES NFR BLD AUTO: 27 % (ref 25–45)
MCH RBC QN AUTO: 21.3 PG (ref 26–34)
MCHC RBC AUTO-ENTMCNC: 30.4 G/DL (ref 31–36)
MCV RBC AUTO: 70 FL (ref 80–100)
MONOCYTES # BLD AUTO: 0.5 THOUSAND/ΜL (ref 0.2–0.9)
MONOCYTES NFR BLD AUTO: 9 % (ref 1–10)
NEUTROPHILS # BLD AUTO: 3.9 THOUSANDS/ΜL (ref 1.8–7.8)
NEUTS SEG NFR BLD AUTO: 62 % (ref 45–65)
NONHDLC SERPL-MCNC: 147 MG/DL
PLATELET # BLD AUTO: 234 THOUSANDS/UL (ref 150–450)
PLATELET BLD QL SMEAR: ADEQUATE
PMV BLD AUTO: 9.8 FL (ref 8.9–12.7)
POTASSIUM SERPL-SCNC: 4.3 MMOL/L (ref 3.6–5)
PROT SERPL-MCNC: 7.6 G/DL (ref 5.9–8.4)
RBC # BLD AUTO: 4.73 MILLION/UL (ref 4–5.2)
RBC MORPH BLD: NORMAL
SODIUM SERPL-SCNC: 138 MMOL/L (ref 137–147)
TRIGL SERPL-MCNC: 102 MG/DL
WBC # BLD AUTO: 6.3 THOUSAND/UL (ref 4.5–11)

## 2021-02-16 PROCEDURE — 82550 ASSAY OF CK (CPK): CPT

## 2021-02-16 PROCEDURE — 86140 C-REACTIVE PROTEIN: CPT

## 2021-02-16 PROCEDURE — 80053 COMPREHEN METABOLIC PANEL: CPT

## 2021-02-16 PROCEDURE — 83516 IMMUNOASSAY NONANTIBODY: CPT

## 2021-02-16 PROCEDURE — 85652 RBC SED RATE AUTOMATED: CPT

## 2021-02-16 PROCEDURE — 86255 FLUORESCENT ANTIBODY SCREEN: CPT

## 2021-02-16 PROCEDURE — 85025 COMPLETE CBC W/AUTO DIFF WBC: CPT

## 2021-02-16 PROCEDURE — 83036 HEMOGLOBIN GLYCOSYLATED A1C: CPT

## 2021-02-16 PROCEDURE — 36415 COLL VENOUS BLD VENIPUNCTURE: CPT

## 2021-02-16 PROCEDURE — 80061 LIPID PANEL: CPT

## 2021-02-16 PROCEDURE — 82306 VITAMIN D 25 HYDROXY: CPT

## 2021-02-16 PROCEDURE — 82784 ASSAY IGA/IGD/IGG/IGM EACH: CPT

## 2021-02-17 LAB
ENDOMYSIUM IGA SER QL: NEGATIVE
GLIADIN PEPTIDE IGA SER-ACNC: 2 UNITS (ref 0–19)
GLIADIN PEPTIDE IGG SER-ACNC: 2 UNITS (ref 0–19)
IGA SERPL-MCNC: 65 MG/DL (ref 87–352)
TTG IGA SER-ACNC: <2 U/ML (ref 0–3)
TTG IGG SER-ACNC: <2 U/ML (ref 0–5)

## 2021-02-18 DIAGNOSIS — R79.89 LOW SERUM VITAMIN D: Primary | ICD-10-CM

## 2021-02-18 DIAGNOSIS — R79.82 ELEVATED C-REACTIVE PROTEIN (CRP): ICD-10-CM

## 2021-02-18 DIAGNOSIS — D50.9 IRON DEFICIENCY ANEMIA, UNSPECIFIED IRON DEFICIENCY ANEMIA TYPE: ICD-10-CM

## 2021-02-18 RX ORDER — ASCORBIC ACID 500 MG
500 TABLET ORAL DAILY
Qty: 90 TABLET | Refills: 1 | Status: SHIPPED | OUTPATIENT
Start: 2021-02-18

## 2021-02-18 RX ORDER — ERGOCALCIFEROL 1.25 MG/1
50000 CAPSULE ORAL WEEKLY
Qty: 24 CAPSULE | Refills: 0 | Status: SHIPPED | OUTPATIENT
Start: 2021-02-18

## 2021-02-18 RX ORDER — FERROUS SULFATE TAB EC 324 MG (65 MG FE EQUIVALENT) 324 (65 FE) MG
324 TABLET DELAYED RESPONSE ORAL
Qty: 90 TABLET | Refills: 1 | Status: SHIPPED | OUTPATIENT
Start: 2021-02-18 | End: 2021-11-17 | Stop reason: ALTCHOICE

## 2021-02-19 ENCOUNTER — TELEPHONE (OUTPATIENT)
Dept: FAMILY MEDICINE CLINIC | Facility: CLINIC | Age: 36
End: 2021-02-19

## 2021-02-19 NOTE — TELEPHONE ENCOUNTER
Patient called regarding her results of c-reactive protein and sedimentation rate that wasn't given to her on the phone call as per patient    Please call patient with these results

## 2021-02-19 NOTE — TELEPHONE ENCOUNTER
This is why staff messages should not be used  They do not go to the chart  You should always reply using the result note button   I will inform her

## 2021-02-25 ENCOUNTER — TELEPHONE (OUTPATIENT)
Dept: FAMILY MEDICINE CLINIC | Facility: CLINIC | Age: 36
End: 2021-02-25

## 2021-03-03 ENCOUNTER — HOSPITAL ENCOUNTER (EMERGENCY)
Facility: HOSPITAL | Age: 36
Discharge: HOME | End: 2021-03-03
Attending: EMERGENCY MEDICINE | Admitting: EMERGENCY MEDICINE
Payer: COMMERCIAL

## 2021-03-03 ENCOUNTER — APPOINTMENT (EMERGENCY)
Dept: RADIOLOGY | Facility: HOSPITAL | Age: 36
End: 2021-03-03
Payer: COMMERCIAL

## 2021-03-03 VITALS
DIASTOLIC BLOOD PRESSURE: 65 MMHG | OXYGEN SATURATION: 96 % | HEART RATE: 87 BPM | SYSTOLIC BLOOD PRESSURE: 148 MMHG | RESPIRATION RATE: 18 BRPM

## 2021-03-03 DIAGNOSIS — M19.131 SCAPHOID NON-UNION ADVANCED COLLAPSE, RIGHT: Primary | ICD-10-CM

## 2021-03-03 DIAGNOSIS — S62.001S SCAPHOID NON-UNION ADVANCED COLLAPSE, RIGHT: Primary | ICD-10-CM

## 2021-03-03 DIAGNOSIS — M87.039 AVASCULAR NECROSIS OF SCAPHOID (HCC): ICD-10-CM

## 2021-03-03 PROCEDURE — 99284 EMERGENCY DEPT VISIT MOD MDM: CPT | Performed by: PHYSICIAN ASSISTANT

## 2021-03-03 PROCEDURE — 73110 X-RAY EXAM OF WRIST: CPT

## 2021-03-03 PROCEDURE — 99283 EMERGENCY DEPT VISIT LOW MDM: CPT

## 2021-03-03 PROCEDURE — 29125 APPL SHORT ARM SPLINT STATIC: CPT | Performed by: PHYSICIAN ASSISTANT

## 2021-03-03 NOTE — Clinical Note
Moira Brain was seen and treated in our emergency department on 3/3/2021  No use of right hand until follow-up with Orthopedics    Diagnosis:     Consuelo Riley  may return to work on return date  She may return on this date: 03/04/2021         If you have any questions or concerns, please don't hesitate to call        Nadeem Gay PA-C    ______________________________           _______________          _______________  Hospital Representative                              Date                                Time

## 2021-03-03 NOTE — DISCHARGE INSTRUCTIONS
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

## 2021-03-03 NOTE — ED PROVIDER NOTES
History  Chief Complaint   Patient presents with    Wrist Pain     Pt reports r wrist pain  Pt reports she exacerbated a previous injury 2 days ago and wants an xray      Patient is a 39 y/o female, left-hand dominant presents emergency department for evaluation wrist pain  Patient states she had a previous injury to right wrist, necessitating her to be in a cast a couple years ago  Patient states 2 days ago she was lifting a child when she felt a pop and began with pain  Patient states pain worse with ulnar and radial deviation of right wrist   Patient localizes pain to radial head  Patient does work as a CNA, lifting patients often and wanted to get an x-ray today to make sure nothing is broken  Patient has not taken any medication or use ice for pain relief  Patient denies fever, wrist swelling/redness/warmth, numbness/tingling, focal weakness  Prior to Admission Medications   Prescriptions Last Dose Informant Patient Reported? Taking?    LORazepam (ATIVAN) 0 5 mg tablet   Yes No   Sig: Take 1 tablet by mouth 3 (three) times a day as needed   SYNTHROID 125 MCG tablet   Yes No   Sig: Take 125 mcg by mouth daily   acetaminophen (TYLENOL) 500 mg tablet   No No   Sig: Take 1 tablet (500 mg total) by mouth every 6 (six) hours as needed for mild pain   Patient not taking: Reported on 11/15/2019   ascorbic acid (VITAMIN C) 500 MG tablet   No No   Sig: Take 1 tablet (500 mg total) by mouth daily   chlorhexidine (HIBICLENS) 4 % external liquid   No No   Sig: Apply 1 application topically daily as needed for wound care   dicyclomine (BENTYL) 10 mg capsule   No No   Sig: Take 1 capsule (10 mg total) by mouth 4 (four) times a day (before meals and at bedtime)   ergocalciferol (VITAMIN D2) 50,000 units   No No   Sig: Take 1 capsule (50,000 Units total) by mouth once a week   escitalopram (LEXAPRO) 10 mg tablet   Yes No   Sig: Take 10 mg by mouth daily   ferrous sulfate 324 (65 Fe) mg   No No   Sig: Take 1 tablet (324 mg total) by mouth daily before breakfast   fluticasone (FLONASE) 50 mcg/act nasal spray   No No   Si spray into each nostril daily   Patient not taking: Reported on 11/15/2019   guaiFENesin (ROBITUSSIN) 100 MG/5ML oral liquid   No No   Sig: Take 10 mL (200 mg total) by mouth 3 (three) times a day as needed for cough   Patient not taking: Reported on 2020   ibuprofen (MOTRIN) 400 mg tablet   No No   Sig: Take 1 tablet (400 mg total) by mouth every 6 (six) hours as needed for moderate pain   Patient not taking: Reported on 11/15/2019   loperamide (IMODIUM) 2 mg capsule   No No   Sig: Take 1 capsule (2 mg total) by mouth 4 (four) times a day as needed for diarrhea   Patient not taking: Reported on 2020   loratadine (CLARITIN) 10 mg tablet   No No   Sig: Take 1 tablet (10 mg total) by mouth daily   Patient not taking: Reported on 11/15/2019   menthol-cetylpyridinium (CEPACOL) 3 MG lozenge   No No   Sig: Take 1 lozenge (3 mg total) by mouth as needed for sore throat   Patient not taking: Reported on 11/15/2019   norgestimate-ethinyl estradiol (ORTHO-CYCLEN) 0 25-35 MG-MCG per tablet   No No   Sig: Take 1 tablet by mouth daily   Patient not taking: Reported on 2019    norgestimate-ethinyl estradiol (ORTHO-CYCLEN) 0 25-35 MG-MCG per tablet   No No   Sig: Take 1 tablet by mouth daily   Patient not taking: Reported on 2019    phenazopyridine (PYRIDIUM) 200 mg tablet   No No   Sig: Take 1 tablet (200 mg total) by mouth 3 (three) times a day   Patient not taking: Reported on 2021   pseudoephedrine (SUDAFED) 30 mg tablet   No No   Sig: Take 2 tablets (60 mg total) by mouth every 6 (six) hours as needed for congestion   Patient not taking: Reported on 2020   scopolamine (TRANSDERM-SCOP) 1 5 mg/3 days TD 72 hr patch   No No   Sig: Place 1 patch on the skin every third day   Patient not taking: Reported on 2/15/2021   sertraline (ZOLOFT) 50 mg tablet   Yes No   Sig: TAKE HALF A TABLET BY MOUTH DAILY FOR 2 WEEKS THEN 1 TABLET EVERY DAY   sodium chloride (OCEAN) 0 65 % nasal spray   No No   Si spray into each nostril as needed for congestion   Patient not taking: Reported on 2020      Facility-Administered Medications: None       Past Medical History:   Diagnosis Date    Anxiety     Depression     Disease of thyroid gland     Gestational diabetes     Gestational hypertension     previous pregnancy    Hashimoto's thyroiditis     HPV (human papilloma virus) infection     Retained placenta        Past Surgical History:   Procedure Laterality Date    CHOLECYSTECTOMY      DILATION AND CURETTAGE OF UTERUS      HERNIA REPAIR      UMBILICAL HERNIA REPAIR         Family History   Problem Relation Age of Onset    Anuerysm Mother     Thyroid disease Mother     Lung cancer Maternal Grandmother      I have reviewed and agree with the history as documented  E-Cigarette/Vaping    E-Cigarette Use Never User      E-Cigarette/Vaping Substances     Social History     Tobacco Use    Smoking status: Never Smoker    Smokeless tobacco: Never Used   Substance Use Topics    Alcohol use: No    Drug use: No       Review of Systems   Musculoskeletal:        Wrist pain   Skin: Negative for wound  All other systems reviewed and are negative  Physical Exam  Physical Exam  Constitutional:       Appearance: Normal appearance  HENT:      Head: Normocephalic and atraumatic  Right Ear: External ear normal       Left Ear: External ear normal       Nose: Nose normal       Mouth/Throat:      Lips: Pink  Mouth: Mucous membranes are moist    Eyes:      Extraocular Movements: Extraocular movements intact  Conjunctiva/sclera: Conjunctivae normal    Neck:      Musculoskeletal: Normal range of motion and neck supple  Pulmonary:      Effort: No tachypnea or respiratory distress     Musculoskeletal:      Right wrist: She exhibits decreased range of motion (pain with ulnar and radial deviation ) and tenderness (radial aspect)  She exhibits no swelling, no effusion, no crepitus and no deformity  Comments: Neurovascularly intact distally  5/5 strength bilateral upper extremities  Radial pulse 2 +  Cap refill <2 seconds  Sensation intact  Skin:     General: Skin is warm  Capillary Refill: Capillary refill takes less than 2 seconds  Neurological:      Mental Status: She is alert and oriented to person, place, and time  GCS: GCS eye subscore is 4  GCS verbal subscore is 5  GCS motor subscore is 6  Psychiatric:         Mood and Affect: Mood and affect normal          Speech: Speech normal          Vital Signs  ED Triage Vitals [03/03/21 1232]   Temp Pulse Respirations Blood Pressure SpO2   -- 87 18 148/65 96 %      Temp src Heart Rate Source Patient Position - Orthostatic VS BP Location FiO2 (%)   -- Monitor -- Right arm --      Pain Score       --           Vitals:    03/03/21 1232   BP: 148/65   Pulse: 87         Visual Acuity      ED Medications  Medications - No data to display    Diagnostic Studies  Results Reviewed     None                 XR wrist 3+ views RIGHT   Final Result by Prabha Tamayo MD (03/03 1316)      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  There is likely avascular necrosis of the proximal pole, with sclerotic change and loss of height  The examination demonstrates a significant  finding and was documented as such in [facility] for liaison and referring practitioner immediate notification  Workstation performed: [id number]                    Procedures  Splint application    Date/Time: 3/3/2021 1:57 PM  Performed by: Eleazar Murphy PA-C  Authorized by: Eleazar Murphy PA-C   Universal Protocol:  Procedure performed by: Chirag Fowler ED Tech)  Consent: Verbal consent obtained    Risks and benefits: risks, benefits and alternatives were discussed  Consent given by: patient  Patient identity confirmed: verbally with patient and arm band      Pre-procedure details:     Sensation:  Normal  Procedure details:     Laterality:  Right    Location:  Wrist    Wrist:  R wristCast type:  Short arm    Splint type:  Thumb spica    Supplies:  Cotton padding, elastic bandage and Ortho-Glass  Post-procedure details:     Pain:  Improved    Sensation:  Normal    Patient tolerance of procedure: Tolerated well, no immediate complications  Comments:      Splint was applied by technician, good position, neurovascularly intact distally, good capillary refill  Evaluated by me prior to discharge  ED Course  ED Course as of Mar 03 1359   Wed Mar 03, 2021   1318 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 No Filed at: 03/03/2021 1243   Initial Alcohol Screen: US AUDIT-C    1  How often do you have a drink containing alcohol?  0 Filed at: 03/03/2021 1243   2  How many drinks containing alcohol do you have on a typical day you are drinking? 0 Filed at: 03/03/2021 1243   3a  Male UNDER 65: How often do you have five or more drinks on one occasion? 0 Filed at: 03/03/2021 1243   3b  FEMALE Any Age, or MALE 65+: How often do you have 4 or more drinks on one occassion? 0 Filed at: 03/03/2021 1243   Audit-C Score  0 Filed at: 03/03/2021 1243   LAKESHA: How many times in the past year have you    Used an illegal drug or used a prescription medication for non-medical reasons?   Never Filed at: 03/03/2021 1243                    MDM  Number of Diagnoses or Management Options  Avascular necrosis of scaphoid [facility]): new and requires workup  Scaphoid non-union advanced collapse, right: new and requires workup  Diagnosis management comments: Patient is a 39 y/o female, left-hand dominant presents emergency department for evaluation wrist pain  Patient states she had a previous injury to right wrist, necessitating her to be in a cast a couple years ago  Patient states 2 days ago she was lifting a child when she felt a pop and began with pain  Patient states pain worse with ulnar and radial deviation of right wrist   Patient localizes pain to radial head  X-ray right wrist shows 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  There is likely avascular necrosis of the proximal pole, with sclerotic change and loss of height  Thumb spica splint applied by ED tech, neurovascularly intact distally post splint application  Stressed the importance of follow-up with Orthopedics within the next week given possibility of AVN of scaphoid from chronic non-union fracture  Information for Orthopedics provided  Advised to stay in splint until follow-up and take motrin/tylenol for pain reduction  Patient verbalizes understanding and agrees with plan  The management plan was discussed in detail with the patient at bedside and all questions were answered  Prior to discharge, I provided both verbal and written instructions  I discussed with the patient the signs and symptoms for which to return to the emergency department  All questions were answered and patient was comfortable with the plan of care and discharged to home  The patient agrees to return to the Emergency Department for concerns and/or progression of illness          Disposition  Final diagnoses:   Scaphoid non-union advanced collapse, right   Avascular necrosis of scaphoid [facility])     Time reflects when diagnosis was documented in both MDM as applicable and the Disposition within this note     Time User Action Codes Description Comment    3/3/2021  1:32 PM Manas Avendaño [T89 [id number],  S62 001S] Scaphoid non-union advanced collapse, right 3/3/2021  1:32 PM Sangeeta Vallejo Add [G93 [id number]] Avascular necrosis of scaphoid [facility])       ED Disposition     ED Disposition Condition Date/Time Comment    Discharge Stable Wed Mar 3, 2021  1:33 PM [facility] discharge to home/self care  Follow-up Information     Follow up With Specialties Details Why Contact Info Additional [address] [facility] [place] [facility] Schedule an appointment as soon as possible for a visit   [address]  [id number] [phone] [address], [address] [facility], [address] [facility] [place], [place], [address] [facility], [id number] [phone]          Patient's Medications   Discharge Prescriptions    No medications on file     No discharge procedures on file      PDMP Review     None          ED Provider  Electronically Signed by           Ramila Ahmadi PA-C  03/03/21 7471

## 2021-03-04 ENCOUNTER — OFFICE VISIT (OUTPATIENT)
Dept: OBGYN CLINIC | Facility: HOSPITAL | Age: 36
End: 2021-03-04
Payer: COMMERCIAL

## 2021-03-04 VITALS
SYSTOLIC BLOOD PRESSURE: 119 MMHG | DIASTOLIC BLOOD PRESSURE: 77 MMHG | WEIGHT: 227 LBS | BODY MASS INDEX: 35.63 KG/M2 | HEIGHT: 67 IN | HEART RATE: 71 BPM

## 2021-03-04 DIAGNOSIS — S62.001K CLOSED NONDISPLACED FRACTURE OF SCAPHOID OF RIGHT WRIST WITH NONUNION, UNSPECIFIED PORTION OF SCAPHOID, SUBSEQUENT ENCOUNTER: Primary | ICD-10-CM

## 2021-03-04 PROCEDURE — 99203 OFFICE O/P NEW LOW 30 MIN: CPT | Performed by: PHYSICIAN ASSISTANT

## 2021-03-04 RX ORDER — DIAZEPAM 5 MG/1
TABLET ORAL
Qty: 2 TABLET | Refills: 0 | Status: SHIPPED | OUTPATIENT
Start: 2021-03-04 | End: 2021-04-18

## 2021-03-04 NOTE — LETTER
March 4, 2021     Patient: Bogdan Seo   YOB: 1985   Date of Visit: 3/4/2021       To Whom it May Concern:    Bogdan Seo is under my professional care  She was seen in my office on 3/4/2021  She may return to work with limitations Max lifting 20lbs       If you have any questions or concerns, please don't hesitate to call           Sincerely,          Hugo Angeles PA-C        CC: Bogdan Seo
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
## 2021-03-10 ENCOUNTER — HOSPITAL ENCOUNTER (OUTPATIENT)
Dept: MRI IMAGING | Facility: HOSPITAL | Age: 36
Discharge: HOME | End: 2021-03-10
Payer: COMMERCIAL

## 2021-03-10 DIAGNOSIS — S62.001K CLOSED NONDISPLACED FRACTURE OF SCAPHOID OF RIGHT WRIST WITH NONUNION, UNSPECIFIED PORTION OF SCAPHOID, SUBSEQUENT ENCOUNTER: ICD-10-CM

## 2021-03-10 PROCEDURE — G1004 CDSM NDSC: HCPCS

## 2021-03-10 PROCEDURE — 73221 MRI JOINT UPR EXTREM W/O DYE: CPT

## 2021-03-12 ENCOUNTER — OFFICE VISIT (OUTPATIENT)
Dept: OBGYN CLINIC | Facility: HOSPITAL | Age: 36
End: 2021-03-12
Payer: COMMERCIAL

## 2021-03-12 VITALS
HEIGHT: 67 IN | SYSTOLIC BLOOD PRESSURE: 112 MMHG | HEART RATE: 66 BPM | BODY MASS INDEX: 35.63 KG/M2 | WEIGHT: 227 LBS | DIASTOLIC BLOOD PRESSURE: 72 MMHG

## 2021-03-12 DIAGNOSIS — M19.131 SNAC (SCAPHOID NON-UNION ADVANCED COLLAPSE) OF WRIST, RIGHT: Primary | ICD-10-CM

## 2021-03-12 DIAGNOSIS — S62.001S SNAC (SCAPHOID NON-UNION ADVANCED COLLAPSE) OF WRIST, RIGHT: Primary | ICD-10-CM

## 2021-03-12 PROCEDURE — 99214 OFFICE O/P EST MOD 30 MIN: CPT | Performed by: ORTHOPAEDIC SURGERY
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
## 2021-03-19 ENCOUNTER — HOSPITAL ENCOUNTER (OUTPATIENT)
Dept: CT IMAGING | Facility: HOSPITAL | Age: 36
Discharge: HOME | End: 2021-03-19
Attending: ORTHOPAEDIC SURGERY
Payer: COMMERCIAL

## 2021-03-19 DIAGNOSIS — M19.131 SNAC (SCAPHOID NON-UNION ADVANCED COLLAPSE) OF WRIST, RIGHT: ICD-10-CM

## 2021-03-19 DIAGNOSIS — S62.001S SNAC (SCAPHOID NON-UNION ADVANCED COLLAPSE) OF WRIST, RIGHT: ICD-10-CM

## 2021-03-19 PROCEDURE — 73200 CT UPPER EXTREMITY W/O DYE: CPT

## 2021-03-24 ENCOUNTER — OFFICE VISIT (OUTPATIENT)
Dept: OBGYN CLINIC | Facility: HOSPITAL | Age: 36
End: 2021-03-24
Payer: COMMERCIAL

## 2021-03-24 ENCOUNTER — PREP FOR PROCEDURE (OUTPATIENT)
Dept: OBGYN CLINIC | Facility: HOSPITAL | Age: 36
End: 2021-03-24

## 2021-03-24 VITALS — WEIGHT: 225 LBS | BODY MASS INDEX: 35.31 KG/M2 | HEIGHT: 67 IN

## 2021-03-24 DIAGNOSIS — Z01.812 PRE-OPERATIVE LABORATORY EXAMINATION: Primary | ICD-10-CM

## 2021-03-24 DIAGNOSIS — S62.001K CLOSED NONDISPLACED FRACTURE OF SCAPHOID OF RIGHT WRIST WITH NONUNION, UNSPECIFIED PORTION OF SCAPHOID, SUBSEQUENT ENCOUNTER: Primary | ICD-10-CM

## 2021-03-24 PROCEDURE — 3008F BODY MASS INDEX DOCD: CPT | Performed by: NURSE PRACTITIONER

## 2021-03-24 PROCEDURE — 1036F TOBACCO NON-USER: CPT | Performed by: ORTHOPAEDIC SURGERY

## 2021-03-24 PROCEDURE — 3008F BODY MASS INDEX DOCD: CPT | Performed by: ORTHOPAEDIC SURGERY

## 2021-03-24 PROCEDURE — 99214 OFFICE O/P EST MOD 30 MIN: CPT | Performed by: ORTHOPAEDIC SURGERY

## 2021-03-24 RX ORDER — CEFAZOLIN SODIUM 2 G/50ML
2000 SOLUTION INTRAVENOUS ONCE
Status: CANCELLED | OUTPATIENT
Start: 2021-03-24 | End: 2021-03-24

## 2021-03-24 NOTE — PROGRESS NOTES
ASSESSMENT/PLAN:    Assessment:   right scaphoid nonunion    Plan:    Open Reduction and Internal Fixation of right scaphoid nonunion with bone graft from the distal radius     Follow Up: After Surgery    To Do Next Visit:   sutures out, Steri-Strips   X-ray   active armor versus cast    General Discussions:  Fracture - Nonoperative Care: The physiology of a fractured bone was discussed with the patient today  With nondisplaced or minimally displaced fractures, conservative treatment often results in a functional recovery  Typically, these fractures are immobilized in either a cast or splint depending on the pattern  Radiographs are typically taken at intervals throughout the fracture healing to ensure that muscular forces do not cause loss of reduction or alignment  If the fracture loses its alignment, surgical intervention may be required to stabilize it  Medical conditions such as diabetes, osteoporosis, vitamin D deficiency, and a history of or exposure to smoking may delay or prevent fracture healing  Operative Discussions:  Fracture Operative Treatment: The physiology of a fractured bone was discussed with the patient today  With displaced fractures, operative treatment often results in a functional recovery  Typically, these fractures undergo reduction either through percutaneous or open methods depending on the location and fracture pattern  Radiographs are typically taken at intervals throughout the fracture healing ensure maintenance of reduction and alignment  If the fracture loses its alignment, revision surgery may be required  Medical conditions such as diabetes, osteoporosis, vitamin D deficiency, and a history of or exposure to smoking may delay or prevent fracture healing    The risks and benefits of the procedure were explained to the patient, which include, but are not limited to: Bleeding, infection, recurrence, pain, scar, malunion, nonunion, damage to tendons, damage to nerves, and damage to blood vessels, and complications related to anesthesia, failure to give desire result, need for more surgery  These risks, along with alternative conservative treatment options, and postoperative protocols were voiced back and understood by the patient  All questions were answered to the patient's satisfaction  The patient agrees to comply with a standard postoperative protocol, and is willing to proceed  Education was provided via written and auditory forms  There were no barriers to learning  Written handouts regarding wound care, incision and scar care, and general preoperative information was provided to the patient  Prior to surgery, the patient may be requested to stop all anti-inflammatory medications  Prophylactic aspirin, Plavix, and Coumadin may be allowed to be continued  Medications including vitamin E , ginkgo, and fish oil are requested to be stopped approximately one week prior to surgery  Hypertensive medications and beta blockers, if taken, should be continued  discussed with patient risks and benefits of operative intervention  Discussed with patient expected postoperative course with 12-18 weeks of postoperative care requirements  Patient signed consent forms today     _____________________________________________________  CHIEF COMPLAINT:  Chief Complaint   Patient presents with    Right Wrist - Follow-up         SUBJECTIVE:  Denys Lindo is a 38 y o  female who presents for follow up regarding   Right wrist pain with known scaphoid fracture  Patient continues to have significant pain in the wrist   She has now had to change her job to allow for her restrictions  She states that even at the end of the day, she has significant pain despite wearing the brace  She denies any numbness or tingling  She denies any other acute complaints      PAST MEDICAL HISTORY:  Past Medical History:   Diagnosis Date    Anxiety     Depression     Disease of thyroid gland     Gestational diabetes     Gestational hypertension     previous pregnancy    Hashimoto's thyroiditis     HPV (human papilloma virus) infection     Retained placenta        PAST SURGICAL HISTORY:  Past Surgical History:   Procedure Laterality Date    CHOLECYSTECTOMY      DILATION AND CURETTAGE OF UTERUS      HERNIA REPAIR      UMBILICAL HERNIA REPAIR         FAMILY HISTORY:  Family History   Problem Relation Age of Onset    Anuerysm Mother     Thyroid disease Mother     Lung cancer Maternal Grandmother        SOCIAL HISTORY:  Social History     Tobacco Use    Smoking status: Never Smoker    Smokeless tobacco: Never Used   Substance Use Topics    Alcohol use: No    Drug use: No       MEDICATIONS:    Current Outpatient Medications:     acetaminophen (TYLENOL) 500 mg tablet, Take 1 tablet (500 mg total) by mouth every 6 (six) hours as needed for mild pain (Patient not taking: Reported on 11/15/2019), Disp: 30 tablet, Rfl: 0    ascorbic acid (VITAMIN C) 500 MG tablet, Take 1 tablet (500 mg total) by mouth daily, Disp: 90 tablet, Rfl: 1    chlorhexidine (HIBICLENS) 4 % external liquid, Apply 1 application topically daily as needed for wound care, Disp: 120 mL, Rfl: 0    diazepam (VALIUM) 5 mg tablet, Take one the morning of the MRI and one 30 minutes before the scan if needed  , Disp: 2 tablet, Rfl: 0    dicyclomine (BENTYL) 10 mg capsule, Take 1 capsule (10 mg total) by mouth 4 (four) times a day (before meals and at bedtime), Disp: 120 capsule, Rfl: 0    ergocalciferol (VITAMIN D2) 50,000 units, Take 1 capsule (50,000 Units total) by mouth once a week, Disp: 24 capsule, Rfl: 0    escitalopram (LEXAPRO) 10 mg tablet, Take 10 mg by mouth daily, Disp: , Rfl:     ferrous sulfate 324 (65 Fe) mg, Take 1 tablet (324 mg total) by mouth daily before breakfast, Disp: 90 tablet, Rfl: 1    fluticasone (FLONASE) 50 mcg/act nasal spray, 1 spray into each nostril daily (Patient not taking: Reported on 11/15/2019), Disp: 16 g, Rfl: 0    guaiFENesin (ROBITUSSIN) 100 MG/5ML oral liquid, Take 10 mL (200 mg total) by mouth 3 (three) times a day as needed for cough (Patient not taking: Reported on 5/19/2020), Disp: 120 mL, Rfl: 0    ibuprofen (MOTRIN) 400 mg tablet, Take 1 tablet (400 mg total) by mouth every 6 (six) hours as needed for moderate pain (Patient not taking: Reported on 11/15/2019), Disp: 20 tablet, Rfl: 0    loperamide (IMODIUM) 2 mg capsule, Take 1 capsule (2 mg total) by mouth 4 (four) times a day as needed for diarrhea (Patient not taking: Reported on 5/19/2020), Disp: 90 capsule, Rfl: 1    loratadine (CLARITIN) 10 mg tablet, Take 1 tablet (10 mg total) by mouth daily (Patient not taking: Reported on 11/15/2019), Disp: 20 tablet, Rfl: 0    LORazepam (ATIVAN) 0 5 mg tablet, Take 1 tablet by mouth 3 (three) times a day as needed, Disp: , Rfl:     menthol-cetylpyridinium (CEPACOL) 3 MG lozenge, Take 1 lozenge (3 mg total) by mouth as needed for sore throat (Patient not taking: Reported on 11/15/2019), Disp: 30 lozenge, Rfl: 0    norgestimate-ethinyl estradiol (ORTHO-CYCLEN) 0 25-35 MG-MCG per tablet, Take 1 tablet by mouth daily (Patient not taking: Reported on 1/4/2019 ), Disp: 28 tablet, Rfl: 3    norgestimate-ethinyl estradiol (ORTHO-CYCLEN) 0 25-35 MG-MCG per tablet, Take 1 tablet by mouth daily (Patient not taking: Reported on 1/4/2019 ), Disp: 28 tablet, Rfl: 0    phenazopyridine (PYRIDIUM) 200 mg tablet, Take 1 tablet (200 mg total) by mouth 3 (three) times a day (Patient not taking: Reported on 1/20/2021), Disp: 6 tablet, Rfl: 0    pseudoephedrine (SUDAFED) 30 mg tablet, Take 2 tablets (60 mg total) by mouth every 6 (six) hours as needed for congestion (Patient not taking: Reported on 5/19/2020), Disp: 30 tablet, Rfl: 1    scopolamine (TRANSDERM-SCOP) 1 5 mg/3 days TD 72 hr patch, Place 1 patch on the skin every third day (Patient not taking: Reported on 2/15/2021), Disp: 10 patch, Rfl: 3    sertraline (ZOLOFT) 50 mg tablet, TAKE HALF A TABLET BY MOUTH DAILY FOR 2 WEEKS THEN 1 TABLET EVERY DAY, Disp: , Rfl: 1    sodium chloride (OCEAN) 0 65 % nasal spray, 1 spray into each nostril as needed for congestion (Patient not taking: Reported on 5/19/2020), Disp: 104 mL, Rfl: 0    SYNTHROID 125 MCG tablet, Take 125 mcg by mouth daily, Disp: , Rfl: 3    ALLERGIES:  Allergies   Allergen Reactions    Bactrim [Sulfamethoxazole-Trimethoprim] Hives    Other Hives     seafood    Shellfish-Derived Products     Sulfamethoxazole Hives    Trimethoprim Hives       REVIEW OF SYSTEMS:  Pertinent items are noted in HPI  A comprehensive review of systems was negative  LABS:  HgA1c:   Lab Results   Component Value Date    HGBA1C 5 6 02/16/2021     BMP:   Lab Results   Component Value Date    CALCIUM 9 7 02/16/2021    K 4 3 02/16/2021    CO2 27 02/16/2021     02/16/2021    BUN 8 02/16/2021    CREATININE 0 55 (L) 02/16/2021           _____________________________________________________  PHYSICAL EXAMINATION:  Vital signs: Ht 5' 7" (1 702 m)   Wt 102 kg (225 lb)   BMI 35 24 kg/m²   General: well developed and well nourished, alert, oriented times 3 and appears comfortable  Psychiatric: Normal  HEENT: Trachea Midline, No torticollis  Cardiovascular: No discernable arrhythmia  Pulmonary: No wheezing or stridor  Skin: No masses, erythema, lacerations, fluctation, ulcerations  Neurovascular: Sensation Intact to the Median, Ulnar, Radial Nerve, Motor Intact to the Median, Ulnar, Radial Nerve and Pulses Intact    MUSCULOSKELETAL EXAMINATION:  RIGHT SIDE:  Wrist:  No visible swelling or deformity    Tenderness over the radial styloid and  anatomic snuffbox    _____________________________________________________  STUDIES REVIEWED:  Images were reviewed in PACS by Dr Melina Chaudhary and demonstrate:   A scaphoid nonunion with well-maintained blood supply      PROCEDURES PERFORMED:  Procedures  No Procedures performed today    Scribe Attestation    I,:  Roslyn Levine PA-C am acting as a scribe while in the presence of the attending physician :       I,:  Prakash Dorsey MD personally performed the services described in this documentation    as scribed in my presence :           Eulas Castleman,:  Roslyn Levine PA-C am acting as a scribe while in the presence of the attending physician :       I,:  Prakash Dorsey MD personally performed the services described in this documentation    as scribed in my presence :

## 2021-03-24 NOTE — H&P
ASSESSMENT/PLAN:    Assessment:   right scaphoid nonunion    Plan:    Open Reduction and Internal Fixation of right scaphoid nonunion with bone graft from the distal radius     Follow Up: After Surgery    To Do Next Visit:   sutures out, Steri-Strips   X-ray   active armor versus cast    General Discussions:  Fracture - Nonoperative Care: The physiology of a fractured bone was discussed with the patient today  With nondisplaced or minimally displaced fractures, conservative treatment often results in a functional recovery  Typically, these fractures are immobilized in either a cast or splint depending on the pattern  Radiographs are typically taken at intervals throughout the fracture healing to ensure that muscular forces do not cause loss of reduction or alignment  If the fracture loses its alignment, surgical intervention may be required to stabilize it  Medical conditions such as diabetes, osteoporosis, vitamin D deficiency, and a history of or exposure to smoking may delay or prevent fracture healing  Operative Discussions:  Fracture Operative Treatment: The physiology of a fractured bone was discussed with the patient today  With displaced fractures, operative treatment often results in a functional recovery  Typically, these fractures undergo reduction either through percutaneous or open methods depending on the location and fracture pattern  Radiographs are typically taken at intervals throughout the fracture healing ensure maintenance of reduction and alignment  If the fracture loses its alignment, revision surgery may be required  Medical conditions such as diabetes, osteoporosis, vitamin D deficiency, and a history of or exposure to smoking may delay or prevent fracture healing    The risks and benefits of the procedure were explained to the patient, which include, but are not limited to: Bleeding, infection, recurrence, pain, scar, malunion, nonunion, damage to tendons, damage to nerves, and damage to blood vessels, and complications related to anesthesia, failure to give desire result, need for more surgery  These risks, along with alternative conservative treatment options, and postoperative protocols were voiced back and understood by the patient  All questions were answered to the patient's satisfaction  The patient agrees to comply with a standard postoperative protocol, and is willing to proceed  Education was provided via written and auditory forms  There were no barriers to learning  Written handouts regarding wound care, incision and scar care, and general preoperative information was provided to the patient  Prior to surgery, the patient may be requested to stop all anti-inflammatory medications  Prophylactic aspirin, Plavix, and Coumadin may be allowed to be continued  Medications including vitamin E , ginkgo, and fish oil are requested to be stopped approximately one week prior to surgery  Hypertensive medications and beta blockers, if taken, should be continued  discussed with patient risks and benefits of operative intervention  Discussed with patient expected postoperative course with 12-18 weeks of postoperative care requirements  Patient signed consent forms today     _____________________________________________________  CHIEF COMPLAINT:  Chief Complaint   Patient presents with    Right Wrist - Follow-up         SUBJECTIVE:  Millie Ely is a 38 y o  female who presents for follow up regarding   Right wrist pain with known scaphoid fracture  Patient continues to have significant pain in the wrist   She has now had to change her job to allow for her restrictions  She states that even at the end of the day, she has significant pain despite wearing the brace  She denies any numbness or tingling  She denies any other acute complaints      PAST MEDICAL HISTORY:  Past Medical History:   Diagnosis Date    Anxiety     Depression     Disease of thyroid gland     Gestational diabetes     Gestational hypertension     previous pregnancy    Hashimoto's thyroiditis     HPV (human papilloma virus) infection     Retained placenta        PAST SURGICAL HISTORY:  Past Surgical History:   Procedure Laterality Date    CHOLECYSTECTOMY      DILATION AND CURETTAGE OF UTERUS      HERNIA REPAIR      UMBILICAL HERNIA REPAIR         FAMILY HISTORY:  Family History   Problem Relation Age of Onset    Anuerysm Mother     Thyroid disease Mother     Lung cancer Maternal Grandmother        SOCIAL HISTORY:  Social History     Tobacco Use    Smoking status: Never Smoker    Smokeless tobacco: Never Used   Substance Use Topics    Alcohol use: No    Drug use: No       MEDICATIONS:    Current Outpatient Medications:     acetaminophen (TYLENOL) 500 mg tablet, Take 1 tablet (500 mg total) by mouth every 6 (six) hours as needed for mild pain (Patient not taking: Reported on 11/15/2019), Disp: 30 tablet, Rfl: 0    ascorbic acid (VITAMIN C) 500 MG tablet, Take 1 tablet (500 mg total) by mouth daily, Disp: 90 tablet, Rfl: 1    chlorhexidine (HIBICLENS) 4 % external liquid, Apply 1 application topically daily as needed for wound care, Disp: 120 mL, Rfl: 0    diazepam (VALIUM) 5 mg tablet, Take one the morning of the MRI and one 30 minutes before the scan if needed  , Disp: 2 tablet, Rfl: 0    dicyclomine (BENTYL) 10 mg capsule, Take 1 capsule (10 mg total) by mouth 4 (four) times a day (before meals and at bedtime), Disp: 120 capsule, Rfl: 0    ergocalciferol (VITAMIN D2) 50,000 units, Take 1 capsule (50,000 Units total) by mouth once a week, Disp: 24 capsule, Rfl: 0    escitalopram (LEXAPRO) 10 mg tablet, Take 10 mg by mouth daily, Disp: , Rfl:     ferrous sulfate 324 (65 Fe) mg, Take 1 tablet (324 mg total) by mouth daily before breakfast, Disp: 90 tablet, Rfl: 1    fluticasone (FLONASE) 50 mcg/act nasal spray, 1 spray into each nostril daily (Patient not taking: Reported on 11/15/2019), Disp: 16 g, Rfl: 0    guaiFENesin (ROBITUSSIN) 100 MG/5ML oral liquid, Take 10 mL (200 mg total) by mouth 3 (three) times a day as needed for cough (Patient not taking: Reported on 5/19/2020), Disp: 120 mL, Rfl: 0    ibuprofen (MOTRIN) 400 mg tablet, Take 1 tablet (400 mg total) by mouth every 6 (six) hours as needed for moderate pain (Patient not taking: Reported on 11/15/2019), Disp: 20 tablet, Rfl: 0    loperamide (IMODIUM) 2 mg capsule, Take 1 capsule (2 mg total) by mouth 4 (four) times a day as needed for diarrhea (Patient not taking: Reported on 5/19/2020), Disp: 90 capsule, Rfl: 1    loratadine (CLARITIN) 10 mg tablet, Take 1 tablet (10 mg total) by mouth daily (Patient not taking: Reported on 11/15/2019), Disp: 20 tablet, Rfl: 0    LORazepam (ATIVAN) 0 5 mg tablet, Take 1 tablet by mouth 3 (three) times a day as needed, Disp: , Rfl:     menthol-cetylpyridinium (CEPACOL) 3 MG lozenge, Take 1 lozenge (3 mg total) by mouth as needed for sore throat (Patient not taking: Reported on 11/15/2019), Disp: 30 lozenge, Rfl: 0    norgestimate-ethinyl estradiol (ORTHO-CYCLEN) 0 25-35 MG-MCG per tablet, Take 1 tablet by mouth daily (Patient not taking: Reported on 1/4/2019 ), Disp: 28 tablet, Rfl: 3    norgestimate-ethinyl estradiol (ORTHO-CYCLEN) 0 25-35 MG-MCG per tablet, Take 1 tablet by mouth daily (Patient not taking: Reported on 1/4/2019 ), Disp: 28 tablet, Rfl: 0    phenazopyridine (PYRIDIUM) 200 mg tablet, Take 1 tablet (200 mg total) by mouth 3 (three) times a day (Patient not taking: Reported on 1/20/2021), Disp: 6 tablet, Rfl: 0    pseudoephedrine (SUDAFED) 30 mg tablet, Take 2 tablets (60 mg total) by mouth every 6 (six) hours as needed for congestion (Patient not taking: Reported on 5/19/2020), Disp: 30 tablet, Rfl: 1    scopolamine (TRANSDERM-SCOP) 1 5 mg/3 days TD 72 hr patch, Place 1 patch on the skin every third day (Patient not taking: Reported on 2/15/2021), Disp: 10 patch, Rfl: 3    sertraline (ZOLOFT) 50 mg tablet, TAKE HALF A TABLET BY MOUTH DAILY FOR 2 WEEKS THEN 1 TABLET EVERY DAY, Disp: , Rfl: 1    sodium chloride (OCEAN) 0 65 % nasal spray, 1 spray into each nostril as needed for congestion (Patient not taking: Reported on 5/19/2020), Disp: 104 mL, Rfl: 0    SYNTHROID 125 MCG tablet, Take 125 mcg by mouth daily, Disp: , Rfl: 3    ALLERGIES:  Allergies   Allergen Reactions    Bactrim [Sulfamethoxazole-Trimethoprim] Hives    Other Hives     seafood    Shellfish-Derived Products     Sulfamethoxazole Hives    Trimethoprim Hives       REVIEW OF SYSTEMS:  Pertinent items are noted in HPI  A comprehensive review of systems was negative  LABS:  HgA1c:   Lab Results   Component Value Date    HGBA1C 5 6 02/16/2021     BMP:   Lab Results   Component Value Date    CALCIUM 9 7 02/16/2021    K 4 3 02/16/2021    CO2 27 02/16/2021     02/16/2021    BUN 8 02/16/2021    CREATININE 0 55 (L) 02/16/2021           _____________________________________________________  PHYSICAL EXAMINATION:  Vital signs: Ht 5' 7" (1 702 m)   Wt 102 kg (225 lb)   BMI 35 24 kg/m²   General: well developed and well nourished, alert, oriented times 3 and appears comfortable  Psychiatric: Normal  HEENT: Trachea Midline, No torticollis  Cardiovascular: No discernable arrhythmia  Pulmonary: No wheezing or stridor  Skin: No masses, erythema, lacerations, fluctation, ulcerations  Neurovascular: Sensation Intact to the Median, Ulnar, Radial Nerve, Motor Intact to the Median, Ulnar, Radial Nerve and Pulses Intact    MUSCULOSKELETAL EXAMINATION:  RIGHT SIDE:  Wrist:  No visible swelling or deformity    Tenderness over the radial styloid and  anatomic snuffbox    _____________________________________________________  STUDIES REVIEWED:  Images were reviewed in PACS by Dr Tulio Perez and demonstrate:   A scaphoid nonunion with well-maintained blood supply      PROCEDURES PERFORMED:  Procedures  No Procedures performed today    Scribe Attestation    I,:  Roshni Jacobson PA-C am acting as a scribe while in the presence of the attending physician :       I,:  Raymundo Cook MD personally performed the services described in this documentation    as scribed in my presence :           Carlos Woo,:  Roshni Jacobson PA-C am acting as a scribe while in the presence of the attending physician :       I,:  Raymundo Cook MD personally performed the services described in this documentation    as scribed in my presence :

## 2021-04-02 ENCOUNTER — TELEPHONE (OUTPATIENT)
Dept: FAMILY MEDICINE CLINIC | Facility: CLINIC | Age: 36
End: 2021-04-02

## 2021-04-02 NOTE — TELEPHONE ENCOUNTER
Patient called to get a physical - Dr Sandee Cooks doesn't have anything until the 12th - that was soon enough for her and I advised her she can always go to an urgent care and she said they do not contract with her job and she disconnected the phone

## 2021-04-18 ENCOUNTER — APPOINTMENT (EMERGENCY)
Dept: RADIOLOGY | Facility: HOSPITAL | Age: 36
End: 2021-04-18
Payer: COMMERCIAL

## 2021-04-18 ENCOUNTER — HOSPITAL ENCOUNTER (EMERGENCY)
Facility: HOSPITAL | Age: 36
Discharge: HOME | End: 2021-04-18
Attending: EMERGENCY MEDICINE
Payer: COMMERCIAL

## 2021-04-18 VITALS
OXYGEN SATURATION: 98 % | TEMPERATURE: 98.9 F | WEIGHT: 227.07 LBS | DIASTOLIC BLOOD PRESSURE: 65 MMHG | RESPIRATION RATE: 14 BRPM | BODY MASS INDEX: 35.56 KG/M2 | SYSTOLIC BLOOD PRESSURE: 116 MMHG | HEART RATE: 70 BPM

## 2021-04-18 DIAGNOSIS — N39.0 UTI (URINARY TRACT INFECTION): ICD-10-CM

## 2021-04-18 DIAGNOSIS — R53.83 FATIGUE: Primary | ICD-10-CM

## 2021-04-18 DIAGNOSIS — R11.0 NAUSEA: ICD-10-CM

## 2021-04-18 LAB
ALBUMIN SERPL BCP-MCNC: 3.5 G/DL (ref 3.5–5)
ALP SERPL-CCNC: 104 U/L (ref 46–116)
ALT SERPL W P-5'-P-CCNC: 34 U/L (ref 12–78)
ANION GAP SERPL CALCULATED.3IONS-SCNC: 10 MMOL/L (ref 4–13)
AST SERPL W P-5'-P-CCNC: 21 U/L (ref 5–45)
BACTERIA UR QL AUTO: ABNORMAL /HPF
BASOPHILS # BLD AUTO: 0.03 THOUSANDS/ΜL (ref 0–0.1)
BASOPHILS NFR BLD AUTO: 1 % (ref 0–1)
BILIRUB SERPL-MCNC: 0.24 MG/DL (ref 0.2–1)
BILIRUB UR QL STRIP: NEGATIVE
BUN SERPL-MCNC: 7 MG/DL (ref 5–25)
CALCIUM SERPL-MCNC: 8.7 MG/DL (ref 8.3–10.1)
CHLORIDE SERPL-SCNC: 101 MMOL/L (ref 100–108)
CLARITY UR: CLEAR
CO2 SERPL-SCNC: 27 MMOL/L (ref 21–32)
COLOR UR: YELLOW
CREAT SERPL-MCNC: 0.61 MG/DL (ref 0.6–1.3)
EOSINOPHIL # BLD AUTO: 0.06 THOUSAND/ΜL (ref 0–0.61)
EOSINOPHIL NFR BLD AUTO: 1 % (ref 0–6)
ERYTHROCYTE [DISTWIDTH] IN BLOOD BY AUTOMATED COUNT: 17.6 % (ref 11.6–15.1)
EXT PREG TEST URINE: NEGATIVE
EXT. CONTROL ED NAV: NORMAL
FLUAV RNA RESP QL NAA+PROBE: NEGATIVE
FLUBV RNA RESP QL NAA+PROBE: NEGATIVE
GFR SERPL CREATININE-BSD FRML MDRD: 116 ML/MIN/1.73SQ M
GLUCOSE SERPL-MCNC: 123 MG/DL (ref 65–140)
GLUCOSE UR STRIP-MCNC: NEGATIVE MG/DL
HCT VFR BLD AUTO: 36.1 % (ref 34.8–46.1)
HGB BLD-MCNC: 10.7 G/DL (ref 11.5–15.4)
HGB UR QL STRIP.AUTO: ABNORMAL
IMM GRANULOCYTES # BLD AUTO: 0.02 THOUSAND/UL (ref 0–0.2)
IMM GRANULOCYTES NFR BLD AUTO: 0 % (ref 0–2)
KETONES UR STRIP-MCNC: NEGATIVE MG/DL
LEUKOCYTE ESTERASE UR QL STRIP: ABNORMAL
LYMPHOCYTES # BLD AUTO: 1 THOUSANDS/ΜL (ref 0.6–4.47)
LYMPHOCYTES NFR BLD AUTO: 20 % (ref 14–44)
MCH RBC QN AUTO: 22.9 PG (ref 26.8–34.3)
MCHC RBC AUTO-ENTMCNC: 29.6 G/DL (ref 31.4–37.4)
MCV RBC AUTO: 77 FL (ref 82–98)
MONOCYTES # BLD AUTO: 0.48 THOUSAND/ΜL (ref 0.17–1.22)
MONOCYTES NFR BLD AUTO: 10 % (ref 4–12)
NEUTROPHILS # BLD AUTO: 3.44 THOUSANDS/ΜL (ref 1.85–7.62)
NEUTS SEG NFR BLD AUTO: 68 % (ref 43–75)
NITRITE UR QL STRIP: NEGATIVE
NON-SQ EPI CELLS URNS QL MICRO: ABNORMAL /HPF
NRBC BLD AUTO-RTO: 0 /100 WBCS
PH UR STRIP.AUTO: 5.5 [PH] (ref 4.5–8)
PLATELET # BLD AUTO: 178 THOUSANDS/UL (ref 149–390)
PMV BLD AUTO: 11 FL (ref 8.9–12.7)
POTASSIUM SERPL-SCNC: 3.5 MMOL/L (ref 3.5–5.3)
PROT SERPL-MCNC: 7.4 G/DL (ref 6.4–8.2)
PROT UR STRIP-MCNC: NEGATIVE MG/DL
RBC # BLD AUTO: 4.68 MILLION/UL (ref 3.81–5.12)
RBC #/AREA URNS AUTO: ABNORMAL /HPF
RSV RNA RESP QL NAA+PROBE: NEGATIVE
SARS-COV-2 RNA RESP QL NAA+PROBE: NEGATIVE
SODIUM SERPL-SCNC: 138 MMOL/L (ref 136–145)
SP GR UR STRIP.AUTO: 1.02 (ref 1–1.03)
TROPONIN I SERPL-MCNC: <0.02 NG/ML
TSH SERPL DL<=0.05 MIU/L-ACNC: 0.68 UIU/ML (ref 0.36–3.74)
UROBILINOGEN UR QL STRIP.AUTO: 0.2 E.U./DL
WBC # BLD AUTO: 5.03 THOUSAND/UL (ref 4.31–10.16)
WBC #/AREA URNS AUTO: ABNORMAL /HPF

## 2021-04-18 PROCEDURE — 84484 ASSAY OF TROPONIN QUANT: CPT | Performed by: PHYSICIAN ASSISTANT

## 2021-04-18 PROCEDURE — 85025 COMPLETE CBC W/AUTO DIFF WBC: CPT | Performed by: PHYSICIAN ASSISTANT

## 2021-04-18 PROCEDURE — 96374 THER/PROPH/DIAG INJ IV PUSH: CPT

## 2021-04-18 PROCEDURE — 81001 URINALYSIS AUTO W/SCOPE: CPT

## 2021-04-18 PROCEDURE — 81025 URINE PREGNANCY TEST: CPT | Performed by: PHYSICIAN ASSISTANT

## 2021-04-18 PROCEDURE — 84443 ASSAY THYROID STIM HORMONE: CPT | Performed by: PHYSICIAN ASSISTANT

## 2021-04-18 PROCEDURE — 36415 COLL VENOUS BLD VENIPUNCTURE: CPT | Performed by: PHYSICIAN ASSISTANT

## 2021-04-18 PROCEDURE — 96361 HYDRATE IV INFUSION ADD-ON: CPT

## 2021-04-18 PROCEDURE — 71045 X-RAY EXAM CHEST 1 VIEW: CPT

## 2021-04-18 PROCEDURE — 99285 EMERGENCY DEPT VISIT HI MDM: CPT

## 2021-04-18 PROCEDURE — 80053 COMPREHEN METABOLIC PANEL: CPT | Performed by: PHYSICIAN ASSISTANT

## 2021-04-18 PROCEDURE — 93005 ELECTROCARDIOGRAM TRACING: CPT

## 2021-04-18 PROCEDURE — 96375 TX/PRO/DX INJ NEW DRUG ADDON: CPT

## 2021-04-18 PROCEDURE — 0241U HB NFCT DS VIR RESP RNA 4 TRGT: CPT | Performed by: PHYSICIAN ASSISTANT

## 2021-04-18 PROCEDURE — 99285 EMERGENCY DEPT VISIT HI MDM: CPT | Performed by: PHYSICIAN ASSISTANT

## 2021-04-18 RX ORDER — ONDANSETRON 2 MG/ML
4 INJECTION INTRAMUSCULAR; INTRAVENOUS ONCE
Status: COMPLETED | OUTPATIENT
Start: 2021-04-18 | End: 2021-04-18

## 2021-04-18 RX ORDER — ONDANSETRON 4 MG/1
4 TABLET, ORALLY DISINTEGRATING ORAL EVERY 6 HOURS PRN
Qty: 20 TABLET | Refills: 0 | Status: SHIPPED | OUTPATIENT
Start: 2021-04-18 | End: 2021-05-19 | Stop reason: ALTCHOICE

## 2021-04-18 RX ORDER — KETOROLAC TROMETHAMINE 30 MG/ML
15 INJECTION, SOLUTION INTRAMUSCULAR; INTRAVENOUS ONCE
Status: COMPLETED | OUTPATIENT
Start: 2021-04-18 | End: 2021-04-18

## 2021-04-18 RX ORDER — CEPHALEXIN 500 MG/1
500 CAPSULE ORAL EVERY 12 HOURS SCHEDULED
Qty: 14 CAPSULE | Refills: 0 | Status: SHIPPED | OUTPATIENT
Start: 2021-04-18 | End: 2021-04-25

## 2021-04-18 RX ADMIN — KETOROLAC TROMETHAMINE 15 MG: 30 INJECTION, SOLUTION INTRAMUSCULAR; INTRAVENOUS at 18:49

## 2021-04-18 RX ADMIN — ONDANSETRON 4 MG: 2 INJECTION INTRAMUSCULAR; INTRAVENOUS at 18:48

## 2021-04-18 RX ADMIN — SODIUM CHLORIDE 1000 ML: 0.9 INJECTION, SOLUTION INTRAVENOUS at 18:47

## 2021-04-18 NOTE — ED PROVIDER NOTES
History  Chief Complaint   Patient presents with    Weakness - Generalized     Patient reports weakness abdominal pain, heart racing, light headed, nausea (controlled with zofran), diarrhea for the past 4 days  Sick contacts at home but everyone else was better in 24 hours  38year old female with a history of anxiety, depression, hypothyroid presents to the emergency department for evaluation of fatigue, lightheadedness, palpitations, abdominal pain, nausea and diarrhea x 4 days  She states that her 2 children had gastroenteritis 4 days ago, which lasted approximately 24 hours, but her symptoms have persisted  She denies any dysuria, but states she has frequent UTIs  She states zofran has helped and she has had decreased PO intake  She denies any chest pain, shortness of breath, fever, vomiting  She states she has had approximately 2 episodes per day of watery, nonbloody diarrhea  She denies any recent antibiotic use or change in dosage of thyroid medication  History provided by:  Medical records   used: No    Fatigue  Severity:  Mild  Onset quality:  Gradual  Duration:  4 days  Timing:  Constant  Progression:  Worsening  Chronicity:  New  Context: dehydration    Relieved by:  Medication  Associated symptoms: abdominal pain, anorexia, diarrhea and nausea    Associated symptoms: no arthralgias, no chest pain, no cough, no dizziness, no dysuria, no fever, no shortness of breath and no vomiting    Risk factors: no new medications        Prior to Admission Medications   Prescriptions Last Dose Informant Patient Reported? Taking?    LORazepam (ATIVAN) 0 5 mg tablet   Yes Yes   Sig: Take 1 tablet by mouth 3 (three) times a day as needed   SYNTHROID 125 MCG tablet   Yes Yes   Sig: Take 150 mcg by mouth daily    ascorbic acid (VITAMIN C) 500 MG tablet   No Yes   Sig: Take 1 tablet (500 mg total) by mouth daily   ergocalciferol (VITAMIN D2) 50,000 units   No Yes   Sig: Take 1 capsule (50,000 Units total) by mouth once a week   ferrous sulfate 324 (65 Fe) mg   No Yes   Sig: Take 1 tablet (324 mg total) by mouth daily before breakfast      Facility-Administered Medications: None       Past Medical History:   Diagnosis Date    Anxiety     Depression     Disease of thyroid gland     Gestational diabetes     Gestational hypertension     previous pregnancy    Hashimoto's thyroiditis     HPV (human papilloma virus) infection     Retained placenta        Past Surgical History:   Procedure Laterality Date    CHOLECYSTECTOMY      DILATION AND CURETTAGE OF UTERUS      HERNIA REPAIR      UMBILICAL HERNIA REPAIR         Family History   Problem Relation Age of Onset    Anuerysm Mother     Thyroid disease Mother     Lung cancer Maternal Grandmother      I have reviewed and agree with the history as documented  E-Cigarette/Vaping    E-Cigarette Use Never User      E-Cigarette/Vaping Substances     Social History     Tobacco Use    Smoking status: Never Smoker    Smokeless tobacco: Never Used   Substance Use Topics    Alcohol use: No    Drug use: No       Review of Systems   Constitutional: Positive for fatigue  Negative for chills and fever  HENT: Negative for congestion  Respiratory: Negative for cough and shortness of breath  Cardiovascular: Negative for chest pain  Gastrointestinal: Positive for abdominal pain, anorexia, diarrhea and nausea  Negative for blood in stool and vomiting  Genitourinary: Negative for dysuria  Musculoskeletal: Negative for arthralgias  Skin: Negative for rash and wound  Neurological: Negative for dizziness  All other systems reviewed and are negative  Physical Exam  Physical Exam  Vitals signs and nursing note reviewed  Constitutional:       General: She is not in acute distress  Appearance: She is well-developed  She is not ill-appearing or toxic-appearing  HENT:      Head: Normocephalic and atraumatic        Right Ear: Hearing and external ear normal       Left Ear: Hearing and external ear normal       Nose: Nose normal    Eyes:      Conjunctiva/sclera: Conjunctivae normal    Neck:      Musculoskeletal: Full passive range of motion without pain and neck supple  Cardiovascular:      Rate and Rhythm: Normal rate and regular rhythm  Heart sounds: Normal heart sounds, S1 normal and S2 normal  No murmur  Pulmonary:      Effort: Pulmonary effort is normal       Breath sounds: Normal breath sounds  No decreased breath sounds, wheezing, rhonchi or rales  Abdominal:      General: Bowel sounds are normal       Palpations: Abdomen is soft  Tenderness: There is no abdominal tenderness  There is no guarding or rebound  Musculoskeletal:      Comments: Normal range of motion; no edema, tenderness, or deformities  Skin:     General: Skin is warm and dry  Findings: No rash  Neurological:      Mental Status: She is alert and oriented to person, place, and time  GCS: GCS eye subscore is 4  GCS verbal subscore is 5  GCS motor subscore is 6  Motor: Motor function is intact  Gait: Gait is intact     Psychiatric:         Mood and Affect: Mood normal          Speech: Speech normal          Vital Signs  ED Triage Vitals [04/18/21 1646]   Temperature Pulse Respirations Blood Pressure SpO2   98 9 °F (37 2 °C) 85 20 152/70 100 %      Temp Source Heart Rate Source Patient Position - Orthostatic VS BP Location FiO2 (%)   Oral Monitor Sitting Right arm --      Pain Score       4           Vitals:    04/18/21 1646 04/18/21 1854   BP: 152/70 116/65   Pulse: 85 70   Patient Position - Orthostatic VS: Sitting Lying         Visual Acuity      ED Medications  Medications   sodium chloride 0 9 % bolus 1,000 mL (0 mL Intravenous Stopped 4/18/21 1958)   ketorolac (TORADOL) injection 15 mg (15 mg Intravenous Given 4/18/21 1849)   ondansetron (ZOFRAN) injection 4 mg (4 mg Intravenous Given 4/18/21 1848)       Diagnostic Studies  Results Reviewed     Procedure Component Value Units Date/Time    Urine Microscopic [[id number]]  (Abnormal) Collected: 04/18/21 1844    Lab Status: Final result Specimen: Urine, Other Updated: 04/18/21 1950     RBC, UA 1-2 /hpf      WBC, UA 2-4 /hpf      Epithelial Cells Occasional /hpf      Bacteria, UA Occasional /hpf     COVID19, Influenza A/B, RSV PCR, [facility] [[id number]]  (Normal) Collected: 04/18/21 1831    Lab Status: Final result Specimen: Nasopharyngeal Swab Updated: 04/18/21 1943     SARS-CoV-2 Negative     INFLUENZA A PCR Negative     INFLUENZA B PCR Negative     RSV PCR Negative    Narrative: This test has been authorized by FDA under an EUA (Emergency Use Assay) for use by authorized laboratories  Clinical caution and judgement should be used with the interpretation of these results with consideration of the clinical impression and other laboratory testing  Testing reported as "Positive" or "Negative" has been proven to be accurate according to standard laboratory validation requirements  All testing is performed with control materials showing appropriate reactivity at standard intervals  TSH [[id number]]  (Normal) Collected: 04/18/21 1839    Lab Status: Final result Specimen: Blood from Arm, Right Updated: 04/18/21 1912     TSH 3RD GENERATON 0 683 uIU/mL     Narrative:      Patients undergoing fluorescein dye angiography may retain small amounts of fluorescein in the body for 48-72 hours post procedure  Samples containing fluorescein can produce falsely depressed TSH values  If the patient had this procedure,a specimen should be resubmitted post fluorescein clearance        Troponin I [[id number]]  (Normal) Collected: 04/18/21 1839    Lab Status: Final result Specimen: Blood from Arm, Right Updated: 04/18/21 1905     Troponin I <0 02 ng/mL     Comprehensive metabolic panel [[id number]] Collected: 04/18/21 1839    Lab Status: Final result Specimen: Blood from Arm, Right Updated: 04/18/21 1901     Sodium 138 mmol/L      Potassium 3 5 mmol/L      Chloride 101 mmol/L      CO2 27 mmol/L      ANION GAP 10 mmol/L      BUN 7 mg/dL      Creatinine 0 61 mg/dL      Glucose 123 mg/dL      Calcium 8 7 mg/dL      AST 21 U/L      ALT 34 U/L      Alkaline Phosphatase 104 U/L      Total Protein 7 4 g/dL      Albumin 3 5 g/dL      Total Bilirubin 0 24 mg/dL      eGFR 116 ml/min/1 73sq m     Narrative:      [place] guidelines for Chronic Kidney Disease (CKD):     Stage 1 with normal or high GFR (GFR > 90 mL/min/1 73 square meters)    Stage 2 Mild CKD (GFR = 60-89 mL/min/1 73 square meters)    Stage 3A Moderate CKD (GFR = 45-59 mL/min/1 73 square meters)    Stage 3B Moderate CKD (GFR = 30-44 mL/min/1 73 square meters)    Stage 4 Severe CKD (GFR = 15-29 mL/min/1 73 square meters)    Stage 5 End Stage CKD (GFR <15 mL/min/1 73 square meters)  Note: GFR calculation is accurate only with a steady state creatinine    POCT urinalysis dipstick [[id number]]  (Abnormal) Resulted: 04/18/21 1846    Lab Status: Final result Specimen: Urine Updated: 04/18/21 1846    POCT pregnancy, urine [[id number]]  (Normal) Resulted: 04/18/21 1845    Lab Status: Final result Updated: 04/18/21 1846     EXT PREG TEST UR (Ref: Negative) negative     Control valid    Urine Macroscopic, POC [[id number]]  (Abnormal) Collected: 04/18/21 1844    Lab Status: Final result Specimen: Urine Updated: 04/18/21 1845     Color, UA Yellow     Clarity, UA Clear     pH, UA 5 5     Leukocytes, UA Trace     Nitrite, UA Negative     Protein, UA Negative mg/dl      Glucose, UA Negative mg/dl      Ketones, UA Negative mg/dl      Urobilinogen, UA 0 2 E U /dl      Bilirubin, UA Negative     Blood, UA Trace     Specific [place], UA 1 020    Narrative:      CLINITEK RESULT    CBC and differential [[id number]]  (Abnormal) Collected: 04/18/21 1839    Lab Status: Final result Specimen: Blood from Arm, Right Updated: 04/18/21 1844     WBC 5 03 Thousand/uL      RBC 4 68 Million/uL      Hemoglobin 10 7 g/dL      Hematocrit 36 1 %      MCV 77 fL      MCH 22 9 pg      MCHC 29 6 g/dL      RDW 17 6 %      MPV 11 0 fL      Platelets 487 Thousands/uL      nRBC 0 /100 WBCs      Neutrophils Relative 68 %      Immat GRANS % 0 %      Lymphocytes Relative 20 %      Monocytes Relative 10 %      Eosinophils Relative 1 %      Basophils Relative 1 %      Neutrophils Absolute 3 44 Thousands/µL      Immature Grans Absolute 0 02 Thousand/uL      Lymphocytes Absolute 1 00 Thousands/µL      Monocytes Absolute 0 48 Thousand/µL      Eosinophils Absolute 0 06 Thousand/µL      Basophils Absolute 0 03 Thousands/µL                  XR chest 1 view portable   ED Interpretation by Ronaldo Major PA-C (04/18 1214)   Preliminary read by myself: no acute cardiopulmonary disease  Final Result by Micaela Monaco MD (04/19 5716)      No acute cardiopulmonary disease  Workstation performed: [id number]                    Procedures  ECG 12 Lead Documentation Only    Date/Time: 4/18/2021 6:37 PM  Performed by: Ronaldo Major PA-C  Authorized by: Ronaldo Major PA-C     Indications / Diagnosis:  Palpitations  ECG reviewed by me, the ED Provider: yes (also Dr Bertrand Gave)    Patient location:  ED  Previous ECG:     Previous ECG:  Unavailable  Rate:     ECG rate:  68    ECG rate assessment: normal    Rhythm:     Rhythm: sinus rhythm    Ectopy:     Ectopy: none    QRS:     QRS axis:  Normal  Conduction:     Conduction: normal    ST segments:     ST segments:  Normal  T waves:     T waves: normal               ED Course  ED Course as of Apr 19 1035   Sun Apr 18, 2021   1838 68 bpm; NSR; No ST elevation, depression or T wave inversion     ECG 12 lead   1850 PREGNANCY TEST URINE: negative   1850 Will treat   Leukocytes, UA(!): Trace   1850 Nitrite, UA: Negative   1850 Blood, UA(!): Trace   1850 WBC: 5 03   1850 baseline   Hemoglobin(!): 10 7   1850 HCT: 36 1   1851 Platelet Count: 088   2412 NAD   XR chest 1 view portable   1901 Sodium: 138   1901 Potassium: 3 5   1901 Creatinine: 0 61   1902 Glucose, Random: 80   1918 TSH 3RD GENERATON: 0 683   1918 Troponin I: <0 02   1946 SARS-COV-2: Negative   1946 INFLU A PCR: Negative   1946 RSV PCR: Negative                                           MDM  Number of Diagnoses or Management Options  Fatigue:   Nausea:   UTI (urinary tract infection):   Diagnosis management comments: 38year old female presents for fatigue, nausea, diarrhea, palpitations  Labs unremarkable  Trace leuks and blood in urine  Will treat for UTI  Patient feels somewhat improved after IVF, zofran  I suspect the cause of this patient's diarrhea is most likely a self-limiting syndrome  There are no significant GI risk factors  The patient was counseled regarding the worsening signs look out for, as well as the possibility that this might be early presentation of more significant pathology  The patient understood this and indicated they would be able to be seen for followup and return if there are worsening signs or symptoms  The management plan was discussed in detail with the patient at bedside and all questions were answered  The prior to discharge, we provided both verbal and written instructions  We discussed with the patient the signs and symptoms for which to return to the emergency department  All questions were answered and patient was comfortable with the plan of care and discharged to home  Instructed the patient to follow up with the primary care provider and/or special as provided and their written instructions  The patient verbalized understanding of our discussion and plan of care, and agrees to return to the Emergency Department for concerns and progression of illness            Disposition  Final diagnoses:   Fatigue   UTI (urinary tract infection)   Nausea     Time reflects when diagnosis was documented in both MDM as applicable and the Disposition within this note     Time User Action Codes Description Comment    4/18/2021  7:47 PM [address] [R53 83] Fatigue     4/18/2021  7:47 PM Daphne Alegre Add [N39 0] UTI (urinary tract infection)     4/18/2021  7:48 PM Daphne Alegre Add [R11 0] Nausea       ED Disposition     ED Disposition Condition Date/Time Comment    Discharge Stable Sun Apr 18, 2021  7:46 PM [facility] discharge to home/self care              Follow-up Information     Follow up With Specialties Details Why Contact Info Additional Information    Conrad Savage, [address], Nurse Practitioner Schedule an appointment as soon as possible for a visit in 3 days  Felisa ySed   49  [phone]  [address] [facility] Emergency Department Emergency Medicine Go to  If symptoms worsen [facility] [phone]  [address] [facility] Emergency Department, [address] [facility] [place], [address] [facility], [phone]          Discharge Medication List as of 4/18/2021  7:49 PM      START taking these medications    Details   cephalexin (KEFLEX) 500 mg capsule Take 1 capsule (500 mg total) by mouth every 12 (twelve) hours for 7 days, Starting Sun 4/18/2021, Until Sun 4/25/2021, Normal      ondansetron (ZOFRAN-ODT) 4 mg disintegrating tablet Take 1 tablet (4 mg total) by mouth every 6 (six) hours as needed for nausea or vomiting, Starting Sun 4/18/2021, Normal         CONTINUE these medications which have NOT CHANGED    Details   ascorbic acid (VITAMIN C) 500 MG tablet Take 1 tablet (500 mg total) by mouth daily, Starting u 2/18/2021, Normal      ergocalciferol (VITAMIN D2) 50,000 units Take 1 capsule (50,000 Units total) by mouth once a week, Starting u 2/18/2021, Normal      ferrous sulfate 324 (65 Fe) mg Take 1 tablet (324 mg total) by mouth daily before breakfast, Starting u 2/18/2021, Normal      LORazepam (ATIVAN) 0 5 mg tablet Take 1 tablet by mouth 3 (three) times a day as needed, Starting Tue 8/29/2017, Historical Med      SYNTHROID 125 MCG tablet Take 150 mcg by mouth daily , Starting Mon 5/21/2018, Historical Med           No discharge procedures on file      PDMP Review       Value Time User    PDMP Reviewed  Yes 3/4/2021 10:32 AM Tosin Napoles PA-C          ED Provider  Electronically Signed by           Lea Mello PA-C  04/19/21 0128

## 2021-04-19 LAB
ATRIAL RATE: 68 BPM
P AXIS: 41 DEGREES
PR INTERVAL: 154 MS
QRS AXIS: 57 DEGREES
QRSD INTERVAL: 86 MS
QT INTERVAL: 382 MS
QTC INTERVAL: 406 MS
T WAVE AXIS: 36 DEGREES
VENTRICULAR RATE: 68 BPM

## 2021-04-19 PROCEDURE — 93010 ELECTROCARDIOGRAM REPORT: CPT | Performed by: INTERNAL MEDICINE

## 2021-04-28 ENCOUNTER — APPOINTMENT (OUTPATIENT)
Dept: LAB | Facility: HOSPITAL | Age: 36
End: 2021-04-28
Attending: INTERNAL MEDICINE
Payer: COMMERCIAL

## 2021-04-28 ENCOUNTER — CONSULT (OUTPATIENT)
Dept: GASTROENTEROLOGY | Facility: MEDICAL CENTER | Age: 36
End: 2021-04-28
Payer: COMMERCIAL

## 2021-04-28 VITALS
DIASTOLIC BLOOD PRESSURE: 80 MMHG | SYSTOLIC BLOOD PRESSURE: 140 MMHG | HEIGHT: 67 IN | WEIGHT: 228 LBS | HEART RATE: 84 BPM | BODY MASS INDEX: 35.79 KG/M2 | TEMPERATURE: 98 F

## 2021-04-28 DIAGNOSIS — R10.30 LOWER ABDOMINAL PAIN: ICD-10-CM

## 2021-04-28 DIAGNOSIS — D50.9 IRON DEFICIENCY ANEMIA, UNSPECIFIED IRON DEFICIENCY ANEMIA TYPE: ICD-10-CM

## 2021-04-28 DIAGNOSIS — R19.7 DIARRHEA, UNSPECIFIED TYPE: ICD-10-CM

## 2021-04-28 DIAGNOSIS — K58.0 IRRITABLE BOWEL SYNDROME WITH DIARRHEA: ICD-10-CM

## 2021-04-28 DIAGNOSIS — R19.7 DIARRHEA, UNSPECIFIED TYPE: Primary | ICD-10-CM

## 2021-04-28 PROCEDURE — 3008F BODY MASS INDEX DOCD: CPT | Performed by: ORTHOPAEDIC SURGERY

## 2021-04-28 PROCEDURE — 87209 SMEAR COMPLEX STAIN: CPT

## 2021-04-28 PROCEDURE — 99204 OFFICE O/P NEW MOD 45 MIN: CPT | Performed by: INTERNAL MEDICINE

## 2021-04-28 PROCEDURE — 87177 OVA AND PARASITES SMEARS: CPT

## 2021-04-28 PROCEDURE — 83993 ASSAY FOR CALPROTECTIN FECAL: CPT

## 2021-04-28 RX ORDER — MAGNESIUM 30 MG
30 TABLET ORAL 2 TIMES DAILY
COMMUNITY
End: 2021-05-18 | Stop reason: ALTCHOICE

## 2021-04-28 NOTE — PROGRESS NOTES
[facility] - Outpatient Consultation  [facility] 38 y o  female MRN: [id number]  Encounter: [id number]    HPI:    [facility] is a 38 y o  female who presents with multiple GI and constitutional complaints  She has had symptoms for 17 years, but overall there has been significant worsening over the past 6-12 months  She has a diagnosis of IBS-D but has not had a GI workup to exclude other diseases  Currently, she has has 3 to 12 BMs per day with significant urgency and frequent incontinence  Stool is watery or loose and non-bloody  There is mucus in her stool  She has lower abdominal cramping pain  She also has gas, bloating, GERD, early satiety, chronic fatigue, and generalized weakness  She has had iron deficiency anemia for over 2 years and has not been able to tolerate PO iron due to GI distress  She has not had IV iron  She is also vitamin D deficient  She has Hashimoto's thyroiditis and is on synthroid  She also has diffuse demetrice aches and recently saw a rheumatologist who diagnosed fibromyalgia  She reports hand and foot swelling  Recently, she also developed sores in the groin area and axilla and will be seeing a dermatologist tomorrow for possible hydradenitis  No mouth ulcers or skin rashes  No eye inflammation  She recently started taking Mg and K supplements for muscle cramping  She drinks milk and takes Tums to help with GERD symptoms  No NSAID use  Recent weight gain  No IBD in the family  Grandfather with colon cancer  Recent Hgb 10 7, MCV 77  Low IgA  Celiac negative (TTG IgG)  CRP 16 9  She has never had endoscopic evaluation or cross-sectional imaging or stool testing  REVIEW OF SYSTEMS:  CONSTITUTIONAL: Denies any fever, chills, rigors, and weight loss  +Fatigue  HEENT: No earache or tinnitus  Denies hearing loss or visual disturbances  CARDIOVASCULAR: No chest pain or palpitations     RESPIRATORY: Denies any cough, hemoptysis, shortness of breath or dyspnea on exertion  GASTROINTESTINAL: As noted in the History of Present Illness  GENITOURINARY: No problems with urination  Denies any hematuria or dysuria  NEUROLOGIC: No dizziness or vertigo  +Headaches  MUSCULOSKELETAL: +Mylagia  SKIN: Denies skin rashes or itching  See HPI re  possible hydradenitis  ENDOCRINE: Denies excessive thirst  Denies intolerance to heat or cold  PSYCHOSOCIAL: Denies depression or anxiety  Denies any recent memory loss       Historical Information   Past Medical History:   Diagnosis Date    Anxiety     Depression     Disease of thyroid gland     Gestational diabetes     Gestational hypertension     previous pregnancy    Hashimoto's thyroiditis     HPV (human papilloma virus) infection     Retained placenta      Past Surgical History:   Procedure Laterality Date    CHOLECYSTECTOMY      DILATION AND CURETTAGE OF UTERUS      HERNIA REPAIR      UMBILICAL HERNIA REPAIR       Social History   Social History     Substance and Sexual Activity   Alcohol Use No     Social History     Substance and Sexual Activity   Drug Use No     Social History     Tobacco Use   Smoking Status Never Smoker   Smokeless Tobacco Never Used     Family History   Problem Relation Age of Onset    Anuerysm Mother     Thyroid disease Mother     Hepatitis Mother     Lung cancer Maternal Grandmother     Colon cancer Maternal Grandfather        Meds/Allergies       Current Outpatient Medications:     ascorbic acid (VITAMIN C) 500 MG tablet    ergocalciferol (VITAMIN D2) 50,000 units    ferrous sulfate 324 (65 Fe) mg    magnesium 30 MG tablet    SYNTHROID 125 MCG tablet    LORazepam (ATIVAN) 0 5 mg tablet    ondansetron (ZOFRAN-ODT) 4 mg disintegrating tablet    Allergies   Allergen Reactions    Bactrim [Sulfamethoxazole-Trimethoprim] Hives    Other Hives     seafood    Shellfish-Derived Products - Food Allergy     Sulfamethoxazole Hives    Trimethoprim Hives       Objective   Blood pressure 140/80, pulse 84, temperature 98 °F (36 7 °C), temperature source Tympanic, height 5' 7" (1 702 m), weight 103 kg (228 lb), last menstrual period 04/10/2021, not currently breastfeeding  Body mass index is 35 71 kg/m²  PHYSICAL EXAM:    General Appearance:   Alert, cooperative, no distress   HEENT:   Normocephalic, atraumatic, anicteric  Neck:  Supple, symmetrical, trachea midline   Lungs:   Clear to auscultation bilaterally; no rales, rhonchi or wheezing; respirations unlabored    Heart[de-identified]   Regular rate and rhythm; no murmur, rub, or gallop  Abdomen:   Soft, epigastric and lower abdominal ttp, non-distended; normal bowel sounds; no masses, no organomegaly    Genitalia:   Deferred    Rectal:   Deferred    Extremities:  No cyanosis, clubbing or edema    Pulses:  2+ and symmetric    Skin:  No jaundice, rashes, or lesions    Lymph nodes:  No palpable cervical lymphadenopathy        Lab Results:   No visits with results within 1 Day(s) from this visit     Latest known visit with results is:   Admission on 04/18/2021, Discharged on 04/18/2021   Component Date Value    Sodium 04/18/2021 138     Potassium 04/18/2021 3 5     Chloride 04/18/2021 101     CO2 04/18/2021 27     ANION GAP 04/18/2021 10     BUN 04/18/2021 7     Creatinine 04/18/2021 0 61     Glucose 04/18/2021 123     Calcium 04/18/2021 8 7     AST 04/18/2021 21     ALT 04/18/2021 34     Alkaline Phosphatase 04/18/2021 104     Total Protein 04/18/2021 7 4     Albumin 04/18/2021 3 5     Total Bilirubin 04/18/2021 0 24     eGFR 04/18/2021 116     WBC 04/18/2021 5 03     RBC 04/18/2021 4 68     Hemoglobin 04/18/2021 10 7*    Hematocrit 04/18/2021 36 1     MCV 04/18/2021 77*    MCH 04/18/2021 22 9*    MCHC 04/18/2021 29 6*    RDW 04/18/2021 17 6*    MPV 04/18/2021 11 0     Platelets 53/22/5824 178     nRBC 04/18/2021 0     Neutrophils Relative 04/18/2021 68     Immat GRANS % 04/18/2021 0     Lymphocytes Relative 04/18/2021 20     Monocytes Relative 04/18/2021 10     Eosinophils Relative 04/18/2021 1     Basophils Relative 04/18/2021 1     Neutrophils Absolute 04/18/2021 3 44     Immature Grans Absolute 04/18/2021 0 02     Lymphocytes Absolute 04/18/2021 1 00     Monocytes Absolute 04/18/2021 0 48     Eosinophils Absolute 04/18/2021 0 06     Basophils Absolute 04/18/2021 0 03     Troponin I 04/18/2021 <0 02     TSH 3RD GENERATON 04/18/2021 0 683     SARS-CoV-2 04/18/2021 Negative     INFLUENZA A PCR 04/18/2021 Negative     INFLUENZA B PCR 04/18/2021 Negative     RSV PCR 04/18/2021 Negative     EXT PREG TEST UR (Ref: N* 04/18/2021 negative     Control 04/18/2021 valid     Color, UA 04/18/2021 Yellow     Clarity, UA 04/18/2021 Clear     pH, UA 04/18/2021 5 5     Leukocytes, UA 04/18/2021 Trace*    Nitrite, UA 04/18/2021 Negative     Protein, UA 04/18/2021 Negative     Glucose, UA 04/18/2021 Negative     Ketones, UA 04/18/2021 Negative     Urobilinogen, UA 04/18/2021 0 2     Bilirubin, UA 04/18/2021 Negative     Blood, UA 04/18/2021 Trace*    Specific [place], UA 04/18/2021 1 020     RBC, UA 04/18/2021 1-2*    WBC, UA 04/18/2021 2-4     Epithelial Cells 04/18/2021 Occasional     Bacteria, UA 04/18/2021 Occasional     Ventricular Rate 04/18/2021 68     Atrial Rate 04/18/2021 68     IN Interval 04/18/2021 154     QRSD Interval 04/18/2021 86     QT Interval 04/18/2021 382     QTC Interval 04/18/2021 406     P Axis 04/18/2021 41     QRS Axis 04/18/2021 57     T Wave Axis 04/18/2021 36        Lab Results   Component Value Date    WBC 5 03 04/18/2021    HGB 10 7 (L) 04/18/2021    HCT 36 1 04/18/2021    MCV 77 (L) 04/18/2021     04/18/2021       Lab Results   Component Value Date    SODIUM 138 04/18/2021    K 3 5 04/18/2021     04/18/2021    CO2 27 04/18/2021    AGAP 10 04/18/2021    BUN 7 04/18/2021    CREATININE 0 61 04/18/2021    GLUC 123 04/18/2021    GLUF 123 (H) 02/16/2021    CALCIUM 8 7 04/18/2021    AST 21 04/18/2021    ALT 34 04/18/2021    ALKPHOS 104 04/18/2021    TP 7 4 04/18/2021    TBILI 0 24 04/18/2021    EGFR 116 04/18/2021       Lab Results   Component Value Date    CRP 16 9 (H) 02/16/2021       Lab Results   Component Value Date    [id number] 0 683 04/18/2021       Lab Results   Component Value Date    IRON 23 (L) 07/29/2020    TIBC 509 (H) 07/29/2020    FERRITIN 4 (L) 07/29/2020       Radiology Results:   Xr Chest 1 View Portable    Result Date: 4/19/2021  Narrative: CHEST INDICATION:   palpitations  COMPARISON:  8/8/2020 EXAM PERFORMED/VIEWS:  XR CHEST PORTABLE FINDINGS: Cardiomediastinal silhouette appears unremarkable  The lungs are clear  No pneumothorax or pleural effusion  Osseous structures appear within normal limits for patient age  Impression: No acute cardiopulmonary disease  Workstation performed: [id number]       ______________________________________________________________________  ASSESSMENT AND PLAN:    Gabino Zepeda is a 38 y o  female with long-standing diarrhea, lower abdominal pain, and iron deficiency  Recently with worsening of symptoms and possible hydradenitis  She also has GERD and early satiety  Told she has IBS but no appropriate workup done to rule out other causes  Clearly, IBD is a real possibility  It is possible that Mg oxide supplements and dairy consumption are contributing to her symptoms  She is also IgA deficient; infectious causes should also be ruled out  I discussed with her the possibility of IBD and we will do the following   - Stool testing for C diff, other bacteria, o&p  - Check stool calprotectin  - Schedule EGD and colonoscopy  - I asked her to stop Mg supplements and try dairy-free diet  - We will plan IV iron in the near   - Small bowel imaging if EGD and colonoscopy are negative  Return in 3 months after her procedures    If there is IBD, we will likely start biologic therapy  I discussed the risks of bleeding, infection, and perforation associated with endoscopic procedures  1  Diarrhea, unspecified type    2  Irritable bowel syndrome with diarrhea    3  Lower abdominal pain    4   Iron deficiency anemia, unspecified iron deficiency anemia type        Orders Placed This Encounter   Procedures    Clostridium difficile toxin by PCR    Ova and parasite examination    Stool culture    Calprotectin,Fecal    Colonoscopy    EGD

## 2021-04-28 NOTE — H&P (VIEW-ONLY)
[facility] - Outpatient Consultation  [facility] 38 y o  female MRN: [id number]  Encounter: [id number]    HPI:    [facility] is a 38 y o  female who presents with multiple GI and constitutional complaints  She has had symptoms for 17 years, but overall there has been significant worsening over the past 6-12 months  She has a diagnosis of IBS-D but has not had a GI workup to exclude other diseases  Currently, she has has 3 to 12 BMs per day with significant urgency and frequent incontinence  Stool is watery or loose and non-bloody  There is mucus in her stool  She has lower abdominal cramping pain  She also has gas, bloating, GERD, early satiety, chronic fatigue, and generalized weakness  She has had iron deficiency anemia for over 2 years and has not been able to tolerate PO iron due to GI distress  She has not had IV iron  She is also vitamin D deficient  She has Hashimoto's thyroiditis and is on synthroid  She also has diffuse demetrice aches and recently saw a rheumatologist who diagnosed fibromyalgia  She reports hand and foot swelling  Recently, she also developed sores in the groin area and axilla and will be seeing a dermatologist tomorrow for possible hydradenitis  No mouth ulcers or skin rashes  No eye inflammation  She recently started taking Mg and K supplements for muscle cramping  She drinks milk and takes Tums to help with GERD symptoms  No NSAID use  Recent weight gain  No IBD in the family  Grandfather with colon cancer  Recent Hgb 10 7, MCV 77  Low IgA  Celiac negative (TTG IgG)  CRP 16 9  She has never had endoscopic evaluation or cross-sectional imaging or stool testing  REVIEW OF SYSTEMS:  CONSTITUTIONAL: Denies any fever, chills, rigors, and weight loss  +Fatigue  HEENT: No earache or tinnitus  Denies hearing loss or visual disturbances  CARDIOVASCULAR: No chest pain or palpitations     RESPIRATORY: Denies any cough, hemoptysis, shortness of breath or dyspnea on exertion  GASTROINTESTINAL: As noted in the History of Present Illness  GENITOURINARY: No problems with urination  Denies any hematuria or dysuria  NEUROLOGIC: No dizziness or vertigo  +Headaches  MUSCULOSKELETAL: +Mylagia  SKIN: Denies skin rashes or itching  See HPI re  possible hydradenitis  ENDOCRINE: Denies excessive thirst  Denies intolerance to heat or cold  PSYCHOSOCIAL: Denies depression or anxiety  Denies any recent memory loss       Historical Information   Past Medical History:   Diagnosis Date    Anxiety     Depression     Disease of thyroid gland     Gestational diabetes     Gestational hypertension     previous pregnancy    Hashimoto's thyroiditis     HPV (human papilloma virus) infection     Retained placenta      Past Surgical History:   Procedure Laterality Date    CHOLECYSTECTOMY      DILATION AND CURETTAGE OF UTERUS      HERNIA REPAIR      UMBILICAL HERNIA REPAIR       Social History   Social History     Substance and Sexual Activity   Alcohol Use No     Social History     Substance and Sexual Activity   Drug Use No     Social History     Tobacco Use   Smoking Status Never Smoker   Smokeless Tobacco Never Used     Family History   Problem Relation Age of Onset    Anuerysm Mother     Thyroid disease Mother     Hepatitis Mother     Lung cancer Maternal Grandmother     Colon cancer Maternal Grandfather        Meds/Allergies       Current Outpatient Medications:     ascorbic acid (VITAMIN C) 500 MG tablet    ergocalciferol (VITAMIN D2) 50,000 units    ferrous sulfate 324 (65 Fe) mg    magnesium 30 MG tablet    SYNTHROID 125 MCG tablet    LORazepam (ATIVAN) 0 5 mg tablet    ondansetron (ZOFRAN-ODT) 4 mg disintegrating tablet    Allergies   Allergen Reactions    Bactrim [Sulfamethoxazole-Trimethoprim] Hives    Other Hives     seafood    Shellfish-Derived Products - Food Allergy     Sulfamethoxazole Hives    Trimethoprim Hives       Objective   Blood pressure 140/80, pulse 84, temperature 98 °F (36 7 °C), temperature source Tympanic, height 5' 7" (1 702 m), weight 103 kg (228 lb), last menstrual period 04/10/2021, not currently breastfeeding  Body mass index is 35 71 kg/m²  PHYSICAL EXAM:    General Appearance:   Alert, cooperative, no distress   HEENT:   Normocephalic, atraumatic, anicteric  Neck:  Supple, symmetrical, trachea midline   Lungs:   Clear to auscultation bilaterally; no rales, rhonchi or wheezing; respirations unlabored    Heart[de-identified]   Regular rate and rhythm; no murmur, rub, or gallop  Abdomen:   Soft, epigastric and lower abdominal ttp, non-distended; normal bowel sounds; no masses, no organomegaly    Genitalia:   Deferred    Rectal:   Deferred    Extremities:  No cyanosis, clubbing or edema    Pulses:  2+ and symmetric    Skin:  No jaundice, rashes, or lesions    Lymph nodes:  No palpable cervical lymphadenopathy        Lab Results:   No visits with results within 1 Day(s) from this visit     Latest known visit with results is:   Admission on 04/18/2021, Discharged on 04/18/2021   Component Date Value    Sodium 04/18/2021 138     Potassium 04/18/2021 3 5     Chloride 04/18/2021 101     CO2 04/18/2021 27     ANION GAP 04/18/2021 10     BUN 04/18/2021 7     Creatinine 04/18/2021 0 61     Glucose 04/18/2021 123     Calcium 04/18/2021 8 7     AST 04/18/2021 21     ALT 04/18/2021 34     Alkaline Phosphatase 04/18/2021 104     Total Protein 04/18/2021 7 4     Albumin 04/18/2021 3 5     Total Bilirubin 04/18/2021 0 24     eGFR 04/18/2021 116     WBC 04/18/2021 5 03     RBC 04/18/2021 4 68     Hemoglobin 04/18/2021 10 7*    Hematocrit 04/18/2021 36 1     MCV 04/18/2021 77*    MCH 04/18/2021 22 9*    MCHC 04/18/2021 29 6*    RDW 04/18/2021 17 6*    MPV 04/18/2021 11 0     Platelets 12/54/9148 178     nRBC 04/18/2021 0     Neutrophils Relative 04/18/2021 68     Immat GRANS % 04/18/2021 0     Lymphocytes Relative 04/18/2021 20     Monocytes Relative 04/18/2021 10     Eosinophils Relative 04/18/2021 1     Basophils Relative 04/18/2021 1     Neutrophils Absolute 04/18/2021 3 44     Immature Grans Absolute 04/18/2021 0 02     Lymphocytes Absolute 04/18/2021 1 00     Monocytes Absolute 04/18/2021 0 48     Eosinophils Absolute 04/18/2021 0 06     Basophils Absolute 04/18/2021 0 03     Troponin I 04/18/2021 <0 02     TSH 3RD GENERATON 04/18/2021 0 683     SARS-CoV-2 04/18/2021 Negative     INFLUENZA A PCR 04/18/2021 Negative     INFLUENZA B PCR 04/18/2021 Negative     RSV PCR 04/18/2021 Negative     EXT PREG TEST UR (Ref: N* 04/18/2021 negative     Control 04/18/2021 valid     Color, UA 04/18/2021 Yellow     Clarity, UA 04/18/2021 Clear     pH, UA 04/18/2021 5 5     Leukocytes, UA 04/18/2021 Trace*    Nitrite, UA 04/18/2021 Negative     Protein, UA 04/18/2021 Negative     Glucose, UA 04/18/2021 Negative     Ketones, UA 04/18/2021 Negative     Urobilinogen, UA 04/18/2021 0 2     Bilirubin, UA 04/18/2021 Negative     Blood, UA 04/18/2021 Trace*    Specific [place], UA 04/18/2021 1 020     RBC, UA 04/18/2021 1-2*    WBC, UA 04/18/2021 2-4     Epithelial Cells 04/18/2021 Occasional     Bacteria, UA 04/18/2021 Occasional     Ventricular Rate 04/18/2021 68     Atrial Rate 04/18/2021 68     SC Interval 04/18/2021 154     QRSD Interval 04/18/2021 86     QT Interval 04/18/2021 382     QTC Interval 04/18/2021 406     P Axis 04/18/2021 41     QRS Axis 04/18/2021 57     T Wave Axis 04/18/2021 36        Lab Results   Component Value Date    WBC 5 03 04/18/2021    HGB 10 7 (L) 04/18/2021    HCT 36 1 04/18/2021    MCV 77 (L) 04/18/2021     04/18/2021       Lab Results   Component Value Date    SODIUM 138 04/18/2021    K 3 5 04/18/2021     04/18/2021    CO2 27 04/18/2021    AGAP 10 04/18/2021    BUN 7 04/18/2021    CREATININE 0 61 04/18/2021    GLUC 123 04/18/2021    GLUF 123 (H) 02/16/2021    CALCIUM 8 7 04/18/2021    AST 21 04/18/2021    ALT 34 04/18/2021    ALKPHOS 104 04/18/2021    TP 7 4 04/18/2021    TBILI 0 24 04/18/2021    EGFR 116 04/18/2021       Lab Results   Component Value Date    CRP 16 9 (H) 02/16/2021       Lab Results   Component Value Date    [id number] 0 683 04/18/2021       Lab Results   Component Value Date    IRON 23 (L) 07/29/2020    TIBC 509 (H) 07/29/2020    FERRITIN 4 (L) 07/29/2020       Radiology Results:   Xr Chest 1 View Portable    Result Date: 4/19/2021  Narrative: CHEST INDICATION:   palpitations  COMPARISON:  8/8/2020 EXAM PERFORMED/VIEWS:  XR CHEST PORTABLE FINDINGS: Cardiomediastinal silhouette appears unremarkable  The lungs are clear  No pneumothorax or pleural effusion  Osseous structures appear within normal limits for patient age  Impression: No acute cardiopulmonary disease  Workstation performed: [id number]       ______________________________________________________________________  ASSESSMENT AND PLAN:    oTm Saini is a 38 y o  female with long-standing diarrhea, lower abdominal pain, and iron deficiency  Recently with worsening of symptoms and possible hydradenitis  She also has GERD and early satiety  Told she has IBS but no appropriate workup done to rule out other causes  Clearly, IBD is a real possibility  It is possible that Mg oxide supplements and dairy consumption are contributing to her symptoms  She is also IgA deficient; infectious causes should also be ruled out  I discussed with her the possibility of IBD and we will do the following   - Stool testing for C diff, other bacteria, o&p  - Check stool calprotectin  - Schedule EGD and colonoscopy  - I asked her to stop Mg supplements and try dairy-free diet  - We will plan IV iron in the near   - Small bowel imaging if EGD and colonoscopy are negative  Return in 3 months after her procedures    If there is IBD, we will likely start biologic therapy  I discussed the risks of bleeding, infection, and perforation associated with endoscopic procedures  1  Diarrhea, unspecified type    2  Irritable bowel syndrome with diarrhea    3  Lower abdominal pain    4   Iron deficiency anemia, unspecified iron deficiency anemia type        Orders Placed This Encounter   Procedures    Clostridium difficile toxin by PCR    Ova and parasite examination    Stool culture    Calprotectin,Fecal    Colonoscopy    EGD

## 2021-04-28 NOTE — PATIENT INSTRUCTIONS
The patient is scheduled at [place] for a colon/egd with Dr Miah Hudson on 5/18/2021  miralax/dulcolax prep instructions have been gone over in the office, with the patient, by the MA  The patient is aware that they will receive a call with the arrival time the day prior to procedure and that they will need a  the day of the procedure   I have asked the patient to call with any questions that they might have prior to procedure

## 2021-04-29 ENCOUNTER — OCCMED (OUTPATIENT)
Dept: URGENT CARE | Facility: MEDICAL CENTER | Age: 36
End: 2021-04-29

## 2021-04-29 ENCOUNTER — TRANSCRIBE ORDERS (OUTPATIENT)
Dept: LAB | Facility: HOSPITAL | Age: 36
End: 2021-04-29

## 2021-04-29 DIAGNOSIS — R10.30 LOWER ABDOMINAL PAIN: ICD-10-CM

## 2021-04-29 DIAGNOSIS — R19.7 DIARRHEA, UNSPECIFIED TYPE: Primary | ICD-10-CM

## 2021-04-29 DIAGNOSIS — R19.7 DIARRHEA OF PRESUMED INFECTIOUS ORIGIN: ICD-10-CM

## 2021-04-29 DIAGNOSIS — R10.30 INGUINAL PAIN, UNSPECIFIED LATERALITY: ICD-10-CM

## 2021-04-29 DIAGNOSIS — Z11.59 SPECIAL SCREENING EXAMINATION FOR UNSPECIFIED VIRAL DISEASE: Primary | ICD-10-CM

## 2021-04-29 LAB — O+P STL CONC: NORMAL

## 2021-04-29 PROCEDURE — 87493 C DIFF AMPLIFIED PROBE: CPT

## 2021-04-29 PROCEDURE — U0005 INFEC AGEN DETEC AMPLI PROBE: HCPCS

## 2021-04-29 PROCEDURE — U0003 INFECTIOUS AGENT DETECTION BY NUCLEIC ACID (DNA OR RNA); SEVERE ACUTE RESPIRATORY SYNDROME CORONAVIRUS 2 (SARS-COV-2) (CORONAVIRUS DISEASE [COVID-19]), AMPLIFIED PROBE TECHNIQUE, MAKING USE OF HIGH THROUGHPUT TECHNOLOGIES AS DESCRIBED BY CMS-2020-01-R: HCPCS

## 2021-04-29 PROCEDURE — 87505 NFCT AGENT DETECTION GI: CPT

## 2021-04-30 LAB
C DIFF TOX B TCDB STL QL NAA+PROBE: NEGATIVE
CALPROTECTIN STL-MCNT: 25 UG/G (ref 0–120)
CAMPYLOBACTER DNA SPEC NAA+PROBE: NORMAL
SALMONELLA DNA SPEC QL NAA+PROBE: NORMAL
SARS-COV-2 RNA RESP QL NAA+PROBE: NEGATIVE
SHIGA TOXIN STX GENE SPEC NAA+PROBE: NORMAL
SHIGELLA DNA SPEC QL NAA+PROBE: NORMAL

## 2021-05-14 ENCOUNTER — HOSPITAL ENCOUNTER (EMERGENCY)
Facility: HOSPITAL | Age: 36
Discharge: HOME | End: 2021-05-15
Attending: EMERGENCY MEDICINE | Admitting: EMERGENCY MEDICINE
Payer: COMMERCIAL

## 2021-05-14 VITALS
HEART RATE: 62 BPM | OXYGEN SATURATION: 100 % | WEIGHT: 231.26 LBS | DIASTOLIC BLOOD PRESSURE: 74 MMHG | TEMPERATURE: 98.1 F | SYSTOLIC BLOOD PRESSURE: 129 MMHG | BODY MASS INDEX: 36.22 KG/M2 | RESPIRATION RATE: 22 BRPM

## 2021-05-14 DIAGNOSIS — S16.1XXA STRAIN OF NECK MUSCLE, INITIAL ENCOUNTER: Primary | ICD-10-CM

## 2021-05-14 PROCEDURE — 99284 EMERGENCY DEPT VISIT MOD MDM: CPT | Performed by: EMERGENCY MEDICINE

## 2021-05-14 PROCEDURE — 99283 EMERGENCY DEPT VISIT LOW MDM: CPT

## 2021-05-14 RX ORDER — KETOROLAC TROMETHAMINE 30 MG/ML
30 INJECTION, SOLUTION INTRAMUSCULAR; INTRAVENOUS ONCE
Status: COMPLETED | OUTPATIENT
Start: 2021-05-15 | End: 2021-05-15

## 2021-05-14 NOTE — Clinical Note
Tiffanie Overton was seen and treated in our emergency department on 5/14/2021  Diagnosis: Acute cervical strain    Alice Adair  may return to work on return date  She may return on this date: 05/17/2021         If you have any questions or concerns, please don't hesitate to call        Sergei Bowie MD    ______________________________           _______________          _______________  Hospital Representative                              Date                                Time

## 2021-05-15 PROCEDURE — 96372 THER/PROPH/DIAG INJ SC/IM: CPT

## 2021-05-15 RX ORDER — SERTRALINE HYDROCHLORIDE 100 MG/1
100 TABLET, FILM COATED ORAL DAILY
COMMUNITY
Start: 2021-02-15 | End: 2022-01-25

## 2021-05-15 RX ORDER — NAPROXEN 500 MG/1
500 TABLET ORAL 2 TIMES DAILY WITH MEALS
Qty: 20 TABLET | Refills: 0 | Status: SHIPPED | OUTPATIENT
Start: 2021-05-15 | End: 2021-10-06

## 2021-05-15 RX ORDER — METHOCARBAMOL 500 MG/1
500 TABLET, FILM COATED ORAL 2 TIMES DAILY
Qty: 20 TABLET | Refills: 0 | Status: SHIPPED | OUTPATIENT
Start: 2021-05-15 | End: 2021-10-06

## 2021-05-15 RX ADMIN — KETOROLAC TROMETHAMINE 30 MG: 30 INJECTION, SOLUTION INTRAMUSCULAR; INTRAVENOUS at 00:09

## 2021-05-15 NOTE — DISCHARGE INSTRUCTIONS
Take the naprosyn twice daily for the next 10 days  Use the Robaxin as needed for muscle spasm  Use an electric heating pad over your sore muscles, 4-5 times daily, 20 minutes each time  Make sure to drink plenty of fluids

## 2021-05-16 NOTE — ED PROVIDER NOTES
History  Chief Complaint   Patient presents with    Neck Pain     Pt states she was at park with children notes pain on right side of neck radiating to posterior shoulder  Today increasing pain unable to work  39 YO female presents for evaluation of neck pain  Pt states pain began recently while she was playing with her children, mild at first, this has worsened to the point where she was not able to continue work tonight  Pt states the pain is aching, primarily on the Right, some on the Left, with radiation to the B/L shoulders  She denies weakness or numbness in the extremities  She denies similar in the past  Pt denies CP/SOB/F/C/N/V/D/C, no dysuria, burning on urination or blood in urine  History provided by:  Patient   used: No    Neck Pain  Pain location:  L side and R side  Quality:  Stiffness and aching  Pain radiates to:  L shoulder and R shoulder  Pain severity:  Moderate  Onset quality:  Gradual  Timing:  Constant  Progression:  Worsening  Chronicity:  New  Relieved by:  Nothing  Worsened by:  Position  Ineffective treatments:  Analgesics  Associated symptoms: no chest pain, no fever, no headaches, no numbness, no tingling and no weakness        Prior to Admission Medications   Prescriptions Last Dose Informant Patient Reported? Taking?    LORazepam (ATIVAN) 0 5 mg tablet   Yes No   Sig: Take 1 tablet by mouth 3 (three) times a day as needed   SYNTHROID 125 MCG tablet 5/14/2021  Yes No   Sig: Take 150 mcg by mouth daily    ascorbic acid (VITAMIN C) 500 MG tablet 5/13/2021  No No   Sig: Take 1 tablet (500 mg total) by mouth daily   ergocalciferol (VITAMIN D2) 50,000 units 5/14/2021  No No   Sig: Take 1 capsule (50,000 Units total) by mouth once a week   ferrous sulfate 324 (65 Fe) mg 5/12/2021  No No   Sig: Take 1 tablet (324 mg total) by mouth daily before breakfast   magnesium 30 MG tablet 5/13/2021  Yes No   Sig: Take 30 mg by mouth 2 (two) times a day   ondansetron (ZOFRAN-ODT) 4 mg disintegrating tablet   No No   Sig: Take 1 tablet (4 mg total) by mouth every 6 (six) hours as needed for nausea or vomiting   sertraline (ZOLOFT) 100 mg tablet 5/13/2021 at Unknown time  Yes Yes   Sig: Take 100 mg by mouth daily      Facility-Administered Medications: None       Past Medical History:   Diagnosis Date    Anxiety     Crohn's disease ([address] )     Depression     Disease of thyroid gland     Fibromyalgia     Gestational diabetes     Gestational hypertension     previous pregnancy    Hashimoto's thyroiditis     HPV (human papilloma virus) infection     Retained placenta        Past Surgical History:   Procedure Laterality Date    CHOLECYSTECTOMY      DILATION AND CURETTAGE OF UTERUS      HERNIA REPAIR      UMBILICAL HERNIA REPAIR         Family History   Problem Relation Age of Onset    Anuerysm Mother     Thyroid disease Mother     Hepatitis Mother     Lung cancer Maternal Grandmother     Colon cancer Maternal Grandfather      I have reviewed and agree with the history as documented  E-Cigarette/Vaping    E-Cigarette Use Never User      E-Cigarette/Vaping Substances     Social History     Tobacco Use    Smoking status: Never Smoker    Smokeless tobacco: Never Used   Substance Use Topics    Alcohol use: No    Drug use: No       Review of Systems   Constitutional: Negative for chills, fatigue and fever  HENT: Negative for dental problem  Eyes: Negative for visual disturbance  Respiratory: Negative for shortness of breath  Cardiovascular: Negative for chest pain  Gastrointestinal: Negative for abdominal pain, diarrhea and vomiting  Genitourinary: Negative for dysuria and frequency  Musculoskeletal: Positive for neck pain  Negative for arthralgias  Skin: Negative for rash  Neurological: Negative for dizziness, tingling, weakness, light-headedness, numbness and headaches     Psychiatric/Behavioral: Negative for agitation, behavioral problems and confusion  All other systems reviewed and are negative  Physical Exam  Physical Exam  Vitals signs and nursing note reviewed  Constitutional:       Appearance: Normal appearance  HENT:      Head: Normocephalic and atraumatic  Eyes:      Extraocular Movements: Extraocular movements intact  Conjunctiva/sclera: Conjunctivae normal    Neck:      Musculoskeletal: Normal range of motion  No neck rigidity  Comments: Tenderness with hypertrophic musculature over the B/L shoulders  Cardiovascular:      Rate and Rhythm: Normal rate  Pulmonary:      Effort: Pulmonary effort is normal    Abdominal:      General: There is no distension  Musculoskeletal: Normal range of motion  Skin:     Findings: No rash  Neurological:      General: No focal deficit present  Mental Status: She is alert  Cranial Nerves: No cranial nerve deficit  Psychiatric:         Mood and Affect: Mood normal          Vital Signs  ED Triage Vitals   Temperature Pulse Respirations Blood Pressure SpO2   05/14/21 2238 05/14/21 2238 05/14/21 2238 05/14/21 2238 05/14/21 2238   98 1 °F (36 7 °C) 62 22 129/74 100 %      Temp Source Heart Rate Source Patient Position - Orthostatic VS BP Location FiO2 (%)   05/14/21 2238 05/14/21 2238 05/14/21 2238 05/14/21 2238 --   Oral Monitor Sitting Right arm       Pain Score       05/15/21 0009       7           Vitals:    05/14/21 2238   BP: 129/74   Pulse: 62   Patient Position - Orthostatic VS: Sitting         Visual Acuity      ED Medications  Medications   ketorolac (TORADOL) injection 30 mg (30 mg Intramuscular Given 5/15/21 0009)       Diagnostic Studies  Results Reviewed     None                 No orders to display              Procedures  Procedures         ED Course                                           MDM  Number of Diagnoses or Management Options  Strain of neck muscle, initial encounter: new and requires workup  Diagnosis management comments: 1   Neck pain - Pt with exam consistent with acute cervical strain  She denies weakness or numbness  Will prescribe NSAIDs, muscle relaxer, instruct pt to apply heat  Patient Progress  Patient progress: stable      Disposition  Final diagnoses:   Strain of neck muscle, initial encounter     Time reflects when diagnosis was documented in both MDM as applicable and the Disposition within this note     Time User Action Codes Description Comment    5/15/2021 12:03 AM Kolby ZEPEDA Add [S16  1XXA] Strain of neck muscle, initial encounter       ED Disposition     ED Disposition Condition Date/Time Comment    Discharge Stable Sat May 15, 2021 12:03 AM Chencho Guevara discharge to home/self care              Follow-up Information    None         Discharge Medication List as of 5/15/2021 12:07 AM      START taking these medications    Details   methocarbamol (ROBAXIN) 500 mg tablet Take 1 tablet (500 mg total) by mouth 2 (two) times a day, Starting Sat 5/15/2021, Normal      naproxen (NAPROSYN) 500 mg tablet Take 1 tablet (500 mg total) by mouth 2 (two) times a day with meals for 10 days, Starting Sat 5/15/2021, Until Tue 5/25/2021, Normal         CONTINUE these medications which have NOT CHANGED    Details   ascorbic acid (VITAMIN C) 500 MG tablet Take 1 tablet (500 mg total) by mouth daily, Starting Thu 2/18/2021, Normal      ergocalciferol (VITAMIN D2) 50,000 units Take 1 capsule (50,000 Units total) by mouth once a week, Starting Thu 2/18/2021, Normal      ferrous sulfate 324 (65 Fe) mg Take 1 tablet (324 mg total) by mouth daily before breakfast, Starting Thu 2/18/2021, Normal      LORazepam (ATIVAN) 0 5 mg tablet Take 1 tablet by mouth 3 (three) times a day as needed, Starting Tue 8/29/2017, Historical Med      magnesium 30 MG tablet Take 30 mg by mouth 2 (two) times a day, Historical Med      ondansetron (ZOFRAN-ODT) 4 mg disintegrating tablet Take 1 tablet (4 mg total) by mouth every 6 (six) hours as needed for nausea or vomiting, Starting Sun 4/18/2021, Normal      SYNTHROID 125 MCG tablet Take 150 mcg by mouth daily , Starting Mon 5/21/2018, Historical Med           No discharge procedures on file      PDMP Review       Value Time User    PDMP Reviewed  Yes 3/4/2021 10:32 AM Adilia Baldwin PA-C          ED Provider  Electronically Signed by           Trenton Zuleta MD  05/16/21 0860

## 2021-05-17 ENCOUNTER — ANESTHESIA EVENT (OUTPATIENT)
Dept: GASTROENTEROLOGY | Facility: HOSPITAL | Age: 36
End: 2021-05-17

## 2021-05-17 ENCOUNTER — TELEPHONE (OUTPATIENT)
Dept: GASTROENTEROLOGY | Facility: AMBULARY SURGERY CENTER | Age: 36
End: 2021-05-17

## 2021-05-17 NOTE — TELEPHONE ENCOUNTER
Patients GI provider:  Dr Alexx Frank  [phone]  Number to return call: (        Reason for call: Pt calling to ask if she needs to rs her colon since she was placed on motrin at recent ed visit    Scheduled procedure/appointment date if applicable: Apt/procedure  5-18-21

## 2021-05-17 NOTE — TELEPHONE ENCOUNTER
Patient was in ED for neck pain on Friday  She received Torodol 30 mg IM and advil every 6 hours  She called to confirm it is ok to proceed with EGD/ colonoscopy tomorrow due to medications  Patient is aware no contraindications for EGD/ colonoscopy regarding torodol or motrin

## 2021-05-17 NOTE — TELEPHONE ENCOUNTER
Yes, ok to proceed with procedures  If patient can tolerate tylenol rather than advil for her neck pain, that would be preferred

## 2021-05-17 NOTE — ANESTHESIA PREPROCEDURE EVALUATION
Procedure:  COLONOSCOPY  EGD    Relevant Problems   ANESTHESIA  old charts reviewed      ENDO  obesity   (+) Other specified hypothyroidism      GI/HEPATIC  bowel prep  Crohn's hx      GYN   (-) Currently pregnant      HEMATOLOGY   (+) Iron deficiency anemia      MUSCULOSKELETAL  diffuse myalgias recent cervical neck strain   no restrictions      NEURO/PSYCH   (+) Depression with anxiety        Physical Exam    Airway    Mallampati score: II  TM Distance: >3 FB  Neck ROM: full     Dental   upper dentures,     Cardiovascular  Cardiovascular exam normal    Pulmonary  Pulmonary exam normal     Other Findings   lower teeth and in good repair      Anesthesia Plan  ASA Score- 2     Anesthesia Type- IV sedation with anesthesia with ASA Monitors  Additional Monitors:   Airway Plan:           Plan Factors-Exercise tolerance (METS): >4 METS  Chart reviewed  EKG reviewed  Imaging results reviewed  Existing labs reviewed  Patient summary reviewed  Patient is not a current smoker  Patient not instructed to abstain from smoking on day of procedure  Patient did not smoke on day of surgery  Induction- intravenous  Postoperative Plan-     Informed Consent- Anesthetic plan and risks discussed with patient (friend present)

## 2021-05-18 ENCOUNTER — HOSPITAL ENCOUNTER (OUTPATIENT)
Dept: GASTROENTEROLOGY | Facility: HOSPITAL | Age: 36
Setting detail: OUTPATIENT SURGERY
Discharge: HOME | End: 2021-05-18
Attending: INTERNAL MEDICINE
Payer: COMMERCIAL

## 2021-05-18 ENCOUNTER — ANESTHESIA (OUTPATIENT)
Dept: GASTROENTEROLOGY | Facility: HOSPITAL | Age: 36
End: 2021-05-18

## 2021-05-18 VITALS
OXYGEN SATURATION: 100 % | TEMPERATURE: 97.8 F | SYSTOLIC BLOOD PRESSURE: 127 MMHG | BODY MASS INDEX: 35.79 KG/M2 | HEIGHT: 67 IN | RESPIRATION RATE: 20 BRPM | DIASTOLIC BLOOD PRESSURE: 76 MMHG | HEART RATE: 67 BPM | WEIGHT: 228 LBS

## 2021-05-18 DIAGNOSIS — R19.7 DIARRHEA, UNSPECIFIED TYPE: ICD-10-CM

## 2021-05-18 DIAGNOSIS — D50.9 IRON DEFICIENCY ANEMIA, UNSPECIFIED IRON DEFICIENCY ANEMIA TYPE: ICD-10-CM

## 2021-05-18 LAB
EXT PREGNANCY TEST URINE: NEGATIVE
EXT. CONTROL: NORMAL

## 2021-05-18 PROCEDURE — 88305 TISSUE EXAM BY PATHOLOGIST: CPT | Performed by: PATHOLOGY

## 2021-05-18 PROCEDURE — 45380 COLONOSCOPY AND BIOPSY: CPT | Performed by: INTERNAL MEDICINE

## 2021-05-18 PROCEDURE — 43239 EGD BIOPSY SINGLE/MULTIPLE: CPT | Performed by: INTERNAL MEDICINE

## 2021-05-18 PROCEDURE — 81025 URINE PREGNANCY TEST: CPT | Performed by: INTERNAL MEDICINE

## 2021-05-18 RX ORDER — PROPOFOL 10 MG/ML
INJECTION, EMULSION INTRAVENOUS AS NEEDED
Status: DISCONTINUED | OUTPATIENT
Start: 2021-05-18 | End: 2021-05-18

## 2021-05-18 RX ORDER — SODIUM CHLORIDE 9 MG/ML
125 INJECTION, SOLUTION INTRAVENOUS CONTINUOUS
Status: DISCONTINUED | OUTPATIENT
Start: 2021-05-18 | End: 2021-05-22 | Stop reason: HOSPADM

## 2021-05-18 RX ORDER — LIDOCAINE HYDROCHLORIDE 10 MG/ML
INJECTION, SOLUTION EPIDURAL; INFILTRATION; INTRACAUDAL; PERINEURAL AS NEEDED
Status: DISCONTINUED | OUTPATIENT
Start: 2021-05-18 | End: 2021-05-18

## 2021-05-18 RX ADMIN — SODIUM CHLORIDE 125 ML/HR: 0.9 INJECTION, SOLUTION INTRAVENOUS at 08:01

## 2021-05-18 RX ADMIN — PROPOFOL 50 MG: 10 INJECTION, EMULSION INTRAVENOUS at 09:55

## 2021-05-18 RX ADMIN — LIDOCAINE HYDROCHLORIDE 50 MG: 10 INJECTION, SOLUTION EPIDURAL; INFILTRATION; INTRACAUDAL; PERINEURAL at 09:47

## 2021-05-18 RX ADMIN — PROPOFOL 80 MG: 10 INJECTION, EMULSION INTRAVENOUS at 09:48

## 2021-05-18 RX ADMIN — PROPOFOL 30 MG: 10 INJECTION, EMULSION INTRAVENOUS at 10:00

## 2021-05-18 RX ADMIN — LIDOCAINE HYDROCHLORIDE 50 MG: 10 INJECTION, SOLUTION EPIDURAL; INFILTRATION; INTRACAUDAL; PERINEURAL at 09:48

## 2021-05-18 RX ADMIN — PROPOFOL 30 MG: 10 INJECTION, EMULSION INTRAVENOUS at 10:03

## 2021-05-18 RX ADMIN — PROPOFOL 120 MG: 10 INJECTION, EMULSION INTRAVENOUS at 09:47

## 2021-05-18 RX ADMIN — PROPOFOL 50 MG: 10 INJECTION, EMULSION INTRAVENOUS at 09:53

## 2021-05-18 NOTE — INTERVAL H&P NOTE
H&P reviewed  After examining the patient I find no changes in the patients condition since the H&P had been written      Vitals:    05/18/21 0746   BP: 120/66   Pulse: 88   Resp: 20   Temp: 98 2 °F (36 8 °C)   SpO2: 98%

## 2021-05-18 NOTE — DISCHARGE INSTRUCTIONS
Upper Endoscopy   WHAT YOU NEED TO KNOW:   An upper endoscopy is also called an upper gastrointestinal (GI) endoscopy, or an esophagogastroduodenoscopy (EGD)  You may feel bloated, gassy, or have some abdominal discomfort after your procedure  Your throat may be sore for 24 to 36 hours  You may burp or pass gas from air that is still inside your body  DISCHARGE INSTRUCTIONS:   Call 911 if:   · You have sudden chest pain or trouble breathing  Seek care immediately if:   · You feel dizzy or faint  · You have trouble swallowing  · You have severe throat pain  · Your bowel movements are very dark or black  · Your abdomen is hard and firm and you have severe pain  · You vomit blood  Contact your healthcare provider if:   · You feel full or bloated and cannot burp or pass gas  · You have not had a bowel movement for 3 days after your procedure  · You have neck pain  · You have a fever or chills  · You have nausea or are vomiting  · You have a rash or hives  · You have questions or concerns about your endoscopy  Relieve a sore throat:  Suck on throat lozenges or crushed ice  Gargle with a small amount of warm salt water  Mix 1 teaspoon of salt and 1 cup of warm water to make salt water  Relieve gas and discomfort from bloating:  Lie on your right side with a heating pad on your abdomen  Take short walks to help pass gas  Eat small meals until bloating is relieved  Rest after your procedure:  Do not drive or make important decisions until the day after your procedure  Return to your normal activity as directed  You can usually return to work the day after your procedure  Follow up with your healthcare provider as directed:  Write down your questions so you remember to ask them during your visits  © Copyright [address] Information is for End User's use only and may not be sold, redistributed or otherwise used for commercial purposes   All illustrations and images included in CareNotes® are the copyrighted property of [employer]  or Wesley [employer]   The above information is an  only  It is not intended as medical advice for individual conditions or treatments  Talk to your doctor, nurse or pharmacist before following any medical regimen to see if it is safe and effective for you  Colonoscopy   AMBULATORY CARE:   What you need to know about a colonoscopy:  A colonoscopy is a procedure to examine the inside of your colon (intestine) with a scope  A scope is a flexible tube with a small light and camera on the end  Polyps or tissue growths may be removed during your colonoscopy  What you need to do the week before your colonoscopy: You will need to stop taking medicines that contain aspirin or iron for 7 days before your colonoscopy  If you take a blood thinner, such as warfarin, ask when you should stop taking it  Make plans for someone to drive you home after your procedure  How to prepare for your colonoscopy: Your healthcare provider will have you prepare your bowels before your procedure  Your bowels will need to be empty before your procedure to allow him or her to see your colon clearly  You will need to do the following:  · Have only clear liquids for the entire day before your colonoscopy  Clear liquids include plain gelatin, unsweetened fruit juices, clear soup, and broth  Do not drink any liquid that is blue, red, or purple  · Follow your bowel prep as directed  There are many different preparations that can be given before a colonoscopy  Some are given over 2 hours and others over 6 hours  Some are given earlier in the afternoon the day before the colonoscopy  Others are given the day before and then the morning of the colonoscopy  With any bowel prep, stay close to the bathroom  This liquid will cause your bowels to move often  · Use an enema if directed    Your healthcare provider may tell you to use an enema to help clean out your bowels  · Do not eat or drink anything after midnight  This will help prevent problems that can happen if you vomit while under anesthesia  What will happen during your colonoscopy:   · You will be given medicine to help you relax  You will lie on your left side and raise one or both knees toward your chest  Your healthcare provider will examine your anus and use a finger to check your rectum  You may need another enema if your bowel is not empty  The scope will be lubricated and gently placed into your anus  It will then be passed through your rectum and into your colon  Water or air will be put into your colon to help clean or expand it  This is done so your healthcare provider can see your colon clearly  · Tissue samples may be taken from the walls of your bowel and sent to a lab for tests  If you have a polyp, your healthcare provider will pass a wire loop through the scope and use it to hold the polyp  The polyp is then removed from the wall of your colon  The polyps are sent to a lab for tests  Pictures of your colon may be taken during the procedure  What will happen after your colonoscopy: You may feel bloated or have some gas and abdominal discomfort  You may need to lie on your left side with a heating pad on your abdomen  Risks of a colonoscopy: You may have pain or bleeding after the scope or polyps are removed  You may also have a slow heartbeat, decreased blood pressure, or increased sweating  Your colon may tear due to the increased pressure from the scope and other instruments  This may cause bowel contents to leak out of your colon and into your abdomen  If this happens, you will need to have surgery on your colon  Call your doctor if:   · You have a large amount of bright red blood in your bowel movements  · Your abdomen is hard and firm and you have severe pain  · You have sudden trouble breathing  · You develop a rash or hives      · You have a fever within 24 hours of your procedure  · You have not had a bowel movement for 3 days after your procedure  · You have questions or concerns about your condition or care  After your colonoscopy:   · Do not lift, strain, or run  for 3 days  · Rest as much as possible  You have been given medicine to relax you  Do not  drive or make important decisions for at least 24 hours  Return to your normal activity as directed  · Relieve gas and discomfort from bloating  by lying on your left side with a heating pad on your abdomen  You may need to take short walks to help the gas move out  Eat small meals until bloating is relieved  If you had polyps removed: For 7 days after your procedure:  · Do not  take aspirin  · Do not  go on long car rides  Help prevent constipation:   · Eat a variety of healthy foods  Healthy foods include fruit, vegetables, whole-grain breads, low-fat dairy products, beans, lean meat, and fish  Ask if you need to be on a special diet  Your healthcare provider may recommend that you eat high-fiber foods such as cooked beans  Fiber helps you have regular bowel movements  · Drink liquids as directed  Adults should drink between 9 and 13 eight-ounce cups of liquid every day  Ask what amount is best for you  For most people, good liquids to drink are water, juice, and milk  · Exercise as directed  Talk to your healthcare provider about the best exercise plan for you  Exercise can help prevent constipation, decrease your blood pressure and improve your health  Follow up with your healthcare provider as directed:  Write down your questions so you remember to ask them during your visits  © Copyright [address] Information is for End User's use only and may not be sold, redistributed or otherwise used for commercial purposes   All illustrations and images included in CareNotes® are the copyrighted property of [employer]  or Wesley Goldman   The above information is an  only  It is not intended as medical advice for individual conditions or treatments  Talk to your doctor, nurse or pharmacist before following any medical regimen to see if it is safe and effective for you

## 2021-05-19 ENCOUNTER — TELEPHONE (OUTPATIENT)
Dept: GASTROENTEROLOGY | Facility: CLINIC | Age: 36
End: 2021-05-19

## 2021-05-19 ENCOUNTER — APPOINTMENT (EMERGENCY)
Dept: CT IMAGING | Facility: HOSPITAL | Age: 36
End: 2021-05-19
Payer: COMMERCIAL

## 2021-05-19 ENCOUNTER — HOSPITAL ENCOUNTER (EMERGENCY)
Facility: HOSPITAL | Age: 36
Discharge: HOME | End: 2021-05-19
Attending: EMERGENCY MEDICINE | Admitting: EMERGENCY MEDICINE
Payer: COMMERCIAL

## 2021-05-19 VITALS
DIASTOLIC BLOOD PRESSURE: 58 MMHG | TEMPERATURE: 97.8 F | SYSTOLIC BLOOD PRESSURE: 107 MMHG | RESPIRATION RATE: 14 BRPM | HEART RATE: 69 BPM | OXYGEN SATURATION: 99 %

## 2021-05-19 DIAGNOSIS — R16.2 HEPATOSPLENOMEGALY: ICD-10-CM

## 2021-05-19 DIAGNOSIS — K21.9 GASTROESOPHAGEAL REFLUX DISEASE WITHOUT ESOPHAGITIS: Primary | ICD-10-CM

## 2021-05-19 DIAGNOSIS — R10.9 ABDOMINAL PAIN: Primary | ICD-10-CM

## 2021-05-19 LAB
ALBUMIN SERPL BCP-MCNC: 3.7 G/DL (ref 3.5–5)
ALP SERPL-CCNC: 94 U/L (ref 46–116)
ALT SERPL W P-5'-P-CCNC: 21 U/L (ref 12–78)
ANION GAP SERPL CALCULATED.3IONS-SCNC: 8 MMOL/L (ref 4–13)
AST SERPL W P-5'-P-CCNC: 19 U/L (ref 5–45)
BACTERIA UR QL AUTO: ABNORMAL /HPF
BASOPHILS # BLD AUTO: 0.03 THOUSANDS/ΜL (ref 0–0.1)
BASOPHILS NFR BLD AUTO: 1 % (ref 0–1)
BILIRUB SERPL-MCNC: 0.23 MG/DL (ref 0.2–1)
BILIRUB UR QL STRIP: NEGATIVE
BUN SERPL-MCNC: 4 MG/DL (ref 5–25)
CALCIUM SERPL-MCNC: 9.1 MG/DL (ref 8.3–10.1)
CHLORIDE SERPL-SCNC: 105 MMOL/L (ref 100–108)
CLARITY UR: CLEAR
CO2 SERPL-SCNC: 28 MMOL/L (ref 21–32)
COLOR UR: YELLOW
CREAT SERPL-MCNC: 0.59 MG/DL (ref 0.6–1.3)
EOSINOPHIL # BLD AUTO: 0.08 THOUSAND/ΜL (ref 0–0.61)
EOSINOPHIL NFR BLD AUTO: 1 % (ref 0–6)
ERYTHROCYTE [DISTWIDTH] IN BLOOD BY AUTOMATED COUNT: 17 % (ref 11.6–15.1)
EXT PREG TEST URINE: NEGATIVE
EXT. CONTROL ED NAV: NORMAL
GFR SERPL CREATININE-BSD FRML MDRD: 117 ML/MIN/1.73SQ M
GLUCOSE SERPL-MCNC: 99 MG/DL (ref 65–140)
GLUCOSE UR STRIP-MCNC: NEGATIVE MG/DL
HCT VFR BLD AUTO: 36.5 % (ref 34.8–46.1)
HGB BLD-MCNC: 10.9 G/DL (ref 11.5–15.4)
HGB UR QL STRIP.AUTO: NEGATIVE
IMM GRANULOCYTES # BLD AUTO: 0.02 THOUSAND/UL (ref 0–0.2)
IMM GRANULOCYTES NFR BLD AUTO: 0 % (ref 0–2)
KETONES UR STRIP-MCNC: NEGATIVE MG/DL
LEUKOCYTE ESTERASE UR QL STRIP: ABNORMAL
LIPASE SERPL-CCNC: 60 U/L (ref 73–393)
LYMPHOCYTES # BLD AUTO: 1.57 THOUSANDS/ΜL (ref 0.6–4.47)
LYMPHOCYTES NFR BLD AUTO: 27 % (ref 14–44)
MCH RBC QN AUTO: 23.4 PG (ref 26.8–34.3)
MCHC RBC AUTO-ENTMCNC: 29.9 G/DL (ref 31.4–37.4)
MCV RBC AUTO: 79 FL (ref 82–98)
MONOCYTES # BLD AUTO: 0.46 THOUSAND/ΜL (ref 0.17–1.22)
MONOCYTES NFR BLD AUTO: 8 % (ref 4–12)
NEUTROPHILS # BLD AUTO: 3.67 THOUSANDS/ΜL (ref 1.85–7.62)
NEUTS SEG NFR BLD AUTO: 63 % (ref 43–75)
NITRITE UR QL STRIP: NEGATIVE
NON-SQ EPI CELLS URNS QL MICRO: ABNORMAL /HPF
NRBC BLD AUTO-RTO: 0 /100 WBCS
PH UR STRIP.AUTO: 7 [PH] (ref 4.5–8)
PLATELET # BLD AUTO: 192 THOUSANDS/UL (ref 149–390)
PMV BLD AUTO: 11.1 FL (ref 8.9–12.7)
POTASSIUM SERPL-SCNC: 4.1 MMOL/L (ref 3.5–5.3)
PROT SERPL-MCNC: 7.5 G/DL (ref 6.4–8.2)
PROT UR STRIP-MCNC: NEGATIVE MG/DL
RBC # BLD AUTO: 4.65 MILLION/UL (ref 3.81–5.12)
RBC #/AREA URNS AUTO: ABNORMAL /HPF
SODIUM SERPL-SCNC: 141 MMOL/L (ref 136–145)
SP GR UR STRIP.AUTO: 1.01 (ref 1–1.03)
UROBILINOGEN UR QL STRIP.AUTO: 0.2 E.U./DL
WBC # BLD AUTO: 5.83 THOUSAND/UL (ref 4.31–10.16)
WBC #/AREA URNS AUTO: ABNORMAL /HPF

## 2021-05-19 PROCEDURE — 99284 EMERGENCY DEPT VISIT MOD MDM: CPT

## 2021-05-19 PROCEDURE — 80053 COMPREHEN METABOLIC PANEL: CPT | Performed by: PHYSICIAN ASSISTANT

## 2021-05-19 PROCEDURE — 36415 COLL VENOUS BLD VENIPUNCTURE: CPT | Performed by: PHYSICIAN ASSISTANT

## 2021-05-19 PROCEDURE — 85025 COMPLETE CBC W/AUTO DIFF WBC: CPT | Performed by: PHYSICIAN ASSISTANT

## 2021-05-19 PROCEDURE — 96374 THER/PROPH/DIAG INJ IV PUSH: CPT

## 2021-05-19 PROCEDURE — 99285 EMERGENCY DEPT VISIT HI MDM: CPT | Performed by: PHYSICIAN ASSISTANT

## 2021-05-19 PROCEDURE — 74177 CT ABD & PELVIS W/CONTRAST: CPT

## 2021-05-19 PROCEDURE — 81001 URINALYSIS AUTO W/SCOPE: CPT

## 2021-05-19 PROCEDURE — 83690 ASSAY OF LIPASE: CPT | Performed by: PHYSICIAN ASSISTANT

## 2021-05-19 PROCEDURE — 81025 URINE PREGNANCY TEST: CPT | Performed by: PHYSICIAN ASSISTANT

## 2021-05-19 RX ORDER — MORPHINE SULFATE 4 MG/ML
4 INJECTION, SOLUTION INTRAMUSCULAR; INTRAVENOUS ONCE
Status: DISCONTINUED | OUTPATIENT
Start: 2021-05-19 | End: 2021-05-19

## 2021-05-19 RX ORDER — ALUMINUM HYDROXIDE, MAGNESIUM HYDROXIDE, SIMETHICONE 400; 400; 40 MG/10ML; MG/10ML; MG/10ML
20 SUSPENSION ORAL
Qty: 335 ML | Refills: 0 | Status: SHIPPED | OUTPATIENT
Start: 2021-05-19

## 2021-05-19 RX ORDER — KETOROLAC TROMETHAMINE 30 MG/ML
15 INJECTION, SOLUTION INTRAMUSCULAR; INTRAVENOUS ONCE
Status: COMPLETED | OUTPATIENT
Start: 2021-05-19 | End: 2021-05-19

## 2021-05-19 RX ADMIN — IOHEXOL 100 ML: 350 INJECTION, SOLUTION INTRAVENOUS at 13:03

## 2021-05-19 RX ADMIN — KETOROLAC TROMETHAMINE 15 MG: 30 INJECTION, SOLUTION INTRAMUSCULAR; INTRAVENOUS at 12:44

## 2021-05-19 NOTE — DISCHARGE INSTRUCTIONS
DISCHARGE INSTRUCTIONS:    FOLLOW UP WITH YOUR PRIMARY CARE PROVIDER OR THE [address] [place] [address]  MAKE AN APPOINTMENT TO BE SEEN  TAKE MEDICATION AS PRESCRIBED  IF RASH, SHORTNESS OF BREATH OR TROUBLE SWALLOWING, STOP TAKING THE MEDICATION AND BE SEEN  REST AND DRINK PLENTY OF FLUIDS  FOLLOW UP WITH THE RECOMMENDED GASTROENTEROLOGIST  IF SYMPTOMS WORSEN OR NEW SYMPTOMS ARISE, RETURN TO THE ER TO BE SEEN

## 2021-05-19 NOTE — TELEPHONE ENCOUNTER
Visit Dr King Leader 4/28/21  Hx: IBS-D, GERD, Hashimoto's thyroiditis, fibromyalgia  EGD/Colon: 5/18/21    LVM instructing to call back   Advised if there is blood in stool, "extreme or unbearable" abdominal pain to report to ER given procedure done yesterday

## 2021-05-19 NOTE — ED PROVIDER NOTES
History  Chief Complaint   Patient presents with    Abdominal Pain     Pt  had a EGD and colonoscopy yesterday  Pt  now reports abdominal pain and vomitting      35y  o female with PMH of anxiety, crohn's disease, depression, fibromyalgia, hashimoto's and HPV presents to the ER for evaluation  Patient states she had an EGD and colonoscopy completed yesterday  Today she began having epigastric and LLQ pain  She denies taking any medication for symptoms  She describes her pain as sharp and non-radiating  Pain is constant  She denies fever, chills, URI symptoms, chest pain, dyspnea, N/V/D, weakness or paresthesias  History provided by:  Patient   used: No        Prior to Admission Medications   Prescriptions Last Dose Informant Patient Reported? Taking?    SYNTHROID 125 MCG tablet 5/19/2021 at Unknown time  Yes Yes   Sig: Take 150 mcg by mouth daily    ascorbic acid (VITAMIN C) 500 MG tablet Past Week at Unknown time  No Yes   Sig: Take 1 tablet (500 mg total) by mouth daily   ergocalciferol (VITAMIN D2) 50,000 units Past Week at Unknown time  No Yes   Sig: Take 1 capsule (50,000 Units total) by mouth once a week   ferrous sulfate 324 (65 Fe) mg Past Week at Unknown time  No Yes   Sig: Take 1 tablet (324 mg total) by mouth daily before breakfast   methocarbamol (ROBAXIN) 500 mg tablet Not Taking at Unknown time  No No   Sig: Take 1 tablet (500 mg total) by mouth 2 (two) times a day   Patient not taking: Reported on 5/19/2021   naproxen (NAPROSYN) 500 mg tablet Not Taking at Unknown time  No No   Sig: Take 1 tablet (500 mg total) by mouth 2 (two) times a day with meals for 10 days   Patient not taking: Reported on 5/19/2021   sertraline (ZOLOFT) 100 mg tablet 5/18/2021 at Unknown time  Yes Yes   Sig: Take 100 mg by mouth daily      Facility-Administered Medications: None       Past Medical History:   Diagnosis Date    Anxiety     [facility]'s disease ([facility] [address] [facility]     Depression     Disease of thyroid gland     Fibromyalgia     Gestational diabetes     Gestational hypertension     previous pregnancy    Hashimoto's thyroiditis     HPV (human papilloma virus) infection     Retained placenta        Past Surgical History:   Procedure Laterality Date    CHOLECYSTECTOMY      DILATION AND CURETTAGE OF UTERUS      HERNIA REPAIR      UMBILICAL HERNIA REPAIR         Family History   Problem Relation Age of Onset    Anuerysm Mother     Thyroid disease Mother     Hepatitis Mother     Lung cancer Maternal Grandmother     Colon cancer Maternal Grandfather      I have reviewed and agree with the history as documented  E-Cigarette/Vaping    E-Cigarette Use Never User      E-Cigarette/Vaping Substances     Social History     Tobacco Use    Smoking status: Never Smoker    Smokeless tobacco: Never Used   Substance Use Topics    Alcohol use: No    Drug use: No       Review of Systems   Constitutional: Negative for activity change, appetite change, chills and fever  HENT: Negative for congestion, drooling, ear discharge, ear pain, facial swelling, rhinorrhea and sore throat  Eyes: Negative for redness  Respiratory: Negative for cough and shortness of breath  Cardiovascular: Negative for chest pain  Gastrointestinal: Positive for abdominal pain  Negative for diarrhea, nausea and vomiting  Musculoskeletal: Negative for neck stiffness  Skin: Negative for rash  Allergic/Immunologic: Negative for food allergies  Neurological: Negative for weakness and numbness  Physical Exam  Physical Exam  Vitals signs and nursing note reviewed  Constitutional:       General: She is not in acute distress  Appearance: She is not toxic-appearing  HENT:      Head: Normocephalic and atraumatic  Eyes:      Conjunctiva/sclera: Conjunctivae normal    Neck:      Musculoskeletal: Normal range of motion and neck supple  Trachea: No tracheal deviation     Cardiovascular:      Rate and Rhythm: Normal rate    Pulmonary:      Effort: Pulmonary effort is normal  No respiratory distress  Abdominal:      General: Bowel sounds are normal  There is no distension  Palpations: Abdomen is soft  Tenderness: There is abdominal tenderness in the epigastric area and left lower quadrant  There is no guarding or rebound  Skin:     General: Skin is warm and dry  Findings: No rash  Neurological:      Mental Status: She is alert  GCS: GCS eye subscore is 4  GCS verbal subscore is 5  GCS motor subscore is 6           Vital Signs  ED Triage Vitals   Temperature Pulse Respirations Blood Pressure SpO2   05/19/21 1142 05/19/21 1142 05/19/21 1142 05/19/21 1142 05/19/21 1142   97 8 °F (36 6 °C) 72 18 127/63 100 %      Temp Source Heart Rate Source Patient Position - Orthostatic VS BP Location FiO2 (%)   05/19/21 1142 05/19/21 1142 05/19/21 1142 05/19/21 1142 --   Oral Monitor Sitting Right arm       Pain Score       05/19/21 1219       7           Vitals:    05/19/21 1142 05/19/21 1441   BP: 127/63 107/58   Pulse: 72 69   Patient Position - Orthostatic VS: Sitting          Visual Acuity      ED Medications  Medications   ketorolac (TORADOL) injection 15 mg (15 mg Intravenous Given 5/19/21 1244)   iohexol (OMNIPAQUE) 350 MG/ML injection (SINGLE-DOSE) 100 mL (100 mL Intravenous Given 5/19/21 1303)       Diagnostic Studies  Results Reviewed     Procedure Component Value Units Date/Time    Comprehensive metabolic panel [[id number]]  (Abnormal) Collected: 05/19/21 1222    Lab Status: Final result Specimen: Blood from Arm, Left Updated: 05/19/21 1250     Sodium 141 mmol/L      Potassium 4 1 mmol/L      Chloride 105 mmol/L      CO2 28 mmol/L      ANION GAP 8 mmol/L      BUN 4 mg/dL      Creatinine 0 59 mg/dL      Glucose 99 mg/dL      Calcium 9 1 mg/dL      AST 19 U/L      ALT 21 U/L      Alkaline Phosphatase 94 U/L      Total Protein 7 5 g/dL      Albumin 3 7 g/dL      Total Bilirubin 0 23 mg/dL      eGFR 117 ml/min/1 73sq m     Narrative:      National Kidney Disease Foundation guidelines for Chronic Kidney Disease (CKD):     Stage 1 with normal or high GFR (GFR > 90 mL/min/1 73 square meters)    Stage 2 Mild CKD (GFR = 60-89 mL/min/1 73 square meters)    Stage 3A Moderate CKD (GFR = 45-59 mL/min/1 73 square meters)    Stage 3B Moderate CKD (GFR = 30-44 mL/min/1 73 square meters)    Stage 4 Severe CKD (GFR = 15-29 mL/min/1 73 square meters)    Stage 5 End Stage CKD (GFR <15 mL/min/1 73 square meters)  Note: GFR calculation is accurate only with a steady state creatinine    Lipase [[id number]]  (Abnormal) Collected: 05/19/21 1222    Lab Status: Final result Specimen: Blood from Arm, Left Updated: 05/19/21 1250     Lipase 60 u/L     POCT pregnancy, urine [[id number]]  (Normal) Resulted: 05/19/21 1238    Lab Status: Final result Updated: 05/19/21 1238     EXT PREG TEST UR (Ref: Negative) NEGATIVE     Control VALID    CBC and differential [[id number]]  (Abnormal) Collected: 05/19/21 1222    Lab Status: Final result Specimen: Blood from Arm, Left Updated: 05/19/21 1229     WBC 5 83 Thousand/uL      RBC 4 65 Million/uL      Hemoglobin 10 9 g/dL      Hematocrit 36 5 %      MCV 79 fL      MCH 23 4 pg      MCHC 29 9 g/dL      RDW 17 0 %      MPV 11 1 fL      Platelets 075 Thousands/uL      nRBC 0 /100 WBCs      Neutrophils Relative 63 %      Immat GRANS % 0 %      Lymphocytes Relative 27 %      Monocytes Relative 8 %      Eosinophils Relative 1 %      Basophils Relative 1 %      Neutrophils Absolute 3 67 Thousands/µL      Immature Grans Absolute 0 02 Thousand/uL      Lymphocytes Absolute 1 57 Thousands/µL      Monocytes Absolute 0 46 Thousand/µL      Eosinophils Absolute 0 08 Thousand/µL      Basophils Absolute 0 03 Thousands/µL     Urine Microscopic [[id number]]  (Abnormal) Collected: 05/19/21 1156    Lab Status: Final result Specimen: Urine, Clean Catch Updated: 05/19/21 1217     RBC, UA None Seen /hpf      WBC, UA 1-2 /hpf Epithelial Cells Moderate /hpf      Bacteria, UA Occasional /hpf     Urine Macroscopic, POC [[id number]]  (Abnormal) Collected: 05/19/21 1156    Lab Status: Final result Specimen: Urine Updated: 05/19/21 1158     Color, UA Yellow     Clarity, UA Clear     pH, UA 7 0     Leukocytes, UA Trace     Nitrite, UA Negative     Protein, UA Negative mg/dl      Glucose, UA Negative mg/dl      Ketones, UA Negative mg/dl      Urobilinogen, UA 0 2 E U /dl      Bilirubin, UA Negative     Blood, UA Negative     Specific Gravity, UA 1 015    Narrative:      CLINITEK RESULT                 CT abdomen pelvis with contrast   Final Result by Stevens Runner, MD (05/19 1408)                 Procedures  Procedures         ED Course                             SBIRT 22yo+      Most Recent Value   SBIRT (22 yo +)   In order to provide better care to our patients, we are screening all of our patients for alcohol and drug use  Would it be okay to ask you these screening questions? Unable to answer at this time Filed at: 05/19/2021 1144                    MDM  Number of Diagnoses or Management Options  Abdominal pain: new and requires workup  Hepatosplenomegaly: new and requires workup  Diagnosis management comments: DDX consists of but not limited to: complication from EGD or colonoscopy, diverticulitis, perforation, GERD    1215   Spoke with Sherrine Prader from GI  Will check labs and imaging  Patient requesting Toradol for pain  Will give  1433    Informed patient of lab and imaging findings  Patient reports improvement in symptoms  Spoke with Sherrine Prader from GI  Will discharge and patient can follow up as an outpatient  Patient agreeable to plan  The management plan was discussed in detail with the patient at bedside and all questions were answered  Prior to discharge, we provided both verbal and written instructions  We discussed with the patient the signs and symptoms for which to return to the emergency department  All questions were answered and patient was comfortable with the plan of care and discharged to home  Instructed the patient to follow up with the primary care provider and/or specialist provided and their written instructions  The patient verbalized understanding of our discussion and plan of care, and agrees to return to the Emergency Department for concerns and progression of illness  At discharge, I instructed the patient to:  -follow up with pcp  -take Maalox as prescribed  -follow up with the recommended GI specialist  -rest and drink plenty of fluids  -return to the ER if symptoms worsened or new symptoms arose  Patient agreed to this plan and was stable at time of discharge  Amount and/or Complexity of Data Reviewed  Clinical lab tests: ordered and reviewed  Tests in the radiology section of CPT®: ordered and reviewed  Review and summarize past medical records: yes  Discuss the patient with other providers: yes    Patient Progress  Patient progress: stable      Disposition  Final diagnoses:   Abdominal pain   Hepatosplenomegaly     Time reflects when diagnosis was documented in both MDM as applicable and the Disposition within this note     Time User Action Codes Description Comment    5/19/2021  2:43 PM Neri DIAS Add [R10 9] Abdominal pain     5/19/2021  2:43 PM Neri DIAS Add [R16 2] Hepatosplenomegaly       ED Disposition     ED Disposition Condition Date/Time Comment    Discharge Stable Wed May 19, 2021  2:43 PM [facility] discharge to home/self care              Follow-up Information     Follow up With Specialties Details Why Contact Info Additional Information    Arabella Duran, [address], Nurse Practitioner Schedule an appointment as soon as possible for a visit   [phone] [facility] [address] [address] [facility] Schedule an appointment as soon as possible for a visit   [phone]  [address] [facility] [address]  Ren Clifford [address] [facility], [address], [address], [facility], [place], [address] [phone]          Discharge Medication List as of 5/19/2021  2:46 PM      START taking these medications    Details   aluminum-magnesium hydroxide-simethicone (MAALOX) 509-595-83 MG/5ML SUSP Take 20 mL by mouth 4 (four) times a day (before meals and at bedtime), Starting Wed 5/19/2021, Normal         CONTINUE these medications which have NOT CHANGED    Details   ascorbic acid (VITAMIN C) 500 MG tablet Take 1 tablet (500 mg total) by mouth daily, Starting Thu 2/18/2021, Normal      ergocalciferol (VITAMIN D2) 50,000 units Take 1 capsule (50,000 Units total) by mouth once a week, Starting Thu 2/18/2021, Normal      ferrous sulfate 324 (65 Fe) mg Take 1 tablet (324 mg total) by mouth daily before breakfast, Starting Thu 2/18/2021, Normal      sertraline (ZOLOFT) 100 mg tablet Take 100 mg by mouth daily, Starting Mon 2/15/2021, Historical Med      SYNTHROID 125 MCG tablet Take 150 mcg by mouth daily , Starting Mon 5/21/2018, Historical Med      methocarbamol (ROBAXIN) 500 mg tablet Take 1 tablet (500 mg total) by mouth 2 (two) times a day, Starting Sat 5/15/2021, Normal      naproxen (NAPROSYN) 500 mg tablet Take 1 tablet (500 mg total) by mouth 2 (two) times a day with meals for 10 days, Starting Sat 5/15/2021, Until Tue 5/25/2021, Normal           No discharge procedures on file      PDMP Review       Value Time User    PDMP Reviewed  Yes 3/4/2021 10:32 AM Hipolito Arias PA-C          ED Provider  Electronically Signed by           Beverly Hong PA-C  05/19/21 2434

## 2021-05-19 NOTE — TELEPHONE ENCOUNTER
Patients GI provider:  Dr Neda Borrero     Number to return call: [phone]    Reason for call: Pt calling statin she is experiencing extreme abd pain, heartburn and nausea      Scheduled procedure/appointment date if applicable: Appt  0/29/96

## 2021-05-19 NOTE — TELEPHONE ENCOUNTER
Does her job involve labor-intensive work? Usually we only recommend taking the day of the procedure off, but if she is feeling fatigued from the procedure still and cannot work, we can give her work note for 1-2 days

## 2021-05-19 NOTE — TELEPHONE ENCOUNTER
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

## 2021-05-19 NOTE — TELEPHONE ENCOUNTER
Patients GI provider:  Dr Neeraj Nieto    Number to return call: [phone]    Reason for call: Pt had a Colon/Egd yesterday and stated she would need a note for work saying she is unable to lift anything for 3 days      Scheduled procedure/appointment date if applicable: N/A

## 2021-05-19 NOTE — TELEPHONE ENCOUNTER
Patient had EGD/ colonoscopy yesterday  She is requesting a note for work because her instructions on AVS state do not run, lift or strain for 3 days  Please verify if there is a reason she needs to follow these restrictions

## 2021-05-20 RX ORDER — PANTOPRAZOLE SODIUM 20 MG/1
20 TABLET, DELAYED RELEASE ORAL DAILY
Qty: 30 TABLET | Refills: 3 | Status: SHIPPED | OUTPATIENT
Start: 2021-05-20 | End: 2021-12-28 | Stop reason: SDUPTHER

## 2021-05-20 NOTE — TELEPHONE ENCOUNTER
I will send in protonix  Please advise patient to maintain anti-reflux diet and lifestyle management (I e, elevate your head, don't lay down within 3 hours of eating, etc)  If she still ah problems after 1 weeks of starting protonix in the AM, she can add OTC pepcid at night  As far as her liver: her LFTs are normal at this time so please reassure her  She has no liver injury seen on labs at this time  Fatty liver is a common finding in many people and we like to try to stop this from progressing into fibrosis of the liver by weight loss, healthy diet, and control of comorbidity

## 2021-05-20 NOTE — TELEPHONE ENCOUNTER
Patient of Dr Sophie Victor, last seen 4/28/21    History of diarrhea, IBS-D, lower abdominal pain, iron deficiency anemia    Called patient to follow up  She was seen in ED yesterday for post procedure symptoms of abdominal pain, nausea, and heartburn  Patient states she overall feels much better than yesterday but is requesting we prescribe protonix  She states she has taken protonix in the past for heartburn as she has "severe acid reflux"  I did see her EGD appeared normal, pending biopsies, but patient states if she even drinks a glass of water, it will come up as reflux  I tried to assess how long this has been going on, as it is not mentioned in her 4/28 office note  Patient states "it's always been a problem" but there are periods when it's better and periods where it's worse  I did advise we may want to start pepcid but she is requesting protonix since she has been on it in the past and knows it works for her  Additionally, patient had CT done yesterday in ED and it showed she had hepatomegaly and hepatic fatty infiltration  Patient states she has never drank much alcohol so she is concerned about this  I did advise that fatty enlarged liver is not always from alcohol and can be a result of being overweight  Patient states she is following up with weight management but wanted to ensure we had no other recommendations as far as further testing   Please advise

## 2021-05-20 NOTE — TELEPHONE ENCOUNTER
Called patient to follow up- appears she was seen in ED yesterday   I reached voicemail, left message instructing to call back

## 2021-05-24 ENCOUNTER — TELEPHONE (OUTPATIENT)
Dept: GASTROENTEROLOGY | Facility: AMBULARY SURGERY CENTER | Age: 36
End: 2021-05-24

## 2021-05-27 NOTE — TELEPHONE ENCOUNTER
Please advise patient that her tissue biopsies are all normal aside from some mild chronic inflammation in the stomach  There is no signs of celiac disease or colitis  No inflammatory bowel disease  Will defer to dr Joseph Monaco on if he wants small bowel imaging as he mentioned this in his office note in April

## 2021-05-27 NOTE — TELEPHONE ENCOUNTER
Patients GI provider:  Dr Amanda Iniguez     Number to return call: [phone]     Reason for call: Pt calling stated she is returning a call from [facility] for results     Scheduled procedure/appointment date if applicable: Apt/procedure

## 2021-07-09 ENCOUNTER — OFFICE VISIT (OUTPATIENT)
Dept: FAMILY MEDICINE CLINIC | Facility: CLINIC | Age: 36
End: 2021-07-09

## 2021-07-09 VITALS
WEIGHT: 228.7 LBS | BODY MASS INDEX: 35.9 KG/M2 | HEART RATE: 80 BPM | TEMPERATURE: 96.7 F | OXYGEN SATURATION: 97 % | DIASTOLIC BLOOD PRESSURE: 70 MMHG | SYSTOLIC BLOOD PRESSURE: 110 MMHG | RESPIRATION RATE: 18 BRPM | HEIGHT: 67 IN

## 2021-07-09 DIAGNOSIS — Z71.9 ENCOUNTER FOR COUNSELING: Primary | ICD-10-CM

## 2021-07-09 PROCEDURE — 1036F TOBACCO NON-USER: CPT | Performed by: FAMILY MEDICINE

## 2021-07-09 PROCEDURE — 3008F BODY MASS INDEX DOCD: CPT | Performed by: FAMILY MEDICINE

## 2021-07-09 PROCEDURE — 99213 OFFICE O/P EST LOW 20 MIN: CPT | Performed by: FAMILY MEDICINE

## 2021-07-09 RX ORDER — BUPROPION HYDROCHLORIDE 150 MG/1
150 TABLET ORAL DAILY
COMMUNITY
Start: 2021-05-25 | End: 2022-01-25

## 2021-07-09 NOTE — ASSESSMENT & PLAN NOTE
Patient came to clinic today for paperwork to be filled out for her employment  They required further details on her diagnosis of anxiety/depression, for which she is on Wellbutrin and also sees a psychiatrist  Pan Escort the patient that this paperwork should be filled out by her psychiatrist, as it is specific to her psychiatric conditions  Patient displayed understanding

## 2021-07-23 ENCOUNTER — VBI (OUTPATIENT)
Dept: ADMINISTRATIVE | Facility: OTHER | Age: 36
End: 2021-07-23

## 2021-08-23 ENCOUNTER — OFFICE VISIT (OUTPATIENT)
Dept: FAMILY MEDICINE CLINIC | Facility: CLINIC | Age: 36
End: 2021-08-23

## 2021-08-23 DIAGNOSIS — Z20.822 SUSPECTED COVID-19 VIRUS INFECTION: Primary | ICD-10-CM

## 2021-08-23 PROCEDURE — U0005 INFEC AGEN DETEC AMPLI PROBE: HCPCS | Performed by: STUDENT IN AN ORGANIZED HEALTH CARE EDUCATION/TRAINING PROGRAM

## 2021-08-23 PROCEDURE — G2025 DIS SITE TELE SVCS RHC/FQHC: HCPCS | Performed by: FAMILY MEDICINE

## 2021-08-23 PROCEDURE — U0003 INFECTIOUS AGENT DETECTION BY NUCLEIC ACID (DNA OR RNA); SEVERE ACUTE RESPIRATORY SYNDROME CORONAVIRUS 2 (SARS-COV-2) (CORONAVIRUS DISEASE [COVID-19]), AMPLIFIED PROBE TECHNIQUE, MAKING USE OF HIGH THROUGHPUT TECHNOLOGIES AS DESCRIBED BY CMS-2020-01-R: HCPCS | Performed by: STUDENT IN AN ORGANIZED HEALTH CARE EDUCATION/TRAINING PROGRAM

## 2021-08-23 NOTE — PROGRESS NOTES
COVID-19 Outpatient Progress Note    Assessment/Plan:    Problem List Items Addressed This Visit     None      Visit Diagnoses     Suspected COVID-19 virus infection    -  Primary    Relevant Orders    Novel Coronavirus (Covid-19),PCR [facility] - Collected at [facility] or [facility]         Disposition:     I referred patient to one of our centralized sites for a COVID-19 swab  Reports possible exposure  Works at [address] precautions given    I have spent 10 minutes directly with the patient  Greater than 50% of this time was spent in counseling/coordination of care regarding: instructions for management and importance of treatment compliance  Verification of patient location:    Patient is located in the following state in which I hold an active license [place]    Encounter provider Gia Jasmine MD    Provider located at [address] [address] [phone]    Recent Visits  No visits were found meeting these conditions  Showing recent visits within past 7 days and meeting all other requirements  Today's Visits  Date Type Provider Dept   08/23/21 Office Visit Gia Jasmine MD  [facility]   Showing today's visits and meeting all other requirements  Future Appointments  No visits were found meeting these conditions  Showing future appointments within next 150 days and meeting all other requirements     This virtual check-in was done via telephone and she agrees to proceed  Patient agrees to participate in a virtual check in via telephone or video visit instead of presenting to the office to address urgent/immediate medical needs  Patient is aware this is a billable service  After connecting through Telephone, the patient was identified by name and date of birth  Liv Granter was informed that this was a telemedicine visit and that the exam was being conducted confidentially over secure lines   My office door was closed  Anjali Booker acknowledged consent and understanding of privacy and security of the telemedicine visit  I informed the patient that I have reviewed her record in Epic and presented the opportunity for her to ask any questions regarding the visit today  The patient agreed to participate  Subjective:   Anjali Booker is a 38 y o  female who is concerned about COVID-19  Patient's symptoms include chills, nasal congestion, sore throat, cough, diarrhea, myalgias and headache  Patient denies fever       Date of symptom onset: 8/20/2021  COVID-19 vaccination status: Not vaccinated    Lab Results   Component Value Date    SARSCOV2 Negative 04/29/2021    [address] [facility] [address] Not Detected 11/13/2020     Past Medical History:   Diagnosis Date    Anxiety     Crohn's disease ([address] )     Depression     Disease of thyroid gland     Fibromyalgia     Gestational diabetes     Gestational hypertension     previous pregnancy    Hashimoto's thyroiditis     HPV (human papilloma virus) infection     Retained placenta      Past Surgical History:   Procedure Laterality Date    CHOLECYSTECTOMY      DILATION AND CURETTAGE OF UTERUS      HERNIA REPAIR      UMBILICAL HERNIA REPAIR       Current Outpatient Medications   Medication Sig Dispense Refill    aluminum-magnesium hydroxide-simethicone (MAALOX) 200-200-20 MG/5ML SUSP Take 20 mL by mouth 4 (four) times a day (before meals and at bedtime) 335 mL 0    ascorbic acid (VITAMIN C) 500 MG tablet Take 1 tablet (500 mg total) by mouth daily (Patient not taking: Reported on 7/9/2021) 90 tablet 1    buPROPion (WELLBUTRIN XL) 150 mg 24 hr tablet Take 150 mg by mouth daily      ergocalciferol (VITAMIN D2) 50,000 units Take 1 capsule (50,000 Units total) by mouth once a week (Patient not taking: Reported on 7/9/2021) 24 capsule 0    ferrous sulfate 324 (65 Fe) mg Take 1 tablet (324 mg total) by mouth daily before breakfast (Patient not taking: Reported on 7/9/2021) 90 tablet 1    methocarbamol (ROBAXIN) 500 mg tablet Take 1 tablet (500 mg total) by mouth 2 (two) times a day (Patient not taking: Reported on 5/19/2021) 20 tablet 0    naproxen (NAPROSYN) 500 mg tablet Take 1 tablet (500 mg total) by mouth 2 (two) times a day with meals for 10 days (Patient not taking: Reported on 5/19/2021) 20 tablet 0    pantoprazole (PROTONIX) 20 mg tablet Take 1 tablet (20 mg total) by mouth daily 30 tablet 3    sertraline (ZOLOFT) 100 mg tablet Take 100 mg by mouth daily (Patient not taking: Reported on 7/9/2021)      SYNTHROID 125 MCG tablet Take 150 mcg by mouth daily   3     No current facility-administered medications for this visit  Allergies   Allergen Reactions    Bactrim [Sulfamethoxazole-Trimethoprim] Hives    Other Hives     seafood    Shellfish-Derived Products - Food Allergy     Sulfamethoxazole Hives    Trimethoprim Hives       Review of Systems   Constitutional: Positive for chills  Negative for fever  HENT: Positive for congestion and sore throat  Respiratory: Positive for cough  Gastrointestinal: Positive for diarrhea  Musculoskeletal: Positive for myalgias  Neurological: Positive for headaches  Objective: There were no vitals filed for this visit  Physical Exam    VIRTUAL VISIT DISCLAIMER    Jean Paul Valdivia verbally agrees to participate in [employer]  Pt is aware that [employer] could be limited without vital signs or the ability to perform a full hands-on physical exam  Pb Pulido understands she or the provider may request at any time to terminate the video visit and request the patient to seek care or treatment in person

## 2021-09-25 ENCOUNTER — LAB (OUTPATIENT)
Dept: LAB | Facility: HOSPITAL | Age: 36
End: 2021-09-25
Payer: COMMERCIAL

## 2021-09-25 DIAGNOSIS — F33.1 MAJOR DEPRESSIVE DISORDER, RECURRENT EPISODE, MODERATE (HCC): ICD-10-CM

## 2021-09-25 DIAGNOSIS — Z79.899 ENCOUNTER FOR LONG-TERM (CURRENT) USE OF OTHER MEDICATIONS: ICD-10-CM

## 2021-09-25 DIAGNOSIS — Z01.89 RADIOLOGICAL EXAMINATION, NOT ELSEWHERE CLASSIFIED: ICD-10-CM

## 2021-09-25 DIAGNOSIS — L73.2 HIDRADENITIS: Primary | ICD-10-CM

## 2021-09-25 DIAGNOSIS — F43.10 POSTTRAUMATIC STRESS DISORDER: ICD-10-CM

## 2021-09-25 DIAGNOSIS — F41.1 GENERALIZED ANXIETY DISORDER: ICD-10-CM

## 2021-09-25 LAB
ALBUMIN SERPL BCP-MCNC: 4.3 G/DL (ref 3–5.2)
ALP SERPL-CCNC: 93 U/L (ref 43–122)
ALT SERPL W P-5'-P-CCNC: 31 U/L
ANION GAP SERPL CALCULATED.3IONS-SCNC: 9 MMOL/L (ref 5–14)
ANISOCYTOSIS BLD QL SMEAR: PRESENT
AST SERPL W P-5'-P-CCNC: 41 U/L (ref 14–36)
BASOPHILS # BLD AUTO: 0 THOUSANDS/ΜL (ref 0–0.1)
BASOPHILS NFR BLD AUTO: 1 % (ref 0–1)
BILIRUB SERPL-MCNC: 0.59 MG/DL
BUN SERPL-MCNC: 8 MG/DL (ref 5–25)
CALCIUM SERPL-MCNC: 9.4 MG/DL (ref 8.4–10.2)
CHLORIDE SERPL-SCNC: 105 MMOL/L (ref 97–108)
CHOLEST SERPL-MCNC: 165 MG/DL
CO2 SERPL-SCNC: 27 MMOL/L (ref 22–30)
CREAT SERPL-MCNC: 0.64 MG/DL (ref 0.6–1.2)
DACRYOCYTES BLD QL SMEAR: PRESENT
EOSINOPHIL # BLD AUTO: 0.1 THOUSAND/ΜL (ref 0–0.4)
EOSINOPHIL NFR BLD AUTO: 2 % (ref 0–6)
ERYTHROCYTE [DISTWIDTH] IN BLOOD BY AUTOMATED COUNT: 17.1 %
GFR SERPL CREATININE-BSD FRML MDRD: 114 ML/MIN/1.73SQ M
GLUCOSE P FAST SERPL-MCNC: 112 MG/DL (ref 70–99)
HBV CORE AB SER QL: NORMAL
HBV SURFACE AB SER-ACNC: 164.71 MIU/ML
HBV SURFACE AG SER QL: NORMAL
HCT VFR BLD AUTO: 32.9 % (ref 36–46)
HCV AB SER QL: NORMAL
HDLC SERPL-MCNC: 29 MG/DL
HGB BLD-MCNC: 10.4 G/DL (ref 12–16)
HYPERCHROMIA BLD QL SMEAR: PRESENT
LDLC SERPL CALC-MCNC: 116 MG/DL
LYMPHOCYTES # BLD AUTO: 1.5 THOUSANDS/ΜL (ref 0.5–4)
LYMPHOCYTES NFR BLD AUTO: 32 % (ref 25–45)
MCH RBC QN AUTO: 22.9 PG (ref 26–34)
MCHC RBC AUTO-ENTMCNC: 31.7 G/DL (ref 31–36)
MCV RBC AUTO: 72 FL (ref 80–100)
MICROCYTES BLD QL AUTO: PRESENT
MONOCYTES # BLD AUTO: 0.4 THOUSAND/ΜL (ref 0.2–0.9)
MONOCYTES NFR BLD AUTO: 8 % (ref 1–10)
NEUTROPHILS # BLD AUTO: 2.7 THOUSANDS/ΜL (ref 1.8–7.8)
NEUTS SEG NFR BLD AUTO: 57 % (ref 45–65)
NONHDLC SERPL-MCNC: 136 MG/DL
PLATELET # BLD AUTO: 224 THOUSANDS/UL (ref 150–450)
PLATELET BLD QL SMEAR: ADEQUATE
PMV BLD AUTO: 9.8 FL (ref 8.9–12.7)
POIKILOCYTOSIS BLD QL SMEAR: PRESENT
POTASSIUM SERPL-SCNC: 4.4 MMOL/L (ref 3.6–5)
PROT SERPL-MCNC: 7.4 G/DL (ref 5.9–8.4)
RBC # BLD AUTO: 4.55 MILLION/UL (ref 4–5.2)
RBC MORPH BLD: PRESENT
SODIUM SERPL-SCNC: 141 MMOL/L (ref 137–147)
TRIGL SERPL-MCNC: 99 MG/DL
WBC # BLD AUTO: 4.8 THOUSAND/UL (ref 4.5–11)

## 2021-09-25 PROCEDURE — 86480 TB TEST CELL IMMUN MEASURE: CPT

## 2021-09-25 PROCEDURE — 87340 HEPATITIS B SURFACE AG IA: CPT

## 2021-09-25 PROCEDURE — 36415 COLL VENOUS BLD VENIPUNCTURE: CPT

## 2021-09-25 PROCEDURE — 86803 HEPATITIS C AB TEST: CPT

## 2021-09-25 PROCEDURE — 93005 ELECTROCARDIOGRAM TRACING: CPT

## 2021-09-25 PROCEDURE — 85025 COMPLETE CBC W/AUTO DIFF WBC: CPT

## 2021-09-25 PROCEDURE — 80053 COMPREHEN METABOLIC PANEL: CPT

## 2021-09-25 PROCEDURE — 86704 HEP B CORE ANTIBODY TOTAL: CPT

## 2021-09-25 PROCEDURE — 87389 HIV-1 AG W/HIV-1&-2 AB AG IA: CPT

## 2021-09-25 PROCEDURE — 86706 HEP B SURFACE ANTIBODY: CPT

## 2021-09-25 PROCEDURE — 80061 LIPID PANEL: CPT

## 2021-09-27 LAB
ATRIAL RATE: 55 BPM
HIV 1+2 AB+HIV1 P24 AG SERPL QL IA: NORMAL
P AXIS: 33 DEGREES
PR INTERVAL: 148 MS
QRS AXIS: 56 DEGREES
QRSD INTERVAL: 86 MS
QT INTERVAL: 444 MS
QTC INTERVAL: 424 MS
T WAVE AXIS: 37 DEGREES
VENTRICULAR RATE: 55 BPM

## 2021-09-27 PROCEDURE — 93010 ELECTROCARDIOGRAM REPORT: CPT | Performed by: INTERNAL MEDICINE

## 2021-09-28 ENCOUNTER — TELEPHONE (OUTPATIENT)
Dept: FAMILY MEDICINE CLINIC | Facility: CLINIC | Age: 36
End: 2021-09-28

## 2021-09-28 ENCOUNTER — OFFICE VISIT (OUTPATIENT)
Dept: URGENT CARE | Age: 36
End: 2021-09-28
Payer: COMMERCIAL

## 2021-09-28 VITALS
RESPIRATION RATE: 18 BRPM | HEART RATE: 72 BPM | TEMPERATURE: 97.7 F | OXYGEN SATURATION: 99 % | DIASTOLIC BLOOD PRESSURE: 65 MMHG | SYSTOLIC BLOOD PRESSURE: 121 MMHG

## 2021-09-28 DIAGNOSIS — M25.562 ACUTE PAIN OF LEFT KNEE: Primary | ICD-10-CM

## 2021-09-28 LAB
GAMMA INTERFERON BACKGROUND BLD IA-ACNC: 0.02 IU/ML
M TB IFN-G BLD-IMP: NEGATIVE
M TB IFN-G CD4+ BCKGRND COR BLD-ACNC: 0.01 IU/ML
M TB IFN-G CD4+ BCKGRND COR BLD-ACNC: 0.01 IU/ML
MITOGEN IGNF BCKGRD COR BLD-ACNC: >10 IU/ML

## 2021-09-28 PROCEDURE — 99213 OFFICE O/P EST LOW 20 MIN: CPT | Performed by: PHYSICIAN ASSISTANT

## 2021-09-28 RX ORDER — KETOROLAC TROMETHAMINE 10 MG/1
10 TABLET, FILM COATED ORAL EVERY 8 HOURS
Qty: 15 TABLET | Refills: 0 | Status: SHIPPED | OUTPATIENT
Start: 2021-09-28 | End: 2021-09-28

## 2021-09-28 RX ORDER — KETOROLAC TROMETHAMINE 10 MG/1
10 TABLET, FILM COATED ORAL EVERY 8 HOURS
Qty: 15 TABLET | Refills: 0 | Status: SHIPPED | OUTPATIENT
Start: 2021-09-28 | End: 2021-10-21

## 2021-09-28 RX ORDER — CEPHALEXIN 500 MG/1
500 CAPSULE ORAL EVERY 8 HOURS SCHEDULED
Qty: 21 CAPSULE | Refills: 0 | Status: SHIPPED | OUTPATIENT
Start: 2021-09-28 | End: 2021-10-05

## 2021-09-28 RX ORDER — CEPHALEXIN 500 MG/1
500 CAPSULE ORAL EVERY 8 HOURS SCHEDULED
Qty: 21 CAPSULE | Refills: 0 | Status: SHIPPED | OUTPATIENT
Start: 2021-09-28 | End: 2021-09-28

## 2021-09-28 NOTE — PATIENT INSTRUCTIONS
Knee Pain   WHAT YOU NEED TO KNOW:   Knee pain may start suddenly, or it may be a long-term problem  You may have pain on the side, front, or back of your knee  You may have knee stiffness and swelling  You may hear popping sounds or feel like your knee is giving way or locking up as you walk  You may feel pain when you sit, stand, walk, or climb up and down stairs  Knee pain can be caused by conditions such as obesity, inflammation, or strains or tears in ligaments or tendons  DISCHARGE INSTRUCTIONS:   Return to the emergency department if:   · Your pain is worse, even after treatment  · You cannot bend or straighten your leg completely  · The swelling around your knee does not go down even with treatment  · Your knee is painful and hot to the touch  Contact your healthcare provider if:   · You have questions or concerns about your condition or care  Medicines: You may need any of the following:  · NSAIDs  help decrease swelling and pain or fever  This medicine is available with or without a doctor's order  NSAIDs can cause stomach bleeding or kidney problems in certain people  If you take blood thinner medicine, always ask your healthcare provider if NSAIDs are safe for you  Always read the medicine label and follow directions  · Acetaminophen  decreases pain and fever  It is available without a doctor's order  Ask how much to take and how often to take it  Follow directions  Read the labels of all other medicines you are using to see if they also contain acetaminophen, or ask your doctor or pharmacist  Acetaminophen can cause liver damage if not taken correctly  Do not use more than 4 grams (4,000 milligrams) total of acetaminophen in one day  · Prescription pain medicine  may be given  Ask your healthcare provider how to take this medicine safely  Some prescription pain medicines contain acetaminophen   Do not take other medicines that contain acetaminophen without talking to your healthcare provider  Too much acetaminophen may cause liver damage  Prescription pain medicine may cause constipation  Ask your healthcare provider how to prevent or treat constipation  · Take your medicine as directed  Contact your healthcare provider if you think your medicine is not helping or if you have side effects  Tell him or her if you are allergic to any medicine  Keep a list of the medicines, vitamins, and herbs you take  Include the amounts, and when and why you take them  Bring the list or the pill bottles to follow-up visits  Carry your medicine list with you in case of an emergency  What you can do to manage your symptoms:   · Rest your knee so it can heal   Limit activities that increase your pain  Do low-impact exercises, such as walking or swimming  · Apply ice to help reduce swelling and pain  Use an ice pack, or put crushed ice in a plastic bag  Cover it with a towel before you apply it to your knee  Apply ice for 15 to 20 minutes every hour, or as directed  · Apply compression to help reduce swelling  Use a brace or bandage only as directed  · Elevate your knee to help decrease pain and swelling  Elevate your knee while you are sitting or lying down  Prop your leg on pillows to keep your knee above the level of your heart  · Prevent your knee from moving as directed  Your healthcare provider may put on a cast or splint  You may need to wear a leg brace to stabilize your knee  A leg brace can be adjusted to increase your range of motion as your knee heals  What you can do to prevent knee pain:   · Maintain a healthy weight  Extra weight increases your risk for knee pain  Ask your healthcare provider how much you should weigh  He or she can help you create a safe weight loss plan if you need to lose weight  · Exercise or train properly  Use the correct equipment for sports  Wear shoes that provide good support   Check your posture often as you exercise, play sports, or train for an event  This can help prevent stress and strain on your knees  Rest between sessions so you do not overwork your knees  Follow up with your healthcare provider within 24 hours or as directed: You may need follow-up treatments, such as steroid injections to decrease pain  Write down your questions so you remember to ask them during your visits  © Copyright [employer] 2021 Information is for End User's use only and may not be sold, redistributed or otherwise used for commercial purposes  All illustrations and images included in CareNotes® are the copyrighted property of [employer]  or [facility] Lolita Goldman   The above information is an  only  It is not intended as medical advice for individual conditions or treatments  Talk to your doctor, nurse or pharmacist before following any medical regimen to see if it is safe and effective for you

## 2021-09-28 NOTE — PROGRESS NOTES
[facility]        NAME: Emmanuelle Blank is a 43 y o  female  : 1985    MRN: [id number]  DATE: 2021  TIME: 7:52 PM    Assessment and Plan   Acute pain of left knee [M25 562]  1  Acute pain of left knee           Patient Instructions       Follow up with PCP in 3-5 days  Proceed to  ER if symptoms worsen  Chief Complaint   No chief complaint on file  History of Present Illness       Patient is a 43year old female presenting to [facility] with left [facility]  Pt reports waking up w/ left knee pain, swelling this morning  No known injury  Warm to the touch  Pt has hx of Osgood Schlatter Disease as a child  No surgical hx  Pt denies any fevers  Knee Pain   The incident occurred 12 to 24 hours ago  There was no injury mechanism  The pain is present in the left knee  The pain is moderate  The pain has been constant since onset  Review of Systems   Review of Systems   Constitutional: Negative for chills and fever  HENT: Negative for ear pain and sore throat  Eyes: Negative for pain and visual disturbance  Respiratory: Negative for cough and shortness of breath  Cardiovascular: Negative for chest pain and palpitations  Gastrointestinal: Negative for abdominal pain and vomiting  Genitourinary: Negative for dysuria and hematuria  Musculoskeletal: Positive for arthralgias (Left knee pain)  Negative for back pain  Skin: Negative for color change and rash  Neurological: Negative for seizures and syncope  All other systems reviewed and are negative          Current Medications       Current Outpatient Medications:     aluminum-magnesium hydroxide-simethicone (MAALOX) 200-200-20 MG/5ML SUSP, Take 20 mL by mouth 4 (four) times a day (before meals and at bedtime), Disp: 335 mL, Rfl: 0    ascorbic acid (VITAMIN C) 500 MG tablet, Take 1 tablet (500 mg total) by mouth daily (Patient not taking: Reported on 2021), Disp: 90 tablet, Rfl: 1    buPROPion (WELLBUTRIN XL) 150 mg 24 hr tablet, Take 150 mg by mouth daily, Disp: , Rfl:     ergocalciferol (VITAMIN D2) 50,000 units, Take 1 capsule (50,000 Units total) by mouth once a week (Patient not taking: Reported on 7/9/2021), Disp: 24 capsule, Rfl: 0    ferrous sulfate 324 (65 Fe) mg, Take 1 tablet (324 mg total) by mouth daily before breakfast (Patient not taking: Reported on 7/9/2021), Disp: 90 tablet, Rfl: 1    methocarbamol (ROBAXIN) 500 mg tablet, Take 1 tablet (500 mg total) by mouth 2 (two) times a day (Patient not taking: Reported on 5/19/2021), Disp: 20 tablet, Rfl: 0    naproxen (NAPROSYN) 500 mg tablet, Take 1 tablet (500 mg total) by mouth 2 (two) times a day with meals for 10 days (Patient not taking: Reported on 5/19/2021), Disp: 20 tablet, Rfl: 0    pantoprazole (PROTONIX) 20 mg tablet, Take 1 tablet (20 mg total) by mouth daily, Disp: 30 tablet, Rfl: 3    sertraline (ZOLOFT) 100 mg tablet, Take 100 mg by mouth daily (Patient not taking: Reported on 7/9/2021), Disp: , Rfl:     SYNTHROID 125 MCG tablet, Take 150 mcg by mouth daily , Disp: , Rfl: 3    Current Allergies     Allergies as of 09/28/2021 - Reviewed 09/28/2021   Allergen Reaction Noted    Bactrim [sulfamethoxazole-trimethoprim] Hives 04/06/2016    Other Hives 04/06/2016    Shellfish-derived products - food allergy  09/07/2018    Sulfamethoxazole Hives 05/25/2018    Trimethoprim Hives 05/25/2018            The following portions of the patient's history were reviewed and updated as appropriate: allergies, current medications, past family history, past medical history, past social history, past surgical history and problem list      Past Medical History:   Diagnosis Date    Anxiety     Crohn's disease ([facility] [address] )     Depression     Disease of thyroid gland     Fibromyalgia     Gestational diabetes     Gestational hypertension     previous pregnancy    Hashimoto's thyroiditis     HPV (human papilloma virus) infection     Retained placenta        Past Surgical History:   Procedure Laterality Date    CHOLECYSTECTOMY      DILATION AND CURETTAGE OF UTERUS      HERNIA REPAIR      UMBILICAL HERNIA REPAIR         Family History   Problem Relation Age of Onset    Anuerysm Mother     Thyroid disease Mother     Hepatitis Mother     Lung cancer Maternal Grandmother     Colon cancer Maternal Grandfather          Medications have been verified  Objective   /65   Pulse 72   Temp 97 7 °F (36 5 °C)   Resp 18   SpO2 99%   No LMP recorded  Physical Exam     Physical Exam  Constitutional:       Appearance: Normal appearance  HENT:      Head: Normocephalic and atraumatic  Nose: Nose normal       Mouth/Throat:      Mouth: Mucous membranes are moist    Eyes:      Extraocular Movements: Extraocular movements intact  Conjunctiva/sclera: Conjunctivae normal       Pupils: Pupils are equal, round, and reactive to light  Cardiovascular:      Rate and Rhythm: Normal rate  Pulmonary:      Effort: Pulmonary effort is normal    Musculoskeletal:         General: Normal range of motion  Cervical back: Normal range of motion and neck supple  Legs:    Skin:     General: Skin is warm and dry  Capillary Refill: Capillary refill takes less than 2 seconds  Neurological:      General: No focal deficit present  Mental Status: She is alert and oriented to person, place, and time     Psychiatric:         Mood and Affect: Mood normal          Behavior: Behavior normal

## 2021-09-28 NOTE — LETTER
September 28, 2021     Patient: Darrin Sicard   YOB: 1985   Date of Visit: 9/28/2021       To Whom It May Concern: It is my medical opinion that Darrin Sicard May return to work on 09/30/2021  Patient encouraged to alternate between sitting and standing while at work to improve symptoms  If you have any questions or concerns, please don't hesitate to call           Sincerely,        Sheba Caceres PA-C    CC: Anita Duckworth

## 2021-09-29 ENCOUNTER — APPOINTMENT (EMERGENCY)
Dept: RADIOLOGY | Facility: HOSPITAL | Age: 36
End: 2021-09-29
Payer: COMMERCIAL

## 2021-09-29 ENCOUNTER — HOSPITAL ENCOUNTER (EMERGENCY)
Facility: HOSPITAL | Age: 36
Discharge: HOME | End: 2021-09-29
Attending: EMERGENCY MEDICINE | Admitting: EMERGENCY MEDICINE
Payer: COMMERCIAL

## 2021-09-29 VITALS
WEIGHT: 227.07 LBS | RESPIRATION RATE: 18 BRPM | SYSTOLIC BLOOD PRESSURE: 161 MMHG | TEMPERATURE: 98.1 F | OXYGEN SATURATION: 100 % | DIASTOLIC BLOOD PRESSURE: 74 MMHG | HEART RATE: 71 BPM | BODY MASS INDEX: 35.56 KG/M2

## 2021-09-29 DIAGNOSIS — M25.562 ACUTE PAIN OF LEFT KNEE: Primary | ICD-10-CM

## 2021-09-29 PROCEDURE — 99284 EMERGENCY DEPT VISIT MOD MDM: CPT | Performed by: PHYSICIAN ASSISTANT

## 2021-09-29 PROCEDURE — 99283 EMERGENCY DEPT VISIT LOW MDM: CPT

## 2021-09-29 PROCEDURE — 73564 X-RAY EXAM KNEE 4 OR MORE: CPT

## 2021-09-29 RX ORDER — IBUPROFEN 600 MG/1
600 TABLET ORAL ONCE
Status: COMPLETED | OUTPATIENT
Start: 2021-09-29 | End: 2021-09-29

## 2021-09-29 RX ORDER — IBUPROFEN 600 MG/1
600 TABLET ORAL EVERY 6 HOURS PRN
Qty: 30 TABLET | Refills: 0 | Status: SHIPPED | OUTPATIENT
Start: 2021-09-29 | End: 2022-01-25

## 2021-09-29 RX ADMIN — IBUPROFEN 600 MG: 600 TABLET, FILM COATED ORAL at 20:56

## 2021-10-01 ENCOUNTER — OFFICE VISIT (OUTPATIENT)
Dept: FAMILY MEDICINE CLINIC | Facility: CLINIC | Age: 36
End: 2021-10-01

## 2021-10-01 VITALS
OXYGEN SATURATION: 99 % | RESPIRATION RATE: 20 BRPM | BODY MASS INDEX: 36.1 KG/M2 | HEIGHT: 67 IN | WEIGHT: 230 LBS | TEMPERATURE: 97.8 F | DIASTOLIC BLOOD PRESSURE: 76 MMHG | HEART RATE: 66 BPM | SYSTOLIC BLOOD PRESSURE: 118 MMHG

## 2021-10-01 DIAGNOSIS — G89.29 CHRONIC PAIN OF LEFT KNEE: Primary | ICD-10-CM

## 2021-10-01 DIAGNOSIS — D50.9 MICROCYTIC ANEMIA: ICD-10-CM

## 2021-10-01 DIAGNOSIS — M54.42 CHRONIC BILATERAL LOW BACK PAIN WITH BILATERAL SCIATICA: ICD-10-CM

## 2021-10-01 DIAGNOSIS — R73.01 ELEVATED FASTING GLUCOSE: ICD-10-CM

## 2021-10-01 DIAGNOSIS — M54.41 CHRONIC BILATERAL LOW BACK PAIN WITH BILATERAL SCIATICA: ICD-10-CM

## 2021-10-01 DIAGNOSIS — G89.29 CHRONIC BILATERAL LOW BACK PAIN WITH BILATERAL SCIATICA: ICD-10-CM

## 2021-10-01 DIAGNOSIS — D50.9 IRON DEFICIENCY ANEMIA, UNSPECIFIED IRON DEFICIENCY ANEMIA TYPE: ICD-10-CM

## 2021-10-01 DIAGNOSIS — R20.2 PARESTHESIA: ICD-10-CM

## 2021-10-01 DIAGNOSIS — M25.562 CHRONIC PAIN OF LEFT KNEE: Primary | ICD-10-CM

## 2021-10-01 PROCEDURE — 99214 OFFICE O/P EST MOD 30 MIN: CPT | Performed by: NURSE PRACTITIONER

## 2021-10-01 RX ORDER — LIDOCAINE 50 MG/G
1 PATCH TOPICAL DAILY
Qty: 90 PATCH | Refills: 0 | Status: SHIPPED | OUTPATIENT
Start: 2021-10-01

## 2021-10-06 ENCOUNTER — OFFICE VISIT (OUTPATIENT)
Dept: FAMILY MEDICINE CLINIC | Facility: CLINIC | Age: 36
End: 2021-10-06

## 2021-10-06 DIAGNOSIS — Z20.822 EXPOSURE TO COVID-19 VIRUS: ICD-10-CM

## 2021-10-06 DIAGNOSIS — B34.9 VIRAL INFECTION, UNSPECIFIED: ICD-10-CM

## 2021-10-06 PROCEDURE — U0003 INFECTIOUS AGENT DETECTION BY NUCLEIC ACID (DNA OR RNA); SEVERE ACUTE RESPIRATORY SYNDROME CORONAVIRUS 2 (SARS-COV-2) (CORONAVIRUS DISEASE [COVID-19]), AMPLIFIED PROBE TECHNIQUE, MAKING USE OF HIGH THROUGHPUT TECHNOLOGIES AS DESCRIBED BY CMS-2020-01-R: HCPCS | Performed by: PHYSICIAN ASSISTANT

## 2021-10-06 PROCEDURE — U0005 INFEC AGEN DETEC AMPLI PROBE: HCPCS | Performed by: PHYSICIAN ASSISTANT

## 2021-10-06 PROCEDURE — 99213 OFFICE O/P EST LOW 20 MIN: CPT | Performed by: PHYSICIAN ASSISTANT

## 2021-10-07 LAB — SARS-COV-2 RNA RESP QL NAA+PROBE: NEGATIVE

## 2021-10-09 ENCOUNTER — HOSPITAL ENCOUNTER (EMERGENCY)
Facility: HOSPITAL | Age: 36
Discharge: HOME | End: 2021-10-09
Attending: EMERGENCY MEDICINE
Payer: COMMERCIAL

## 2021-10-09 VITALS
WEIGHT: 227.96 LBS | DIASTOLIC BLOOD PRESSURE: 85 MMHG | RESPIRATION RATE: 16 BRPM | TEMPERATURE: 99.3 F | HEART RATE: 81 BPM | BODY MASS INDEX: 35.7 KG/M2 | SYSTOLIC BLOOD PRESSURE: 151 MMHG | OXYGEN SATURATION: 99 %

## 2021-10-09 DIAGNOSIS — M79.10 MYALGIA: ICD-10-CM

## 2021-10-09 DIAGNOSIS — Z20.822 PERSON UNDER INVESTIGATION FOR COVID-19: Primary | ICD-10-CM

## 2021-10-09 DIAGNOSIS — R51.9 HEADACHE: ICD-10-CM

## 2021-10-09 DIAGNOSIS — R05.9 COUGH: ICD-10-CM

## 2021-10-09 DIAGNOSIS — J02.9 SORE THROAT: ICD-10-CM

## 2021-10-09 LAB — SARS-COV-2 RNA RESP QL NAA+PROBE: POSITIVE

## 2021-10-09 PROCEDURE — 99282 EMERGENCY DEPT VISIT SF MDM: CPT | Performed by: EMERGENCY MEDICINE

## 2021-10-09 PROCEDURE — U0005 INFEC AGEN DETEC AMPLI PROBE: HCPCS | Performed by: EMERGENCY MEDICINE

## 2021-10-09 PROCEDURE — 99283 EMERGENCY DEPT VISIT LOW MDM: CPT

## 2021-10-09 PROCEDURE — U0003 INFECTIOUS AGENT DETECTION BY NUCLEIC ACID (DNA OR RNA); SEVERE ACUTE RESPIRATORY SYNDROME CORONAVIRUS 2 (SARS-COV-2) (CORONAVIRUS DISEASE [COVID-19]), AMPLIFIED PROBE TECHNIQUE, MAKING USE OF HIGH THROUGHPUT TECHNOLOGIES AS DESCRIBED BY CMS-2020-01-R: HCPCS | Performed by: EMERGENCY MEDICINE

## 2021-10-13 ENCOUNTER — TELEMEDICINE (OUTPATIENT)
Dept: FAMILY MEDICINE CLINIC | Facility: CLINIC | Age: 36
End: 2021-10-13

## 2021-10-13 DIAGNOSIS — U07.1 COVID-19: Primary | ICD-10-CM

## 2021-10-13 PROCEDURE — G2025 DIS SITE TELE SVCS RHC/FQHC: HCPCS | Performed by: NURSE PRACTITIONER

## 2021-10-15 ENCOUNTER — TELEPHONE (OUTPATIENT)
Dept: FAMILY MEDICINE CLINIC | Facility: CLINIC | Age: 36
End: 2021-10-15

## 2021-10-21 ENCOUNTER — HOSPITAL ENCOUNTER (OUTPATIENT)
Dept: RADIOLOGY | Facility: HOSPITAL | Age: 36
Discharge: HOME | End: 2021-10-21
Payer: COMMERCIAL

## 2021-10-21 ENCOUNTER — APPOINTMENT (OUTPATIENT)
Dept: LAB | Facility: HOSPITAL | Age: 36
End: 2021-10-21
Payer: COMMERCIAL

## 2021-10-21 ENCOUNTER — OFFICE VISIT (OUTPATIENT)
Dept: FAMILY MEDICINE CLINIC | Facility: CLINIC | Age: 36
End: 2021-10-21

## 2021-10-21 VITALS
HEART RATE: 88 BPM | HEIGHT: 67 IN | RESPIRATION RATE: 18 BRPM | BODY MASS INDEX: 35.16 KG/M2 | OXYGEN SATURATION: 98 % | WEIGHT: 224 LBS | TEMPERATURE: 97.8 F | SYSTOLIC BLOOD PRESSURE: 136 MMHG | DIASTOLIC BLOOD PRESSURE: 74 MMHG

## 2021-10-21 DIAGNOSIS — M54.41 CHRONIC BILATERAL LOW BACK PAIN WITH BILATERAL SCIATICA: ICD-10-CM

## 2021-10-21 DIAGNOSIS — M79.10 MYALGIA: Primary | ICD-10-CM

## 2021-10-21 DIAGNOSIS — G89.29 CHRONIC BILATERAL LOW BACK PAIN WITH BILATERAL SCIATICA: ICD-10-CM

## 2021-10-21 DIAGNOSIS — D50.9 MICROCYTIC ANEMIA: ICD-10-CM

## 2021-10-21 DIAGNOSIS — R73.01 ELEVATED FASTING GLUCOSE: ICD-10-CM

## 2021-10-21 DIAGNOSIS — M54.42 CHRONIC BILATERAL LOW BACK PAIN WITH BILATERAL SCIATICA: ICD-10-CM

## 2021-10-21 DIAGNOSIS — R20.2 PARESTHESIA: ICD-10-CM

## 2021-10-21 LAB
EST. AVERAGE GLUCOSE BLD GHB EST-MCNC: 117 MG/DL
FERRITIN SERPL-MCNC: 8 NG/ML (ref 8–388)
HBA1C MFR BLD: 5.7 %
IRON SATN MFR SERPL: 7 % (ref 15–50)
IRON SERPL-MCNC: 36 UG/DL (ref 50–170)
TIBC SERPL-MCNC: 516 UG/DL (ref 250–450)
VIT B12 SERPL-MCNC: 930 PG/ML (ref 100–900)

## 2021-10-21 PROCEDURE — 3008F BODY MASS INDEX DOCD: CPT | Performed by: FAMILY MEDICINE

## 2021-10-21 PROCEDURE — 83036 HEMOGLOBIN GLYCOSYLATED A1C: CPT

## 2021-10-21 PROCEDURE — 83550 IRON BINDING TEST: CPT

## 2021-10-21 PROCEDURE — 82728 ASSAY OF FERRITIN: CPT

## 2021-10-21 PROCEDURE — 82607 VITAMIN B-12: CPT

## 2021-10-21 PROCEDURE — 99214 OFFICE O/P EST MOD 30 MIN: CPT | Performed by: FAMILY MEDICINE

## 2021-10-21 PROCEDURE — 86618 LYME DISEASE ANTIBODY: CPT

## 2021-10-21 PROCEDURE — 1036F TOBACCO NON-USER: CPT | Performed by: FAMILY MEDICINE

## 2021-10-21 PROCEDURE — 83540 ASSAY OF IRON: CPT

## 2021-10-21 PROCEDURE — 72110 X-RAY EXAM L-2 SPINE 4/>VWS: CPT

## 2021-10-21 PROCEDURE — 36415 COLL VENOUS BLD VENIPUNCTURE: CPT

## 2021-10-22 LAB — B BURGDOR IGG+IGM SER-ACNC: 6

## 2021-11-01 ENCOUNTER — TELEMEDICINE (OUTPATIENT)
Dept: FAMILY MEDICINE CLINIC | Facility: CLINIC | Age: 36
End: 2021-11-01

## 2021-11-01 DIAGNOSIS — D50.9 IRON DEFICIENCY ANEMIA, UNSPECIFIED IRON DEFICIENCY ANEMIA TYPE: Primary | ICD-10-CM

## 2021-11-01 PROCEDURE — G2025 DIS SITE TELE SVCS RHC/FQHC: HCPCS | Performed by: NURSE PRACTITIONER

## 2021-11-03 ENCOUNTER — TELEPHONE (OUTPATIENT)
Dept: HEMATOLOGY ONCOLOGY | Facility: CLINIC | Age: 36
End: 2021-11-03

## 2021-11-03 ENCOUNTER — TELEPHONE (OUTPATIENT)
Dept: SURGICAL ONCOLOGY | Facility: CLINIC | Age: 36
End: 2021-11-03

## 2021-11-17 ENCOUNTER — TELEMEDICINE (OUTPATIENT)
Dept: HEMATOLOGY ONCOLOGY | Facility: CLINIC | Age: 36
End: 2021-11-17
Payer: COMMERCIAL

## 2021-11-17 DIAGNOSIS — D50.9 MICROCYTIC ANEMIA: Primary | ICD-10-CM

## 2021-11-17 DIAGNOSIS — D50.9 IRON DEFICIENCY ANEMIA, UNSPECIFIED IRON DEFICIENCY ANEMIA TYPE: ICD-10-CM

## 2021-11-17 DIAGNOSIS — D50.0 IRON DEFICIENCY ANEMIA DUE TO CHRONIC BLOOD LOSS: ICD-10-CM

## 2021-11-17 PROCEDURE — 1036F TOBACCO NON-USER: CPT | Performed by: NURSE PRACTITIONER

## 2021-11-17 PROCEDURE — 99204 OFFICE O/P NEW MOD 45 MIN: CPT | Performed by: NURSE PRACTITIONER

## 2021-11-17 RX ORDER — SODIUM CHLORIDE 9 MG/ML
20 INJECTION, SOLUTION INTRAVENOUS ONCE
Status: CANCELLED | OUTPATIENT
Start: 2021-11-24

## 2021-11-24 ENCOUNTER — HOSPITAL ENCOUNTER (OUTPATIENT)
Dept: INFUSION CENTER | Facility: HOSPITAL | Age: 36
Discharge: HOME | End: 2021-11-24
Attending: INTERNAL MEDICINE
Payer: COMMERCIAL

## 2021-11-24 VITALS
HEART RATE: 98 BPM | TEMPERATURE: 96.9 F | DIASTOLIC BLOOD PRESSURE: 76 MMHG | RESPIRATION RATE: 20 BRPM | SYSTOLIC BLOOD PRESSURE: 140 MMHG

## 2021-11-24 DIAGNOSIS — D50.0 IRON DEFICIENCY ANEMIA DUE TO CHRONIC BLOOD LOSS: Primary | ICD-10-CM

## 2021-11-24 DIAGNOSIS — D50.9 IRON DEFICIENCY ANEMIA, UNSPECIFIED IRON DEFICIENCY ANEMIA TYPE: ICD-10-CM

## 2021-11-24 PROCEDURE — 96365 THER/PROPH/DIAG IV INF INIT: CPT

## 2021-11-24 RX ORDER — SODIUM CHLORIDE 9 MG/ML
20 INJECTION, SOLUTION INTRAVENOUS ONCE
Status: COMPLETED | OUTPATIENT
Start: 2021-11-24 | End: 2021-11-24

## 2021-11-24 RX ORDER — SODIUM CHLORIDE 9 MG/ML
20 INJECTION, SOLUTION INTRAVENOUS ONCE
Status: CANCELLED | OUTPATIENT
Start: 2021-11-26

## 2021-11-24 RX ADMIN — IRON SUCROSE 200 MG: 20 INJECTION, SOLUTION INTRAVENOUS at 09:06

## 2021-11-24 RX ADMIN — SODIUM CHLORIDE 20 ML/HR: 0.9 INJECTION, SOLUTION INTRAVENOUS at 08:55

## 2021-11-26 ENCOUNTER — HOSPITAL ENCOUNTER (OUTPATIENT)
Dept: INFUSION CENTER | Facility: HOSPITAL | Age: 36
Discharge: HOME | End: 2021-11-26
Attending: INTERNAL MEDICINE
Payer: COMMERCIAL

## 2021-11-26 VITALS
RESPIRATION RATE: 20 BRPM | TEMPERATURE: 98.2 F | HEART RATE: 101 BPM | SYSTOLIC BLOOD PRESSURE: 159 MMHG | DIASTOLIC BLOOD PRESSURE: 87 MMHG

## 2021-11-26 DIAGNOSIS — D50.9 IRON DEFICIENCY ANEMIA, UNSPECIFIED IRON DEFICIENCY ANEMIA TYPE: ICD-10-CM

## 2021-11-26 DIAGNOSIS — D50.0 IRON DEFICIENCY ANEMIA DUE TO CHRONIC BLOOD LOSS: Primary | ICD-10-CM

## 2021-11-26 PROCEDURE — 96367 TX/PROPH/DG ADDL SEQ IV INF: CPT

## 2021-11-26 PROCEDURE — 96365 THER/PROPH/DIAG IV INF INIT: CPT

## 2021-11-26 RX ORDER — FAMOTIDINE 20 MG/1
20 TABLET, FILM COATED ORAL ONCE
Status: COMPLETED | OUTPATIENT
Start: 2021-11-26 | End: 2021-11-26

## 2021-11-26 RX ORDER — SODIUM CHLORIDE 9 MG/ML
20 INJECTION, SOLUTION INTRAVENOUS ONCE
Status: COMPLETED | OUTPATIENT
Start: 2021-11-26 | End: 2021-11-26

## 2021-11-26 RX ORDER — ONDANSETRON 8 MG/1
8 TABLET, ORALLY DISINTEGRATING ORAL ONCE
Status: COMPLETED | OUTPATIENT
Start: 2021-11-26 | End: 2021-11-26

## 2021-11-26 RX ORDER — FAMOTIDINE 20 MG/1
20 TABLET, FILM COATED ORAL ONCE
Status: CANCELLED
Start: 2021-12-01 | End: 2021-12-01

## 2021-11-26 RX ORDER — ONDANSETRON 8 MG/1
8 TABLET, ORALLY DISINTEGRATING ORAL ONCE
Status: CANCELLED
Start: 2021-12-01 | End: 2021-12-01

## 2021-11-26 RX ORDER — SODIUM CHLORIDE 9 MG/ML
20 INJECTION, SOLUTION INTRAVENOUS ONCE
Status: CANCELLED | OUTPATIENT
Start: 2021-11-29

## 2021-11-26 RX ORDER — DIPHENHYDRAMINE HCL 25 MG
25 CAPSULE ORAL ONCE
Status: CANCELLED
Start: 2021-12-01 | End: 2021-12-01

## 2021-11-26 RX ORDER — DIPHENHYDRAMINE HCL 25 MG
25 TABLET ORAL ONCE
Status: COMPLETED | OUTPATIENT
Start: 2021-11-26 | End: 2021-11-26

## 2021-11-26 RX ADMIN — ONDANSETRON 8 MG: 8 TABLET, ORALLY DISINTEGRATING ORAL at 12:53

## 2021-11-26 RX ADMIN — FAMOTIDINE 20 MG: 20 TABLET ORAL at 12:53

## 2021-11-26 RX ADMIN — IRON SUCROSE 200 MG: 20 INJECTION, SOLUTION INTRAVENOUS at 13:34

## 2021-11-26 RX ADMIN — SODIUM CHLORIDE 20 ML/HR: 0.9 INJECTION, SOLUTION INTRAVENOUS at 12:11

## 2021-11-26 RX ADMIN — DIPHENHYDRAMINE HCL 25 MG: 25 TABLET ORAL at 12:53

## 2021-11-26 RX ADMIN — DEXAMETHASONE SODIUM PHOSPHATE 4 MG: 4 INJECTION, SOLUTION INTRAMUSCULAR; INTRAVENOUS at 12:53

## 2021-11-29 ENCOUNTER — HOSPITAL ENCOUNTER (OUTPATIENT)
Dept: INFUSION CENTER | Facility: HOSPITAL | Age: 36
Discharge: HOME | End: 2021-11-29
Attending: INTERNAL MEDICINE
Payer: COMMERCIAL

## 2021-11-29 ENCOUNTER — OFFICE VISIT (OUTPATIENT)
Dept: FAMILY MEDICINE CLINIC | Facility: CLINIC | Age: 36
End: 2021-11-29

## 2021-11-29 VITALS
HEART RATE: 63 BPM | RESPIRATION RATE: 18 BRPM | TEMPERATURE: 97.4 F | DIASTOLIC BLOOD PRESSURE: 62 MMHG | SYSTOLIC BLOOD PRESSURE: 113 MMHG

## 2021-11-29 VITALS
WEIGHT: 224 LBS | BODY MASS INDEX: 35.16 KG/M2 | RESPIRATION RATE: 16 BRPM | DIASTOLIC BLOOD PRESSURE: 76 MMHG | HEART RATE: 81 BPM | SYSTOLIC BLOOD PRESSURE: 130 MMHG | HEIGHT: 67 IN | OXYGEN SATURATION: 97 % | TEMPERATURE: 98 F

## 2021-11-29 DIAGNOSIS — D50.9 IRON DEFICIENCY ANEMIA, UNSPECIFIED IRON DEFICIENCY ANEMIA TYPE: ICD-10-CM

## 2021-11-29 DIAGNOSIS — N30.01 ACUTE CYSTITIS WITH HEMATURIA: Primary | ICD-10-CM

## 2021-11-29 DIAGNOSIS — D50.0 IRON DEFICIENCY ANEMIA DUE TO CHRONIC BLOOD LOSS: Primary | ICD-10-CM

## 2021-11-29 LAB
SL AMB  POCT GLUCOSE, UA: NEGATIVE
SL AMB LEUKOCYTE ESTERASE,UA: POSITIVE
SL AMB POCT BILIRUBIN,UA: NEGATIVE
SL AMB POCT BLOOD,UA: POSITIVE
SL AMB POCT CLARITY,UA: ABNORMAL
SL AMB POCT COLOR,UA: YELLOW
SL AMB POCT KETONES,UA: NEGATIVE
SL AMB POCT NITRITE,UA: NEGATIVE
SL AMB POCT PH,UA: 7
SL AMB POCT SPECIFIC GRAVITY,UA: 1.02
SL AMB POCT URINE PROTEIN: NEGATIVE
SL AMB POCT UROBILINOGEN: NEGATIVE

## 2021-11-29 PROCEDURE — 3008F BODY MASS INDEX DOCD: CPT | Performed by: FAMILY MEDICINE

## 2021-11-29 PROCEDURE — 96367 TX/PROPH/DG ADDL SEQ IV INF: CPT

## 2021-11-29 PROCEDURE — 87186 SC STD MICRODIL/AGAR DIL: CPT | Performed by: FAMILY MEDICINE

## 2021-11-29 PROCEDURE — 99213 OFFICE O/P EST LOW 20 MIN: CPT | Performed by: FAMILY MEDICINE

## 2021-11-29 PROCEDURE — 87077 CULTURE AEROBIC IDENTIFY: CPT | Performed by: FAMILY MEDICINE

## 2021-11-29 PROCEDURE — 3008F BODY MASS INDEX DOCD: CPT | Performed by: NURSE PRACTITIONER

## 2021-11-29 PROCEDURE — 87086 URINE CULTURE/COLONY COUNT: CPT | Performed by: FAMILY MEDICINE

## 2021-11-29 PROCEDURE — 96365 THER/PROPH/DIAG IV INF INIT: CPT

## 2021-11-29 PROCEDURE — 81002 URINALYSIS NONAUTO W/O SCOPE: CPT | Performed by: FAMILY MEDICINE

## 2021-11-29 RX ORDER — DIPHENHYDRAMINE HCL 25 MG
25 TABLET ORAL ONCE
Status: COMPLETED | OUTPATIENT
Start: 2021-11-29 | End: 2021-11-29

## 2021-11-29 RX ORDER — DIPHENHYDRAMINE HCL 25 MG
25 TABLET ORAL ONCE
Status: CANCELLED
Start: 2021-12-01 | End: 2021-12-01

## 2021-11-29 RX ORDER — SODIUM CHLORIDE 9 MG/ML
20 INJECTION, SOLUTION INTRAVENOUS ONCE
Status: COMPLETED | OUTPATIENT
Start: 2021-11-29 | End: 2021-11-29

## 2021-11-29 RX ORDER — FAMOTIDINE 20 MG/1
20 TABLET, FILM COATED ORAL ONCE
Status: CANCELLED
Start: 2021-12-01 | End: 2021-12-01

## 2021-11-29 RX ORDER — NITROFURANTOIN 25; 75 MG/1; MG/1
100 CAPSULE ORAL 2 TIMES DAILY
Qty: 10 CAPSULE | Refills: 0 | Status: SHIPPED | OUTPATIENT
Start: 2021-11-29 | End: 2021-12-04

## 2021-11-29 RX ORDER — SODIUM CHLORIDE 9 MG/ML
20 INJECTION, SOLUTION INTRAVENOUS ONCE
Status: CANCELLED | OUTPATIENT
Start: 2021-12-01

## 2021-11-29 RX ORDER — ONDANSETRON 8 MG/1
8 TABLET, ORALLY DISINTEGRATING ORAL ONCE
Status: CANCELLED
Start: 2021-12-01 | End: 2021-12-01

## 2021-11-29 RX ORDER — ONDANSETRON 8 MG/1
8 TABLET, ORALLY DISINTEGRATING ORAL ONCE
Status: COMPLETED | OUTPATIENT
Start: 2021-11-29 | End: 2021-11-29

## 2021-11-29 RX ORDER — FAMOTIDINE 20 MG/1
20 TABLET, FILM COATED ORAL ONCE
Status: COMPLETED | OUTPATIENT
Start: 2021-11-29 | End: 2021-11-29

## 2021-11-29 RX ADMIN — IRON SUCROSE 200 MG: 20 INJECTION, SOLUTION INTRAVENOUS at 12:00

## 2021-11-29 RX ADMIN — FAMOTIDINE 20 MG: 20 TABLET ORAL at 10:58

## 2021-11-29 RX ADMIN — ONDANSETRON 8 MG: 8 TABLET, ORALLY DISINTEGRATING ORAL at 10:58

## 2021-11-29 RX ADMIN — DIPHENHYDRAMINE HCL 25 MG: 25 TABLET ORAL at 10:58

## 2021-11-29 RX ADMIN — SODIUM CHLORIDE 20 ML/HR: 0.9 INJECTION, SOLUTION INTRAVENOUS at 10:53

## 2021-11-29 RX ADMIN — DEXAMETHASONE SODIUM PHOSPHATE 4 MG: 10 INJECTION, SOLUTION INTRAMUSCULAR; INTRAVENOUS at 11:12

## 2021-12-01 ENCOUNTER — HOSPITAL ENCOUNTER (OUTPATIENT)
Dept: INFUSION CENTER | Facility: HOSPITAL | Age: 36
Discharge: HOME | End: 2021-12-01
Attending: INTERNAL MEDICINE
Payer: COMMERCIAL

## 2021-12-01 VITALS
RESPIRATION RATE: 18 BRPM | SYSTOLIC BLOOD PRESSURE: 127 MMHG | TEMPERATURE: 97.1 F | HEART RATE: 62 BPM | DIASTOLIC BLOOD PRESSURE: 74 MMHG

## 2021-12-01 DIAGNOSIS — D50.9 IRON DEFICIENCY ANEMIA, UNSPECIFIED IRON DEFICIENCY ANEMIA TYPE: ICD-10-CM

## 2021-12-01 DIAGNOSIS — D50.0 IRON DEFICIENCY ANEMIA DUE TO CHRONIC BLOOD LOSS: Primary | ICD-10-CM

## 2021-12-01 LAB
BACTERIA UR CULT: ABNORMAL
BACTERIA UR CULT: ABNORMAL

## 2021-12-01 PROCEDURE — 96367 TX/PROPH/DG ADDL SEQ IV INF: CPT

## 2021-12-01 PROCEDURE — 96365 THER/PROPH/DIAG IV INF INIT: CPT

## 2021-12-01 RX ORDER — DIPHENHYDRAMINE HCL 25 MG
25 TABLET ORAL ONCE
Status: CANCELLED
Start: 2021-12-06 | End: 2021-12-06

## 2021-12-01 RX ORDER — SODIUM CHLORIDE 9 MG/ML
20 INJECTION, SOLUTION INTRAVENOUS ONCE
Status: CANCELLED | OUTPATIENT
Start: 2021-12-06

## 2021-12-01 RX ORDER — ONDANSETRON 8 MG/1
8 TABLET, ORALLY DISINTEGRATING ORAL ONCE
Status: CANCELLED
Start: 2021-12-06 | End: 2021-12-06

## 2021-12-01 RX ORDER — SODIUM CHLORIDE 9 MG/ML
20 INJECTION, SOLUTION INTRAVENOUS ONCE
Status: COMPLETED | OUTPATIENT
Start: 2021-12-01 | End: 2021-12-01

## 2021-12-01 RX ORDER — FAMOTIDINE 20 MG/1
20 TABLET, FILM COATED ORAL ONCE
Status: COMPLETED | OUTPATIENT
Start: 2021-12-01 | End: 2021-12-01

## 2021-12-01 RX ORDER — ONDANSETRON 8 MG/1
8 TABLET, ORALLY DISINTEGRATING ORAL ONCE
Status: COMPLETED | OUTPATIENT
Start: 2021-12-01 | End: 2021-12-01

## 2021-12-01 RX ORDER — DIPHENHYDRAMINE HCL 25 MG
25 TABLET ORAL ONCE
Status: DISCONTINUED | OUTPATIENT
Start: 2021-12-01 | End: 2021-12-05 | Stop reason: HOSPADM

## 2021-12-01 RX ORDER — FAMOTIDINE 20 MG/1
20 TABLET, FILM COATED ORAL ONCE
Status: CANCELLED
Start: 2021-12-06 | End: 2021-12-06

## 2021-12-01 RX ADMIN — IRON SUCROSE 200 MG: 20 INJECTION, SOLUTION INTRAVENOUS at 13:40

## 2021-12-01 RX ADMIN — DEXAMETHASONE SODIUM PHOSPHATE 4 MG: 10 INJECTION, SOLUTION INTRAMUSCULAR; INTRAVENOUS at 12:53

## 2021-12-01 RX ADMIN — SODIUM CHLORIDE 20 ML/HR: 0.9 INJECTION, SOLUTION INTRAVENOUS at 12:51

## 2021-12-01 RX ADMIN — FAMOTIDINE 20 MG: 20 TABLET ORAL at 12:47

## 2021-12-01 RX ADMIN — ONDANSETRON 8 MG: 8 TABLET, ORALLY DISINTEGRATING ORAL at 12:47

## 2021-12-02 ENCOUNTER — TELEPHONE (OUTPATIENT)
Dept: GYNECOLOGIC ONCOLOGY | Facility: CLINIC | Age: 36
End: 2021-12-02

## 2021-12-02 DIAGNOSIS — R11.0 NAUSEA: Primary | ICD-10-CM

## 2021-12-02 RX ORDER — ONDANSETRON HYDROCHLORIDE 8 MG/1
8 TABLET, FILM COATED ORAL EVERY 8 HOURS PRN
Qty: 20 TABLET | Refills: 0 | Status: SHIPPED | OUTPATIENT
Start: 2021-12-02

## 2021-12-06 ENCOUNTER — HOSPITAL ENCOUNTER (OUTPATIENT)
Dept: INFUSION CENTER | Facility: HOSPITAL | Age: 36
Discharge: HOME | End: 2021-12-06
Attending: INTERNAL MEDICINE
Payer: COMMERCIAL

## 2021-12-06 VITALS
RESPIRATION RATE: 18 BRPM | HEART RATE: 79 BPM | TEMPERATURE: 98.2 F | DIASTOLIC BLOOD PRESSURE: 67 MMHG | SYSTOLIC BLOOD PRESSURE: 112 MMHG

## 2021-12-06 DIAGNOSIS — D50.9 IRON DEFICIENCY ANEMIA, UNSPECIFIED IRON DEFICIENCY ANEMIA TYPE: ICD-10-CM

## 2021-12-06 DIAGNOSIS — D50.0 IRON DEFICIENCY ANEMIA DUE TO CHRONIC BLOOD LOSS: Primary | ICD-10-CM

## 2021-12-06 PROCEDURE — 96367 TX/PROPH/DG ADDL SEQ IV INF: CPT

## 2021-12-06 PROCEDURE — 96365 THER/PROPH/DIAG IV INF INIT: CPT

## 2021-12-06 RX ORDER — ONDANSETRON 8 MG/1
8 TABLET, ORALLY DISINTEGRATING ORAL ONCE
Status: CANCELLED
Start: 2021-12-08 | End: 2021-12-08

## 2021-12-06 RX ORDER — DIPHENHYDRAMINE HCL 25 MG
25 TABLET ORAL ONCE
Status: CANCELLED
Start: 2021-12-08 | End: 2021-12-08

## 2021-12-06 RX ORDER — FAMOTIDINE 20 MG/1
20 TABLET, FILM COATED ORAL ONCE
Status: COMPLETED | OUTPATIENT
Start: 2021-12-06 | End: 2021-12-06

## 2021-12-06 RX ORDER — SODIUM CHLORIDE 9 MG/ML
20 INJECTION, SOLUTION INTRAVENOUS ONCE
Status: CANCELLED | OUTPATIENT
Start: 2021-12-08

## 2021-12-06 RX ORDER — ONDANSETRON 8 MG/1
8 TABLET, ORALLY DISINTEGRATING ORAL ONCE
Status: COMPLETED | OUTPATIENT
Start: 2021-12-06 | End: 2021-12-06

## 2021-12-06 RX ORDER — DIPHENHYDRAMINE HCL 25 MG
25 TABLET ORAL ONCE
Status: COMPLETED | OUTPATIENT
Start: 2021-12-06 | End: 2021-12-06

## 2021-12-06 RX ORDER — FAMOTIDINE 20 MG/1
20 TABLET, FILM COATED ORAL ONCE
Status: CANCELLED
Start: 2021-12-08 | End: 2021-12-08

## 2021-12-06 RX ORDER — SODIUM CHLORIDE 9 MG/ML
20 INJECTION, SOLUTION INTRAVENOUS ONCE
Status: COMPLETED | OUTPATIENT
Start: 2021-12-06 | End: 2021-12-06

## 2021-12-06 RX ADMIN — IRON SUCROSE 200 MG: 20 INJECTION, SOLUTION INTRAVENOUS at 12:13

## 2021-12-06 RX ADMIN — SODIUM CHLORIDE 20 ML/HR: 0.9 INJECTION, SOLUTION INTRAVENOUS at 11:22

## 2021-12-06 RX ADMIN — FAMOTIDINE 20 MG: 20 TABLET ORAL at 11:28

## 2021-12-06 RX ADMIN — DIPHENHYDRAMINE HCL 25 MG: 25 TABLET ORAL at 11:28

## 2021-12-06 RX ADMIN — DEXAMETHASONE SODIUM PHOSPHATE 4 MG: 10 INJECTION INTRAMUSCULAR; INTRAVENOUS at 11:23

## 2021-12-06 RX ADMIN — ONDANSETRON 8 MG: 8 TABLET, ORALLY DISINTEGRATING ORAL at 11:28

## 2021-12-08 ENCOUNTER — HOSPITAL ENCOUNTER (OUTPATIENT)
Dept: INFUSION CENTER | Facility: HOSPITAL | Age: 36
Discharge: HOME | End: 2021-12-08
Attending: INTERNAL MEDICINE
Payer: COMMERCIAL

## 2021-12-08 VITALS
HEART RATE: 75 BPM | SYSTOLIC BLOOD PRESSURE: 113 MMHG | DIASTOLIC BLOOD PRESSURE: 55 MMHG | RESPIRATION RATE: 20 BRPM | TEMPERATURE: 97.7 F

## 2021-12-08 DIAGNOSIS — D50.9 IRON DEFICIENCY ANEMIA, UNSPECIFIED IRON DEFICIENCY ANEMIA TYPE: ICD-10-CM

## 2021-12-08 DIAGNOSIS — D50.0 IRON DEFICIENCY ANEMIA DUE TO CHRONIC BLOOD LOSS: Primary | ICD-10-CM

## 2021-12-08 PROCEDURE — 96365 THER/PROPH/DIAG IV INF INIT: CPT

## 2021-12-08 PROCEDURE — 96367 TX/PROPH/DG ADDL SEQ IV INF: CPT

## 2021-12-08 RX ORDER — DIPHENHYDRAMINE HCL 25 MG
25 CAPSULE ORAL ONCE
Status: CANCELLED
Start: 2021-12-13 | End: 2021-12-13

## 2021-12-08 RX ORDER — DIPHENHYDRAMINE HCL 25 MG
25 TABLET ORAL ONCE
Status: DISCONTINUED | OUTPATIENT
Start: 2021-12-08 | End: 2021-12-12 | Stop reason: HOSPADM

## 2021-12-08 RX ORDER — FAMOTIDINE 20 MG/1
20 TABLET, FILM COATED ORAL ONCE
Status: COMPLETED | OUTPATIENT
Start: 2021-12-08 | End: 2021-12-08

## 2021-12-08 RX ORDER — ONDANSETRON 8 MG/1
8 TABLET, ORALLY DISINTEGRATING ORAL ONCE
Status: COMPLETED | OUTPATIENT
Start: 2021-12-08 | End: 2021-12-08

## 2021-12-08 RX ORDER — SODIUM CHLORIDE 9 MG/ML
20 INJECTION, SOLUTION INTRAVENOUS ONCE
Status: COMPLETED | OUTPATIENT
Start: 2021-12-08 | End: 2021-12-08

## 2021-12-08 RX ORDER — ONDANSETRON 8 MG/1
8 TABLET, ORALLY DISINTEGRATING ORAL ONCE
Status: CANCELLED
Start: 2021-12-13 | End: 2021-12-13

## 2021-12-08 RX ORDER — SODIUM CHLORIDE 9 MG/ML
20 INJECTION, SOLUTION INTRAVENOUS ONCE
Status: CANCELLED | OUTPATIENT
Start: 2021-12-13

## 2021-12-08 RX ORDER — FAMOTIDINE 20 MG/1
20 TABLET, FILM COATED ORAL ONCE
Status: CANCELLED
Start: 2021-12-13 | End: 2021-12-13

## 2021-12-08 RX ADMIN — ONDANSETRON 8 MG: 8 TABLET, ORALLY DISINTEGRATING ORAL at 11:52

## 2021-12-08 RX ADMIN — SODIUM CHLORIDE 20 ML/HR: 0.9 INJECTION, SOLUTION INTRAVENOUS at 11:45

## 2021-12-08 RX ADMIN — FAMOTIDINE 20 MG: 20 TABLET ORAL at 11:52

## 2021-12-08 RX ADMIN — DEXAMETHASONE SODIUM PHOSPHATE 4 MG: 4 INJECTION, SOLUTION INTRAMUSCULAR; INTRAVENOUS at 11:50

## 2021-12-08 RX ADMIN — IRON SUCROSE 200 MG: 20 INJECTION, SOLUTION INTRAVENOUS at 12:37

## 2021-12-28 DIAGNOSIS — K21.9 GASTROESOPHAGEAL REFLUX DISEASE WITHOUT ESOPHAGITIS: ICD-10-CM

## 2021-12-28 RX ORDER — PANTOPRAZOLE SODIUM 20 MG/1
20 TABLET, DELAYED RELEASE ORAL DAILY
Qty: 30 TABLET | Refills: 0 | Status: SHIPPED | OUTPATIENT
Start: 2021-12-28 | End: 2022-04-29 | Stop reason: SDUPTHER

## 2022-01-04 ENCOUNTER — HOSPITAL ENCOUNTER (EMERGENCY)
Facility: HOSPITAL | Age: 37
Discharge: HOME | End: 2022-01-04
Attending: EMERGENCY MEDICINE | Admitting: EMERGENCY MEDICINE
Payer: MEDICARE

## 2022-01-04 VITALS
BODY MASS INDEX: 35.08 KG/M2 | DIASTOLIC BLOOD PRESSURE: 57 MMHG | RESPIRATION RATE: 18 BRPM | OXYGEN SATURATION: 99 % | TEMPERATURE: 98 F | WEIGHT: 223.99 LBS | HEART RATE: 77 BPM | SYSTOLIC BLOOD PRESSURE: 124 MMHG

## 2022-01-04 DIAGNOSIS — N39.0 UTI (URINARY TRACT INFECTION): Primary | ICD-10-CM

## 2022-01-04 LAB
BACTERIA UR QL AUTO: ABNORMAL /HPF
BILIRUB UR QL STRIP: NEGATIVE
CLARITY UR: ABNORMAL
COLOR UR: YELLOW
EXT PREG TEST URINE: NEGATIVE
EXT. CONTROL ED NAV: NORMAL
GLUCOSE UR STRIP-MCNC: NEGATIVE MG/DL
HGB UR QL STRIP.AUTO: ABNORMAL
KETONES UR STRIP-MCNC: NEGATIVE MG/DL
LEUKOCYTE ESTERASE UR QL STRIP: ABNORMAL
NITRITE UR QL STRIP: NEGATIVE
NON-SQ EPI CELLS URNS QL MICRO: ABNORMAL /HPF
PH UR STRIP.AUTO: 5.5 [PH]
PROT UR STRIP-MCNC: ABNORMAL MG/DL
RBC #/AREA URNS AUTO: ABNORMAL /HPF
SP GR UR STRIP.AUTO: 1.02 (ref 1–1.03)
UROBILINOGEN UR QL STRIP.AUTO: 0.2 E.U./DL
WBC #/AREA URNS AUTO: ABNORMAL /HPF

## 2022-01-04 PROCEDURE — 81001 URINALYSIS AUTO W/SCOPE: CPT | Performed by: EMERGENCY MEDICINE

## 2022-01-04 PROCEDURE — 99284 EMERGENCY DEPT VISIT MOD MDM: CPT

## 2022-01-04 PROCEDURE — 87591 N.GONORRHOEAE DNA AMP PROB: CPT

## 2022-01-04 PROCEDURE — 87077 CULTURE AEROBIC IDENTIFY: CPT | Performed by: EMERGENCY MEDICINE

## 2022-01-04 PROCEDURE — 87086 URINE CULTURE/COLONY COUNT: CPT | Performed by: EMERGENCY MEDICINE

## 2022-01-04 PROCEDURE — 99283 EMERGENCY DEPT VISIT LOW MDM: CPT

## 2022-01-04 PROCEDURE — 87491 CHLMYD TRACH DNA AMP PROBE: CPT

## 2022-01-04 PROCEDURE — 87186 SC STD MICRODIL/AGAR DIL: CPT | Performed by: EMERGENCY MEDICINE

## 2022-01-04 PROCEDURE — 81025 URINE PREGNANCY TEST: CPT | Performed by: EMERGENCY MEDICINE

## 2022-01-04 RX ORDER — CEPHALEXIN 500 MG/1
500 CAPSULE ORAL EVERY 6 HOURS SCHEDULED
Qty: 28 CAPSULE | Refills: 0 | Status: SHIPPED | OUTPATIENT
Start: 2022-01-04 | End: 2022-01-11

## 2022-01-04 NOTE — ED PROVIDER NOTES
History  Chief Complaint   Patient presents with    Possible UTI     Pt is suscpicious of UTI, sent by PCP due to them not having any appts  The patient is a 43 YOF who reports to the ED complaining of urinary urgency, hematuria, and dysuria  She reports that on 12/29/21, she was diagnosed by her PCP with a UTI and was started on Macrobid on 12/30  She reports that she completed the antibiotics but continues to have symptoms, prompting ED visit as her PCP has no appointments today  She denies flank pain, abdominal pain, fevers, chills, nausea, vomiting, vaginal discharge, or vaginal odor  She is allergic to Bactrim  Prior to Admission Medications   Prescriptions Last Dose Informant Patient Reported? Taking?    Diclofenac Sodium (VOLTAREN) 1 %   No No   Sig: Apply 2 g topically 4 (four) times a day   SYNTHROID 125 MCG tablet   Yes No   Sig: Take 150 mcg by mouth daily    aluminum-magnesium hydroxide-simethicone (MAALOX) 200-200-20 MG/5ML SUSP   No No   Sig: Take 20 mL by mouth 4 (four) times a day (before meals and at bedtime)   ascorbic acid (VITAMIN C) 500 MG tablet   No No   Sig: Take 1 tablet (500 mg total) by mouth daily   Patient not taking: Reported on 7/9/2021   buPROPion (WELLBUTRIN XL) 150 mg 24 hr tablet   Yes No   Sig: Take 150 mg by mouth daily   ergocalciferol (VITAMIN D2) 50,000 units   No No   Sig: Take 1 capsule (50,000 Units total) by mouth once a week   Patient not taking: Reported on 7/9/2021   ibuprofen (MOTRIN) 600 mg tablet   No No   Sig: Take 1 tablet (600 mg total) by mouth every 6 (six) hours as needed for mild pain for up to 10 days   lidocaine (Lidoderm) 5 %   No No   Sig: Apply 1 patch topically daily Remove & Discard patch within 12 hours or as directed by MD   ondansetron (ZOFRAN) 8 mg tablet   No No   Sig: Take 1 tablet (8 mg total) by mouth every 8 (eight) hours as needed for nausea or vomiting   pantoprazole (PROTONIX) 20 mg tablet   No No   Sig: Take 1 tablet (20 mg total) by mouth daily   sertraline (ZOLOFT) 100 mg tablet   Yes No   Sig: Take 100 mg by mouth daily   Patient not taking: Reported on 7/9/2021      Facility-Administered Medications: None       Past Medical History:   Diagnosis Date    Anxiety     Cro[facility]'s disease ([facility] [address] [facility]     Depression     Disease of thyroid gland     Fibromyalgia     Gestational diabetes     Gestational hypertension     previous pregnancy    Hashimoto's thyroiditis     HPV (human papilloma virus) infection     Retained placenta        Past Surgical History:   Procedure Laterality Date    CHOLECYSTECTOMY      DILATION AND CURETTAGE OF UTERUS      HERNIA REPAIR      UMBILICAL HERNIA REPAIR         Family History   Problem Relation Age of Onset    Anuerysm Mother     Thyroid disease Mother     Hepatitis Mother     Lung cancer Maternal Grandmother     Colon cancer Maternal Grandfather      I have reviewed and agree with the history as documented  E-Cigarette/Vaping    E-Cigarette Use Never User      E-Cigarette/Vaping Substances     Social History     Tobacco Use    Smoking status: Former Smoker    Smokeless tobacco: Never Used   Vaping Use    Vaping Use: Never used   Substance Use Topics    Alcohol use: No    Drug use: No       Review of Systems    Physical Exam  Physical Exam  Vitals and nursing note reviewed  Constitutional:       General: She is not in acute distress  Appearance: She is not toxic-appearing  HENT:      Head: Normocephalic  Mouth/Throat:      Mouth: Mucous membranes are moist    Cardiovascular:      Rate and Rhythm: Normal rate  Comments: Appears well perfused    Pulmonary:      Effort: Pulmonary effort is normal       Comments: No respiratory distress    Abdominal:      General: Abdomen is flat  There is no distension  Tenderness: There is no abdominal tenderness  There is no right CVA tenderness or left CVA tenderness  Musculoskeletal:         General: Normal range of motion  Skin:     General: Skin is warm and dry  Neurological:      Mental Status: She is alert  Vital Signs  ED Triage Vitals   Temperature Pulse Respirations Blood Pressure SpO2   01/04/22 1158 01/04/22 1155 01/04/22 1155 01/04/22 1155 01/04/22 1155   98 °F (36 7 °C) 77 18 124/57 99 %      Temp Source Heart Rate Source Patient Position - Orthostatic VS BP Location FiO2 (%)   01/04/22 1158 01/04/22 1155 01/04/22 1155 01/04/22 1155 --   Oral Monitor Sitting Right arm       Pain Score       --                  Vitals:    01/04/22 1155   BP: 124/57   Pulse: 77   Patient Position - Orthostatic VS: Sitting         Visual Acuity      ED Medications  Medications - No data to display    Diagnostic Studies  Results Reviewed     Procedure Component Value Units Date/Time    Chlamydia/GC amplified DNA by PCR [[id number]] Collected: 01/04/22 1436    Lab Status: In process Specimen: Urine, Other Updated: 01/04/22 1450    Urine Microscopic [[id number]]  (Abnormal) Collected: 01/04/22 1203    Lab Status: Final result Specimen: Urine, Other Updated: 01/04/22 1324     RBC, UA 10-20 /hpf      WBC, UA Innumerable /hpf      Epithelial Cells Occasional /hpf      Bacteria, UA Innumerable /hpf     Urine culture [[id number]] Collected: 01/04/22 1203    Lab Status:  In process Specimen: Urine, Other Updated: 01/04/22 1323    UA w Reflex to Microscopic w Reflex to Culture [[id number]]  (Abnormal) Collected: 01/04/22 1203    Lab Status: Final result Specimen: Urine, Other Updated: 01/04/22 1230     Color, UA Yellow     Clarity, UA Cloudy     Specific Gravity, UA 1 025     pH, UA 5 5     Leukocytes, UA Moderate     Nitrite, UA Negative     Protein, UA Trace mg/dl      Glucose, UA Negative mg/dl      Ketones, UA Negative mg/dl      Urobilinogen, UA 0 2 E U /dl      Bilirubin, UA Negative     Blood, UA Large    POCT pregnancy, urine [[id number]]  (Normal) Resulted: 01/04/22 1207    Lab Status: Final result Updated: 01/04/22 1207     EXT PREG TEST UR (Ref: Negative) negative     Control valid                 No orders to display              Procedures  Procedures         ED Course    The patient is a 43 YOF presenting for UTI symptoms including dysuria, hematuria, and urgency  She is without fever or CVA tenderness  She recently completed a 5 day course of Macrobid starting on 12/30/21  Her urine culture from her PCP was reviewed and the UTI was noted to be susceptible to Keflex; patient put on a 7 day course of Keflex today  At the time of discharge, the patient is in no acute distress  I discussed with the patient the diagnosis, treatment plan, follow-up, return precautions, and discharge instructions; they were given the opportunity to ask questions and verbalized understanding  MDM    Disposition  Final diagnoses:   UTI (urinary tract infection)     Time reflects when diagnosis was documented in both MDM as applicable and the Disposition within this note     Time User Action Codes Description Comment    1/4/2022  2:33 PM Kina Fermin Add [N39 0] UTI (urinary tract infection)       ED Disposition     ED Disposition Condition Date/Time Comment    Discharge Stable Tue Jan 4, 2022  2:35 PM [facility] discharge to home/self care              Follow-up Information     Follow up With Specialties Details Why Contact Info    Julissa Silvestre, [address] [facility], Nurse Practitioner Schedule an appointment as soon as possible for a visit in 2 days  [place] [id number]  [phone]            Discharge Medication List as of 1/4/2022  2:37 PM      START taking these medications    Details   cephalexin (KEFLEX) 500 mg capsule Take 1 capsule (500 mg total) by mouth every 6 (six) hours for 7 days, Starting Tue 1/4/2022, Until Tue 1/11/2022, Normal         CONTINUE these medications which have NOT CHANGED    Details   aluminum-magnesium hydroxide-simethicone (MAALOX) 200-200-20 MG/5ML SUSP Take 20 mL by mouth 4 (four) times a day (before meals and at bedtime), Starting Wed 5/19/2021, Normal      ascorbic acid (VITAMIN C) 500 MG tablet Take 1 tablet (500 mg total) by mouth daily, Starting Thu 2/18/2021, Normal      buPROPion (WELLBUTRIN XL) 150 mg 24 hr tablet Take 150 mg by mouth daily, Starting Tue 5/25/2021, Historical Med      Diclofenac Sodium (VOLTAREN) 1 % Apply 2 g topically 4 (four) times a day, Starting Thu 10/21/2021, Normal      ergocalciferol (VITAMIN D2) 50,000 units Take 1 capsule (50,000 Units total) by mouth once a week, Starting Thu 2/18/2021, Normal      ibuprofen (MOTRIN) 600 mg tablet Take 1 tablet (600 mg total) by mouth every 6 (six) hours as needed for mild pain for up to 10 days, Starting Wed 9/29/2021, Until Sat 10/9/2021 at 2359, Normal      lidocaine (Lidoderm) 5 % Apply 1 patch topically daily Remove & Discard patch within 12 hours or as directed by MD, Starting Fri 10/1/2021, Normal      ondansetron (ZOFRAN) 8 mg tablet Take 1 tablet (8 mg total) by mouth every 8 (eight) hours as needed for nausea or vomiting, Starting Thu 12/2/2021, Normal      pantoprazole (PROTONIX) 20 mg tablet Take 1 tablet (20 mg total) by mouth daily, Starting Tue 12/28/2021, Normal      sertraline (ZOLOFT) 100 mg tablet Take 100 mg by mouth daily, Starting Mon 2/15/2021, Historical Med      SYNTHROID 125 MCG tablet Take 150 mcg by mouth daily , Starting Mon 5/21/2018, Historical Med             No discharge procedures on file      PDMP Review       Value Time User    PDMP Reviewed  Yes 3/4/2021 10:32 AM Lucia Post PA-C          ED Provider  Electronically Signed by VEENA Merchant PA-C  01/04/22 9105

## 2022-01-04 NOTE — DISCHARGE INSTRUCTIONS
Take Keflex as prescribed  Take Advil/Tylenol as needed for pain   Follow up with your PCP in 2 days

## 2022-01-06 LAB
BACTERIA UR CULT: ABNORMAL
BACTERIA UR CULT: ABNORMAL
C TRACH DNA SPEC QL NAA+PROBE: NEGATIVE
N GONORRHOEA DNA SPEC QL NAA+PROBE: NEGATIVE

## 2022-01-10 ENCOUNTER — OFFICE VISIT (OUTPATIENT)
Dept: FAMILY MEDICINE CLINIC | Facility: CLINIC | Age: 37
End: 2022-01-10

## 2022-01-10 VITALS
RESPIRATION RATE: 18 BRPM | DIASTOLIC BLOOD PRESSURE: 66 MMHG | OXYGEN SATURATION: 96 % | WEIGHT: 225.8 LBS | HEIGHT: 67 IN | SYSTOLIC BLOOD PRESSURE: 106 MMHG | HEART RATE: 80 BPM | BODY MASS INDEX: 35.44 KG/M2 | TEMPERATURE: 97.8 F

## 2022-01-10 DIAGNOSIS — K46.1: ICD-10-CM

## 2022-01-10 DIAGNOSIS — N39.0 RECURRENT UTI: ICD-10-CM

## 2022-01-10 DIAGNOSIS — N30.01 ACUTE CYSTITIS WITH HEMATURIA: Primary | ICD-10-CM

## 2022-01-10 LAB
SL AMB  POCT GLUCOSE, UA: NORMAL
SL AMB LEUKOCYTE ESTERASE,UA: ABNORMAL
SL AMB POCT BILIRUBIN,UA: ABNORMAL
SL AMB POCT BLOOD,UA: ABNORMAL
SL AMB POCT CLARITY,UA: CLEAR
SL AMB POCT COLOR,UA: YELLOW
SL AMB POCT KETONES,UA: ABNORMAL
SL AMB POCT NITRITE,UA: ABNORMAL
SL AMB POCT PH,UA: 5
SL AMB POCT SPECIFIC GRAVITY,UA: 1.01
SL AMB POCT URINE PROTEIN: ABNORMAL
SL AMB POCT UROBILINOGEN: ABNORMAL

## 2022-01-10 PROCEDURE — 81002 URINALYSIS NONAUTO W/O SCOPE: CPT

## 2022-01-10 PROCEDURE — 99213 OFFICE O/P EST LOW 20 MIN: CPT

## 2022-01-10 NOTE — PROGRESS NOTES
Assessment/Plan:    Acute cystitis with hematuria  250+ blood in urine per dipstick but pt possible contaminant d/t menstrual cycle  Encouraged hydration  May use PO cranberry pills - although advised pt on limited evidence of this  Continue keflex  Referred to uro d/t recurrence  -RTC if symptoms worsen  ED precautions reviewed         Diagnoses and all orders for this visit:    Acute cystitis with hematuria  -     POCT urine dip    Abdominal hernia with gangrene, recurrence not specified, unspecified hernia type    Recurrent UTI  -     Ambulatory referral to Urology; Future          Subjective:      Patient ID: Koko Sams is a 43 y o  female  Pt presents for an ED follow up  She presented on 01/04/22 and was found to have an UTI  She was started on keflex and advised to f/u with pcp  Today, symptoms are improving  She is concerned because she woke up this morning with mild left sided back pain but reports it is improving  She has not finished her abx therapy yet  This will be her 3rd UTI this year  The following portions of the patient's history were reviewed and updated as appropriate: allergies, current medications, past family history, past medical history, past social history, past surgical history and problem list     Review of Systems   Constitutional: Negative for chills and fever  HENT: Negative for ear pain and sore throat  Eyes: Negative for pain and visual disturbance  Respiratory: Negative for cough and shortness of breath  Cardiovascular: Negative for chest pain and palpitations  Gastrointestinal: Negative for abdominal pain and vomiting  Genitourinary: Negative for dysuria and hematuria  Musculoskeletal: Positive for back pain  Negative for arthralgias  Skin: Negative for color change and rash  Neurological: Negative for seizures and syncope  All other systems reviewed and are negative          Objective:      /66 (BP Location: Left arm, Patient Position: Sitting, Cuff Size: Standard)   Pulse 80   Temp 97 8 °F (36 6 °C) (Temporal)   Resp 18   Ht 5' 7" (1 702 m)   Wt 102 kg (225 lb 12 8 oz)   LMP 01/06/2022   SpO2 96%   BMI 35 37 kg/m²          Physical Exam  Vitals and nursing note reviewed  Constitutional:       Appearance: She is obese  HENT:      Head: Normocephalic and atraumatic  Right Ear: External ear normal       Left Ear: External ear normal       Nose: Nose normal    Eyes:      Extraocular Movements: Extraocular movements intact  Conjunctiva/sclera: Conjunctivae normal       Pupils: Pupils are equal, round, and reactive to light  Cardiovascular:      Rate and Rhythm: Normal rate and regular rhythm  Pulses: Normal pulses  Heart sounds: Normal heart sounds  Pulmonary:      Effort: Pulmonary effort is normal       Breath sounds: Normal breath sounds  Abdominal:      General: Bowel sounds are normal       Palpations: Abdomen is soft  Tenderness: There is no abdominal tenderness  Musculoskeletal:         General: Normal range of motion  Cervical back: Normal range of motion  Skin:     General: Skin is warm and dry  Neurological:      General: No focal deficit present  Mental Status: She is alert and oriented to person, place, and time  Mental status is at baseline  Psychiatric:         Mood and Affect: Mood normal          Behavior: Behavior normal          Thought Content:  Thought content normal

## 2022-01-10 NOTE — ASSESSMENT & PLAN NOTE
250+ blood in urine per dipstick but pt possible contaminant d/t menstrual cycle  Encouraged hydration  May use PO cranberry pills - although advised pt on limited evidence of this    Continue keflex  Referred to uro d/t recurrence  -RTC if symptoms worsen  ED precautions reviewed

## 2022-01-25 ENCOUNTER — OFFICE VISIT (OUTPATIENT)
Dept: FAMILY MEDICINE CLINIC | Facility: CLINIC | Age: 37
End: 2022-01-25

## 2022-01-25 VITALS
BODY MASS INDEX: 35.16 KG/M2 | WEIGHT: 224 LBS | DIASTOLIC BLOOD PRESSURE: 80 MMHG | RESPIRATION RATE: 16 BRPM | HEIGHT: 67 IN | TEMPERATURE: 98 F | OXYGEN SATURATION: 98 % | SYSTOLIC BLOOD PRESSURE: 122 MMHG | HEART RATE: 73 BPM

## 2022-01-25 DIAGNOSIS — F41.8 DEPRESSION WITH ANXIETY: Primary | ICD-10-CM

## 2022-01-25 DIAGNOSIS — Z00.00 MEDICARE ANNUAL WELLNESS VISIT, SUBSEQUENT: ICD-10-CM

## 2022-01-25 PROCEDURE — G0439 PPPS, SUBSEQ VISIT: HCPCS | Performed by: NURSE PRACTITIONER

## 2022-01-25 RX ORDER — HYDROXYZINE PAMOATE 50 MG/1
50 CAPSULE ORAL
Qty: 30 CAPSULE | Refills: 0 | Status: SHIPPED | OUTPATIENT
Start: 2022-01-25

## 2022-01-25 RX ORDER — SERTRALINE HYDROCHLORIDE 25 MG/1
50 TABLET, FILM COATED ORAL DAILY
Qty: 60 TABLET | Refills: 0 | Status: SHIPPED | OUTPATIENT
Start: 2022-01-25 | End: 2022-04-07

## 2022-01-25 NOTE — PATIENT INSTRUCTIONS
Obesity   WHAT YOU NEED TO KNOW:   What is obesity? Obesity is when your body mass index (BMI) is greater than 30  Your healthcare provider will use your height and weight to measure your BMI  What are the risks of obesity? Obesity can cause many health problems, including injuries or physical disability  You may need tests to check for the following:  · Diabetes    · High blood pressure or high cholesterol    · Heart disease    · Stroke    · Gallbladder or liver disease    · Cancer of the colon, breast, prostate, liver, or kidney    · Sleep apnea    · Arthritis or gout    How is obesity treated? The goal of treatment is to help you lose weight so your health will improve  Even a small decrease in BMI can reduce the risk for many health problems  Your healthcare provider will help you set a weight-loss goal   · Lifestyle changes  are the first step in treating obesity  These include making healthy food choices and getting regular physical activity  Your healthcare provider may suggest a weight-loss program that involves coaching, education, and therapy  · Medicine  may help you lose weight when it is used with healthy foods and physical activity  · Surgery  can help you lose weight if you are very obese and have other health problems  There are several types of weight-loss surgery  Ask your healthcare provider for more information  How can I be successful at losing weight? · Set small, realistic goals  An example of a small goal is to walk for 20 minutes 5 days a week  Another goal is to lose 5% of your body weight  · Tell friends, family members, and coworkers about your goals  and ask for their support  Ask a friend to lose weight with you, or join a weight-loss support group  · Identify foods or triggers that may cause you to overeat , and find ways to avoid them  Remove tempting high-calorie foods from your home and workplace  Place a bowl of fresh fruit on your kitchen counter   If stress causes you to eat, then find other ways to cope with stress  A counselor or therapist may be able to help you  · Keep a diary to track what you eat and drink  Also write down how many minutes of physical activity you do each day  Weigh yourself once a week and record it in your diary  What eating changes should I make? You will need to eat 500 to 1,000 fewer calories each day than you currently eat to lose 1 to 2 pounds a week  The following changes will help you cut calories:  · Eat smaller portions  Use small plates, no larger than 9 inches in diameter  Fill your plate half full of fruits and vegetables  Measure your food using measuring cups until you know what a serving size looks like  · Eat 3 meals and 1 or 2 snacks each day  Plan your meals in advance  Mariela Ocampo and eat at home most of the time  Eat slowly  Do not skip meals  Skipping meals can lead to overeating later in the day  This can make it harder for you to lose weight  Talk with a dietitian to help you make a meal plan and schedule that is right for you  · Eat fruits and vegetables at every meal   They are low in calories and high in fiber, which makes you feel full  Do not add butter, margarine, or cream sauce to vegetables  Use herbs to season steamed vegetables  · Eat less fat and fewer fried foods  Eat more baked or grilled chicken and fish  These protein sources are lower in calories and fat than red meat  Limit fast food  Dress your salads with olive oil and vinegar instead of bottled dressing  · Limit the amount of sugar you eat  Do not drink sugary beverages  Limit alcohol  What activity changes should I make? Physical activity is good for your body in many ways  It helps you burn calories and build strong muscles  It decreases stress and depression, and improves your mood  It can also help you sleep better   Talk to your healthcare provider before you begin an exercise program   · Exercise for at least 30 minutes 5 days a week  Start slowly  Set aside time each day for physical activity that you enjoy and that is convenient for you  It is best to do both weight training and an activity that increases your heart rate, such as walking, bicycling, or swimming  · Find ways to be more active  Do yard work and housecleaning  Walk up the stairs instead of using elevators  Spend your leisure time going to events that require walking, such as outdoor festivals or fairs  This extra physical activity can help you lose weight and keep it off  When should I seek immediate care? · You have a severe headache, confusion, or difficulty speaking  · You have weakness on one side of your body  · You have chest pain, sweating, or shortness of breath at rest     When should I call my doctor? · You have symptoms of gallbladder or liver disease, such as pain in your upper abdomen  · You have knee or hip pain and discomfort while walking  · You have symptoms of diabetes, such as intense hunger and thirst, and frequent urination  · You have symptoms of sleep apnea, such as snoring or daytime sleepiness  · You have questions or concerns about your condition or care  CARE AGREEMENT:   You have the right to help plan your care  Learn about your health condition and how it may be treated  Discuss treatment options with your healthcare providers to decide what care you want to receive  You always have the right to refuse treatment  The above information is an  only  It is not intended as medical advice for individual conditions or treatments  Talk to your doctor, nurse or pharmacist before following any medical regimen to see if it is safe and effective for you  © Copyright [employer] 2021 Information is for End User's use only and may not be sold, redistributed or otherwise used for commercial purposes   All illustrations and images included in CareNotes® are the copyrighted property of A  [employer] A M [employer] Inc  or [employer] [facility] Preventive Visit Patient Instructions  Thank you for completing your Welcome to Medicare Visit or Medicare Annual Wellness Visit today  Your next wellness visit will be due in one year (1/26/2023)  The screening/preventive services that you may require over the next 5-10 years are detailed below  Some tests may not apply to you based off risk factors and/or age  Screening tests ordered at today's visit but not completed yet may show as past due  Also, please note that scanned in results may not display below  Preventive Screenings:  Service Recommendations Previous Testing/Comments   Colorectal Cancer Screening  * Colonoscopy    * Fecal Occult Blood Test (FOBT)/Fecal Immunochemical Test (FIT)  * Fecal DNA/Cologuard Test  * Flexible Sigmoidoscopy Age: 54-65 years old   Colonoscopy: every 10 years (may be performed more frequently if at higher risk)  OR  FOBT/FIT: every 1 year  OR  Cologuard: every 3 years  OR  Sigmoidoscopy: every 5 years  Screening may be recommended earlier than age 48 if at higher risk for colorectal cancer  Also, an individualized decision between you and your healthcare provider will decide whether screening between the ages of 74-80 would be appropriate  Colonoscopy: 05/18/2021  FOBT/FIT: Not on file  Cologuard: Not on file  Sigmoidoscopy: Not on file          Breast Cancer Screening Age: 36 years old  Frequency: every 1-2 years  Not required if history of left and right mastectomy Mammogram: Not on file    Screening Not Indicated   Cervical Cancer Screening Between the ages of 21-29, pap smear recommended once every 3 years  Between the ages of 33-67, can perform pap smear with HPV co-testing every 5 years     Recommendations may differ for women with a history of total hysterectomy, cervical cancer, or abnormal pap smears in past  Pap Smear: 03/19/2018        Hepatitis C Screening Once for adults born between 1945 and 1965  More frequently in patients at high risk for Hepatitis C Hep C Antibody: 09/25/2021    Screening Current   Diabetes Screening 1-2 times per year if you're at risk for diabetes or have pre-diabetes Fasting glucose: 112 mg/dL   A1C: 5 7 %    Screening Current   Cholesterol Screening Once every 5 years if you don't have a lipid disorder  May order more often based on risk factors  Lipid panel: 09/25/2021    Screening Current     Other Preventive Screenings Covered by Medicare:  1  Abdominal Aortic Aneurysm (AAA) Screening: covered once if your at risk  You're considered to be at risk if you have a family history of AAA  2  Lung Cancer Screening: covers low dose CT scan once per year if you meet all of the following conditions: (1) Age 50-69; (2) No signs or symptoms of lung cancer; (3) Current smoker or have quit smoking within the last 15 years; (4) You have a tobacco smoking history of at least 30 pack years (packs per day multiplied by number of years you smoked); (5) You get a written order from a healthcare provider  3  Glaucoma Screening: covered annually if you're considered high risk: (1) You have diabetes OR (2) Family history of glaucoma OR (3)  aged 48 and older OR (3)  American aged 72 and older  3  Osteoporosis Screening: covered every 2 years if you meet one of the following conditions: (1) You're estrogen deficient and at risk for osteoporosis based off medical history and other findings; (2) Have a vertebral abnormality; (3) On glucocorticoid therapy for more than 3 months; (4) Have primary hyperparathyroidism; (5) On osteoporosis medications and need to assess response to drug therapy  · Last bone density test (DXA Scan): Not on file  5  HIV Screening: covered annually if you're between the age of 12-76  Also covered annually if you are younger than 13 and older than 72 with risk factors for HIV infection   For pregnant patients, it is covered up to 3 times per pregnancy  Immunizations:  Immunization Recommendations   Influenza Vaccine Annual influenza vaccination during flu season is recommended for all persons aged >= 6 months who do not have contraindications   Pneumococcal Vaccine (Prevnar and Pneumovax)  * Prevnar = PCV13  * Pneumovax = PPSV23   Adults 25-60 years old: 1-3 doses may be recommended based on certain risk factors  Adults 72 years old: Prevnar (PCV13) vaccine recommended followed by Pneumovax (PPSV23) vaccine  If already received PPSV23 since turning 65, then PCV13 recommended at least one year after PPSV23 dose  Hepatitis B Vaccine 3 dose series if at intermediate or high risk (ex: diabetes, end stage renal disease, liver disease)   Tetanus (Td) Vaccine - COST NOT COVERED BY MEDICARE PART B Following completion of primary series, a booster dose should be given every 10 years to maintain immunity against tetanus  Td may also be given as tetanus wound prophylaxis  Tdap Vaccine - COST NOT COVERED BY MEDICARE PART B Recommended at least once for all adults  For pregnant patients, recommended with each pregnancy  Shingles Vaccine (Shingrix) - COST NOT COVERED BY MEDICARE PART B  2 shot series recommended in those aged 48 and above     Health Maintenance Due:      Topic Date Due    Cervical Cancer Screening  03/19/2023    HIV Screening  Completed    Hepatitis C Screening  Completed     Immunizations Due:      Topic Date Due    COVID-19 Vaccine (1) Never done     Advance Directives   What are advance directives? Advance directives are legal documents that state your wishes and plans for medical care  These plans are made ahead of time in case you lose your ability to make decisions for yourself  Advance directives can apply to any medical decision, such as the treatments you want, and if you want to donate organs  What are the types of advance directives?   There are many types of advance directives, and each state has rules about how to use them  You may choose a combination of any of the following:  · Living will: This is a written record of the treatment you want  You can also choose which treatments you do not want, which to limit, and which to stop at a certain time  This includes surgery, medicine, IV fluid, and tube feedings  · Durable power of  for healthcare [facility]): This is a written record that states who you want to make healthcare choices for you when you are unable to make them for yourself  This person, called a proxy, is usually a family member or a friend  You may choose more than 1 proxy  · Do not resuscitate (DNR) order:  A DNR order is used in case your heart stops beating or you stop breathing  It is a request not to have certain forms of treatment, such as CPR  A DNR order may be included in other types of advance directives  · Medical directive: This covers the care that you want if you are in a coma, near death, or unable to make decisions for yourself  You can list the treatments you want for each condition  Treatment may include pain medicine, surgery, blood transfusions, dialysis, IV or tube feedings, and a ventilator (breathing machine)  · Values history: This document has questions about your views, beliefs, and how you feel and think about life  This information can help others choose the care that you would choose  Why are advance directives important? An advance directive helps you control your care  Although spoken wishes may be used, it is better to have your wishes written down  Spoken wishes can be misunderstood, or not followed  Treatments may be given even if you do not want them  An advance directive may make it easier for your family to make difficult choices about your care  Urinary Incontinence   Urinary incontinence (UI)  is when you lose control of your bladder  UI develops because your bladder cannot store or empty urine properly   The 3 most common types of UI are stress incontinence, urge incontinence, or both  Medicines:   · May be given to help strengthen your bladder control  Report any side effects of medication to your healthcare provider  Do pelvic muscle exercises often:  Your pelvic muscles help you stop urinating  Squeeze these muscles tight for 5 seconds, then relax for 5 seconds  Gradually work up to squeezing for 10 seconds  Do 3 sets of 15 repetitions a day, or as directed  This will help strengthen your pelvic muscles and improve bladder control  Train your bladder:  Go to the bathroom at set times, such as every 2 hours, even if you do not feel the urge to go  You can also try to hold your urine when you feel the urge to go  For example, hold your urine for 5 minutes when you feel the urge to go  As that becomes easier, hold your urine for 10 minutes  Self-care:   · Keep a UI record  Write down how often you leak urine and how much you leak  Make a note of what you were doing when you leaked urine  · Drink liquids as directed  You may need to limit the amount of liquid you drink to help control your urine leakage  Do not drink any liquid right before you go to bed  Limit or do not have drinks that contain caffeine or alcohol  · Prevent constipation  Eat a variety of high-fiber foods  Good examples are high-fiber cereals, beans, vegetables, and whole-grain breads  Walking is the best way to trigger your intestines to have a bowel movement  · Exercise regularly and maintain a healthy weight  Weight loss and exercise will decrease pressure on your bladder and help you control your leakage  · Use a catheter as directed  to help empty your bladder  A catheter is a tiny, plastic tube that is put into your bladder to drain your urine  · Go to behavior therapy as directed  Behavior therapy may be used to help you learn to control your urge to urinate      Weight Management   Why it is important to manage your weight:  Being overweight increases your risk of health conditions such as heart disease, high blood pressure, type 2 diabetes, and certain types of cancer  It can also increase your risk for osteoarthritis, sleep apnea, and other respiratory problems  Aim for a slow, steady weight loss  Even a small amount of weight loss can lower your risk of health problems  How to lose weight safely:  A safe and healthy way to lose weight is to eat fewer calories and get regular exercise  You can lose up about 1 pound a week by decreasing the number of calories you eat by 500 calories each day  Healthy meal plan for weight management:  A healthy meal plan includes a variety of foods, contains fewer calories, and helps you stay healthy  A healthy meal plan includes the following:  · Eat whole-grain foods more often  A healthy meal plan should contain fiber  Fiber is the part of grains, fruits, and vegetables that is not broken down by your body  Whole-grain foods are healthy and provide extra fiber in your diet  Some examples of whole-grain foods are whole-wheat breads and pastas, oatmeal, brown rice, and bulgur  · Eat a variety of vegetables every day  Include dark, leafy greens such as spinach, kale, thaddeus greens, and mustard greens  Eat yellow and orange vegetables such as carrots, sweet potatoes, and winter squash  · Eat a variety of fruits every day  Choose fresh or canned fruit (canned in its own juice or light syrup) instead of juice  Fruit juice has very little or no fiber  · Eat low-fat dairy foods  Drink fat-free (skim) milk or 1% milk  Eat fat-free yogurt and low-fat cottage cheese  Try low-fat cheeses such as mozzarella and other reduced-fat cheeses  · Choose meat and other protein foods that are low in fat  Choose beans or other legumes such as split peas or lentils  Choose fish, skinless poultry (chicken or turkey), or lean cuts of red meat (beef or pork)  Before you cook meat or poultry, cut off any visible fat  · Use less fat and oil    Try baking foods instead of frying them  Add less fat, such as margarine, sour cream, regular salad dressing and mayonnaise to foods  Eat fewer high-fat foods  Some examples of high-fat foods include french fries, doughnuts, ice cream, and cakes  · Eat fewer sweets  Limit foods and drinks that are high in sugar  This includes candy, cookies, regular soda, and sweetened drinks  Exercise:  Exercise at least 30 minutes per day on most days of the week  Some examples of exercise include walking, biking, dancing, and swimming  You can also fit in more physical activity by taking the stairs instead of the elevator or parking farther away from stores  Ask your healthcare provider about the best exercise plan for you  © Copyright [address] 2018 Information is for End User's use only and may not be sold, redistributed or otherwise used for commercial purposes   All illustrations and images included in CareNotes® are the copyrighted property of A [employer] M [employer]  or [address] [place] [address]

## 2022-01-25 NOTE — PROGRESS NOTES
Assessment and Plan:     Problem List Items Addressed This Visit        Other    Depression with anxiety - Primary     Patient is following with preventative measures but feels that she is not receiving adequate treatment by her therapist or psychiatrist   Currently not taking any medications   Significant stress 2/2 abruptly having to find new place to live and not having enough money for security deposit   We will trial Zoloft as she thinks that this was helpful in the past but was not increased in dose due to pregnancy   Will also send Vistaril 50 mg HS   Referral to social work          Relevant Medications    sertraline (Zoloft) 25 mg tablet    hydrOXYzine pamoate (VISTARIL) 50 mg capsule    Other Relevant Orders    Ambulatory Referral to Social Work Care Management Program      Other Visit Diagnoses     Medicare annual wellness visit, subsequent            BMI Counseling: Body mass index is 35 08 kg/m²  The BMI is above normal  Nutrition recommendations include decreasing portion sizes, encouraging healthy choices of fruits and vegetables, decreasing fast food intake, consuming healthier snacks and limiting drinks that contain sugar  Exercise recommendations include exercising 3-5 times per week  Rationale for BMI follow-up plan is due to patient being overweight or obese  Preventive health issues were discussed with patient, and age appropriate screening tests were ordered as noted in patient's After Visit Summary  Personalized health advice and appropriate referrals for health education or preventive services given if needed, as noted in patient's After Visit Summary  History of Present Illness:     Patient presents for Medicare Annual Wellness visit  Reports that her biggest concern today is anxiety and depression  She is established with psychiatry and therapy at [facility] but notes that she is not receiving adequate support   She feels very depressed and anxious mostly due to having to move out her apartment suddenly and having difficulty finding new residence  Not currently taking any medications  Previously tried Lexapro and notes that this was helpful until she began to feel emotionless and so stopped medication  Also previously was on Zoloft during pregnancy but never took higher than 25 mg daily       Patient Care Team:  Gail ortiz PCP - General (Family Medicine)     Problem List:     Patient Active Problem List   Diagnosis    Other specified hypothyroidism    Irritable bowel syndrome with diarrhea    Depression with anxiety    Acute cystitis with hematuria    Benign paroxysmal positional vertigo due to bilateral vestibular disorder    Iron deficiency anemia    Stress incontinence of urine    Myalgia    Hidradenitis    Encounter for counseling    Paresthesia    Chronic pain of left knee    Iron deficiency anemia due to chronic blood loss      Past Medical and Surgical History:     Past Medical History:   Diagnosis Date    Anxiety     Crohn's disease ([address] )     Depression     Disease of thyroid gland     Fibromyalgia     Gestational diabetes     Gestational hypertension     previous pregnancy    Hashimoto's thyroiditis     HPV (human papilloma virus) infection     Retained placenta      Past Surgical History:   Procedure Laterality Date    CHOLECYSTECTOMY      DILATION AND CURETTAGE OF UTERUS      HERNIA REPAIR      UMBILICAL HERNIA REPAIR        Family History:     Family History   Problem Relation Age of Onset    Anuerysm Mother     Thyroid disease Mother     Hepatitis Mother     Lung cancer Maternal Grandmother     Colon cancer Maternal Grandfather       Social History:     Social History     Socioeconomic History    Marital status: Legally      Spouse name: None    Number of children: None    Years of education: None    Highest education level: None   Occupational History    None   Tobacco Use    Smoking status: Former Smoker    Smokeless tobacco: Never Used   Vaping Use    Vaping Use: Never used   Substance and Sexual Activity    Alcohol use: No    Drug use: No    Sexual activity: Yes     Partners: Male     Birth control/protection: Injection   Other Topics Concern    None   Social History Narrative    None     Social Determinants of Health     Financial Resource Strain: Low Risk     Difficulty of Paying Living Expenses: Not hard at all   Food Insecurity: No Food Insecurity    Worried About Running Out of Food in the Last Year: Never true    Kerwin of Food in the Last Year: Never true   Transportation Needs: No Transportation Needs    Lack of Transportation (Medical): No    Lack of Transportation (Non-Medical):  No   Physical Activity: Not on file   Stress: Not on file   Social Connections: Not on file   Intimate Partner Violence: Not on file   Housing Stability: Not on file      Medications and Allergies:     Current Outpatient Medications   Medication Sig Dispense Refill    aluminum-magnesium hydroxide-simethicone (MAALOX) 200-200-20 MG/5ML SUSP Take 20 mL by mouth 4 (four) times a day (before meals and at bedtime) 335 mL 0    ascorbic acid (VITAMIN C) 500 MG tablet Take 1 tablet (500 mg total) by mouth daily (Patient not taking: Reported on 7/9/2021) 90 tablet 1    Diclofenac Sodium (VOLTAREN) 1 % Apply 2 g topically 4 (four) times a day 350 g 0    ergocalciferol (VITAMIN D2) 50,000 units Take 1 capsule (50,000 Units total) by mouth once a week (Patient not taking: Reported on 7/9/2021) 24 capsule 0    hydrOXYzine pamoate (VISTARIL) 50 mg capsule Take 1 capsule (50 mg total) by mouth daily at bedtime as needed for itching 30 capsule 0    lidocaine (Lidoderm) 5 % Apply 1 patch topically daily Remove & Discard patch within 12 hours or as directed by MD 90 patch 0    ondansetron (ZOFRAN) 8 mg tablet Take 1 tablet (8 mg total) by mouth every 8 (eight) hours as needed for nausea or vomiting 20 tablet 0    pantoprazole (PROTONIX) 20 mg tablet Take 1 tablet (20 mg total) by mouth daily 30 tablet 0    sertraline (Zoloft) 25 mg tablet Take 2 tablets (50 mg total) by mouth daily Take 25 mg daily first week then increase to 50 mg daily 60 tablet 0    SYNTHROID 125 MCG tablet Take 150 mcg by mouth daily   3     No current facility-administered medications for this visit  Allergies   Allergen Reactions    Bactrim [Sulfamethoxazole-Trimethoprim] Hives    Other Hives     seafood    Shellfish-Derived Products - Food Allergy     Sulfamethoxazole Hives    Trimethoprim Hives      Immunizations:     Immunization History   Administered Date(s) Administered    DTP 08/15/2014    INFLUENZA 11/05/2010, 11/05/2010, 10/18/2011, 10/18/2011, 09/21/2012, 11/02/2019, 10/10/2020    Influenza Injectable, MDCK, Preservative Free, Quadrivalent, 0 5 mL 11/01/2019    Influenza Split 09/21/2012    MMR 07/19/2021    Tdap 08/15/2014, 09/20/2016, 09/20/2016    Tuberculin Skin Test-PPD Intradermal 10/27/2010, 10/27/2010, 05/07/2018, 05/07/2018, 06/14/2019, 06/14/2019, 04/02/2021      Health Maintenance:         Topic Date Due    Cervical Cancer Screening  03/19/2023    HIV Screening  Completed    Hepatitis C Screening  Completed         Topic Date Due    COVID-19 Vaccine (1) Never done      Medicare Health Risk Assessment:     /80 (BP Location: Left arm, Patient Position: Sitting, Cuff Size: Large)   Pulse 73   Temp 98 °F (36 7 °C) (Temporal)   Resp 16   Ht 5' 7" (1 702 m)   Wt 102 kg (224 lb)   LMP 01/06/2022   SpO2 98%   BMI 35 08 kg/m²      Geena Marie is here for her Subsequent Wellness visit  Health Risk Assessment:   Patient rates overall health as good  Patient feels that their physical health rating is slightly better  Patient is dissatisfied with their life  Eyesight was rated as slightly worse  Hearing was rated as same  Patient feels that their emotional and mental health rating is slightly worse   Patients states they are never, rarely angry  Patient states they are always unusually tired/fatigued  Pain experienced in the last 7 days has been some  Patient's pain rating has been 3/10  Patient states that she has experienced no weight loss or gain in last 6 months  Fall Risk Screening: In the past year, patient has experienced: history of falling in past year      Urinary Incontinence Screening:   Patient has leaked urine accidently in the last six months  Home Safety:  Patient does not have trouble with stairs inside or outside of their home  Patient has working smoke alarms and has working carbon monoxide detector  Home safety hazards include: none  Nutrition:   Current diet is Other (please comment)  Not really hungry been feeling nauseous alot since my infusions and starting depo    Medications:   Patient is not currently taking any over-the-counter supplements  Patient is able to manage medications  Activities of Daily Living (ADLs)/Instrumental Activities of Daily Living (IADLs):   Walk and transfer into and out of bed and chair?: Yes  Dress and groom yourself?: Yes    Bathe or shower yourself?: Yes    Feed yourself?  Yes  Do your laundry/housekeeping?: Yes  Manage your money, pay your bills and track your expenses?: Yes  Make your own meals?: Yes    Do your own shopping?: Yes    Previous Hospitalizations:   Any hospitalizations or ED visits within the last 12 months?: Yes    How many hospitalizations have you had in the last year?: 1-2    Advance Care Planning:   Living will: No    Durable POA for healthcare: No    Advanced directive: No      Cognitive Screening:   Provider or family/friend/caregiver concerned regarding cognition?: No    PREVENTIVE SCREENINGS      Cardiovascular Screening:    General: Screening Current      Diabetes Screening:     General: Screening Current      Colorectal Cancer Screening:     General: Screening Not Indicated      Breast Cancer Screening:     General: Screening Not Indicated      Cervical Cancer Screening:    General: Risks and Benefits Discussed    Due for: Cervical Pap Smear and HPV Testing      Osteoporosis Screening:    General: Screening Not Indicated      Abdominal Aortic Aneurysm (AAA) Screening:        General: Screening Not Indicated      Lung Cancer Screening:     General: Screening Not Indicated      Hepatitis C Screening:    General: Screening Current    Screening, Brief Intervention, and Referral to Treatment (SBIRT)    Screening  Typical number of drinks in a day: 0  Typical number of drinks in a week: 0  Interpretation: Low risk drinking behavior  AUDIT-C Screenin) How often did you have a drink containing alcohol in the past year? never  2) How many drinks did you have on a typical day when you were drinking in the past year? 0  3) How often did you have 6 or more drinks on one occasion in the past year? never    AUDIT-C Score: 0  Interpretation: Score 0-2 (female): Negative screen for alcohol misuse    Single Item Drug Screening:  How often have you used an illegal drug (including marijuana) or a prescription medication for non-medical reasons in the past year? never    Single Item Drug Screen Score: 0  Interpretation: Negative screen for possible drug use disorder    Review of Systems   Constitutional: Negative for chills and fever  HENT: Negative for ear pain and sore throat  Eyes: Negative for pain and visual disturbance  Respiratory: Negative for cough and shortness of breath  Cardiovascular: Negative for chest pain and palpitations  Gastrointestinal: Negative for abdominal pain and vomiting  Genitourinary: Negative for dysuria and hematuria  Musculoskeletal: Negative for arthralgias and back pain  Skin: Negative for color change and rash  Neurological: Negative for seizures and syncope  Psychiatric/Behavioral: Positive for decreased concentration, dysphoric mood and sleep disturbance   Negative for self-injury and suicidal ideas  The patient is nervous/anxious  All other systems reviewed and are negative  Physical Exam  Vitals and nursing note reviewed  Constitutional:       General: She is not in acute distress  Appearance: She is well-developed  She is not diaphoretic  HENT:      Head: Normocephalic and atraumatic  Right Ear: External ear normal       Left Ear: External ear normal    Eyes:      Pupils: Pupils are equal, round, and reactive to light  Cardiovascular:      Rate and Rhythm: Normal rate and regular rhythm  Pulmonary:      Effort: Pulmonary effort is normal  No respiratory distress  Breath sounds: Normal breath sounds  No wheezing  Abdominal:      General: Bowel sounds are normal  There is no distension  Palpations: Abdomen is soft  Tenderness: There is no abdominal tenderness  Musculoskeletal:         General: No deformity  Normal range of motion  Cervical back: Normal range of motion and neck supple  Lymphadenopathy:      Cervical: No cervical adenopathy  Skin:     General: Skin is warm and dry  Capillary Refill: Capillary refill takes less than 2 seconds  Findings: No rash  Neurological:      Mental Status: She is alert and oriented to person, place, and time  Psychiatric:         Mood and Affect: Mood is depressed  Affect is tearful           PÉREZ Tsai

## 2022-01-25 NOTE — ASSESSMENT & PLAN NOTE
Patient is following with [facility] but feels that she is not receiving adequate treatment by her therapist or psychiatrist   Currently not taking any medications   Significant stress 2/2 abruptly having to find new place to live and not having enough money for security deposit   We will trial Zoloft as she thinks that this was helpful in the past but was not increased in dose due to pregnancy   Will also send Vistaril 50 mg HS   Referral to social work

## 2022-01-27 ENCOUNTER — PATIENT OUTREACH (OUTPATIENT)
Dept: FAMILY MEDICINE CLINIC | Facility: CLINIC | Age: 37
End: 2022-01-27

## 2022-01-27 NOTE — PROGRESS NOTES
ARACELIS ODOM received referral from provider regarding Pt has been dealing with MH problems  ARACELIS ODOM did chart review and did call the Pt with no response  ARACELIS ODOM left VM with clear details for returned call  ARACELIS ODOM is remain available for further assistance as needed

## 2022-02-04 ENCOUNTER — PATIENT OUTREACH (OUTPATIENT)
Dept: FAMILY MEDICINE CLINIC | Facility: CLINIC | Age: 37
End: 2022-02-04

## 2022-02-04 NOTE — PROGRESS NOTES
ARACELIS ODOM placed second call to Pt  Pt answered the phone at this time  ARACELIS ODOM introduced herself and her role  Pt seems understanding  ARACELIS ODOM asked Pt if she is interested in [place] [id number] service  Pt expressed that she called at [place] [address] clinic to make an appointment to see a therapist and psychiatrist as well  Also, Pt stated that she is waiting the [place] [address] clinic call her to confirm the appointment  ARACELIS ODOM explained Pt that ARACELIS ODOM will call her to confirm if she is established with MH treatment  ARACELIS ODOM is remain available for further assistance as needed  ARACELIS ODOM will continue to follow

## 2022-02-10 ENCOUNTER — PATIENT OUTREACH (OUTPATIENT)
Dept: FAMILY MEDICINE CLINIC | Facility: CLINIC | Age: 37
End: 2022-02-10

## 2022-02-10 NOTE — PROGRESS NOTES
ARACELIS ODOM did call Pt to follow up  Pt answered the phone and she stated that she is waiting for [facility] clinic response to start the treatment  ARACELIS ODOM asked Pt if she would like to contact others [facility] clinics  Pt expressed that she is willing to call another clinic to establish with [facility] service  ARACELIS ODOM explained Pt that ARACELIS ODOM will send her a list of [facility] providers by email  Pt agreed  Pt stated that she is working at this time, and she will contact ARACELIS ODOM to confirm if she received the resources  ARACELIS ODOM sent a list of [facility] resources  ARACELIS ODOM will contact Pt to confirm if she is established with MH treatment  ARACELIS ODOM is remain available for further assistance as needed  ARACELIS ODOM will continue to follow

## 2022-02-18 ENCOUNTER — PATIENT OUTREACH (OUTPATIENT)
Dept: FAMILY MEDICINE CLINIC | Facility: CLINIC | Age: 37
End: 2022-02-18

## 2022-02-18 NOTE — LETTER
[address]  [phone]    Re: Care coordination   2/18/2022       Dear Rachel Beltran,    We tried to reach you by phone on and was unfortunately unable to reach you    It is important that you contact the [facility] as soon as possible at: Dept: [phone]     Sincerely,         [unfilled]

## 2022-02-18 NOTE — PROGRESS NOTES
ARACELIS ODOM did call Pt to follow up  Pt answered the phone and expressed that she does not have availability to talk  ARACELIS ODOM attempted to explained Pt that she can reach out SW CM once she available  Pt stated again that she does not have time to call ARACELIS ODOM and hung out the phone  Pt has [facility] resources and understand she needs [facility] appointment  Pt has ARACELIS ODOM contact to call her for future assistance  ARACELIS ODOM will close this referral at this time  ARACELIS ODOM is remain available for further assistance as needed  This medication should have refills as it was sent in March for one year supply

## 2022-03-01 ENCOUNTER — TELEPHONE (OUTPATIENT)
Dept: HEMATOLOGY ONCOLOGY | Facility: CLINIC | Age: 37
End: 2022-03-01

## 2022-03-01 NOTE — TELEPHONE ENCOUNTER
I spoke with the patient in regards to her lab work that needs to be completed prior to her appointment on 3/4/22  Patient states she will need to reschedule due to no completion of blood work  Informed patient next available is not until 4/1/22 at 8:00am  Patient accepted new appointment

## 2022-03-16 ENCOUNTER — TELEPHONE (OUTPATIENT)
Dept: HEMATOLOGY ONCOLOGY | Facility: CLINIC | Age: 37
End: 2022-03-16

## 2022-03-16 NOTE — TELEPHONE ENCOUNTER
Appointment Confirmation (to confirm pre existing appointments - ONLY)     Appointment with  Olivia Mean    Appointment date & time 4-1-22 @ 8:00am   Location [place]    Patient verbilized Understanding

## 2022-04-01 ENCOUNTER — OFFICE VISIT (OUTPATIENT)
Dept: HEMATOLOGY ONCOLOGY | Facility: CLINIC | Age: 37
End: 2022-04-01
Payer: MEDICARE

## 2022-04-01 VITALS
BODY MASS INDEX: 35.16 KG/M2 | DIASTOLIC BLOOD PRESSURE: 82 MMHG | SYSTOLIC BLOOD PRESSURE: 118 MMHG | HEIGHT: 67 IN | OXYGEN SATURATION: 98 % | RESPIRATION RATE: 18 BRPM | WEIGHT: 224 LBS | TEMPERATURE: 96.8 F | HEART RATE: 72 BPM

## 2022-04-01 DIAGNOSIS — D50.8 OTHER IRON DEFICIENCY ANEMIA: Primary | ICD-10-CM

## 2022-04-01 DIAGNOSIS — D50.9 IRON DEFICIENCY ANEMIA, UNSPECIFIED IRON DEFICIENCY ANEMIA TYPE: ICD-10-CM

## 2022-04-01 DIAGNOSIS — E53.8 B12 DEFICIENCY: ICD-10-CM

## 2022-04-01 DIAGNOSIS — E53.8 FOLIC ACID DEFICIENCY: ICD-10-CM

## 2022-04-01 PROCEDURE — 99214 OFFICE O/P EST MOD 30 MIN: CPT | Performed by: NURSE PRACTITIONER

## 2022-04-01 RX ORDER — CYANOCOBALAMIN 1000 UG/ML
1000 INJECTION INTRAMUSCULAR; SUBCUTANEOUS ONCE
Status: CANCELLED | OUTPATIENT
Start: 2022-04-01 | End: 2022-04-01

## 2022-04-01 RX ORDER — FOLIC ACID 1 MG/1
1 TABLET ORAL DAILY
Qty: 90 TABLET | Refills: 4 | Status: SHIPPED | OUTPATIENT
Start: 2022-04-01

## 2022-04-01 RX ORDER — SODIUM CHLORIDE 9 MG/ML
20 INJECTION, SOLUTION INTRAVENOUS ONCE
Status: CANCELLED | OUTPATIENT
Start: 2022-04-01

## 2022-04-01 RX ORDER — LIOTHYRONINE SODIUM 5 UG/1
5 TABLET ORAL 2 TIMES DAILY
COMMUNITY
Start: 2022-03-18

## 2022-04-01 NOTE — PROGRESS NOTES
Hematology/Oncology Outpatient Follow-up  Willie Romano 43 y o  female 1985 [id number]    Date:  4/1/2022      Assessment and Plan:  1  Other iron deficiency anemia  Patient completed 6 doses of IV Venofer November/December 2021  Premedications were added after her 1st treatment since she had some side effects of not feeling well, taste changes and nausea  Tolerated much better with premedications  Has p r n  Zofran script  She is no longer anemic hemoglobin 14 0 however she continues to have iron deficiency ferritin of 30  Patient was offered 3 additional doses of IV Venofer 200 mg weekly which she agreed to  We will premedicate her with her usual premedications  Her iron deficiency is likely due to her menorrhagia  She did recently start Depo a month ago but is not planning to continue since it was causing persistent bleeding  Suspect that malabsorption may also be contributory as she has other nutritional deficiencies  Reports that she was found to have gastritis on her upper endoscopy last year  Will continue to monitor patient and her laboratory studies on a regular basis  Request she follow up again with repeat labs in 4 months  - cyanocobalamin (VITAMIN B-12) 1000 MCG tablet; Take 1 tablet (1,000 mcg total) by mouth daily  Dispense: 90 tablet; Refill: 3  - CBC and differential; Future  - Comprehensive metabolic panel; Future  - C-reactive protein; Future  - Sedimentation rate, automated; Future  - Iron Panel (Includes Ferritin, Iron Sat%, Iron, and TIBC); Future  - Ferritin; Future    2  B12 deficiency  She will receive 2 vitamin B12 injections with her upcoming iron infusions  Will also start oral vitamin B12 supplements 1000 mcg daily  - cyanocobalamin (VITAMIN B-12) 1000 MCG tablet; Take 1 tablet (1,000 mcg total) by mouth daily  Dispense: 90 tablet; Refill: 3  - Vitamin B12; Future    3  Folic acid deficiency  Patient will be started on folic acid supplements 1 mg daily      - folic acid (FOLVITE) 1 mg tablet; Take 1 tablet (1 mg total) by mouth daily  Dispense: 90 tablet; Refill: 4  - Folate; Future      HPI:  Meka Scott is a 43 y o  who initially presented for further evaluation/ treatment ofher microcytic anemia/ iron deficiency  Past medical history of IBS, menorrhagia, Hashimoto's thyroiditis, anxiety/depression, fibromyalgia and Hidradenitis Suppurativa  She states that she has been anemic at least since 2015  Has trialed oral iron in the past which was not effective  Denies any history of blood transfusions or iron infusions in the past   She did have upper and lower endoscopy done recently 05/18/2021 which showed normal findings  She denies bleeding from any site other than her menstrual cycles  She states that her menstrual cycles are regular on a monthly basis however very heavy for the 1st 3 days typically lasting anywhere from 5-9 days total  She does have chronic diarrhea she attributes to her IBS; inflammatory bowel disease was ruled out on her recent endoscopy      Her laboratory studies from 10/21/2021 showed significant iron deficiency iron saturation 7%, TIBC elevated 516, serum iron low 36 with low ferritin 8  Vitamin B12 930  CBC from 09/25/2021 showed normal white cells and platelets, she had microcytic hypochromic anemia H&H 10 4/32 9, MCV 92, MCH 22 9  Glucose 112, AST slightly above average 41 otherwise normal CMP  She was treated with a course of IV iron Venofer x6 treatments November/December 2021  Premedications needed to be added since she felt ill afterward with taste changes and nausea  Was able to tolerate much better with premedications  Interval history:  Patient presents today for a follow-up visit  She states that initially after completing her IV iron she felt much better however is noting to not feel well again  She reports worsening fatigue, leg pain and hair thinning    Is also working with her endocrinologist who is changing her medications in hopes that this will improve her fatigue  She does report generalized arthralgias and myalgias which may be due to fibromyalgia was seen by Rheumatology in the past   Admits to headaches and dizziness that she attributes to her vertigo however she did have 1 episode recently with significant positional dizziness when she bends over  She denies bleeding from any site other than her menstrual cycles  She did attempt to trial Depo for her menorrhagia but blood for 1 month straight and is planning to discontinue  Is monitoring how her menstrual cycles will be going forward  Her most recent laboratory studies from [address] [place] [address] labs 03/16/2022 showed Essentially normal CBC hemoglobin 14, glucose 138 remaining metabolic panel is normal   Her IgA is decreased 67 with normal IgG and IgM, serum free light chains are in the normal range her SPEP did not reveal any monoclonal protein  LDH normal 144  Inflammatory markers are elevated C-reactive protein 10 6 and sed rate 25  Her iron panel has improved but she continues to be iron deficient iron saturation 13% with ferritin 30  B12 is decreased 669, folic acid also decreased 6 0     ROS: Review of Systems   Constitutional: Positive for appetite change and fatigue  Negative for chills, fever and unexpected weight change  HENT: Negative for congestion, mouth sores, nosebleeds, sore throat and trouble swallowing  Eyes: Negative  Respiratory: Positive for shortness of breath  Negative for cough and chest tightness  Cardiovascular: Negative for chest pain, palpitations and leg swelling  Gastrointestinal: Positive for diarrhea  Negative for abdominal distention, abdominal pain, blood in stool, constipation, nausea and vomiting  Genitourinary: Positive for menstrual problem  Negative for difficulty urinating, dysuria, frequency, hematuria and urgency  Musculoskeletal: Positive for arthralgias and myalgias   Negative for gait problem and joint swelling  Skin: Negative for color change, pallor and rash  Neurological: Positive for dizziness and headaches  Negative for light-headedness and numbness  Hematological: Negative for adenopathy  Does not bruise/bleed easily  Psychiatric/Behavioral: Positive for dysphoric mood and sleep disturbance  Past Medical History:   Diagnosis Date    Anxiety     Crohn's disease ([facility] [address] )     Depression     Disease of thyroid gland     Fibromyalgia     Gestational diabetes     Gestational hypertension     previous pregnancy    Hashimoto's thyroiditis     HPV (human papilloma virus) infection     Retained placenta        Past Surgical History:   Procedure Laterality Date    CHOLECYSTECTOMY      DILATION AND CURETTAGE OF UTERUS      HERNIA REPAIR      UMBILICAL HERNIA REPAIR         Social History     Socioeconomic History    Marital status: Legally      Spouse name: None    Number of children: None    Years of education: None    Highest education level: None   Occupational History    None   Tobacco Use    Smoking status: Former Smoker    Smokeless tobacco: Never Used   Vaping Use    Vaping Use: Never used   Substance and Sexual Activity    Alcohol use: No    Drug use: No    Sexual activity: Yes     Partners: Male     Birth control/protection: Injection   Other Topics Concern    None   Social History Narrative    None     Social Determinants of Health     Financial Resource Strain: Low Risk     Difficulty of Paying Living Expenses: Not hard at all   Food Insecurity: No Food Insecurity    Worried About Running Out of Food in the Last Year: Never true    Kerwin of Food in the Last Year: Never true   Transportation Needs: No Transportation Needs    Lack of Transportation (Medical): No    Lack of Transportation (Non-Medical): No   Physical Activity: Not on file   Stress: Stress Concern Present    Feeling of Stress :  To some extent   Social Connections: Not on file   Intimate Partner Violence: Not on file   Housing Stability: Not on file       Family History   Problem Relation Age of Onset    Anuerysm Mother     Thyroid disease Mother     Hepatitis Mother     Lung cancer Maternal Grandmother     Colon cancer Maternal Grandfather        Allergies   Allergen Reactions    Bactrim [Sulfamethoxazole-Trimethoprim] Hives    Other Hives     seafood    Shellfish-Derived Products - Food Allergy     Sulfamethoxazole Hives    Trimethoprim Hives         Current Outpatient Medications:     hydrOXYzine pamoate (VISTARIL) 50 mg capsule, Take 1 capsule (50 mg total) by mouth daily at bedtime as needed for itching, Disp: 30 capsule, Rfl: 0    liothyronine (CYTOMEL) 5 mcg tablet, Take 5 mcg by mouth 2 (two) times a day, Disp: , Rfl:     pantoprazole (PROTONIX) 20 mg tablet, Take 1 tablet (20 mg total) by mouth daily, Disp: 30 tablet, Rfl: 0    sertraline (Zoloft) 25 mg tablet, Take 2 tablets (50 mg total) by mouth daily Take 25 mg daily first week then increase to 50 mg daily, Disp: 60 tablet, Rfl: 0    SYNTHROID 125 MCG tablet, Take 137 mcg by mouth daily  , Disp: , Rfl: 3    aluminum-magnesium hydroxide-simethicone (MAALOX) 200-200-20 MG/5ML SUSP, Take 20 mL by mouth 4 (four) times a day (before meals and at bedtime), Disp: 335 mL, Rfl: 0    ascorbic acid (VITAMIN C) 500 MG tablet, Take 1 tablet (500 mg total) by mouth daily (Patient not taking: Reported on 7/9/2021), Disp: 90 tablet, Rfl: 1    cyanocobalamin (VITAMIN B-12) 1000 MCG tablet, Take 1 tablet (1,000 mcg total) by mouth daily, Disp: 90 tablet, Rfl: 3    Diclofenac Sodium (VOLTAREN) 1 %, Apply 2 g topically 4 (four) times a day, Disp: 350 g, Rfl: 0    ergocalciferol (VITAMIN D2) 50,000 units, Take 1 capsule (50,000 Units total) by mouth once a week (Patient not taking: Reported on 7/9/2021), Disp: 24 capsule, Rfl: 0    folic acid (FOLVITE) 1 mg tablet, Take 1 tablet (1 mg total) by mouth daily, Disp: 90 tablet, Rfl: 4   lidocaine (Lidoderm) 5 %, Apply 1 patch topically daily Remove & Discard patch within 12 hours or as directed by MD, Disp: 90 patch, Rfl: 0    ondansetron (ZOFRAN) 8 mg tablet, Take 1 tablet (8 mg total) by mouth every 8 (eight) hours as needed for nausea or vomiting, Disp: 20 tablet, Rfl: 0      Physical Exam:  /82 (BP Location: Left arm, Patient Position: Sitting, Cuff Size: Large)   Pulse 72   Temp (!) 96 8 °F (36 °C) (Tympanic)   Resp 18   Ht 5' 7" (1 702 m)   Wt 102 kg (224 lb)   LMP 04/01/2022 (Approximate) Comment: Just started today   SpO2 98%   BMI 35 08 kg/m²     Physical Exam  Vitals reviewed  Constitutional:       General: She is not in acute distress  Appearance: She is well-developed  She is obese  She is not diaphoretic  Comments:   Looks fatigued   HENT:      Head: Normocephalic and atraumatic  Eyes:      General: No scleral icterus  Conjunctiva/sclera: Conjunctivae normal       Pupils: Pupils are equal, round, and reactive to light  Neck:      Thyroid: No thyromegaly  Cardiovascular:      Rate and Rhythm: Normal rate and regular rhythm  Heart sounds: Normal heart sounds  No murmur heard  Pulmonary:      Effort: Pulmonary effort is normal  No respiratory distress  Breath sounds: Normal breath sounds  Chest:   Breasts:      Right: No axillary adenopathy  Left: No axillary adenopathy  Abdominal:      General: There is no distension  Palpations: Abdomen is soft  There is no hepatomegaly or splenomegaly  Tenderness: There is no abdominal tenderness  Musculoskeletal:         General: No swelling  Normal range of motion  Cervical back: Normal range of motion and neck supple  Lymphadenopathy:      Cervical: No cervical adenopathy  Upper Body:      Right upper body: No axillary adenopathy  Left upper body: No axillary adenopathy  Skin:     General: Skin is warm and dry  Coloration: Skin is pale        Findings: No erythema or rash  Neurological:      General: No focal deficit present  Mental Status: She is alert and oriented to person, place, and time  Psychiatric:         Mood and Affect: Mood is depressed  Affect is blunt  Behavior: Behavior normal  Behavior is cooperative  Thought Content: Thought content normal          Judgment: Judgment normal            Labs:  Lab Results   Component Value Date    WBC 4 80 09/25/2021    HGB 10 4 (L) 09/25/2021    HCT 32 9 (L) 09/25/2021    MCV 72 (L) 09/25/2021     09/25/2021     Lab Results   Component Value Date    K 4 4 09/25/2021     09/25/2021    CO2 27 09/25/2021    BUN 8 09/25/2021    CREATININE 0 64 09/25/2021    GLUF 112 (H) 09/25/2021    CALCIUM 9 4 09/25/2021    AST 41 (H) 09/25/2021    ALT 31 09/25/2021    ALKPHOS 93 09/25/2021    EGFR 114 09/25/2021       Patient voiced understanding and agreement in the above discussion  Aware to contact our office with questions/symptoms in the interim  This note has been generated by voice recognition software system  Therefore, there may be spelling, grammar, and or syntax errors  Please contact if questions arise

## 2022-04-07 ENCOUNTER — TELEMEDICINE (OUTPATIENT)
Dept: FAMILY MEDICINE CLINIC | Facility: CLINIC | Age: 37
End: 2022-04-07

## 2022-04-07 DIAGNOSIS — F41.8 DEPRESSION WITH ANXIETY: Primary | ICD-10-CM

## 2022-04-07 PROCEDURE — G2025 DIS SITE TELE SVCS RHC/FQHC: HCPCS | Performed by: NURSE PRACTITIONER

## 2022-04-07 RX ORDER — ESCITALOPRAM OXALATE 10 MG/1
10 TABLET ORAL DAILY
Qty: 30 TABLET | Refills: 0 | Status: SHIPPED | OUTPATIENT
Start: 2022-04-07 | End: 2022-05-02

## 2022-04-07 NOTE — ASSESSMENT & PLAN NOTE
Patient unable to tolerate Zoloft, start lexapro 10 mg daily  Encouraged to continue taking medication as it takes a few weeks to take effect  Reports waiting to hear back from Psychiatry to setup an appt

## 2022-04-07 NOTE — PROGRESS NOTES
Virtual Brief Visit    Patient is located in the following state in which I hold an active license [place]      Assessment/Plan:    Problem List Items Addressed This Visit        Other    Depression with anxiety - Primary     Patient unable to tolerate Zoloft, start lexapro 10 mg daily  Encouraged to continue taking medication as it takes a few weeks to take effect  Reports waiting to hear back from Psychiatry to setup an appt  Relevant Medications    escitalopram (Lexapro) 10 mg tablet          Recent Visits  No visits were found meeting these conditions  Showing recent visits within past 7 days and meeting all other requirements  Today's Visits  Date Type Provider Dept   04/07/22 Telemedicine [facility] today's visits and meeting all other requirements  Future Appointments  No visits were found meeting these conditions  Showing future appointments within next 150 days and meeting all other requirements     Patient presents for virtual follow up  Was re-started on Zoloft one month ago for depression and anxiety  Reports medication makes her feel "numb"  Is interested in switching to another agent  Is on the waiting list for [address] provider  Denies SI, self injury       I spent 12 minutes directly with the patient during this visit

## 2022-04-07 NOTE — LETTER
April 7, 2022     Patient: Gian Abdul   YOB: 1985   Date of Visit: 4/7/2022       To Whom it May Concern:    Gian Abdul was seen in my clinic on 4/7/2022  She is up to date with her annual physical  Please see below for quantiferon gold testing  Ref Range & Units 9/25/21  9:13 AM   QFT Nil 0 - 8 0 IU/ml 0 02    QFT TB1-NIL IU/ml 0 01    QFT TB2-NIL IU/ml 0 01    QFT Mitogen-NIL IU/ml >10 00    QFT Final Interpretation Negative Negative    Comment: No Interferon-gamma response to M  tuberculosis antigens detected   Infection with M  tuberculosis is unlikely   A single negative result does not exclude infection with M  tuberculosis  In patients at high risk for M  tuberculosis infection, a second test should be considered in accordance with the 2017 ATS/IDSA/CDC Clinical Practice Guidelines for Diagnosis of Tuberculosis in Adults and Children  False negative results can be a result of incorrect blood sample collection or handling of the specimen affecting lymphocyte function  Specimen Collected: 09/25/21  9:13 AM Last Resulted: 09/28/21 11:41 AM               If you have any questions or concerns, please don't hesitate to call           Sincerely,          PÉREZ Medeiros        CC: No Recipients

## 2022-04-11 DIAGNOSIS — D50.9 IRON DEFICIENCY ANEMIA, UNSPECIFIED IRON DEFICIENCY ANEMIA TYPE: ICD-10-CM

## 2022-04-11 DIAGNOSIS — D50.0 IRON DEFICIENCY ANEMIA DUE TO CHRONIC BLOOD LOSS: ICD-10-CM

## 2022-04-11 DIAGNOSIS — E53.8 B12 DEFICIENCY: Primary | ICD-10-CM

## 2022-04-11 RX ORDER — SODIUM CHLORIDE 9 MG/ML
20 INJECTION, SOLUTION INTRAVENOUS ONCE
Status: CANCELLED | OUTPATIENT
Start: 2022-04-13

## 2022-04-11 RX ORDER — CYANOCOBALAMIN 1000 UG/ML
1000 INJECTION INTRAMUSCULAR; SUBCUTANEOUS ONCE
Status: CANCELLED | OUTPATIENT
Start: 2022-04-13 | End: 2022-04-13

## 2022-04-11 RX ORDER — ONDANSETRON 4 MG/1
8 TABLET, ORALLY DISINTEGRATING ORAL ONCE
Status: CANCELLED
Start: 2022-04-13 | End: 2022-04-13

## 2022-04-11 RX ORDER — ONDANSETRON 4 MG/1
8 TABLET, ORALLY DISINTEGRATING ORAL ONCE
Status: CANCELLED
Start: 2022-04-11 | End: 2022-04-11

## 2022-04-13 ENCOUNTER — HOSPITAL ENCOUNTER (OUTPATIENT)
Dept: INFUSION CENTER | Facility: HOSPITAL | Age: 37
Discharge: HOME | End: 2022-04-13
Attending: INTERNAL MEDICINE
Payer: MEDICARE

## 2022-04-13 VITALS
DIASTOLIC BLOOD PRESSURE: 76 MMHG | SYSTOLIC BLOOD PRESSURE: 126 MMHG | TEMPERATURE: 97.7 F | HEART RATE: 83 BPM | RESPIRATION RATE: 18 BRPM

## 2022-04-13 DIAGNOSIS — E53.8 B12 DEFICIENCY: Primary | ICD-10-CM

## 2022-04-13 DIAGNOSIS — D50.0 IRON DEFICIENCY ANEMIA DUE TO CHRONIC BLOOD LOSS: ICD-10-CM

## 2022-04-13 DIAGNOSIS — D50.9 IRON DEFICIENCY ANEMIA, UNSPECIFIED IRON DEFICIENCY ANEMIA TYPE: ICD-10-CM

## 2022-04-13 PROCEDURE — 96372 THER/PROPH/DIAG INJ SC/IM: CPT

## 2022-04-13 PROCEDURE — 96367 TX/PROPH/DG ADDL SEQ IV INF: CPT

## 2022-04-13 PROCEDURE — 96365 THER/PROPH/DIAG IV INF INIT: CPT

## 2022-04-13 PROCEDURE — 96375 TX/PRO/DX INJ NEW DRUG ADDON: CPT

## 2022-04-13 RX ORDER — SODIUM CHLORIDE 9 MG/ML
20 INJECTION, SOLUTION INTRAVENOUS ONCE
Status: CANCELLED | OUTPATIENT
Start: 2022-04-18

## 2022-04-13 RX ORDER — CYANOCOBALAMIN 1000 UG/ML
1000 INJECTION INTRAMUSCULAR; SUBCUTANEOUS ONCE
Status: CANCELLED | OUTPATIENT
Start: 2022-04-18 | End: 2022-04-20

## 2022-04-13 RX ORDER — CYANOCOBALAMIN 1000 UG/ML
1000 INJECTION INTRAMUSCULAR; SUBCUTANEOUS ONCE
Status: COMPLETED | OUTPATIENT
Start: 2022-04-13 | End: 2022-04-13

## 2022-04-13 RX ORDER — SODIUM CHLORIDE 9 MG/ML
20 INJECTION, SOLUTION INTRAVENOUS ONCE
Status: COMPLETED | OUTPATIENT
Start: 2022-04-13 | End: 2022-04-13

## 2022-04-13 RX ORDER — ONDANSETRON 4 MG/1
8 TABLET, ORALLY DISINTEGRATING ORAL ONCE
Status: COMPLETED | OUTPATIENT
Start: 2022-04-13 | End: 2022-04-13

## 2022-04-13 RX ORDER — ONDANSETRON 8 MG/1
8 TABLET, ORALLY DISINTEGRATING ORAL ONCE
Status: CANCELLED
Start: 2022-04-18 | End: 2022-04-20

## 2022-04-13 RX ADMIN — FAMOTIDINE 20 MG: 10 INJECTION INTRAVENOUS at 12:09

## 2022-04-13 RX ADMIN — IRON SUCROSE 200 MG: 20 INJECTION, SOLUTION INTRAVENOUS at 12:40

## 2022-04-13 RX ADMIN — DEXAMETHASONE SODIUM PHOSPHATE 4 MG: 10 INJECTION, SOLUTION INTRAMUSCULAR; INTRAVENOUS at 11:34

## 2022-04-13 RX ADMIN — SODIUM CHLORIDE 20 ML/HR: 0.9 INJECTION, SOLUTION INTRAVENOUS at 11:34

## 2022-04-13 RX ADMIN — CYANOCOBALAMIN 1000 MCG: 1000 INJECTION INTRAMUSCULAR; SUBCUTANEOUS at 11:58

## 2022-04-13 RX ADMIN — ONDANSETRON 8 MG: 4 TABLET, ORALLY DISINTEGRATING ORAL at 11:25

## 2022-04-13 NOTE — PROGRESS NOTES
Pt tolerated treatment today with no adverse reactions  Next apt confirmed  Left unit ambulatory with a steady gait

## 2022-04-14 DIAGNOSIS — D50.9 IRON DEFICIENCY ANEMIA, UNSPECIFIED IRON DEFICIENCY ANEMIA TYPE: ICD-10-CM

## 2022-04-14 DIAGNOSIS — D50.0 IRON DEFICIENCY ANEMIA DUE TO CHRONIC BLOOD LOSS: ICD-10-CM

## 2022-04-14 DIAGNOSIS — E53.8 B12 DEFICIENCY: Primary | ICD-10-CM

## 2022-04-14 RX ORDER — ONDANSETRON 4 MG/1
8 TABLET, ORALLY DISINTEGRATING ORAL ONCE
Status: CANCELLED
Start: 2022-04-18 | End: 2022-04-18

## 2022-04-14 RX ORDER — CYANOCOBALAMIN 1000 UG/ML
1000 INJECTION INTRAMUSCULAR; SUBCUTANEOUS ONCE
Status: CANCELLED | OUTPATIENT
Start: 2022-04-18 | End: 2022-04-18

## 2022-04-14 RX ORDER — SODIUM CHLORIDE 9 MG/ML
20 INJECTION, SOLUTION INTRAVENOUS ONCE
Status: CANCELLED | OUTPATIENT
Start: 2022-04-18

## 2022-04-18 ENCOUNTER — HOSPITAL ENCOUNTER (OUTPATIENT)
Dept: INFUSION CENTER | Facility: HOSPITAL | Age: 37
Discharge: HOME | End: 2022-04-18
Attending: INTERNAL MEDICINE
Payer: MEDICARE

## 2022-04-18 VITALS
SYSTOLIC BLOOD PRESSURE: 125 MMHG | TEMPERATURE: 97.7 F | HEART RATE: 74 BPM | RESPIRATION RATE: 16 BRPM | DIASTOLIC BLOOD PRESSURE: 76 MMHG

## 2022-04-18 DIAGNOSIS — D50.9 IRON DEFICIENCY ANEMIA, UNSPECIFIED IRON DEFICIENCY ANEMIA TYPE: ICD-10-CM

## 2022-04-18 DIAGNOSIS — D50.0 IRON DEFICIENCY ANEMIA DUE TO CHRONIC BLOOD LOSS: ICD-10-CM

## 2022-04-18 DIAGNOSIS — E53.8 B12 DEFICIENCY: Primary | ICD-10-CM

## 2022-04-18 PROCEDURE — 96372 THER/PROPH/DIAG INJ SC/IM: CPT

## 2022-04-18 PROCEDURE — 96365 THER/PROPH/DIAG IV INF INIT: CPT

## 2022-04-18 PROCEDURE — 96367 TX/PROPH/DG ADDL SEQ IV INF: CPT

## 2022-04-18 PROCEDURE — 96375 TX/PRO/DX INJ NEW DRUG ADDON: CPT

## 2022-04-18 RX ORDER — SODIUM CHLORIDE 9 MG/ML
20 INJECTION, SOLUTION INTRAVENOUS ONCE
Status: COMPLETED | OUTPATIENT
Start: 2022-04-18 | End: 2022-04-18

## 2022-04-18 RX ORDER — SODIUM CHLORIDE 9 MG/ML
20 INJECTION, SOLUTION INTRAVENOUS ONCE
Status: CANCELLED | OUTPATIENT
Start: 2022-04-25

## 2022-04-18 RX ORDER — ONDANSETRON 8 MG/1
8 TABLET, ORALLY DISINTEGRATING ORAL ONCE
Status: CANCELLED
Start: 2022-04-25 | End: 2022-04-25

## 2022-04-18 RX ORDER — ONDANSETRON 4 MG/1
8 TABLET, ORALLY DISINTEGRATING ORAL ONCE
Status: COMPLETED | OUTPATIENT
Start: 2022-04-18 | End: 2022-04-18

## 2022-04-18 RX ORDER — CYANOCOBALAMIN 1000 UG/ML
1000 INJECTION INTRAMUSCULAR; SUBCUTANEOUS ONCE
Status: COMPLETED | OUTPATIENT
Start: 2022-04-18 | End: 2022-04-18

## 2022-04-18 RX ORDER — CYANOCOBALAMIN 1000 UG/ML
1000 INJECTION INTRAMUSCULAR; SUBCUTANEOUS ONCE
Status: CANCELLED | OUTPATIENT
Start: 2022-04-25 | End: 2022-04-25

## 2022-04-18 RX ADMIN — IRON SUCROSE 200 MG: 20 INJECTION, SOLUTION INTRAVENOUS at 10:59

## 2022-04-18 RX ADMIN — FAMOTIDINE 20 MG: 10 INJECTION INTRAVENOUS at 10:36

## 2022-04-18 RX ADMIN — SODIUM CHLORIDE 20 ML/HR: 0.9 INJECTION, SOLUTION INTRAVENOUS at 10:04

## 2022-04-18 RX ADMIN — DEXAMETHASONE SODIUM PHOSPHATE 4 MG: 4 INJECTION, SOLUTION INTRAMUSCULAR; INTRAVENOUS at 10:05

## 2022-04-18 RX ADMIN — CYANOCOBALAMIN 1000 MCG: 1000 INJECTION INTRAMUSCULAR; SUBCUTANEOUS at 10:16

## 2022-04-18 RX ADMIN — ONDANSETRON 8 MG: 4 TABLET, ORALLY DISINTEGRATING ORAL at 10:14

## 2022-04-26 DIAGNOSIS — D50.9 IRON DEFICIENCY ANEMIA, UNSPECIFIED IRON DEFICIENCY ANEMIA TYPE: ICD-10-CM

## 2022-04-26 DIAGNOSIS — D50.0 IRON DEFICIENCY ANEMIA DUE TO CHRONIC BLOOD LOSS: ICD-10-CM

## 2022-04-26 DIAGNOSIS — E53.8 B12 DEFICIENCY: Primary | ICD-10-CM

## 2022-04-26 RX ORDER — ONDANSETRON 4 MG/1
8 TABLET, ORALLY DISINTEGRATING ORAL ONCE
Status: CANCELLED
Start: 2022-04-27 | End: 2022-04-27

## 2022-04-26 RX ORDER — SODIUM CHLORIDE 9 MG/ML
20 INJECTION, SOLUTION INTRAVENOUS ONCE
Status: CANCELLED | OUTPATIENT
Start: 2022-04-27

## 2022-04-27 ENCOUNTER — HOSPITAL ENCOUNTER (OUTPATIENT)
Dept: INFUSION CENTER | Facility: HOSPITAL | Age: 37
Discharge: HOME | End: 2022-04-27
Attending: INTERNAL MEDICINE
Payer: MEDICARE

## 2022-04-27 VITALS
RESPIRATION RATE: 20 BRPM | DIASTOLIC BLOOD PRESSURE: 58 MMHG | HEART RATE: 74 BPM | SYSTOLIC BLOOD PRESSURE: 126 MMHG | TEMPERATURE: 97.2 F

## 2022-04-27 DIAGNOSIS — D50.0 IRON DEFICIENCY ANEMIA DUE TO CHRONIC BLOOD LOSS: ICD-10-CM

## 2022-04-27 DIAGNOSIS — E53.8 B12 DEFICIENCY: Primary | ICD-10-CM

## 2022-04-27 DIAGNOSIS — D50.9 IRON DEFICIENCY ANEMIA, UNSPECIFIED IRON DEFICIENCY ANEMIA TYPE: ICD-10-CM

## 2022-04-27 PROCEDURE — 96367 TX/PROPH/DG ADDL SEQ IV INF: CPT

## 2022-04-27 PROCEDURE — 96365 THER/PROPH/DIAG IV INF INIT: CPT

## 2022-04-27 RX ORDER — ONDANSETRON 8 MG/1
8 TABLET, ORALLY DISINTEGRATING ORAL ONCE
Status: CANCELLED
Start: 2022-04-28 | End: 2022-04-28

## 2022-04-27 RX ORDER — SODIUM CHLORIDE 9 MG/ML
20 INJECTION, SOLUTION INTRAVENOUS ONCE
Status: COMPLETED | OUTPATIENT
Start: 2022-04-27 | End: 2022-04-27

## 2022-04-27 RX ORDER — ONDANSETRON 4 MG/1
8 TABLET, ORALLY DISINTEGRATING ORAL ONCE
Status: COMPLETED | OUTPATIENT
Start: 2022-04-27 | End: 2022-04-27

## 2022-04-27 RX ORDER — SODIUM CHLORIDE 9 MG/ML
20 INJECTION, SOLUTION INTRAVENOUS ONCE
Status: CANCELLED | OUTPATIENT
Start: 2022-04-28

## 2022-04-27 RX ADMIN — DEXAMETHASONE SODIUM PHOSPHATE 4 MG: 4 INJECTION, SOLUTION INTRAMUSCULAR; INTRAVENOUS at 12:47

## 2022-04-27 RX ADMIN — IRON SUCROSE 200 MG: 20 INJECTION, SOLUTION INTRAVENOUS at 13:37

## 2022-04-27 RX ADMIN — ONDANSETRON 8 MG: 4 TABLET, ORALLY DISINTEGRATING ORAL at 12:49

## 2022-04-27 RX ADMIN — SODIUM CHLORIDE 20 ML/HR: 9 INJECTION, SOLUTION INTRAVENOUS at 12:23

## 2022-04-27 RX ADMIN — FAMOTIDINE 20 MG: 10 INJECTION INTRAVENOUS at 12:24

## 2022-04-27 NOTE — PROGRESS NOTES
Patient tolerated venofer infusion well with no adverse effects  Offers no complaints  Next appointment verified, AVS declined

## 2022-05-02 ENCOUNTER — OFFICE VISIT (OUTPATIENT)
Dept: FAMILY MEDICINE CLINIC | Facility: CLINIC | Age: 37
End: 2022-05-02

## 2022-05-02 VITALS
BODY MASS INDEX: 35.55 KG/M2 | HEART RATE: 80 BPM | DIASTOLIC BLOOD PRESSURE: 80 MMHG | TEMPERATURE: 97.8 F | OXYGEN SATURATION: 98 % | SYSTOLIC BLOOD PRESSURE: 128 MMHG | WEIGHT: 227 LBS | RESPIRATION RATE: 16 BRPM

## 2022-05-02 DIAGNOSIS — N30.00 ACUTE CYSTITIS WITHOUT HEMATURIA: ICD-10-CM

## 2022-05-02 DIAGNOSIS — K64.4 EXTERNAL HEMORRHOID: ICD-10-CM

## 2022-05-02 DIAGNOSIS — H10.32 ACUTE CONJUNCTIVITIS OF LEFT EYE, UNSPECIFIED ACUTE CONJUNCTIVITIS TYPE: Primary | ICD-10-CM

## 2022-05-02 PROCEDURE — 87086 URINE CULTURE/COLONY COUNT: CPT | Performed by: FAMILY MEDICINE

## 2022-05-02 PROCEDURE — 99213 OFFICE O/P EST LOW 20 MIN: CPT | Performed by: FAMILY MEDICINE

## 2022-05-02 RX ORDER — HYDROCORTISONE 25 MG/G
CREAM TOPICAL 2 TIMES DAILY
Qty: 28 G | Refills: 1 | Status: SHIPPED | OUTPATIENT
Start: 2022-05-02

## 2022-05-02 RX ORDER — BACITRACIN ZINC AND POLYMYXIN B SULFATE 500; 1000 [USP'U]/G; [USP'U]/G
OINTMENT TOPICAL 2 TIMES DAILY
Qty: 15 G | Refills: 0 | Status: SHIPPED | OUTPATIENT
Start: 2022-05-02

## 2022-05-02 RX ORDER — NITROFURANTOIN 25; 75 MG/1; MG/1
100 CAPSULE ORAL 2 TIMES DAILY
Qty: 10 CAPSULE | Refills: 0 | Status: SHIPPED | OUTPATIENT
Start: 2022-05-02 | End: 2022-05-07

## 2022-05-02 NOTE — PROGRESS NOTES
Assessment/Plan:    External hemorrhoid  Declined examination today however on/off bleeding and pain and does feel them  - Anusol cream   - Sitz baths  - Referral to colorectal surgery  Acute conjunctivitis of left eye  Son with bilateral conjunctivitis in the setting of URI; patient woke this morning with crusting over left eyelids  Exam today unremarkable  - Polysporin ointment provided if patient becomes symptomatic   - Letter for work  Acute cystitis without hematuria  Frequency, suprapubic abdominal pain and positive leukocytes on UA  - Start Macrobid 100 mg BID for 5 days  - Urine culture  Diagnoses and all orders for this visit:    Acute conjunctivitis of left eye, unspecified acute conjunctivitis type  -     bacitracin-polymyxin b (POLYSPORIN) ointment; Apply topically 2 (two) times a day    Acute cystitis without hematuria  -     nitrofurantoin (MACROBID) 100 mg capsule; Take 1 capsule (100 mg total) by mouth 2 (two) times a day for 5 days  -     Urine culture    External hemorrhoid  -     hydrocortisone (ANUSOL-HC) 2 5 % rectal cream; Apply topically 2 (two) times a day  -     Ambulatory Referral to Colorectal Surgery; Future          Subjective:      Patient ID: Nakul Bhatt is a 43 y o  female  Patient is a pleasant 40-year-old female who presents to the clinic with multiple complaints  Patient states that she has been having urinary frequency and suprapubic abdominal pain and is concerned regarding UTI  She also states that she woke up this morning with her left eye close shot and crusting  She notes that her son had conjunctivitis and was seen in the emergency room and does currently have a URI  She also complains of rectal bleeding and pain likely secondary to external hemorrhoids however she declines testing  She would like medication for this        The following portions of the patient's history were reviewed and updated as appropriate:   She  has a past medical history of Anxiety, Crohn's disease ([facility] [address] ), Depression, Disease of thyroid gland, Fibromyalgia, Gestational diabetes, Gestational hypertension, Hashimoto's thyroiditis, HPV (human papilloma virus) infection, and Retained placenta  She   Patient Active Problem List    Diagnosis Date Noted    Acute conjunctivitis of left eye 05/02/2022    External hemorrhoid 05/02/2022    B12 deficiency 04/01/2022    Iron deficiency anemia due to chronic blood loss 11/17/2021    Paresthesia 10/01/2021    Chronic pain of left knee 10/01/2021    Encounter for counseling 07/09/2021    Hidradenitis 02/15/2021    Myalgia 10/09/2020    Benign paroxysmal positional vertigo due to bilateral vestibular disorder 07/27/2020    Iron deficiency anemia 07/27/2020    Stress incontinence of urine 07/27/2020    Acute cystitis without hematuria 05/19/2020    Other specified hypothyroidism 11/15/2019    Irritable bowel syndrome with diarrhea 11/15/2019    Depression with anxiety 11/15/2019     She  has a past surgical history that includes Cholecystectomy; Dilation and curettage of uterus; Hernia repair; and Umbilical hernia repair  Her family history includes Anuerysm in her mother; Colon cancer in her maternal grandfather; Hepatitis in her mother; Lung cancer in her maternal grandmother; Thyroid disease in her mother  She  reports that she has quit smoking  She has never used smokeless tobacco  She reports that she does not drink alcohol and does not use drugs    Current Outpatient Medications   Medication Sig Dispense Refill    aluminum-magnesium hydroxide-simethicone (MAALOX) 492-510-58 MG/5ML SUSP Take 20 mL by mouth 4 (four) times a day (before meals and at bedtime) 335 mL 0    ascorbic acid (VITAMIN C) 500 MG tablet Take 1 tablet (500 mg total) by mouth daily (Patient not taking: Reported on 7/9/2021) 90 tablet 1    bacitracin-polymyxin b (POLYSPORIN) ointment Apply topically 2 (two) times a day 15 g 0    cyanocobalamin (VITAMIN B-12) 1000 MCG tablet Take 1 tablet (1,000 mcg total) by mouth daily 90 tablet 3    Diclofenac Sodium (VOLTAREN) 1 % Apply 2 g topically 4 (four) times a day 350 g 0    ergocalciferol (VITAMIN D2) 50,000 units Take 1 capsule (50,000 Units total) by mouth once a week (Patient not taking: Reported on 7/9/2021) 24 capsule 0    escitalopram (Lexapro) 20 mg tablet Take 1 tablet (20 mg total) by mouth daily 90 tablet 0    folic acid (FOLVITE) 1 mg tablet Take 1 tablet (1 mg total) by mouth daily 90 tablet 4    hydrocortisone (ANUSOL-HC) 2 5 % rectal cream Apply topically 2 (two) times a day 28 g 1    hydrOXYzine pamoate (VISTARIL) 50 mg capsule Take 1 capsule (50 mg total) by mouth daily at bedtime as needed for itching 30 capsule 0    lidocaine (Lidoderm) 5 % Apply 1 patch topically daily Remove & Discard patch within 12 hours or as directed by MD 90 patch 0    liothyronine (CYTOMEL) 5 mcg tablet Take 5 mcg by mouth 2 (two) times a day      nitrofurantoin (MACROBID) 100 mg capsule Take 1 capsule (100 mg total) by mouth 2 (two) times a day for 5 days 10 capsule 0    ondansetron (ZOFRAN) 8 mg tablet Take 1 tablet (8 mg total) by mouth every 8 (eight) hours as needed for nausea or vomiting 20 tablet 0    pantoprazole (PROTONIX) 20 mg tablet Take 1 tablet (20 mg total) by mouth daily 30 tablet 0    SYNTHROID 125 MCG tablet Take 137 mcg by mouth daily    3     No current facility-administered medications for this visit       Current Outpatient Medications on File Prior to Visit   Medication Sig    aluminum-magnesium hydroxide-simethicone (MAALOX) 200-200-20 MG/5ML SUSP Take 20 mL by mouth 4 (four) times a day (before meals and at bedtime)    ascorbic acid (VITAMIN C) 500 MG tablet Take 1 tablet (500 mg total) by mouth daily (Patient not taking: Reported on 7/9/2021)    cyanocobalamin (VITAMIN B-12) 1000 MCG tablet Take 1 tablet (1,000 mcg total) by mouth daily    Diclofenac Sodium (VOLTAREN) 1 % Apply 2 g topically 4 (four) times a day    ergocalciferol (VITAMIN D2) 50,000 units Take 1 capsule (50,000 Units total) by mouth once a week (Patient not taking: Reported on 6/8/8081)    folic acid (FOLVITE) 1 mg tablet Take 1 tablet (1 mg total) by mouth daily    hydrOXYzine pamoate (VISTARIL) 50 mg capsule Take 1 capsule (50 mg total) by mouth daily at bedtime as needed for itching    lidocaine (Lidoderm) 5 % Apply 1 patch topically daily Remove & Discard patch within 12 hours or as directed by MD    liothyronine (CYTOMEL) 5 mcg tablet Take 5 mcg by mouth 2 (two) times a day    ondansetron (ZOFRAN) 8 mg tablet Take 1 tablet (8 mg total) by mouth every 8 (eight) hours as needed for nausea or vomiting    pantoprazole (PROTONIX) 20 mg tablet Take 1 tablet (20 mg total) by mouth daily    SYNTHROID 125 MCG tablet Take 137 mcg by mouth daily      [DISCONTINUED] escitalopram (Lexapro) 10 mg tablet Take 1 tablet (10 mg total) by mouth daily     No current facility-administered medications on file prior to visit  She is allergic to bactrim [sulfamethoxazole-trimethoprim], other, shellfish-derived products - food allergy, sulfamethoxazole, and trimethoprim       Review of Systems   Constitutional: Negative for activity change, appetite change and fever  HENT: Negative for congestion and sore throat  Eyes: Positive for discharge and redness (in the morning on left)  Negative for visual disturbance  Respiratory: Negative for cough, shortness of breath and wheezing  Cardiovascular: Negative for chest pain and palpitations  Gastrointestinal: Positive for abdominal pain (suprapubic), blood in stool and rectal pain  Negative for abdominal distention, constipation, diarrhea and vomiting  Genitourinary: Positive for frequency  Negative for dysuria and urgency  Musculoskeletal: Negative for arthralgias  Skin: Negative for rash  Neurological: Negative for headaches           Objective:      /80 (BP Location: Right arm, Patient Position: Sitting, Cuff Size: Standard)   Pulse 80   Temp 97 8 °F (36 6 °C) (Temporal)   Resp 16   Wt 103 kg (227 lb)   LMP 04/15/2022 (Approximate)   SpO2 98%   Breastfeeding No   BMI 35 55 kg/m²          Physical Exam  Constitutional:       General: She is not in acute distress  Appearance: Normal appearance  She is well-developed and normal weight  She is not ill-appearing, toxic-appearing or diaphoretic  HENT:      Head: Normocephalic and atraumatic  Right Ear: External ear normal       Left Ear: External ear normal       Nose: Nose normal       Mouth/Throat:      Mouth: Mucous membranes are moist    Eyes:      General: No scleral icterus  Right eye: No discharge  Left eye: No discharge  Extraocular Movements: Extraocular movements intact  Conjunctiva/sclera: Conjunctivae normal       Pupils: Pupils are equal, round, and reactive to light  Neck:      Thyroid: No thyromegaly  Cardiovascular:      Rate and Rhythm: Normal rate and regular rhythm  Pulses: Normal pulses  Heart sounds: Normal heart sounds  No murmur heard  No gallop  Pulmonary:      Effort: Pulmonary effort is normal  No respiratory distress  Breath sounds: Normal breath sounds  No wheezing  Abdominal:      General: Bowel sounds are normal  There is no distension  Palpations: Abdomen is soft  There is no mass  Tenderness: There is abdominal tenderness (suprapubic)  There is no right CVA tenderness, left CVA tenderness, guarding or rebound  Hernia: No hernia is present  Musculoskeletal:      Cervical back: Normal range of motion and neck supple  No rigidity  No muscular tenderness  Right lower leg: No edema  Left lower leg: No edema  Lymphadenopathy:      Cervical: No cervical adenopathy  Skin:     General: Skin is warm  Findings: No erythema or rash  Neurological:      General: No focal deficit present        Mental Status: She is alert     Psychiatric:         Mood and Affect: Mood normal

## 2022-05-02 NOTE — ASSESSMENT & PLAN NOTE
Declined examination today however on/off bleeding and pain and does feel them  - Anusol cream   - Sitz baths  - Referral to colorectal surgery

## 2022-05-02 NOTE — LETTER
May 2, 2022     Patient: Deb Serrato  YOB: 1985  Date of Visit: 5/2/2022      To Whom it May Concern:    Deb Serrato is under my professional care  Tashia Lynne was seen in my office on 5/2/2022  Please excuse from work on 5/2/2022 and 5/3/2022  If you have any questions or concerns, please don't hesitate to call           Sincerely,        MICHAEL Ash  [facility]        CC: No Recipients

## 2022-05-02 NOTE — ASSESSMENT & PLAN NOTE
Frequency, suprapubic abdominal pain and positive leukocytes on UA  - Start Macrobid 100 mg BID for 5 days  - Urine culture

## 2022-05-02 NOTE — ASSESSMENT & PLAN NOTE
Son with bilateral conjunctivitis in the setting of URI; patient woke this morning with crusting over left eyelids  Exam today unremarkable  - Polysporin ointment provided if patient becomes symptomatic   - Letter for work

## 2022-05-04 LAB — BACTERIA UR CULT: NORMAL

## 2022-05-16 ENCOUNTER — TELEPHONE (OUTPATIENT)
Dept: FAMILY MEDICINE CLINIC | Facility: CLINIC | Age: 37
End: 2022-05-16

## 2022-05-16 ENCOUNTER — OFFICE VISIT (OUTPATIENT)
Dept: FAMILY MEDICINE CLINIC | Facility: CLINIC | Age: 37
End: 2022-05-16

## 2022-05-16 VITALS
DIASTOLIC BLOOD PRESSURE: 70 MMHG | HEART RATE: 68 BPM | TEMPERATURE: 97.8 F | SYSTOLIC BLOOD PRESSURE: 112 MMHG | WEIGHT: 225 LBS | BODY MASS INDEX: 35.31 KG/M2 | HEIGHT: 67 IN | RESPIRATION RATE: 18 BRPM | OXYGEN SATURATION: 98 %

## 2022-05-16 DIAGNOSIS — J06.9 UPPER RESPIRATORY TRACT INFECTION, UNSPECIFIED TYPE: Primary | ICD-10-CM

## 2022-05-16 DIAGNOSIS — M62.838 MUSCLE SPASM: ICD-10-CM

## 2022-05-16 PROCEDURE — 99213 OFFICE O/P EST LOW 20 MIN: CPT | Performed by: INTERNAL MEDICINE

## 2022-05-16 RX ORDER — IBUPROFEN 600 MG/1
600 TABLET ORAL EVERY 6 HOURS PRN
Qty: 30 TABLET | Refills: 0 | Status: SHIPPED | OUTPATIENT
Start: 2022-05-16

## 2022-05-16 RX ORDER — CYCLOBENZAPRINE HCL 10 MG
10 TABLET ORAL 3 TIMES DAILY PRN
Qty: 30 TABLET | Refills: 0 | Status: SHIPPED | OUTPATIENT
Start: 2022-05-16

## 2022-05-16 RX ORDER — FLUTICASONE PROPIONATE 50 MCG
1 SPRAY, SUSPENSION (ML) NASAL DAILY
Qty: 18.2 ML | Refills: 1 | Status: SHIPPED | OUTPATIENT
Start: 2022-05-16 | End: 2022-06-09

## 2022-05-16 NOTE — ASSESSMENT & PLAN NOTE
Of 3 days duration, symptoms include headache, Nasal congestion and sinus pressure    Not associated with fevers or chill or cough    Associated with neck tightness due to muscle spasm   No red flags noted  Patient is fully vaccinated against COVID and get tested frequently at her job     - will treat symptomatic fully with Mucinex for the congestion, can take Tylenol for headache p r n , will do a trial of Flexeril for the muscle spasm   - can use saline nasal rinse   Given that patient COVID test was -3 days ago, will not retest for COVID at this time   Patient is to follow-up in the next few weeks if her symptoms does not improve

## 2022-05-16 NOTE — PROGRESS NOTES
Assessment/Plan:    Upper respiratory tract infection   Of 3 days duration, symptoms include headache, Nasal congestion and sinus pressure    Not associated with fevers or chill or cough    Associated with neck tightness due to muscle spasm   No red flags noted  Patient is fully vaccinated against COVID and get tested frequently at her job     - will treat symptomatic fully with Mucinex for the congestion, can take Tylenol for headache p r n , will do a trial of Flexeril for the muscle spasm   - can use saline nasal rinse   Given that patient COVID test was -3 days ago, will not retest for COVID at this time   Patient is to follow-up in the next few weeks if her symptoms does not improve         Diagnoses and all orders for this visit:    Upper respiratory tract infection, unspecified type  -     fluticasone (FLONASE) 50 mcg/act nasal spray; 1 spray into each nostril in the morning   -     dextromethorphan-guaifenesin (MUCINEX DM)  MG per 12 hr tablet; Take 1 tablet by mouth every 12 (twelve) hours  -     ibuprofen (MOTRIN) 600 mg tablet; Take 1 tablet (600 mg total) by mouth every 6 (six) hours as needed for mild pain or headaches    Muscle spasm  -     cyclobenzaprine (FLEXERIL) 10 mg tablet; Take 1 tablet (10 mg total) by mouth as needed in the morning and 1 tablet (10 mg total) as needed at noon and 1 tablet (10 mg total) as needed in the evening for muscle spasms  Subjective:      Patient ID: Meka Scott is a 43 y o  female      HPI  Meka Scott is a 43 y o  female  With history of hypothyroidism and IBS who presents to the office for a same-day visit for headache,  Nasal congestion and sinus pressure of 3 days duration   Denies any recent sick contacts  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, COVID negative 3 days ago  vaccinedated x 2   Had covid in October, And symptom are not similar to this current symptoms     The following portions of the patient's history were reviewed and updated as appropriate: allergies, current medications, past family history, past medical history, past social history, past surgical history and problem list     Review of Systems   Constitutional: Negative for chills and fever  HENT: Negative for ear pain and sore throat  Eyes: Negative for pain and visual disturbance  Respiratory: Negative for cough and shortness of breath  Cardiovascular: Negative for chest pain and palpitations  Gastrointestinal: Negative for abdominal pain, constipation, diarrhea, nausea and vomiting  Genitourinary: Negative for dysuria and hematuria  Musculoskeletal: Positive for neck stiffness  Negative for arthralgias, back pain, joint swelling, myalgias and neck pain  Skin: Negative for color change and rash  Neurological: Positive for headaches  Negative for dizziness, tremors, seizures, syncope, facial asymmetry, weakness, light-headedness and numbness  All other systems reviewed and are negative  Objective:      /70 (BP Location: Left arm, Patient Position: Sitting, Cuff Size: Large)   Pulse 68   Temp 97 8 °F (36 6 °C) (Temporal)   Resp 18   Ht 5' 7" (1 702 m)   Wt 102 kg (225 lb)   LMP  (LMP Unknown)   SpO2 98%   BMI 35 24 kg/m²          Physical Exam  Constitutional:       General: She is not in acute distress  Appearance: She is well-developed  She is not toxic-appearing or diaphoretic  HENT:      Head: Normocephalic and atraumatic  Cardiovascular:      Rate and Rhythm: Normal rate  Heart sounds: Normal heart sounds  No murmur heard  Pulmonary:      Effort: Pulmonary effort is normal  No respiratory distress  Breath sounds: Normal breath sounds  Musculoskeletal:         General: No swelling, tenderness or deformity  Normal range of motion  Cervical back: Neck supple  Right lower leg: No edema  Left lower leg: No edema        Comments: Muscle spasm on back of the neck   Skin: General: Skin is warm and dry  Findings: No rash  Neurological:      Mental Status: She is alert and oriented to person, place, and time  Cranial Nerves: No cranial nerve deficit  Motor: No weakness

## 2022-05-23 ENCOUNTER — OFFICE VISIT (OUTPATIENT)
Dept: URGENT CARE | Age: 37
End: 2022-05-23
Payer: MEDICARE

## 2022-05-23 VITALS
HEART RATE: 89 BPM | WEIGHT: 230 LBS | RESPIRATION RATE: 17 BRPM | TEMPERATURE: 97 F | HEIGHT: 67 IN | BODY MASS INDEX: 36.1 KG/M2 | OXYGEN SATURATION: 98 %

## 2022-05-23 DIAGNOSIS — N30.00 ACUTE CYSTITIS WITHOUT HEMATURIA: Primary | ICD-10-CM

## 2022-05-23 DIAGNOSIS — J02.9 SORE THROAT: ICD-10-CM

## 2022-05-23 DIAGNOSIS — J01.90 ACUTE SINUSITIS, RECURRENCE NOT SPECIFIED, UNSPECIFIED LOCATION: ICD-10-CM

## 2022-05-23 LAB
S PYO AG THROAT QL: NEGATIVE
SL AMB  POCT GLUCOSE, UA: NEGATIVE
SL AMB LEUKOCYTE ESTERASE,UA: ABNORMAL
SL AMB POCT BILIRUBIN,UA: NEGATIVE
SL AMB POCT BLOOD,UA: NEGATIVE
SL AMB POCT CLARITY,UA: ABNORMAL
SL AMB POCT COLOR,UA: YELLOW
SL AMB POCT KETONES,UA: NEGATIVE
SL AMB POCT NITRITE,UA: NEGATIVE
SL AMB POCT PH,UA: 6.5
SL AMB POCT SPECIFIC GRAVITY,UA: 1.01
SL AMB POCT URINE PROTEIN: NEGATIVE
SL AMB POCT UROBILINOGEN: 0.2

## 2022-05-23 PROCEDURE — 99213 OFFICE O/P EST LOW 20 MIN: CPT | Performed by: PHYSICIAN ASSISTANT

## 2022-05-23 PROCEDURE — 81002 URINALYSIS NONAUTO W/O SCOPE: CPT | Performed by: PHYSICIAN ASSISTANT

## 2022-05-23 PROCEDURE — 87086 URINE CULTURE/COLONY COUNT: CPT | Performed by: PHYSICIAN ASSISTANT

## 2022-05-23 PROCEDURE — 87147 CULTURE TYPE IMMUNOLOGIC: CPT | Performed by: PHYSICIAN ASSISTANT

## 2022-05-23 PROCEDURE — 87070 CULTURE OTHR SPECIMN AEROBIC: CPT | Performed by: PHYSICIAN ASSISTANT

## 2022-05-23 RX ORDER — AMOXICILLIN AND CLAVULANATE POTASSIUM 875; 125 MG/1; MG/1
1 TABLET, FILM COATED ORAL EVERY 12 HOURS SCHEDULED
Qty: 14 TABLET | Refills: 0 | Status: SHIPPED | OUTPATIENT
Start: 2022-05-23 | End: 2022-05-30

## 2022-05-23 RX ORDER — MIRTAZAPINE 15 MG/1
15 TABLET, FILM COATED ORAL
COMMUNITY

## 2022-05-23 NOTE — PATIENT INSTRUCTIONS
Take antibiotics as directed  If symptoms do not improve in the next 2-3 days sinus symptoms are likely the result of allergies recommend adding antihistamine  Take antibiotics as prescribed  Complete the entire course  If you do not complete the entire course this may result in bacterial resistance making future infections very difficult or impossible to treat  You should never have leftover antibiotics unless your antibiotic is switched  Note that if you are taking birth control medications, antibiotics can decrease their effectiveness  Recommend abstinence or back up method while on antibiotics and for 3-5 days after completing the antibiotic course  We send all positive urine for culture, we will call you if your antibiotic needs to be changed based on culture results  If symptoms do not improve in 2-3 days, follow-up with your primary care provider  If you develop fever, chills, or worsening low back pain report to the emergency room  These are symptoms of a more serious condition or possible spread of the infection into your bloodstream      Urinary Tract Infection in Women   AMBULATORY CARE:   A urinary tract infection (UTI)  is caused by bacteria that get inside your urinary tract  Most bacteria that enter your urinary tract come out when you urinate  If the bacteria stay in your urinary tract, you may get an infection  Your urinary tract includes your kidneys, ureters, bladder, and urethra  Urine is made in your kidneys, and it flows from the ureters to the bladder  Urine leaves the bladder through the urethra  A UTI is more common in your lower urinary tract, which includes your bladder and urethra        Common symptoms include the following:   Urinating more often or waking from sleep to urinate    Pain or burning when you urinate    Pain or pressure in your lower abdomen     Urine that smells bad    Blood in your urine    Leaking urine    Seek care immediately if:   You are urinating very little or not at all  You have a high fever with shaking chills  You have side or back pain that gets worse  Call your doctor if:   You have a fever  You do not feel better after 2 days of taking antibiotics  You are vomiting  You have questions or concerns about your condition or care  Treatment for a UTI  may include antibiotics to treat a bacterial infection  You may also need medicines to decrease pain and burning, or decrease the urge to urinate often  If you have UTIs often (called recurrent UTIs), you may be given antibiotics to take regularly  You will be given directions for when and how to use antibiotics  The goal is to prevent UTIs but not cause antibiotic resistance by using antibiotics too often  Prevent a UTI:   Empty your bladder often  Urinate and empty your bladder as soon as you feel the need  Do not hold your urine for long periods of time  Wipe from front to back after you urinate or have a bowel movement  This will help prevent germs from getting into your urinary tract through your urethra  Drink liquids as directed  Ask how much liquid to drink each day and which liquids are best for you  You may need to drink more liquids than usual to help flush out the bacteria  Do not drink alcohol, caffeine, or citrus juices  These can irritate your bladder and increase your symptoms  Your healthcare provider may recommend cranberry juice to help prevent a UTI  Urinate after you have sex  This can help flush out bacteria passed during sex  Do not douche or use feminine deodorants  These can change the chemical balance in your vagina  Change sanitary pads or tampons often  This will help prevent germs from getting into your urinary tract  Talk to your healthcare provider about your birth control method  You may need to change your method if it is increasing your risk for UTIs  Wear cotton underwear and clothes that are loose    Tight pants and nylon underwear can trap moisture and cause bacteria to grow  Vaginal estrogen may be recommended  This medicine helps prevent UTIs in women who have gone through menopause or are in meagan-menopause  Do pelvic muscle exercises often  Pelvic muscle exercises may help you start and stop urinating  Strong pelvic muscles may help you empty your bladder easier  Squeeze these muscles tightly for 5 seconds like you are trying to hold back urine  Then relax for 5 seconds  Gradually work up to squeezing for 10 seconds  Do 3 sets of 15 repetitions a day, or as directed  Follow up with your healthcare provider as directed:  Write down your questions so you remember to ask them during your visits  © Copyright [address] Information is for End User's use only and may not be sold, redistributed or otherwise used for commercial purposes  All illustrations and images included in CareNotes® are the copyrighted property of A D A M , Inc  or [facility] Lolita Goldamn   The above information is an  only  It is not intended as medical advice for individual conditions or treatments  Talk to your doctor, nurse or pharmacist before following any medical regimen to see if it is safe and effective for you

## 2022-05-23 NOTE — LETTER
May 23, 2022     Patient: Olu Kessler   YOB: 1985   Date of Visit: 5/23/2022       To Whom It May Concern: It is my medical opinion that Olu Kessler may return to work on 05/24/2022       If you have any questions or concerns, please don't hesitate to call           Sincerely,        Kofi Jiang PA-C    CC: No Recipients

## 2022-05-23 NOTE — PROGRESS NOTES
[facility]        NAME: Deb Serrato is a 43 y o  female  : 1985    MRN: [id number]  DATE: May 23, 2022  TIME: 8:57 AM    Assessment and Plan   Acute cystitis without hematuria [N30 00]  1  Acute cystitis without hematuria  POCT urine dip    amoxicillin-clavulanate (AUGMENTIN) 875-125 mg per tablet    Urine culture   2  Sore throat  POCT rapid strepA    Throat culture   3  Acute sinusitis, recurrence not specified, unspecified location  amoxicillin-clavulanate (AUGMENTIN) 875-125 mg per tablet   Pt presents with 10+ day duration of sinus symptoms, slightly improved with conservative measures but continuing  Additionally with frequency of urine  UA with moderate leukocyte esterdase  Recommend Augmentin to treat as this will cover any bacterial sinusitis as well as UTI  Rapid strep in clinic is negative, will send for culture and call patient with any positive result  Follow-up with PCP in 3-5 days, report to the ED if new symptoms develop or symptoms worsen  Patient Instructions     Patient Instructions     Take antibiotics as directed  If symptoms do not improve in the next 2-3 days sinus symptoms are likely the result of allergies recommend adding antihistamine   Take antibiotics as prescribed  Complete the entire course  If you do not complete the entire course this may result in bacterial resistance making future infections very difficult or impossible to treat  You should never have leftover antibiotics unless your antibiotic is switched  Note that if you are taking birth control medications, antibiotics can decrease their effectiveness  Recommend abstinence or back up method while on antibiotics and for 3-5 days after completing the antibiotic course   We send all positive urine for culture, we will call you if your antibiotic needs to be changed based on culture results   If symptoms do not improve in 2-3 days, follow-up with your primary care provider      If you develop fever, chills, or worsening low back pain report to the emergency room  These are symptoms of a more serious condition or possible spread of the infection into your bloodstream      Urinary Tract Infection in Women   AMBULATORY CARE:   A urinary tract infection (UTI)  is caused by bacteria that get inside your urinary tract  Most bacteria that enter your urinary tract come out when you urinate  If the bacteria stay in your urinary tract, you may get an infection  Your urinary tract includes your kidneys, ureters, bladder, and urethra  Urine is made in your kidneys, and it flows from the ureters to the bladder  Urine leaves the bladder through the urethra  A UTI is more common in your lower urinary tract, which includes your bladder and urethra  Common symptoms include the following:   · Urinating more often or waking from sleep to urinate    · Pain or burning when you urinate    · Pain or pressure in your lower abdomen     · Urine that smells bad    · Blood in your urine    · Leaking urine    Seek care immediately if:   · You are urinating very little or not at all  · You have a high fever with shaking chills  · You have side or back pain that gets worse  Call your doctor if:   · You have a fever  · You do not feel better after 2 days of taking antibiotics  · You are vomiting  · You have questions or concerns about your condition or care  Treatment for a UTI  may include antibiotics to treat a bacterial infection  You may also need medicines to decrease pain and burning, or decrease the urge to urinate often  If you have UTIs often (called recurrent UTIs), you may be given antibiotics to take regularly  You will be given directions for when and how to use antibiotics  The goal is to prevent UTIs but not cause antibiotic resistance by using antibiotics too often  Prevent a UTI:   · Empty your bladder often  Urinate and empty your bladder as soon as you feel the need   Do not hold your urine for long periods of time  · Wipe from front to back after you urinate or have a bowel movement  This will help prevent germs from getting into your urinary tract through your urethra  · Drink liquids as directed  Ask how much liquid to drink each day and which liquids are best for you  You may need to drink more liquids than usual to help flush out the bacteria  Do not drink alcohol, caffeine, or citrus juices  These can irritate your bladder and increase your symptoms  Your healthcare provider may recommend cranberry juice to help prevent a UTI  · Urinate after you have sex  This can help flush out bacteria passed during sex  · Do not douche or use feminine deodorants  These can change the chemical balance in your vagina  · Change sanitary pads or tampons often  This will help prevent germs from getting into your urinary tract  · Talk to your healthcare provider about your birth control method  You may need to change your method if it is increasing your risk for UTIs  · Wear cotton underwear and clothes that are loose  Tight pants and nylon underwear can trap moisture and cause bacteria to grow  · Vaginal estrogen may be recommended  This medicine helps prevent UTIs in women who have gone through menopause or are in meagan-menopause  · Do pelvic muscle exercises often  Pelvic muscle exercises may help you start and stop urinating  Strong pelvic muscles may help you empty your bladder easier  Squeeze these muscles tightly for 5 seconds like you are trying to hold back urine  Then relax for 5 seconds  Gradually work up to squeezing for 10 seconds  Do 3 sets of 15 repetitions a day, or as directed  Follow up with your healthcare provider as directed:  Write down your questions so you remember to ask them during your visits  © Copyright [address] Information is for End User's use only and may not be sold, redistributed or otherwise used for commercial purposes   All illustrations and images included in CareNotes® are the copyrighted property of A D A M , Inc  or [facility] Lolita Goldman   The above information is an  only  It is not intended as medical advice for individual conditions or treatments  Talk to your doctor, nurse or pharmacist before following any medical regimen to see if it is safe and effective for you  Follow up with PCP in 3-5 days  Proceed to  ER if symptoms worsen  Chief Complaint     Chief Complaint   Patient presents with    Cold Like Symptoms     Head cold for 2 wks, given Mucinex & Nasal spray by PCP  5/10 sore throat for 3 days  Biwkly Covid tests (-)         History of Present Illness       43year old female presents with complaint of runny nose, congestion, sinus pain, and sinus pressure for 10+ days  She did see her PCP for this last week and was given Mucinex and Flonase, which have helped with the pressure, but not with the congestion so much  Pt reports mostly clear nasal drainage  Sore throat began 3 days ago  She also notes frequency of urination with small amounts of urine that have continued since being given an rx for Macrobid earlier this month  Pt reports fever 101 2 yesterday, but not additional fevers since then and some mild nausea  Pt denies back pain and abdominal pain  She does have IBS-D and thus has diarrhea frequently  No other concerns or complaints today  Review of Systems   Review of Systems   Constitutional: Positive for fever  Negative for chills and fatigue  HENT: Positive for congestion, ear pain, postnasal drip, rhinorrhea, sinus pressure, sinus pain and sore throat  Negative for drooling, ear discharge, trouble swallowing and voice change  Respiratory: Negative for cough and shortness of breath  Cardiovascular: Negative for chest pain  Gastrointestinal: Negative for diarrhea, nausea and vomiting  Musculoskeletal: Negative for myalgias  Neurological: Negative for headaches  Current Medications       Current Outpatient Medications:     amoxicillin-clavulanate (AUGMENTIN) 875-125 mg per tablet, Take 1 tablet by mouth every 12 (twelve) hours for 7 days, Disp: 14 tablet, Rfl: 0    dextromethorphan-guaifenesin (MUCINEX DM)  MG per 12 hr tablet, Take 1 tablet by mouth every 12 (twelve) hours, Disp: 30 tablet, Rfl: 0    escitalopram (Lexapro) 20 mg tablet, Take 1 tablet (20 mg total) by mouth daily, Disp: 90 tablet, Rfl: 0    fluticasone (FLONASE) 50 mcg/act nasal spray, 1 spray into each nostril in the morning , Disp: 18 2 mL, Rfl: 1    liothyronine (CYTOMEL) 5 mcg tablet, Take 5 mcg by mouth 2 (two) times a day, Disp: , Rfl:     mirtazapine (REMERON) 15 mg tablet, Take 15 mg by mouth daily at bedtime, Disp: , Rfl:     ondansetron (ZOFRAN) 8 mg tablet, Take 1 tablet (8 mg total) by mouth every 8 (eight) hours as needed for nausea or vomiting, Disp: 20 tablet, Rfl: 0    SYNTHROID 125 MCG tablet, Take 137 mcg by mouth daily  , Disp: , Rfl: 3    aluminum-magnesium hydroxide-simethicone (MAALOX) 200-200-20 MG/5ML SUSP, Take 20 mL by mouth 4 (four) times a day (before meals and at bedtime), Disp: 335 mL, Rfl: 0    ascorbic acid (VITAMIN C) 500 MG tablet, Take 1 tablet (500 mg total) by mouth daily (Patient not taking: No sig reported), Disp: 90 tablet, Rfl: 1    bacitracin-polymyxin b (POLYSPORIN) ointment, Apply topically 2 (two) times a day (Patient not taking: Reported on 5/23/2022), Disp: 15 g, Rfl: 0    cyanocobalamin (VITAMIN B-12) 1000 MCG tablet, Take 1 tablet (1,000 mcg total) by mouth daily (Patient not taking: Reported on 5/23/2022), Disp: 90 tablet, Rfl: 3    cyclobenzaprine (FLEXERIL) 10 mg tablet, Take 1 tablet (10 mg total) by mouth as needed in the morning and 1 tablet (10 mg total) as needed at noon and 1 tablet (10 mg total) as needed in the evening for muscle spasms   (Patient not taking: Reported on 5/23/2022), Disp: 30 tablet, Rfl: 0    Diclofenac Sodium (VOLTAREN) 1 %, Apply 2 g topically 4 (four) times a day, Disp: 350 g, Rfl: 0    ergocalciferol (VITAMIN D2) 50,000 units, Take 1 capsule (50,000 Units total) by mouth once a week (Patient not taking: No sig reported), Disp: 24 capsule, Rfl: 0    folic acid (FOLVITE) 1 mg tablet, Take 1 tablet (1 mg total) by mouth daily (Patient not taking: Reported on 5/23/2022), Disp: 90 tablet, Rfl: 4    hydrocortisone (ANUSOL-HC) 2 5 % rectal cream, Apply topically 2 (two) times a day (Patient not taking: Reported on 5/23/2022), Disp: 28 g, Rfl: 1    hydrOXYzine pamoate (VISTARIL) 50 mg capsule, Take 1 capsule (50 mg total) by mouth daily at bedtime as needed for itching (Patient not taking: Reported on 5/23/2022), Disp: 30 capsule, Rfl: 0    ibuprofen (MOTRIN) 600 mg tablet, Take 1 tablet (600 mg total) by mouth every 6 (six) hours as needed for mild pain or headaches (Patient not taking: Reported on 5/23/2022), Disp: 30 tablet, Rfl: 0    lidocaine (Lidoderm) 5 %, Apply 1 patch topically daily Remove & Discard patch within 12 hours or as directed by MD, Disp: 90 patch, Rfl: 0    pantoprazole (PROTONIX) 20 mg tablet, Take 1 tablet (20 mg total) by mouth daily (Patient not taking: Reported on 5/23/2022), Disp: 30 tablet, Rfl: 0    Current Allergies     Allergies as of 05/23/2022 - Reviewed 05/23/2022   Allergen Reaction Noted    Bactrim [sulfamethoxazole-trimethoprim] Hives 04/06/2016    Other Hives 04/06/2016    Shellfish-derived products - food allergy  09/07/2018    Sulfamethoxazole Hives 05/25/2018    Trimethoprim Hives 05/25/2018            The following portions of the patient's history were reviewed and updated as appropriate: allergies, current medications, past family history, past medical history, past social history, past surgical history and problem list      Past Medical History:   Diagnosis Date    Anxiety     Crohn's disease ([facility] [address] )     Depression     Disease of thyroid gland     Fibromyalgia  Gestational diabetes     Gestational hypertension     previous pregnancy    Hashimoto's thyroiditis     HPV (human papilloma virus) infection     Retained placenta        Past Surgical History:   Procedure Laterality Date    CHOLECYSTECTOMY      DILATION AND CURETTAGE OF UTERUS      HERNIA REPAIR      UMBILICAL HERNIA REPAIR         Family History   Problem Relation Age of Onset    Anuerysm Mother     Thyroid disease Mother     Hepatitis Mother     Lung cancer Maternal Grandmother     Colon cancer Maternal Grandfather          Medications have been verified  Objective   Pulse 89   Temp (!) 97 °F (36 1 °C)   Resp 17   Ht 5' 7" (1 702 m)   Wt 104 kg (230 lb)   LMP  (LMP Unknown)   SpO2 98%   BMI 36 02 kg/m²   No LMP recorded (lmp unknown)  Physical Exam     Physical Exam  Vitals and nursing note reviewed  Constitutional:       General: She is awake  She is not in acute distress  Appearance: Normal appearance  She is well-developed and well-groomed  She is not ill-appearing, toxic-appearing or diaphoretic  HENT:      Head: Normocephalic and atraumatic  Right Ear: Hearing, ear canal and external ear normal  A middle ear effusion is present  There is no impacted cerumen  No foreign body  Tympanic membrane is bulging  Tympanic membrane is not injected or erythematous  Left Ear: Hearing, ear canal and external ear normal  A middle ear effusion is present  There is no impacted cerumen  No foreign body  Tympanic membrane is not injected, erythematous or bulging  Nose: Congestion and rhinorrhea present  No mucosal edema  Rhinorrhea is clear  Right Nostril: No foreign body, epistaxis or occlusion  Left Nostril: No foreign body, epistaxis or occlusion  Right Turbinates: Not enlarged, swollen or pale  Left Turbinates: Not enlarged, swollen or pale  Right Sinus: Maxillary sinus tenderness present  No frontal sinus tenderness        Left Sinus: Maxillary sinus tenderness present  No frontal sinus tenderness  Mouth/Throat:      Lips: Pink  No lesions  Mouth: Mucous membranes are moist  No injury, oral lesions or angioedema  Dentition: Normal dentition  Tongue: No lesions  Tongue does not deviate from midline  Palate: No mass and lesions  Pharynx: Uvula midline  Posterior oropharyngeal erythema present  No pharyngeal swelling, oropharyngeal exudate or uvula swelling  Tonsils: No tonsillar exudate or tonsillar abscesses  1+ on the right  1+ on the left  Eyes:      General: Lids are normal  Vision grossly intact  Gaze aligned appropriately  Cardiovascular:      Rate and Rhythm: Normal rate  Pulmonary:      Effort: Pulmonary effort is normal       Comments: Patient is speaking in full sentences with no increased respiratory effort  No audible wheezing or stridor  Abdominal:      Tenderness: There is no right CVA tenderness or left CVA tenderness  Musculoskeletal:      Cervical back: Normal range of motion  Lymphadenopathy:      Cervical: Cervical adenopathy present  Right cervical: Superficial cervical adenopathy present  No deep or posterior cervical adenopathy  Left cervical: Superficial cervical adenopathy present  No deep or posterior cervical adenopathy  Skin:     General: Skin is warm and dry  Neurological:      Mental Status: She is alert and oriented to person, place, and time  Coordination: Coordination is intact  Gait: Gait is intact  Psychiatric:         Attention and Perception: Attention and perception normal          Mood and Affect: Mood and affect normal          Speech: Speech normal          Behavior: Behavior normal  Behavior is cooperative  Note: Portions of this record may have been created with voice recognition software  Occasional wrong word or "sound a like" substitutions may have occurred due to the inherent limitations of voice recognition software  Please read the chart carefully and recognize, using context, where substitutions have occurred  *

## 2022-05-24 LAB — BACTERIA THROAT CULT: ABNORMAL

## 2022-05-26 DIAGNOSIS — K21.9 GASTROESOPHAGEAL REFLUX DISEASE WITHOUT ESOPHAGITIS: ICD-10-CM

## 2022-05-26 LAB — BACTERIA UR CULT: NORMAL

## 2022-05-26 RX ORDER — PANTOPRAZOLE SODIUM 20 MG/1
TABLET, DELAYED RELEASE ORAL
Qty: 30 TABLET | Refills: 0 | Status: SHIPPED | OUTPATIENT
Start: 2022-05-26

## 2022-06-03 ENCOUNTER — TELEPHONE (OUTPATIENT)
Dept: FAMILY MEDICINE CLINIC | Facility: CLINIC | Age: 37
End: 2022-06-03

## 2022-06-03 NOTE — TELEPHONE ENCOUNTER
PCP SIGNATURE NEEDED FOR [place] Dept of [facility] MAIL AND PLACED IN PCP FOLDER TO BE DELIVERED AT ASSIGNED TIMES        Function Report-Adult-Third Party From [id number]

## 2022-06-06 ENCOUNTER — TELEPHONE (OUTPATIENT)
Dept: FAMILY MEDICINE CLINIC | Facility: CLINIC | Age: 37
End: 2022-06-06

## 2022-06-07 NOTE — TELEPHONE ENCOUNTER
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

## 2022-06-09 DIAGNOSIS — J06.9 UPPER RESPIRATORY TRACT INFECTION, UNSPECIFIED TYPE: ICD-10-CM

## 2022-06-09 RX ORDER — FLUTICASONE PROPIONATE 50 MCG
SPRAY, SUSPENSION (ML) NASAL
Qty: 48 ML | Refills: 1 | Status: SHIPPED | OUTPATIENT
Start: 2022-06-09

## 2022-06-13 NOTE — TELEPHONE ENCOUNTER
SECOND ATTEMPT TO COMMUNICATE WITH DEPT OF [employer] & INDUSTRY  LMOM  AT THIS TIME I CONTACTED PT AND ADVISE  PATIENT WILL  BLANK FORM   FORM PLACED NN  BIN

## 2022-06-29 ENCOUNTER — OFFICE VISIT (OUTPATIENT)
Dept: URGENT CARE | Age: 37
End: 2022-06-29
Payer: MEDICARE

## 2022-06-29 VITALS
HEART RATE: 80 BPM | TEMPERATURE: 98 F | OXYGEN SATURATION: 100 % | BODY MASS INDEX: 36.1 KG/M2 | HEIGHT: 67 IN | RESPIRATION RATE: 22 BRPM | WEIGHT: 230 LBS | DIASTOLIC BLOOD PRESSURE: 74 MMHG | SYSTOLIC BLOOD PRESSURE: 136 MMHG

## 2022-06-29 DIAGNOSIS — H65.91 RIGHT NON-SUPPURATIVE OTITIS MEDIA: Primary | ICD-10-CM

## 2022-06-29 PROCEDURE — 99213 OFFICE O/P EST LOW 20 MIN: CPT

## 2022-06-29 RX ORDER — AMOXICILLIN 500 MG/1
500 CAPSULE ORAL EVERY 12 HOURS SCHEDULED
Qty: 20 CAPSULE | Refills: 0 | Status: SHIPPED | OUTPATIENT
Start: 2022-06-29 | End: 2022-07-09

## 2022-07-01 NOTE — PROGRESS NOTES
[facility]        NAME: Vanesa Montejo is a 43 y o  female  : 1985    MRN: [id number]  DATE: 2022  TIME: 8:42 AM    Assessment and Plan   Right non-suppurative otitis media [H65 91]  1  Right non-suppurative otitis media  amoxicillin (AMOXIL) 500 mg capsule     Patient presents with complaints of right ear pain for 2 days  Denies fevers, sore throat, drainage or other symptoms  Assessment notes right OM  Will order amox  May continue to take OTC tylenol/motrin for discomfort  Patient Instructions       Follow up with PCP as needed    Chief Complaint   No chief complaint on file  History of Present Illness       Patient presents with complaints of right ear pain for 2 days  Denies fevers, sore throat, drainage or other symptoms  Review of Systems   Review of Systems   Constitutional: Negative for chills and fever  HENT: Positive for ear pain  Negative for congestion, postnasal drip, sinus pain and sore throat  Eyes: Negative for pain and itching  Respiratory: Negative for cough, shortness of breath and wheezing  Cardiovascular: Negative for chest pain and palpitations  Gastrointestinal: Negative for abdominal pain, constipation, diarrhea, nausea and vomiting  Genitourinary: Negative for difficulty urinating and hematuria  Musculoskeletal: Negative for arthralgias and myalgias  Skin: Negative for rash  Neurological: Negative for dizziness, light-headedness and headaches  Psychiatric/Behavioral: Negative for agitation and sleep disturbance  The patient is not nervous/anxious            Current Medications       Current Outpatient Medications:     amoxicillin (AMOXIL) 500 mg capsule, Take 1 capsule (500 mg total) by mouth every 12 (twelve) hours for 10 days, Disp: 20 capsule, Rfl: 0    aluminum-magnesium hydroxide-simethicone (MAALOX) 200-200-20 MG/5ML SUSP, Take 20 mL by mouth 4 (four) times a day (before meals and at bedtime), Disp: 335 mL, Rfl: 0    ascorbic acid (VITAMIN C) 500 MG tablet, Take 1 tablet (500 mg total) by mouth daily (Patient not taking: No sig reported), Disp: 90 tablet, Rfl: 1    bacitracin-polymyxin b (POLYSPORIN) ointment, Apply topically 2 (two) times a day (Patient not taking: Reported on 5/23/2022), Disp: 15 g, Rfl: 0    cyanocobalamin (VITAMIN B-12) 1000 MCG tablet, Take 1 tablet (1,000 mcg total) by mouth daily (Patient not taking: Reported on 5/23/2022), Disp: 90 tablet, Rfl: 3    cyclobenzaprine (FLEXERIL) 10 mg tablet, Take 1 tablet (10 mg total) by mouth as needed in the morning and 1 tablet (10 mg total) as needed at noon and 1 tablet (10 mg total) as needed in the evening for muscle spasms   (Patient not taking: Reported on 5/23/2022), Disp: 30 tablet, Rfl: 0    dextromethorphan-guaifenesin (MUCINEX DM)  MG per 12 hr tablet, Take 1 tablet by mouth every 12 (twelve) hours, Disp: 30 tablet, Rfl: 0    Diclofenac Sodium (VOLTAREN) 1 %, Apply 2 g topically 4 (four) times a day, Disp: 350 g, Rfl: 0    ergocalciferol (VITAMIN D2) 50,000 units, Take 1 capsule (50,000 Units total) by mouth once a week (Patient not taking: No sig reported), Disp: 24 capsule, Rfl: 0    escitalopram (Lexapro) 20 mg tablet, Take 1 tablet (20 mg total) by mouth daily, Disp: 90 tablet, Rfl: 0    fluticasone (FLONASE) 50 mcg/act nasal spray, USE 1 SPRAY IN EACH NOSTRIL IN THE MORNING, Disp: 48 mL, Rfl: 1    folic acid (FOLVITE) 1 mg tablet, Take 1 tablet (1 mg total) by mouth daily (Patient not taking: Reported on 5/23/2022), Disp: 90 tablet, Rfl: 4    hydrocortisone (ANUSOL-HC) 2 5 % rectal cream, Apply topically 2 (two) times a day (Patient not taking: Reported on 5/23/2022), Disp: 28 g, Rfl: 1    hydrOXYzine pamoate (VISTARIL) 50 mg capsule, Take 1 capsule (50 mg total) by mouth daily at bedtime as needed for itching (Patient not taking: Reported on 5/23/2022), Disp: 30 capsule, Rfl: 0    ibuprofen (MOTRIN) 600 mg tablet, Take 1 tablet (600 mg total) by mouth every 6 (six) hours as needed for mild pain or headaches (Patient not taking: Reported on 5/23/2022), Disp: 30 tablet, Rfl: 0    lidocaine (Lidoderm) 5 %, Apply 1 patch topically daily Remove & Discard patch within 12 hours or as directed by MD, Disp: 90 patch, Rfl: 0    liothyronine (CYTOMEL) 5 mcg tablet, Take 5 mcg by mouth 2 (two) times a day, Disp: , Rfl:     mirtazapine (REMERON) 15 mg tablet, Take 15 mg by mouth daily at bedtime, Disp: , Rfl:     ondansetron (ZOFRAN) 8 mg tablet, Take 1 tablet (8 mg total) by mouth every 8 (eight) hours as needed for nausea or vomiting, Disp: 20 tablet, Rfl: 0    pantoprazole (PROTONIX) 20 mg tablet, take 1 tablet by mouth once daily, Disp: 30 tablet, Rfl: 0    SYNTHROID 125 MCG tablet, Take 137 mcg by mouth daily  , Disp: , Rfl: 3    Current Allergies     Allergies as of 06/29/2022 - Reviewed 06/29/2022   Allergen Reaction Noted    Bactrim [sulfamethoxazole-trimethoprim] Hives 04/06/2016    Other Hives 04/06/2016    Shellfish-derived products - food allergy  09/07/2018    Sulfamethoxazole Hives 05/25/2018    Trimethoprim Hives 05/25/2018            The following portions of the patient's history were reviewed and updated as appropriate: allergies, current medications, past family history, past medical history, past social history, past surgical history and problem list      Past Medical History:   Diagnosis Date    Anxiety     Cro[facility]'s disease ([facility] [address] )     Depression     Disease of thyroid gland     Fibromyalgia     Gestational diabetes     Gestational hypertension     previous pregnancy    Hashimoto's thyroiditis     HPV (human papilloma virus) infection     Retained placenta        Past Surgical History:   Procedure Laterality Date    CHOLECYSTECTOMY      DILATION AND CURETTAGE OF UTERUS      HERNIA REPAIR      UMBILICAL HERNIA REPAIR         Family History   Problem Relation Age of Onset    Anuerysm Mother     Thyroid disease Mother     Hepatitis Mother     Lung cancer Maternal Grandmother     Colon cancer Maternal Grandfather          Medications have been verified  Objective   /74   Pulse 80   Temp 98 °F (36 7 °C)   Resp 22   Ht 5' 7" (1 702 m)   Wt 104 kg (230 lb)   SpO2 100%   BMI 36 02 kg/m²   No LMP recorded  Physical Exam     Physical Exam  Vitals reviewed  Constitutional:       General: She is not in acute distress  Appearance: Normal appearance  She is not ill-appearing  HENT:      Head: Normocephalic and atraumatic  Right Ear: Tenderness present  Tympanic membrane is erythematous  Eyes:      Extraocular Movements: Extraocular movements intact  Conjunctiva/sclera: Conjunctivae normal    Pulmonary:      Effort: Pulmonary effort is normal    Skin:     General: Skin is warm  Neurological:      General: No focal deficit present  Mental Status: She is alert     Psychiatric:         Mood and Affect: Mood normal          Behavior: Behavior normal          Judgment: Judgment normal

## 2022-07-27 ENCOUNTER — TELEPHONE (OUTPATIENT)
Dept: HEMATOLOGY ONCOLOGY | Facility: CLINIC | Age: 37
End: 2022-07-27

## 2022-07-27 NOTE — TELEPHONE ENCOUNTER
LVM to the patient in regards to her lab work that needs to be completed prior to her appt on 8/3/22 at 8:00am

## 2022-07-31 DIAGNOSIS — F41.8 DEPRESSION WITH ANXIETY: ICD-10-CM

## 2022-08-01 RX ORDER — ESCITALOPRAM OXALATE 20 MG/1
TABLET ORAL
Qty: 90 TABLET | Refills: 0 | Status: SHIPPED | OUTPATIENT
Start: 2022-08-01

## 2022-08-26 ENCOUNTER — OFFICE VISIT (OUTPATIENT)
Dept: FAMILY MEDICINE CLINIC | Facility: CLINIC | Age: 37
End: 2022-08-26

## 2022-08-26 DIAGNOSIS — K64.4 EXTERNAL HEMORRHOID: ICD-10-CM

## 2022-08-26 PROCEDURE — 99213 OFFICE O/P EST LOW 20 MIN: CPT | Performed by: FAMILY MEDICINE

## 2022-08-26 RX ORDER — LIDOCAINE 50 MG/G
OINTMENT TOPICAL AS NEEDED
Qty: 35.44 G | Refills: 1 | Status: SHIPPED | OUTPATIENT
Start: 2022-08-26

## 2022-08-26 RX ORDER — HYDROCORTISONE 25 MG/G
CREAM TOPICAL 2 TIMES DAILY
Qty: 28 G | Refills: 1 | Status: SHIPPED | OUTPATIENT
Start: 2022-08-26

## 2022-08-26 NOTE — PROGRESS NOTES
Virtual Regular Visit    Verification of patient location:    Patient is located in the following state in which I hold an active license [place]      Assessment/Plan:    Problem List Items Addressed This Visit        Digestive    External hemorrhoid     - Discussed chronic nature of hemorrhoids and association with her chronic constipation and IBS  - Rectal Hydrocortisone cream refilled, do not use greater than one week  - Lidocaine ointment prn  - Sitz baths, dietary changes, increase fiber and water intake, avoid prolonged sitting  - Declined referral to colorectal surgery         Relevant Medications    hydrocortisone (ANUSOL-HC) 2 5 % rectal cream    lidocaine (XYLOCAINE) 5 % ointment         Reason for visit is No chief complaint on file  Encounter provider [facility]  Provider located at [address] [address] [phone]  Recent Visits  No visits were found meeting these conditions  Showing recent visits within past 7 days and meeting all other requirements  Today's Visits  Date Type Provider Dept   08/26/22 Office Visit  [facility]   Showing today's visits and meeting all other requirements  Future Appointments  No visits were found meeting these conditions  Showing future appointments within next 150 days and meeting all other requirements   The patient was identified by name and date of birth  Koko Sams was informed that this is a telemedicine visit and that the visit is being conducted through [facility]  My office door was closed  No one else was in the room  She acknowledged consent and understanding of privacy and security of the platform  The patient has agreed to participate and understands they can discontinue the visit at any time  Patient is aware this is a billable service       Marilou Fountain is a 43 y o  female who presents virtually for a SAME DAY VISIT  Pt ha h/zo chronic bleeding hemorrhoids  They worsen everytime her Hypothyroidism is uncontrolled which causes increased constipation, of note pt also has h/o of IBS  Last flare was in 5/2022 and the rectal steroid cream helped, but now sx have returned and her hemorrhoids are bleeding intermittently with each bowel movement associated with mild pain  HPI     Past Medical History:   Diagnosis Date    Anxiety     Crohn's disease ([address] )     Depression     Disease of thyroid gland     Fibromyalgia     Gestational diabetes     Gestational hypertension     previous pregnancy    Hashimoto's thyroiditis     HPV (human papilloma virus) infection     Retained placenta        Past Surgical History:   Procedure Laterality Date    CHOLECYSTECTOMY      DILATION AND CURETTAGE OF UTERUS      HERNIA REPAIR      UMBILICAL HERNIA REPAIR          Allergies   Allergen Reactions    Bactrim [Sulfamethoxazole-Trimethoprim] Hives    Other Hives     seafood    Shellfish-Derived Products - Food Allergy     Sulfamethoxazole Hives    Trimethoprim Hives     Review of Systems   Constitutional: Negative for chills and fever  Gastrointestinal: Positive for blood in stool, constipation and rectal pain  Negative for nausea and vomiting  Genitourinary: Negative for dysuria  Neurological: Negative for dizziness and headaches  Exam    There were no vitals filed for this visit  Physical Exam  Nursing note reviewed  Neurological:      Mental Status: She is alert and oriented to person, place, and time     Psychiatric:         Mood and Affect: Mood normal       Pt pleasant and cooperative during conversation/    I spent 10 minutes directly with the patient during this visit

## 2022-08-26 NOTE — ASSESSMENT & PLAN NOTE
- Discussed chronic nature of hemorrhoids and association with her chronic constipation and IBS  - Rectal Hydrocortisone cream refilled, do not use greater than one week  - Lidocaine ointment prn  - Sitz baths, dietary changes, increase fiber and water intake, avoid prolonged sitting  - Declined referral to colorectal surgery

## 2022-09-13 ENCOUNTER — APPOINTMENT (OUTPATIENT)
Dept: LAB | Facility: MEDICAL CENTER | Age: 37
End: 2022-09-13

## 2022-09-13 ENCOUNTER — OCCMED (OUTPATIENT)
Dept: URGENT CARE | Facility: MEDICAL CENTER | Age: 37
End: 2022-09-13

## 2022-09-13 DIAGNOSIS — Z02.1 PRE-EMPLOYMENT EXAMINATION: Primary | ICD-10-CM

## 2022-09-13 DIAGNOSIS — Z02.1 PRE-EMPLOYMENT EXAMINATION: ICD-10-CM

## 2022-09-13 LAB
MEV IGG SER QL IA: NORMAL
MUV IGG SER QL IA: NORMAL
RUBV IGG SERPL IA-ACNC: >175 IU/ML
VZV IGG SER QL IA: NORMAL

## 2022-09-13 PROCEDURE — 86787 VARICELLA-ZOSTER ANTIBODY: CPT

## 2022-09-13 PROCEDURE — 36415 COLL VENOUS BLD VENIPUNCTURE: CPT

## 2022-09-13 PROCEDURE — 86762 RUBELLA ANTIBODY: CPT

## 2022-09-13 PROCEDURE — 86765 RUBEOLA ANTIBODY: CPT

## 2022-09-13 PROCEDURE — 86735 MUMPS ANTIBODY: CPT

## 2022-09-13 PROCEDURE — 86480 TB TEST CELL IMMUN MEASURE: CPT

## 2022-09-14 LAB
GAMMA INTERFERON BACKGROUND BLD IA-ACNC: 0.03 IU/ML
M TB IFN-G BLD-IMP: NEGATIVE
M TB IFN-G CD4+ BCKGRND COR BLD-ACNC: 0.01 IU/ML
M TB IFN-G CD4+ BCKGRND COR BLD-ACNC: 0.01 IU/ML
MITOGEN IGNF BCKGRD COR BLD-ACNC: >10 IU/ML

## 2022-09-30 ENCOUNTER — TELEPHONE (OUTPATIENT)
Dept: PSYCHIATRY | Facility: CLINIC | Age: 37
End: 2022-09-30

## 2022-09-30 NOTE — TELEPHONE ENCOUNTER
Pt phoned in seeking services  Writer was able to get her on the proper wait list,Could not update the insurance due to the pt not having her cards yet

## 2022-10-11 PROBLEM — H10.32 ACUTE CONJUNCTIVITIS OF LEFT EYE: Status: RESOLVED | Noted: 2022-05-02 | Resolved: 2022-10-11

## 2022-10-11 PROBLEM — N30.00 ACUTE CYSTITIS WITHOUT HEMATURIA: Status: RESOLVED | Noted: 2020-05-19 | Resolved: 2022-10-11

## 2022-11-04 ENCOUNTER — APPOINTMENT (EMERGENCY)
Dept: CT IMAGING | Facility: HOSPITAL | Age: 37
End: 2022-11-04

## 2022-11-04 ENCOUNTER — HOSPITAL ENCOUNTER (EMERGENCY)
Facility: HOSPITAL | Age: 37
Discharge: HOME | End: 2022-11-04
Attending: EMERGENCY MEDICINE

## 2022-11-04 VITALS
HEART RATE: 89 BPM | DIASTOLIC BLOOD PRESSURE: 68 MMHG | SYSTOLIC BLOOD PRESSURE: 124 MMHG | RESPIRATION RATE: 18 BRPM | TEMPERATURE: 98.6 F | OXYGEN SATURATION: 99 %

## 2022-11-04 DIAGNOSIS — S09.92XA INJURY OF NOSE, INITIAL ENCOUNTER: ICD-10-CM
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
## 2022-11-04 RX ORDER — BACITRACIN, NEOMYCIN, POLYMYXIN B 400; 3.5; 5 [USP'U]/G; MG/G; [USP'U]/G
1 OINTMENT TOPICAL ONCE
Status: COMPLETED | OUTPATIENT
Start: 2022-11-04 | End: 2022-11-04

## 2022-11-04 RX ADMIN — BACITRACIN ZINC, NEOMYCIN, POLYMYXIN B 1 SMALL APPLICATION: 400; 3.5; 5 OINTMENT TOPICAL at 07:43

## 2022-11-04 NOTE — Clinical Note
Tiffanie Keen was seen and treated in our emergency department on 11/4/2022  Diagnosis:     Pb  is off the rest of the shift today  She may return on this date: If you have any questions or concerns, please don't hesitate to call        Adore Alicea PA-C    ______________________________           _______________          _______________  Hospital Representative                              Date                                Time

## 2022-11-04 NOTE — ED PROVIDER NOTES
History  Chief Complaint   Patient presents with   • Fall     Pt was taking kids to  tripped on sidewalk hitting her face off the grass pt c/o right knee pain and  nose pain  Has broke her nose in the past       Patient is a 43 y/o female presenting to the ED for evaluation of fall  Pt states PTA she was walking outside when she tripped, falling onto her face, patient began with bleeding to left nares and pain, bleeding resolved  Pt denies LOC  Pt also got abrasion to left knee, has full ROM of knee and no pain  Pt denies vision changes, headache, dizziness, focal weakness, oral pain, dental injury, hearing changes, chest pain or SOB  Patient states she has chronic septal deviation   Pt reports UTD on tetanus           Prior to Admission Medications   Prescriptions Last Dose Informant Patient Reported? Taking? Diclofenac Sodium (VOLTAREN) 1 %  Self No No   Sig: Apply 2 g topically 4 (four) times a day   SYNTHROID 125 MCG tablet  Self Yes No   Sig: Take 137 mcg by mouth daily     aluminum-magnesium hydroxide-simethicone (MAALOX) 200-200-20 MG/5ML SUSP  Self No No   Sig: Take 20 mL by mouth 4 (four) times a day (before meals and at bedtime)   ascorbic acid (VITAMIN C) 500 MG tablet  Self No No   Sig: Take 1 tablet (500 mg total) by mouth daily   Patient not taking: No sig reported   bacitracin-polymyxin b (POLYSPORIN) ointment   No No   Sig: Apply topically 2 (two) times a day   Patient not taking: Reported on 5/23/2022   cyanocobalamin (VITAMIN B-12) 1000 MCG tablet   No No   Sig: Take 1 tablet (1,000 mcg total) by mouth daily   Patient not taking: Reported on 5/23/2022   cyclobenzaprine (FLEXERIL) 10 mg tablet   No No   Sig: Take 1 tablet (10 mg total) by mouth as needed in the morning and 1 tablet (10 mg total) as needed at noon and 1 tablet (10 mg total) as needed in the evening for muscle spasms     Patient not taking: Reported on 5/23/2022   dextromethorphan-guaifenesin (MUCINEX DM)  MG per 12 hr tablet   No No   Sig: Take 1 tablet by mouth every 12 (twelve) hours   ergocalciferol (VITAMIN D2) 50,000 units  Self No No   Sig: Take 1 capsule (50,000 Units total) by mouth once a week   Patient not taking: No sig reported   escitalopram (LEXAPRO) 20 mg tablet   No No   Sig: take 1 tablet by mouth once daily   fluticasone (FLONASE) 50 mcg/act nasal spray   No No   Sig: USE 1 SPRAY IN EACH NOSTRIL IN THE MORNING   folic acid (FOLVITE) 1 mg tablet   No No   Sig: Take 1 tablet (1 mg total) by mouth daily   Patient not taking: Reported on 5/23/2022   hydrOXYzine pamoate (VISTARIL) 50 mg capsule  Self No No   Sig: Take 1 capsule (50 mg total) by mouth daily at bedtime as needed for itching   Patient not taking: Reported on 5/23/2022   hydrocortisone (ANUSOL-HC) 2 5 % rectal cream   No No   Sig: Apply topically 2 (two) times a day   ibuprofen (MOTRIN) 600 mg tablet   No No   Sig: Take 1 tablet (600 mg total) by mouth every 6 (six) hours as needed for mild pain or headaches   Patient not taking: Reported on 5/23/2022   lidocaine (Lidoderm) 5 %  Self No No   Sig: Apply 1 patch topically daily Remove & Discard patch within 12 hours or as directed by MD   lidocaine (XYLOCAINE) 5 % ointment   No No   Sig: Apply topically as needed for mild pain   liothyronine (CYTOMEL) 5 mcg tablet  Self Yes No   Sig: Take 5 mcg by mouth 2 (two) times a day   mirtazapine (REMERON) 15 mg tablet   Yes No   Sig: Take 15 mg by mouth daily at bedtime   ondansetron (ZOFRAN) 8 mg tablet  Self No No   Sig: Take 1 tablet (8 mg total) by mouth every 8 (eight) hours as needed for nausea or vomiting   pantoprazole (PROTONIX) 20 mg tablet   No No   Sig: take 1 tablet by mouth once daily      Facility-Administered Medications: None       Past Medical History:   Diagnosis Date   • Anxiety    • Crohn's disease (HCC)    • Depression    • Disease of thyroid gland    • Fibromyalgia    • Gestational diabetes    • Gestational hypertension     previous pregnancy • Hashimoto's thyroiditis    • HPV (human papilloma virus) infection    • Retained placenta        Past Surgical History:   Procedure Laterality Date   • CHOLECYSTECTOMY     • DILATION AND CURETTAGE OF UTERUS     • HERNIA REPAIR     • UMBILICAL HERNIA REPAIR         Family History   Problem Relation Age of Onset   • Anuerysm Mother    • Thyroid disease Mother    • Hepatitis Mother    • Lung cancer Maternal Grandmother    • Colon cancer Maternal Grandfather      I have reviewed and agree with the history as documented  E-Cigarette/Vaping   • E-Cigarette Use Never User      E-Cigarette/Vaping Substances     Social History     Tobacco Use   • Smoking status: Former Smoker   • Smokeless tobacco: Never Used   Vaping Use   • Vaping Use: Never used   Substance Use Topics   • Alcohol use: No   • Drug use: No       Review of Systems   HENT: Positive for nosebleeds  Nasal pain   Skin: Positive for wound (knee)  All other systems reviewed and are negative  Physical Exam  Physical Exam  Constitutional:       Appearance: Normal appearance  HENT:      Head: Normocephalic  No raccoon eyes or Jones's sign  Right Ear: Hearing and external ear normal       Left Ear: Hearing and external ear normal       Nose: Signs of injury present  No nasal deformity or septal deviation  Right Nostril: No epistaxis, septal hematoma or occlusion  Left Nostril: Epistaxis (dried) present  No septal hematoma or occlusion  Mouth/Throat:      Lips: Pink  Mouth: Mucous membranes are moist    Eyes:      General: Vision grossly intact  Extraocular Movements: Extraocular movements intact  Conjunctiva/sclera: Conjunctivae normal       Pupils: Pupils are equal, round, and reactive to light  Pulmonary:      Effort: No tachypnea or respiratory distress  Musculoskeletal:      Cervical back: Normal range of motion and neck supple  Legs:    Skin:     General: Skin is warm        Capillary Refill: Capillary refill takes less than 2 seconds  Neurological:      Mental Status: She is alert and oriented to person, place, and time  GCS: GCS eye subscore is 4  GCS verbal subscore is 5  GCS motor subscore is 6  Gait: Gait is intact  Psychiatric:         Mood and Affect: Mood and affect normal          Speech: Speech normal          Vital Signs  ED Triage Vitals   Temperature Pulse Respirations Blood Pressure SpO2   11/04/22 0709 11/04/22 0709 11/04/22 0709 11/04/22 0710 11/04/22 0709   98 6 °F (37 °C) 89 18 124/68 99 %      Temp src Heart Rate Source Patient Position - Orthostatic VS BP Location FiO2 (%)   -- -- 11/04/22 0709 11/04/22 0709 --     Sitting Right arm       Pain Score       --                  Vitals:    11/04/22 0709 11/04/22 0710   BP:  124/68   Pulse: 89    Patient Position - Orthostatic VS: Sitting          Visual Acuity      ED Medications  Medications   neomycin-bacitracin-polymyxin b (NEOSPORIN) ointment 1 small application (1 small application Topical Given 11/4/22 0743)       Diagnostic Studies  Results Reviewed     None                 CT facial bones without contrast   Final Result by Rocco Lemus MD (11/04 4099)      No acute fracture  Workstation performed: [id number]                    Procedures  Procedures         ED Course                               SBIRT 22yo+    Flowsheet Row Most Recent Value   SBIRT (25 yo +)    In order to provide better care to our patients, we are screening all of our patients for alcohol and drug use  Would it be okay to ask you these screening questions? Unable to answer at this time Filed at: 11/04/2022 0756                    [facility]  Number of Diagnoses or Management Options  Fall, initial encounter  Injury of nose, initial encounter  Diagnosis management comments: Patient is a 43 y/o female presenting to the ED for evaluation of fall       CT facial bones shows no fracture, known septal deviation from prior  F/u with ENT    Patient verbalizes understanding and agrees with plan  The management plan was discussed in detail with the patient at bedside and all questions were answered  Prior to discharge, I provided both verbal and written instructions  I discussed with the patient the signs and symptoms for which to return to the emergency department  All questions were answered and patient was comfortable with the plan of care and discharged to home  The patient agrees to return to the Emergency Department for concerns and/or progression of illness  Disposition  Final diagnoses:   Fall, initial encounter   Injury of nose, initial encounter     Time reflects when diagnosis was documented in both MDM as applicable and the Disposition within this note     Time User Action Codes Description Comment    11/4/2022  8:01 AM Frank Anderson Add [O23  QJKL] Fall, initial encounter     11/4/2022  8:01 AM Frank Anderson Add [F33 [id number]] Injury of nose, initial encounter       ED Disposition     ED Disposition   Discharge    Condition   Stable    Date/Time   Fri Nov 4, 2022  8:00 AM    Comment   Arlyn Dey discharge to home/self care                 Follow-up Information     Follow up With Specialties Details [address], MD Otolaryngology   [address]  [phone]            Discharge Medication List as of 11/4/2022  8:04 AM      CONTINUE these medications which have NOT CHANGED    Details   aluminum-magnesium hydroxide-simethicone (MAALOX) 200-200-20 MG/5ML SUSP Take 20 mL by mouth 4 (four) times a day (before meals and at bedtime), Starting Wed 5/19/2021, Normal      ascorbic acid (VITAMIN C) 500 MG tablet Take 1 tablet (500 mg total) by mouth daily, Starting Thu 2/18/2021, Normal      bacitracin-polymyxin b (POLYSPORIN) ointment Apply topically 2 (two) times a day, Starting Mon 5/2/2022, Normal      cyanocobalamin (VITAMIN B-12) 1000 MCG tablet Take 1 tablet (1,000 mcg total) by mouth daily, Starting Fri 4/1/2022, Normal      cyclobenzaprine (FLEXERIL) 10 mg tablet Take 1 tablet (10 mg total) by mouth as needed in the morning and 1 tablet (10 mg total) as needed at noon and 1 tablet (10 mg total) as needed in the evening for muscle spasms  , Starting Mon 5/16/2022, Normal      dextromethorphan-guaifenesin (MUCINEX DM)  MG per 12 hr tablet Take 1 tablet by mouth every 12 (twelve) hours, Starting Mon 5/16/2022, Normal      Diclofenac Sodium (VOLTAREN) 1 % Apply 2 g topically 4 (four) times a day, Starting Thu 10/21/2021, Normal      ergocalciferol (VITAMIN D2) 50,000 units Take 1 capsule (50,000 Units total) by mouth once a week, Starting Thu 2/18/2021, Normal      escitalopram (LEXAPRO) 20 mg tablet take 1 tablet by mouth once daily, Normal      fluticasone (FLONASE) 50 mcg/act nasal spray USE 1 SPRAY IN EACH NOSTRIL IN THE MORNING, Normal      folic acid (FOLVITE) 1 mg tablet Take 1 tablet (1 mg total) by mouth daily, Starting Fri 4/1/2022, Normal      hydrocortisone (ANUSOL-HC) 2 5 % rectal cream Apply topically 2 (two) times a day, Starting Fri 8/26/2022, Normal      hydrOXYzine pamoate (VISTARIL) 50 mg capsule Take 1 capsule (50 mg total) by mouth daily at bedtime as needed for itching, Starting Tue 1/25/2022, Normal      ibuprofen (MOTRIN) 600 mg tablet Take 1 tablet (600 mg total) by mouth every 6 (six) hours as needed for mild pain or headaches, Starting Mon 5/16/2022, Normal      lidocaine (Lidoderm) 5 % Apply 1 patch topically daily Remove & Discard patch within 12 hours or as directed by MD, Starting Fri 10/1/2021, Normal      lidocaine (XYLOCAINE) 5 % ointment Apply topically as needed for mild pain, Starting Fri 8/26/2022, Normal      liothyronine (CYTOMEL) 5 mcg tablet Take 5 mcg by mouth 2 (two) times a day, Starting Fri 3/18/2022, Historical Med      mirtazapine (REMERON) 15 mg tablet Take 15 mg by mouth daily at bedtime, Historical Med ondansetron (ZOFRAN) 8 mg tablet Take 1 tablet (8 mg total) by mouth every 8 (eight) hours as needed for nausea or vomiting, Starting Thu 12/2/2021, Normal      pantoprazole (PROTONIX) 20 mg tablet take 1 tablet by mouth once daily, Normal      SYNTHROID 125 MCG tablet Take 137 mcg by mouth daily  , Starting Mon 5/21/2018, Historical Med             No discharge procedures on file      PDMP Review       Value Time User    PDMP Reviewed  Yes 3/4/2021 10:32 AM Massimo Bernabe PA-C          ED Provider  Electronically Signed by           Adalberto Trinidad PA-C  11/04/22 8446

## 2022-11-04 NOTE — Clinical Note
Jeremy Delcid was seen and treated in our emergency department on 11/4/2022  Diagnosis:     Pb  is off the rest of the shift today  She may return on this date: If you have any questions or concerns, please don't hesitate to call        Jillian Luther PA-C    ______________________________           _______________          _______________  Hospital Representative                              Date                                Time

## 2022-11-04 NOTE — DISCHARGE INSTRUCTIONS
FACIAL BONES:   No facial bone fracture identified  Normal alignment of the temporomandibular joints  No lytic or blastic lesion  There is rightward nasal septal deviation  ORBITS:  Orbital globes, optic nerves, and extraocular muscles appear symmetric and normal  There is no evidence of retrobulbar mass, abscess, or hematoma  SINUSES:  There is minimal mucosal thickening in the right maxillary sinus  SOFT TISSUES:  Normal      IMPRESSION:     No acute fracture

## 2022-11-19 ENCOUNTER — OFFICE VISIT (OUTPATIENT)
Dept: URGENT CARE | Age: 37
End: 2022-11-19

## 2022-11-19 VITALS
DIASTOLIC BLOOD PRESSURE: 70 MMHG | HEIGHT: 67 IN | SYSTOLIC BLOOD PRESSURE: 120 MMHG | TEMPERATURE: 98 F | RESPIRATION RATE: 18 BRPM | BODY MASS INDEX: 35.31 KG/M2 | WEIGHT: 225 LBS | HEART RATE: 71 BPM | OXYGEN SATURATION: 98 %

## 2022-11-19 DIAGNOSIS — H69.81 DYSFUNCTION OF RIGHT EUSTACHIAN TUBE: Primary | ICD-10-CM

## 2022-11-19 RX ORDER — PREDNISONE 50 MG/1
50 TABLET ORAL DAILY
Qty: 5 TABLET | Refills: 0 | Status: SHIPPED | OUTPATIENT
Start: 2022-11-19 | End: 2022-11-24

## 2022-11-19 RX ORDER — AMOXICILLIN 875 MG/1
875 TABLET, COATED ORAL 2 TIMES DAILY
Qty: 14 TABLET | Refills: 0 | Status: SHIPPED | OUTPATIENT
Start: 2022-11-19 | End: 2022-11-26

## 2022-11-19 NOTE — PROGRESS NOTES
[facility]        NAME: Heather Buenrostro is a 42 y o  female  : 1985    MRN: [id number]  DATE: 2022  TIME: 4:56 PM    Assessment and Plan   Dysfunction of right eustachian tube [H69 81]  1  Dysfunction of right eustachian tube  predniSONE 50 mg tablet    amoxicillin (AMOXIL) 875 mg tablet            Patient Instructions     Take medications as directed until completed  Motrin and Tylenol as needed for fevers aches and pains  Follow up with PCP in 3-5 days  Proceed to  ER if symptoms worsen  Chief Complaint     Chief Complaint   Patient presents with   • Earache     Right ear pain started yesterday  Pt states that when she stretches her neck it hurts          History of Present Illness       68-year-old female presents with right ear pain that radiates down into her neck  Symptoms started yesterday and has been waxing waning since then  Denies any fevers  No runny nose or congestion  No abdominal pain nausea vomiting or diarrhea  No chest pain shortness breast or cough  Earache   There is pain in the right ear  This is a new problem  The current episode started yesterday  The problem occurs constantly  The problem has been waxing and waning  There has been no fever  The pain is moderate  Associated symptoms include rhinorrhea  Pertinent negatives include no coughing, diarrhea, headaches, hearing loss or sore throat  She has tried nothing for the symptoms  The treatment provided no relief  Review of Systems   Review of Systems   Constitutional: Negative  Negative for fatigue and fever  HENT: Positive for ear pain and rhinorrhea  Negative for hearing loss and sore throat  Eyes: Negative  Respiratory: Negative  Negative for cough  Cardiovascular: Negative  Gastrointestinal: Negative  Negative for diarrhea  Musculoskeletal: Negative  Skin: Negative  Neurological: Negative  Negative for headaches           Current Medications       Current Outpatient Medications:   •  amoxicillin (AMOXIL) 875 mg tablet, Take 1 tablet (875 mg total) by mouth 2 (two) times a day for 7 days, Disp: 14 tablet, Rfl: 0  •  liothyronine (CYTOMEL) 5 mcg tablet, Take 5 mcg by mouth 2 (two) times a day, Disp: , Rfl:   •  predniSONE 50 mg tablet, Take 1 tablet (50 mg total) by mouth daily for 5 days, Disp: 5 tablet, Rfl: 0  •  SYNTHROID 125 MCG tablet, Take 137 mcg by mouth daily  , Disp: , Rfl: 3  •  aluminum-magnesium hydroxide-simethicone (MAALOX) 545-121-20 MG/5ML SUSP, Take 20 mL by mouth 4 (four) times a day (before meals and at bedtime), Disp: 335 mL, Rfl: 0  •  ascorbic acid (VITAMIN C) 500 MG tablet, Take 1 tablet (500 mg total) by mouth daily (Patient not taking: No sig reported), Disp: 90 tablet, Rfl: 1  •  bacitracin-polymyxin b (POLYSPORIN) ointment, Apply topically 2 (two) times a day (Patient not taking: Reported on 5/23/2022), Disp: 15 g, Rfl: 0  •  cyanocobalamin (VITAMIN B-12) 1000 MCG tablet, Take 1 tablet (1,000 mcg total) by mouth daily (Patient not taking: Reported on 5/23/2022), Disp: 90 tablet, Rfl: 3  •  cyclobenzaprine (FLEXERIL) 10 mg tablet, Take 1 tablet (10 mg total) by mouth as needed in the morning and 1 tablet (10 mg total) as needed at noon and 1 tablet (10 mg total) as needed in the evening for muscle spasms   (Patient not taking: Reported on 5/23/2022), Disp: 30 tablet, Rfl: 0  •  dextromethorphan-guaifenesin (MUCINEX DM)  MG per 12 hr tablet, Take 1 tablet by mouth every 12 (twelve) hours, Disp: 30 tablet, Rfl: 0  •  Diclofenac Sodium (VOLTAREN) 1 %, Apply 2 g topically 4 (four) times a day, Disp: 350 g, Rfl: 0  •  ergocalciferol (VITAMIN D2) 50,000 units, Take 1 capsule (50,000 Units total) by mouth once a week (Patient not taking: No sig reported), Disp: 24 capsule, Rfl: 0  •  escitalopram (LEXAPRO) 20 mg tablet, take 1 tablet by mouth once daily, Disp: 90 tablet, Rfl: 0  •  fluticasone (FLONASE) 50 mcg/act nasal spray, USE 1 SPRAY IN EACH NOSTRIL IN THE MORNING, Disp: 48 mL, Rfl: 1  •  folic acid (FOLVITE) 1 mg tablet, Take 1 tablet (1 mg total) by mouth daily (Patient not taking: Reported on 5/23/2022), Disp: 90 tablet, Rfl: 4  •  hydrocortisone (ANUSOL-HC) 2 5 % rectal cream, Apply topically 2 (two) times a day, Disp: 28 g, Rfl: 1  •  hydrOXYzine pamoate (VISTARIL) 50 mg capsule, Take 1 capsule (50 mg total) by mouth daily at bedtime as needed for itching (Patient not taking: Reported on 5/23/2022), Disp: 30 capsule, Rfl: 0  •  ibuprofen (MOTRIN) 600 mg tablet, Take 1 tablet (600 mg total) by mouth every 6 (six) hours as needed for mild pain or headaches (Patient not taking: Reported on 5/23/2022), Disp: 30 tablet, Rfl: 0  •  lidocaine (Lidoderm) 5 %, Apply 1 patch topically daily Remove & Discard patch within 12 hours or as directed by MD, Disp: 90 patch, Rfl: 0  •  lidocaine (XYLOCAINE) 5 % ointment, Apply topically as needed for mild pain (Patient not taking: Reported on 11/19/2022), Disp: 35 44 g, Rfl: 1  •  mirtazapine (REMERON) 15 mg tablet, Take 15 mg by mouth daily at bedtime (Patient not taking: Reported on 11/19/2022), Disp: , Rfl:   •  ondansetron (ZOFRAN) 8 mg tablet, Take 1 tablet (8 mg total) by mouth every 8 (eight) hours as needed for nausea or vomiting (Patient not taking: Reported on 11/19/2022), Disp: 20 tablet, Rfl: 0  •  pantoprazole (PROTONIX) 20 mg tablet, take 1 tablet by mouth once daily (Patient not taking: Reported on 11/19/2022), Disp: 30 tablet, Rfl: 0    Current Allergies     Allergies as of 11/19/2022 - Reviewed 11/19/2022   Allergen Reaction Noted   • Bactrim [sulfamethoxazole-trimethoprim] Hives 04/06/2016   • Other Hives 04/06/2016   • Shellfish-derived products - food allergy  09/07/2018   • Sulfamethoxazole Hives 05/25/2018   • Trimethoprim Hives 05/25/2018            The following portions of the patient's history were reviewed and updated as appropriate: allergies, current medications, past family history, past medical history, past social history, past surgical history and problem list      Past Medical History:   Diagnosis Date   • Anxiety    • Crohn's disease ([address] )    • Depression    • Disease of thyroid gland    • Fibromyalgia    • Gestational diabetes    • Gestational hypertension     previous pregnancy   • Hashimoto's thyroiditis    • HPV (human papilloma virus) infection    • Retained placenta        Past Surgical History:   Procedure Laterality Date   • CHOLECYSTECTOMY     • DILATION AND CURETTAGE OF UTERUS     • HERNIA REPAIR     • UMBILICAL HERNIA REPAIR         Family History   Problem Relation Age of Onset   • Anuerysm Mother    • Thyroid disease Mother    • Hepatitis Mother    • Lung cancer Maternal Grandmother    • Colon cancer Maternal Grandfather          Medications have been verified  Objective   /70   Pulse 71   Temp 98 °F (36 7 °C)   Resp 18   Ht 5' 7" (1 702 m)   Wt 102 kg (225 lb)   LMP 11/04/2022 (Exact Date)   SpO2 98%   BMI 35 24 kg/m²   Patient's last menstrual period was 11/04/2022 (exact date)  Physical Exam     Physical Exam  Vitals and nursing note reviewed  Constitutional:       General: She is not in acute distress  Appearance: Normal appearance  She is well-developed  HENT:      Head: Normocephalic and atraumatic  Right Ear: Hearing, ear canal and external ear normal  No laceration, drainage, swelling or tenderness  A middle ear effusion is present  There is no impacted cerumen  No foreign body  No mastoid tenderness  No PE tube  No hemotympanum  Tympanic membrane is bulging  Tympanic membrane is not injected, scarred, perforated, erythematous or retracted  Left Ear: Hearing, tympanic membrane, ear canal and external ear normal  There is no impacted cerumen  Nose: Nose normal       Mouth/Throat:      Pharynx: Uvula midline  No oropharyngeal exudate  Eyes:      General:         Right eye: No discharge  Left eye: No discharge  Conjunctiva/sclera: Conjunctivae normal    Cardiovascular:      Rate and Rhythm: Normal rate and regular rhythm  Heart sounds: Normal heart sounds  No murmur heard  Pulmonary:      Effort: Pulmonary effort is normal  No respiratory distress  Breath sounds: Normal breath sounds  No wheezing or rales  Abdominal:      General: Bowel sounds are normal       Palpations: Abdomen is soft  Tenderness: There is no abdominal tenderness  Musculoskeletal:         General: Normal range of motion  Cervical back: Normal range of motion and neck supple  Lymphadenopathy:      Cervical: No cervical adenopathy  Skin:     General: Skin is warm and dry  Neurological:      Mental Status: She is alert and oriented to person, place, and time     Psychiatric:         Mood and Affect: Mood normal

## 2022-11-19 NOTE — PATIENT INSTRUCTIONS
Take medications as directed until completed  Motrin and Tylenol as needed for fevers aches and pains  Follow up with PCP in 3-5 days  Proceed to  ER if symptoms worsen  Eustachian Tube Dysfunction   WHAT YOU NEED TO KNOW:   What is eustachian tube dysfunction (ETD)? ETD is a condition that prevents your eustachian tubes from opening properly  It can also cause them to become blocked  Eustachian tubes connect your middle ear to the back of your nose and throat  These tubes open and allow air to flow in and out when you sneeze, swallow, or yawn  What causes or increases my risk for ETD? ETD may be caused by swelling or buildup of mucus in your eustachian tubes  Pressure can build if you travel in an airplane or go scuba diving  Allergies, a cold, or a sinus infection can cause mucus to build up  The following can also increase your risk:  Smoking cigarettes    GERD, chronic sinus inflammation, or a tumor in your nose or throat    An immune system disorder    Sleeping on your stomach    In children, long-term use of a bottle, going to , or a condition such as a cleft palate    What are the signs and symptoms of ETD? Fullness or pressure in your ears    Muffled hearing, or a feeling you are hearing under water or have clogged ears    Pain in one or both ears    Ringing in your ears    Popping, crackling, or clicking feeling in your ears    Trouble keeping your balance    How is ETD diagnosed? ETD is most common in children younger than 5 years  Adults with ETD may have had it since childhood  ETD can sometimes begin in adulthood, usually because of certain medical conditions that have developed  Your healthcare provider will ask about your symptoms and when they began  He or she will examine your ears, your nose, and the back of your throat  He or she may also do a hearing test   How is ETD treated? ETD may get better on its own without any treatment   If it continues, you may need any of the following:  Swallow, yawn, or chew gum  to help open your eustachian tubes  Your healthcare provider may also recommend you blow with your mouth shut and your nostrils pinched closed  Air pressure devices  push air into your nose and eustachian tubes to help relieve air pressure in your ear  Treatment for allergies  such as decongestants, antihistamines, and nasal steroids may improve ETD  They may help decrease swelling of the eustachian tubes  A myringotomy  is surgery to make a hole in your eardrum  The hole relieves pressure and lets fluid drain from your ear  A pressure equalizing (PE) tube may be used to keep the hole open and to help drain fluid  Tuboplasty  is a procedure to widen your eustachian tubes  When should I call my doctor? Your symptoms do not improve or get worse  You have a fever  You have any hearing loss  You have questions or concerns about your condition or care  CARE AGREEMENT:   You have the right to help plan your care  Learn about your health condition and how it may be treated  Discuss treatment options with your healthcare providers to decide what care you want to receive  You always have the right to refuse treatment  The above information is an  only  It is not intended as medical advice for individual conditions or treatments  Talk to your doctor, nurse or pharmacist before following any medical regimen to see if it is safe and effective for you  © Copyright [employer] 2022 Information is for End User's use only and may not be sold, redistributed or otherwise used for commercial purposes   All illustrations and images included in CareNotes® are the copyrighted property of [employer]  or [address] [place] [address]

## 2022-12-17 ENCOUNTER — OFFICE VISIT (OUTPATIENT)
Dept: URGENT CARE | Age: 37
End: 2022-12-17

## 2022-12-17 VITALS
BODY MASS INDEX: 34.53 KG/M2 | SYSTOLIC BLOOD PRESSURE: 129 MMHG | DIASTOLIC BLOOD PRESSURE: 77 MMHG | HEART RATE: 80 BPM | WEIGHT: 220 LBS | HEIGHT: 67 IN | OXYGEN SATURATION: 98 % | RESPIRATION RATE: 18 BRPM | TEMPERATURE: 98 F

## 2022-12-17 DIAGNOSIS — F41.9 ANXIETY: Primary | ICD-10-CM

## 2022-12-17 RX ORDER — HYDROXYZINE HYDROCHLORIDE 10 MG/1
10 TABLET, FILM COATED ORAL 3 TIMES DAILY PRN
Qty: 30 TABLET | Refills: 0 | Status: SHIPPED | OUTPATIENT
Start: 2022-12-17 | End: 2022-12-31

## 2022-12-17 NOTE — PROGRESS NOTES
[facility]        NAME: Be Schultz is a 42 y o  female  : 1985    MRN: [id number]  DATE: 2022  TIME: 5:39 PM    Assessment and Plan   Anxiety [F41 9]  1  Anxiety  hydrOXYzine HCL (ATARAX) 10 mg tablet      Patient presents with request for antidepressant  States she was previously on buspar in past and it didn't work  Most recently ( 2 months) has been on effexor however was having terrible thoughts and stopped taking  Had a video visit with [facility] today and was told she would be started on abilify however it was never called in   Has an appt in [place] with Gorman Primrose but cant wait in meantime to start a medication  Discussed UC/ED was not appropriate setting to start a managed medication  Discussed atarax for break through panic attacks  Denies SH/HI  Aware to call crisis for harmful thoughts  Patient Instructions       Follow up with PCP/psych  Chief Complaint     Chief Complaint   Patient presents with   • Anxiety     Anxiety x 2 months         History of Present Illness       Patient presents with request for antidepressant  States she was previously on buspar in past and it didn't work  Most recently ( 2 months) has been on effexor however was having terrible thoughts and stopped taking  Had a video visit with [facility] today and was told she would be started on abilify however it was never called in   Has an appt in [place] with Gorman Primrose but cant wait in meantime to start a medication  Discussed UC/ED was not appropriate setting to start a managed medication  Discussed atarax for break through panic attacks  Denies SH/HI  Aware to call crisis for harmful thoughts  Review of Systems   Review of Systems   Constitutional: Negative for chills and fever  HENT: Negative for congestion, ear pain, postnasal drip, sinus pain and sore throat  Eyes: Negative for pain and itching  Respiratory: Negative for cough, shortness of breath and wheezing      Cardiovascular: Negative for chest pain and palpitations  Gastrointestinal: Negative for abdominal pain, constipation, diarrhea, nausea and vomiting  Genitourinary: Negative for difficulty urinating and hematuria  Musculoskeletal: Negative for arthralgias and myalgias  Skin: Negative for rash  Neurological: Negative for dizziness, light-headedness and headaches  Psychiatric/Behavioral: Negative for agitation, self-injury and sleep disturbance  The patient is nervous/anxious  Current Medications       Current Outpatient Medications:   •  hydrOXYzine HCL (ATARAX) 10 mg tablet, Take 1 tablet (10 mg total) by mouth 3 (three) times a day as needed for anxiety for up to 14 days, Disp: 30 tablet, Rfl: 0  •  liothyronine (CYTOMEL) 5 mcg tablet, Take 5 mcg by mouth 2 (two) times a day, Disp: , Rfl:   •  SYNTHROID 125 MCG tablet, Take 137 mcg by mouth daily  , Disp: , Rfl: 3  •  aluminum-magnesium hydroxide-simethicone (MAALOX) 200-200-20 MG/5ML SUSP, Take 20 mL by mouth 4 (four) times a day (before meals and at bedtime), Disp: 335 mL, Rfl: 0  •  ascorbic acid (VITAMIN C) 500 MG tablet, Take 1 tablet (500 mg total) by mouth daily (Patient not taking: Reported on 7/9/2021), Disp: 90 tablet, Rfl: 1  •  bacitracin-polymyxin b (POLYSPORIN) ointment, Apply topically 2 (two) times a day (Patient not taking: Reported on 5/23/2022), Disp: 15 g, Rfl: 0  •  cyanocobalamin (VITAMIN B-12) 1000 MCG tablet, Take 1 tablet (1,000 mcg total) by mouth daily (Patient not taking: Reported on 5/23/2022), Disp: 90 tablet, Rfl: 3  •  cyclobenzaprine (FLEXERIL) 10 mg tablet, Take 1 tablet (10 mg total) by mouth as needed in the morning and 1 tablet (10 mg total) as needed at noon and 1 tablet (10 mg total) as needed in the evening for muscle spasms   (Patient not taking: Reported on 5/23/2022), Disp: 30 tablet, Rfl: 0  •  dextromethorphan-guaifenesin (MUCINEX DM)  MG per 12 hr tablet, Take 1 tablet by mouth every 12 (twelve) hours (Patient not taking: Reported on 12/17/2022), Disp: 30 tablet, Rfl: 0  •  Diclofenac Sodium (VOLTAREN) 1 %, Apply 2 g topically 4 (four) times a day, Disp: 350 g, Rfl: 0  •  ergocalciferol (VITAMIN D2) 50,000 units, Take 1 capsule (50,000 Units total) by mouth once a week (Patient not taking: Reported on 7/9/2021), Disp: 24 capsule, Rfl: 0  •  escitalopram (LEXAPRO) 20 mg tablet, take 1 tablet by mouth once daily (Patient not taking: Reported on 12/17/2022), Disp: 90 tablet, Rfl: 0  •  fluticasone (FLONASE) 50 mcg/act nasal spray, USE 1 SPRAY IN EACH NOSTRIL IN THE MORNING, Disp: 48 mL, Rfl: 1  •  folic acid (FOLVITE) 1 mg tablet, Take 1 tablet (1 mg total) by mouth daily (Patient not taking: Reported on 5/23/2022), Disp: 90 tablet, Rfl: 4  •  hydrocortisone (ANUSOL-HC) 2 5 % rectal cream, Apply topically 2 (two) times a day, Disp: 28 g, Rfl: 1  •  hydrOXYzine pamoate (VISTARIL) 50 mg capsule, Take 1 capsule (50 mg total) by mouth daily at bedtime as needed for itching (Patient not taking: Reported on 5/23/2022), Disp: 30 capsule, Rfl: 0  •  ibuprofen (MOTRIN) 600 mg tablet, Take 1 tablet (600 mg total) by mouth every 6 (six) hours as needed for mild pain or headaches (Patient not taking: Reported on 5/23/2022), Disp: 30 tablet, Rfl: 0  •  lidocaine (Lidoderm) 5 %, Apply 1 patch topically daily Remove & Discard patch within 12 hours or as directed by MD, Disp: 90 patch, Rfl: 0  •  lidocaine (XYLOCAINE) 5 % ointment, Apply topically as needed for mild pain (Patient not taking: Reported on 11/19/2022), Disp: 35 44 g, Rfl: 1  •  mirtazapine (REMERON) 15 mg tablet, Take 15 mg by mouth daily at bedtime (Patient not taking: Reported on 11/19/2022), Disp: , Rfl:   •  ondansetron (ZOFRAN) 8 mg tablet, Take 1 tablet (8 mg total) by mouth every 8 (eight) hours as needed for nausea or vomiting (Patient not taking: Reported on 11/19/2022), Disp: 20 tablet, Rfl: 0  •  pantoprazole (PROTONIX) 20 mg tablet, take 1 tablet by mouth once daily (Patient not taking: Reported on 11/19/2022), Disp: 30 tablet, Rfl: 0    Current Allergies     Allergies as of 12/17/2022 - Reviewed 12/17/2022   Allergen Reaction Noted   • Bactrim [sulfamethoxazole-trimethoprim] Hives 04/06/2016   • Other Hives 04/06/2016   • Shellfish-derived products - food allergy  09/07/2018   • Sulfamethoxazole Hives 05/25/2018   • Trimethoprim Hives 05/25/2018            The following portions of the patient's history were reviewed and updated as appropriate: allergies, current medications, past family history, past medical history, past social history, past surgical history and problem list      Past Medical History:   Diagnosis Date   • Anxiety    • Crohn's disease ([facility] [address] )    • Depression    • Disease of thyroid gland    • Fibromyalgia    • Gestational diabetes    • Gestational hypertension     previous pregnancy   • Hashimoto's thyroiditis    • HPV (human papilloma virus) infection    • Retained placenta        Past Surgical History:   Procedure Laterality Date   • CHOLECYSTECTOMY     • DILATION AND CURETTAGE OF UTERUS     • HERNIA REPAIR     • UMBILICAL HERNIA REPAIR         Family History   Problem Relation Age of Onset   • Anuerysm Mother    • Thyroid disease Mother    • Hepatitis Mother    • Lung cancer Maternal Grandmother    • Colon cancer Maternal Grandfather          Medications have been verified  Objective   /77   Pulse 80   Temp 98 °F (36 7 °C)   Resp 18   Ht 5' 7" (1 702 m)   Wt 99 8 kg (220 lb)   SpO2 98%   BMI 34 46 kg/m²   No LMP recorded  Physical Exam     Physical Exam  Vitals reviewed  Constitutional:       General: She is not in acute distress  Appearance: Normal appearance  HENT:      Head: Normocephalic  Neurological:      General: No focal deficit present  Mental Status: She is alert and oriented to person, place, and time  Mental status is at baseline     Psychiatric:         Mood and Affect: Mood normal          Behavior: Behavior normal          Thought Content:  Thought content normal          Judgment: Judgment normal

## 2022-12-19 ENCOUNTER — OFFICE VISIT (OUTPATIENT)
Dept: INTERNAL MEDICINE CLINIC | Facility: CLINIC | Age: 37
End: 2022-12-19

## 2022-12-19 VITALS
SYSTOLIC BLOOD PRESSURE: 122 MMHG | OXYGEN SATURATION: 99 % | DIASTOLIC BLOOD PRESSURE: 66 MMHG | HEART RATE: 102 BPM | WEIGHT: 229 LBS | TEMPERATURE: 98.4 F | HEIGHT: 67 IN | BODY MASS INDEX: 35.94 KG/M2

## 2022-12-19 DIAGNOSIS — Z12.4 CERVICAL CANCER SCREENING: ICD-10-CM

## 2022-12-19 DIAGNOSIS — M87.039 AVASCULAR NECROSIS OF SCAPHOID (HCC): ICD-10-CM

## 2022-12-19 DIAGNOSIS — F41.9 ANXIETY: ICD-10-CM

## 2022-12-19 DIAGNOSIS — F33.1 MODERATE EPISODE OF RECURRENT MAJOR DEPRESSIVE DISORDER (HCC): ICD-10-CM

## 2022-12-19 DIAGNOSIS — Z00.00 ANNUAL PHYSICAL EXAM: ICD-10-CM

## 2022-12-19 DIAGNOSIS — E53.8 B12 DEFICIENCY: ICD-10-CM

## 2022-12-19 DIAGNOSIS — E03.8 HYPOTHYROIDISM DUE TO HASHIMOTO'S THYROIDITIS: ICD-10-CM

## 2022-12-19 DIAGNOSIS — F39 MOOD DISORDER (HCC): ICD-10-CM

## 2022-12-19 DIAGNOSIS — E06.3 HYPOTHYROIDISM DUE TO HASHIMOTO'S THYROIDITIS: ICD-10-CM

## 2022-12-19 DIAGNOSIS — M79.7 FIBROMYALGIA: ICD-10-CM

## 2022-12-19 DIAGNOSIS — Z76.89 ENCOUNTER TO ESTABLISH CARE: Primary | ICD-10-CM

## 2022-12-19 DIAGNOSIS — D50.9 IRON DEFICIENCY ANEMIA, UNSPECIFIED IRON DEFICIENCY ANEMIA TYPE: ICD-10-CM

## 2022-12-19 DIAGNOSIS — E66.9 CLASS 2 OBESITY WITHOUT SERIOUS COMORBIDITY WITH BODY MASS INDEX (BMI) OF 35.0 TO 35.9 IN ADULT, UNSPECIFIED OBESITY TYPE: ICD-10-CM

## 2022-12-19 DIAGNOSIS — D50.0 IRON DEFICIENCY ANEMIA DUE TO CHRONIC BLOOD LOSS: ICD-10-CM

## 2022-12-19 DIAGNOSIS — R73.03 PREDIABETES: ICD-10-CM

## 2022-12-19 DIAGNOSIS — N93.9 ABNORMAL UTERINE BLEEDING (AUB): ICD-10-CM

## 2022-12-19 PROBLEM — Z86.32 HISTORY OF GESTATIONAL DIABETES MELLITUS: Status: ACTIVE | Noted: 2018-05-25

## 2022-12-19 PROBLEM — F43.10 PTSD (POST-TRAUMATIC STRESS DISORDER): Status: ACTIVE | Noted: 2017-04-07

## 2022-12-19 PROBLEM — M25.562 CHRONIC PAIN OF LEFT KNEE: Status: RESOLVED | Noted: 2021-10-01 | Resolved: 2022-12-19

## 2022-12-19 PROBLEM — N87.9 CERVICAL DYSPLASIA: Status: RESOLVED | Noted: 2017-04-07 | Resolved: 2022-12-19

## 2022-12-19 PROBLEM — O35.EXX0 RENAL AGENESIS, FETAL, AFFECTING CARE OF MOTHER, ANTEPARTUM: Status: RESOLVED | Noted: 2017-09-12 | Resolved: 2022-12-19

## 2022-12-19 PROBLEM — B37.31 VULVOVAGINITIS CANDIDA ALBICANS: Status: RESOLVED | Noted: 2017-10-19 | Resolved: 2022-12-19

## 2022-12-19 PROBLEM — A15.9 TUBERCULOSIS: Status: ACTIVE | Noted: 2017-04-07

## 2022-12-19 PROBLEM — I34.1 MITRAL VALVE PROLAPSE: Status: ACTIVE | Noted: 2017-04-21

## 2022-12-19 PROBLEM — R87.619 ABNORMAL PAPANICOLAOU SMEAR OF CERVIX: Status: ACTIVE | Noted: 2017-04-07

## 2022-12-19 PROBLEM — R35.0 FREQUENCY OF MICTURITION: Status: ACTIVE | Noted: 2017-09-28

## 2022-12-19 PROBLEM — O41.02X0 OLIGOHYDRAMNIOS IN SINGLETON PREGNANCY IN SECOND TRIMESTER: Status: RESOLVED | Noted: 2017-09-12 | Resolved: 2022-12-19

## 2022-12-19 PROBLEM — N87.0 CERVICAL INTRAEPITHELIAL NEOPLASIA I: Status: ACTIVE | Noted: 2017-04-07

## 2022-12-19 PROBLEM — L02.224 BOIL OF GROIN: Status: ACTIVE | Noted: 2020-09-15

## 2022-12-19 PROBLEM — R35.0 FREQUENCY OF MICTURITION: Status: RESOLVED | Noted: 2017-09-28 | Resolved: 2022-12-19

## 2022-12-19 PROBLEM — R73.9 HYPERGLYCEMIA: Status: RESOLVED | Noted: 2017-04-21 | Resolved: 2022-12-19

## 2022-12-19 PROBLEM — O41.02X0 OLIGOHYDRAMNIOS IN SINGLETON PREGNANCY IN SECOND TRIMESTER: Status: ACTIVE | Noted: 2017-09-12

## 2022-12-19 PROBLEM — B37.31 VULVOVAGINITIS CANDIDA ALBICANS: Status: ACTIVE | Noted: 2017-10-19

## 2022-12-19 PROBLEM — N91.2 AMENORRHEA: Status: RESOLVED | Noted: 2019-01-21 | Resolved: 2022-12-19

## 2022-12-19 PROBLEM — M25.562 CHRONIC ARTHRALGIAS OF KNEES AND HIPS: Status: ACTIVE | Noted: 2020-06-26

## 2022-12-19 PROBLEM — N91.2 AMENORRHEA: Status: ACTIVE | Noted: 2019-01-21

## 2022-12-19 PROBLEM — A59.00 UROGENITAL TRICHOMONIASIS: Status: RESOLVED | Noted: 2017-04-07 | Resolved: 2022-12-19

## 2022-12-19 PROBLEM — A74.9 CHLAMYDIAL INFECTION: Status: RESOLVED | Noted: 2017-04-07 | Resolved: 2022-12-19

## 2022-12-19 PROBLEM — R76.8 HSV-2 SEROPOSITIVE: Status: ACTIVE | Noted: 2017-04-07

## 2022-12-19 PROBLEM — M25.552 CHRONIC ARTHRALGIAS OF KNEES AND HIPS: Status: ACTIVE | Noted: 2020-06-26

## 2022-12-19 PROBLEM — M25.551 CHRONIC ARTHRALGIAS OF KNEES AND HIPS: Status: ACTIVE | Noted: 2020-06-26

## 2022-12-19 PROBLEM — R87.619 ABNORMAL PAPANICOLAOU SMEAR OF CERVIX: Status: RESOLVED | Noted: 2017-04-07 | Resolved: 2022-12-19

## 2022-12-19 PROBLEM — R73.9 HYPERGLYCEMIA: Status: ACTIVE | Noted: 2017-04-21

## 2022-12-19 PROBLEM — M25.561 CHRONIC ARTHRALGIAS OF KNEES AND HIPS: Status: ACTIVE | Noted: 2020-06-26

## 2022-12-19 PROBLEM — M92.529 SCHLATTER-OSGOOD DISEASE: Status: ACTIVE | Noted: 2017-04-21

## 2022-12-19 PROBLEM — A15.9 TUBERCULOSIS: Status: RESOLVED | Noted: 2017-04-07 | Resolved: 2022-12-19

## 2022-12-19 PROBLEM — G89.29 CHRONIC PAIN OF LEFT KNEE: Status: RESOLVED | Noted: 2021-10-01 | Resolved: 2022-12-19

## 2022-12-19 PROBLEM — G89.29 CHRONIC ARTHRALGIAS OF KNEES AND HIPS: Status: ACTIVE | Noted: 2020-06-26

## 2022-12-19 PROBLEM — A59.00 UROGENITAL TRICHOMONIASIS: Status: ACTIVE | Noted: 2017-04-07

## 2022-12-19 PROBLEM — A74.9 CHLAMYDIAL INFECTION: Status: ACTIVE | Noted: 2017-04-07

## 2022-12-19 PROBLEM — N87.9 CERVICAL DYSPLASIA: Status: ACTIVE | Noted: 2017-04-07

## 2022-12-19 PROBLEM — O35.EXX0 RENAL AGENESIS, FETAL, AFFECTING CARE OF MOTHER, ANTEPARTUM: Status: ACTIVE | Noted: 2017-09-12

## 2022-12-19 PROBLEM — J06.9 UPPER RESPIRATORY TRACT INFECTION: Status: RESOLVED | Noted: 2022-05-16 | Resolved: 2022-12-19

## 2022-12-19 PROBLEM — E66.812 CLASS 2 OBESITY WITHOUT SERIOUS COMORBIDITY WITH BODY MASS INDEX (BMI) OF 35.0 TO 35.9 IN ADULT: Status: ACTIVE | Noted: 2022-12-19

## 2022-12-19 PROBLEM — Z71.9 ENCOUNTER FOR COUNSELING: Status: RESOLVED | Noted: 2021-07-09 | Resolved: 2022-12-19

## 2022-12-19 RX ORDER — LEVOTHYROXINE SODIUM 150 MCG
150 TABLET ORAL DAILY
COMMUNITY
Start: 2022-10-31

## 2022-12-19 RX ORDER — ARIPIPRAZOLE 5 MG/1
5 TABLET ORAL DAILY
Qty: 30 TABLET | Refills: 1 | Status: SHIPPED | OUTPATIENT
Start: 2022-12-19 | End: 2023-01-18

## 2022-12-19 NOTE — PROGRESS NOTES
INTERNAL MEDICINE OFFICE VISIT NOTE  [facility] Internal Medicine [place]    NAME: Emeka Fenton  AGE: 42 y o  SEX: female    DATE OF ENCOUNTER: 2022       Chief Complaint   Patient presents with   • Establish Care     New Patient to [facility]      Previously following at the [facility]  Last visit on 2022  Previously diagnosed with:  Depression, PTSD, IBS with diarrhea, external hemorrhoids, hypothyroidism due to Hashimoto's,fibromyalgia, prediabetes, iron deficiency anemia, AUB  Significant FHx: paternal grandmother with Colon Ca, no Breast Ca   Shx: former smoker, quit 19 years ago, smoked 1/2-1 ppd from 13 -22 y/o, Denies vaping/marijuana/illict drug use  Alcohol use: Denies   Environmental exposures: Not known   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, in a relationship with a female for 4 years, 7 Kids: 24, 25, 15, 8, 6, 10, her youngest daughter  at birth at 29 weeks  Available labs or imaging reports reviewed      Review of Systems  10 point ROS negative except per HPI    OBJECTIVE:  Vitals:    22 1552   BP: 122/66   BP Location: Left arm   Patient Position: Sitting   Cuff Size: Standard   Pulse: 102   Temp: 98 4 °F (36 9 °C)   SpO2: 99%   Weight: 104 kg (229 lb)   Height: 5' 7" (1 702 m)       Physical Exam:   GENERAL: NAD, Normal appearance  Non diaphoretic, non-toxic, not ill-appearing, well-developed, well-nourished  NEUROLOGIC:  Alert/oriented x3  HEENT:  NC/AT, EOMI, MMM, no scleral icterus  CARDIAC:  RRR, +S1/S2, no S3/S4 heard, no m/g/r  PULMONARY:  non-labored breathing, CTA B/L, no wheezing/rales/rhonci appreciated at time of encounter  ABDOMEN:  Soft, NT/ND, +BS, no rebound/guarding/rigidity  Extremities:  2+ Pulses in DP/PT  No edema, cyanosis  SKIN:  No rashes or erythema    ASSESSMENT/PLAN:    1  Encounter to establish care    2   Mood disorder [facility])  Assessment & Plan:  Previously following with behavioral health at [facility]  There are plans to start her on Abilify 5 mg-description never sent  Requesting a prescription until evaluated by behavioral health by [facility] in January  -Start Abilify 5 mg  -Follow-up in 1 week    Orders:  -     ARIPiprazole (ABILIFY) 5 mg tablet; Take 1 tablet (5 mg total) by mouth daily    3  Hypothyroidism due to Hashimoto's thyroiditis  Assessment & Plan:  Previously following with [facility] endocrinology  Currently on Synthroid and CYTOMEL   Reports poor response to generic medications  - thyroid studies ordered    Orders:  -     TSH, 3rd generation; Future  -     T4, free; Future    4  Prediabetes  Assessment & Plan:  Previous history of gestational diabetes  A1c ordered    Orders:  -     HEMOGLOBIN A1C W/ EAG ESTIMATION; Future    5  Cervical cancer screening  -     Ambulatory Referral to Gynecology; Future    6  Iron deficiency anemia, unspecified iron deficiency anemia type  -     CBC and Platelet; Future  -     Iron Panel (Includes Ferritin, Iron Sat%, Iron, and TIBC); Future    7  Annual physical exam  -     CBC and Platelet; Future  -     Comprehensive metabolic panel; Future  -     Lipid Panel with Direct LDL reflex; Future  -     Iron Panel (Includes Ferritin, Iron Sat%, Iron, and TIBC); Future    8  Class 2 obesity without serious comorbidity with body mass index (BMI) of 35 0 to 35 9 in adult, unspecified obesity type  Assessment & Plan: Body mass index is 35 87 kg/m²  Wt Readings from Last 3 Encounters:   12/19/22 104 kg (229 lb)   12/17/22 99 8 kg (220 lb)   11/19/22 102 kg (225 lb)      BMI counseling provided    Orders:  -     Vitamin D 25 hydroxy; Future    9   Moderate episode of recurrent major depressive disorder [facility])  Assessment & Plan:  [facility] -   Previously on Remeron, Cymbalta, most recently Effexor-XR however developed thoughts of her kids getting drown hence discontinued it about a week ago  There were plans to start her on Abilify but was never prescribed   Scheduled with [facility] on Jan 6  Requested prescription for Abilify 5 mg until evaluated by behavioral health -we will send prescription      Orders:  -     Vitamin D 25 hydroxy; Future    10  B12 deficiency  Assessment & Plan:  Diagnosis noted on chart review  B12 and MMA levels ordered    Orders:  -     Vitamin B12; Future  -     Methylmalonic acid, serum; Future    11  Avascular necrosis of scaphoid [facility])  Assessment & Plan:  H/o right scaphoid fx in 2002  NO c/o current pain       12  Iron deficiency anemia due to chronic blood loss  Assessment & Plan:  Longstanding diagnosis  History of infusions, reports her last infusion was earlier in the year  -CBC and iron studies ordered  -Refer to gynecology for further evaluation of abnormal uterine bleeding      13  Abnormal uterine bleeding (AUB)  Assessment & Plan:  Long standing symptoms  Concurrent YASMANI   Will refer to gynecology      14  Fibromyalgia  Assessment & Plan:  Reports previous evaluation by rheumatology  Previously on Cymbalta and most recently Effexor discontinued due to thoughts of her children dying    - Can consider TCA's vs neurontin in the future       15   Anxiety  Assessment & Plan:  Controlled as per patient with Atarax  Previously following with [facility] behavioral health  Scheduled with [facility] in January          Current Outpatient Medications:   •  ARIPiprazole (ABILIFY) 5 mg tablet, Take 1 tablet (5 mg total) by mouth daily, Disp: 30 tablet, Rfl: 1  •  liothyronine (CYTOMEL) 5 mcg tablet, Take 5 mcg by mouth 2 (two) times a day, Disp: , Rfl:   •  hydrOXYzine HCL (ATARAX) 10 mg tablet, Take 1 tablet (10 mg total) by mouth 3 (three) times a day as needed for anxiety for up to 14 days, Disp: 30 tablet, Rfl: 0  •  Synthroid 150 MCG tablet, Take 150 mcg by mouth daily, Disp: , Rfl:     TCM Call     None      TCM Call     None             Traci Phillip MD  [facility] Internal Medicine [place]

## 2022-12-19 NOTE — ASSESSMENT & PLAN NOTE
Previously following with [facility] endocrinology  Currently on Synthroid and CYTOMEL   Reports poor response to generic medications  - thyroid studies ordered

## 2022-12-19 NOTE — ASSESSMENT & PLAN NOTE
Longstanding diagnosis  History of infusions, reports her last infusion was earlier in the year  -CBC and iron studies ordered  -Refer to gynecology for further evaluation of abnormal uterine bleeding

## 2022-12-19 NOTE — ASSESSMENT & PLAN NOTE
Body mass index is 35 87 kg/m²    Wt Readings from Last 3 Encounters:   12/19/22 104 kg (229 lb)   12/17/22 99 8 kg (220 lb)   11/19/22 102 kg (225 lb)      BMI counseling provided

## 2022-12-19 NOTE — ASSESSMENT & PLAN NOTE
Controlled as per patient with Atarax  Previously following with [facility] behavioral health  Scheduled with [facility] behavioral health in January

## 2022-12-19 NOTE — ASSESSMENT & PLAN NOTE
Previously following with behavioral health at [facility]  There are plans to start her on Abilify 5 mg-description never sent    Requesting a prescription until evaluated by behavioral health by [facility] in January  -Start Abilify 5 mg  -Follow-up in 1 week

## 2022-12-19 NOTE — ASSESSMENT & PLAN NOTE
Previously following at [facility] mental health -   Previously on Remeron, Cymbalta, most recently Effexor-XR however developed thoughts of her kids getting drown hence discontinued it about a week ago  There were plans to start her on Abilify but was never prescribed   Scheduled with [facility] on Jan 6  Requested prescription for Abilify 5 mg until evaluated by behavioral health -we will send prescription

## 2022-12-19 NOTE — ASSESSMENT & PLAN NOTE
Reports previous evaluation by rheumatology  Previously on Cymbalta and most recently Effexor discontinued due to thoughts of her children dying    - Can consider TCA's vs neurontin in the future

## 2022-12-20 ENCOUNTER — TELEPHONE (OUTPATIENT)
Dept: INTERNAL MEDICINE CLINIC | Facility: CLINIC | Age: 37
End: 2022-12-20

## 2022-12-20 NOTE — TELEPHONE ENCOUNTER
Patient calling today stated that she noticed that she lost her sense of taste so she tested herself and she is COVID Positive today  Any suggestions? She was scheduled for f/u next week, would she be allowed to come into the office that day?

## 2022-12-21 DIAGNOSIS — U07.1 COVID-19 VIRUS INFECTION: Primary | ICD-10-CM

## 2022-12-21 RX ORDER — AMOXICILLIN 875 MG/1
875 TABLET, COATED ORAL 2 TIMES DAILY
Qty: 10 TABLET | Refills: 0 | Status: SHIPPED | OUTPATIENT
Start: 2022-12-21 | End: 2022-12-26

## 2022-12-21 RX ORDER — NIRMATRELVIR AND RITONAVIR 300-100 MG
3 KIT ORAL 2 TIMES DAILY
Qty: 30 TABLET | Refills: 0 | Status: SHIPPED | OUTPATIENT
Start: 2022-12-21 | End: 2022-12-26

## 2022-12-21 NOTE — PROGRESS NOTES
Positive COVID test on 12/20 now with sinus pressure and fever  Antiviral discussed with patient including adverse effects   We have decided to proceed with tx    - will sent Rx for Paxlovid and Amoxicillin x5 days   - f/u as needed

## 2023-01-06 ENCOUNTER — OFFICE VISIT (OUTPATIENT)
Dept: PSYCHIATRY | Facility: CLINIC | Age: 38
End: 2023-01-06

## 2023-01-06 DIAGNOSIS — F43.10 PTSD (POST-TRAUMATIC STRESS DISORDER): ICD-10-CM

## 2023-01-06 DIAGNOSIS — F41.0 GENERALIZED ANXIETY DISORDER WITH PANIC ATTACKS: ICD-10-CM

## 2023-01-06 DIAGNOSIS — F41.1 GENERALIZED ANXIETY DISORDER WITH PANIC ATTACKS: ICD-10-CM

## 2023-01-06 DIAGNOSIS — F33.2 MAJOR DEPRESSIVE DISORDER, RECURRENT SEVERE WITHOUT PSYCHOTIC FEATURES (HCC): Primary | ICD-10-CM

## 2023-01-06 RX ORDER — ARIPIPRAZOLE 5 MG/1
5 TABLET ORAL DAILY
Qty: 30 TABLET | Refills: 1 | Status: SHIPPED | OUTPATIENT
Start: 2023-01-06 | End: 2023-03-07

## 2023-01-06 RX ORDER — ESCITALOPRAM OXALATE 10 MG/1
TABLET ORAL
Qty: 27 TABLET | Refills: 0 | Status: SHIPPED | OUTPATIENT
Start: 2023-01-06 | End: 2023-02-05

## 2023-01-06 RX ORDER — HYDROXYZINE HYDROCHLORIDE 25 MG/1
25 TABLET, FILM COATED ORAL 3 TIMES DAILY PRN
Qty: 90 TABLET | Refills: 1 | Status: SHIPPED | OUTPATIENT
Start: 2023-01-06 | End: 2023-03-07
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
## 2023-01-09 ENCOUNTER — APPOINTMENT (OUTPATIENT)
Dept: LAB | Facility: HOSPITAL | Age: 38
End: 2023-01-09

## 2023-01-09 DIAGNOSIS — E53.8 B12 DEFICIENCY: ICD-10-CM

## 2023-01-09 DIAGNOSIS — R73.03 PREDIABETES: ICD-10-CM

## 2023-01-09 DIAGNOSIS — D50.9 IRON DEFICIENCY ANEMIA, UNSPECIFIED IRON DEFICIENCY ANEMIA TYPE: ICD-10-CM

## 2023-01-09 DIAGNOSIS — E03.8 HYPOTHYROIDISM DUE TO HASHIMOTO'S THYROIDITIS: ICD-10-CM

## 2023-01-09 DIAGNOSIS — E66.9 CLASS 2 OBESITY WITHOUT SERIOUS COMORBIDITY WITH BODY MASS INDEX (BMI) OF 35.0 TO 35.9 IN ADULT, UNSPECIFIED OBESITY TYPE: ICD-10-CM

## 2023-01-09 DIAGNOSIS — F33.1 MODERATE EPISODE OF RECURRENT MAJOR DEPRESSIVE DISORDER (HCC): ICD-10-CM

## 2023-01-09 DIAGNOSIS — Z00.00 ANNUAL PHYSICAL EXAM: ICD-10-CM

## 2023-01-09 DIAGNOSIS — E06.3 HYPOTHYROIDISM DUE TO HASHIMOTO'S THYROIDITIS: ICD-10-CM

## 2023-01-09 LAB
25(OH)D3 SERPL-MCNC: 20.4 NG/ML (ref 30–100)
ALBUMIN SERPL BCP-MCNC: 3.9 G/DL (ref 3.5–5)
ALP SERPL-CCNC: 96 U/L (ref 46–116)
ALT SERPL W P-5'-P-CCNC: 31 U/L (ref 12–78)
ANION GAP SERPL CALCULATED.3IONS-SCNC: 10 MMOL/L (ref 4–13)
AST SERPL W P-5'-P-CCNC: 24 U/L (ref 5–45)
BILIRUB SERPL-MCNC: 0.46 MG/DL (ref 0.2–1)
BUN SERPL-MCNC: 5 MG/DL (ref 5–25)
CALCIUM SERPL-MCNC: 9.3 MG/DL (ref 8.3–10.1)
CHLORIDE SERPL-SCNC: 102 MMOL/L (ref 96–108)
CHOLEST SERPL-MCNC: 179 MG/DL
CO2 SERPL-SCNC: 27 MMOL/L (ref 21–32)
CREAT SERPL-MCNC: 0.77 MG/DL (ref 0.6–1.3)
ERYTHROCYTE [DISTWIDTH] IN BLOOD BY AUTOMATED COUNT: 13.3 % (ref 11.6–15.1)
FERRITIN SERPL-MCNC: 24 NG/ML (ref 8–388)
GFR SERPL CREATININE-BSD FRML MDRD: 97 ML/MIN/1.73SQ M
GLUCOSE SERPL-MCNC: 111 MG/DL (ref 65–140)
HCT VFR BLD AUTO: 40.7 % (ref 34.8–46.1)
HDLC SERPL-MCNC: 39 MG/DL
HGB BLD-MCNC: 13.1 G/DL (ref 11.5–15.4)
IRON SATN MFR SERPL: 10 % (ref 15–50)
IRON SERPL-MCNC: 41 UG/DL (ref 50–170)
LDLC SERPL CALC-MCNC: 123 MG/DL (ref 0–100)
MCH RBC QN AUTO: 28.1 PG (ref 26.8–34.3)
MCHC RBC AUTO-ENTMCNC: 32.2 G/DL (ref 31.4–37.4)
MCV RBC AUTO: 87 FL (ref 82–98)
PLATELET # BLD AUTO: 222 THOUSANDS/UL (ref 149–390)
PMV BLD AUTO: 11.3 FL (ref 8.9–12.7)
POTASSIUM SERPL-SCNC: 3.6 MMOL/L (ref 3.5–5.3)
PROT SERPL-MCNC: 7.6 G/DL (ref 6.4–8.4)
RBC # BLD AUTO: 4.66 MILLION/UL (ref 3.81–5.12)
SODIUM SERPL-SCNC: 139 MMOL/L (ref 135–147)
T4 FREE SERPL-MCNC: 0.97 NG/DL (ref 0.76–1.46)
TIBC SERPL-MCNC: 417 UG/DL (ref 250–450)
TRIGL SERPL-MCNC: 87 MG/DL
TSH SERPL DL<=0.05 MIU/L-ACNC: 6.22 UIU/ML (ref 0.45–4.5)
VIT B12 SERPL-MCNC: 499 PG/ML (ref 100–900)
WBC # BLD AUTO: 4.92 THOUSAND/UL (ref 4.31–10.16)

## 2023-01-10 LAB
EST. AVERAGE GLUCOSE BLD GHB EST-MCNC: 114 MG/DL
HBA1C MFR BLD: 5.6 %

## 2023-01-12 ENCOUNTER — OFFICE VISIT (OUTPATIENT)
Dept: INTERNAL MEDICINE CLINIC | Facility: CLINIC | Age: 38
End: 2023-01-12

## 2023-01-12 VITALS
SYSTOLIC BLOOD PRESSURE: 122 MMHG | OXYGEN SATURATION: 98 % | DIASTOLIC BLOOD PRESSURE: 60 MMHG | HEART RATE: 83 BPM | HEIGHT: 67 IN | TEMPERATURE: 97.8 F | BODY MASS INDEX: 35.31 KG/M2 | WEIGHT: 225 LBS

## 2023-01-12 DIAGNOSIS — N93.9 ABNORMAL UTERINE BLEEDING (AUB): ICD-10-CM

## 2023-01-12 DIAGNOSIS — E06.3 HYPOTHYROIDISM DUE TO HASHIMOTO'S THYROIDITIS: ICD-10-CM

## 2023-01-12 DIAGNOSIS — R73.03 PREDIABETES: ICD-10-CM

## 2023-01-12 DIAGNOSIS — E53.8 B12 DEFICIENCY: ICD-10-CM

## 2023-01-12 DIAGNOSIS — F39 MOOD DISORDER (HCC): ICD-10-CM

## 2023-01-12 DIAGNOSIS — E55.9 VITAMIN D DEFICIENCY: Primary | ICD-10-CM

## 2023-01-12 DIAGNOSIS — D50.0 IRON DEFICIENCY ANEMIA DUE TO CHRONIC BLOOD LOSS: ICD-10-CM

## 2023-01-12 DIAGNOSIS — M87.039 AVASCULAR NECROSIS OF SCAPHOID (HCC): ICD-10-CM

## 2023-01-12 DIAGNOSIS — E03.8 HYPOTHYROIDISM DUE TO HASHIMOTO'S THYROIDITIS: ICD-10-CM

## 2023-01-12 DIAGNOSIS — F33.1 MODERATE EPISODE OF RECURRENT MAJOR DEPRESSIVE DISORDER (HCC): ICD-10-CM

## 2023-01-12 PROBLEM — F33.2 MAJOR DEPRESSIVE DISORDER, RECURRENT SEVERE WITHOUT PSYCHOTIC FEATURES (HCC): Status: RESOLVED | Noted: 2023-01-06 | Resolved: 2023-01-12

## 2023-01-12 PROBLEM — F41.9 ANXIETY: Status: RESOLVED | Noted: 2017-04-07 | Resolved: 2023-01-12

## 2023-01-12 PROBLEM — N87.0 CERVICAL INTRAEPITHELIAL NEOPLASIA I: Status: RESOLVED | Noted: 2017-04-07 | Resolved: 2023-01-12

## 2023-01-12 LAB — METHYLMALONATE SERPL-SCNC: 82 NMOL/L (ref 0–378)

## 2023-01-12 RX ORDER — SODIUM CHLORIDE 9 MG/ML
20 INJECTION, SOLUTION INTRAVENOUS ONCE
OUTPATIENT
Start: 2023-01-12

## 2023-01-12 RX ORDER — LANOLIN ALCOHOL/MO/W.PET/CERES
CREAM (GRAM) TOPICAL DAILY
Status: CANCELLED | OUTPATIENT
Start: 2023-01-12

## 2023-01-12 RX ORDER — LEVOTHYROXINE SODIUM 175 UG/1
175 TABLET ORAL DAILY
COMMUNITY

## 2023-01-12 RX ORDER — ERGOCALCIFEROL 1.25 MG/1
50000 CAPSULE ORAL WEEKLY
Qty: 6 CAPSULE | Refills: 0 | Status: SHIPPED | OUTPATIENT
Start: 2023-01-12 | End: 2023-01-20

## 2023-01-12 RX ORDER — MULTIVIT WITH MINERALS/LUTEIN
250 TABLET ORAL DAILY
Qty: 90 TABLET | Refills: 1 | Status: CANCELLED | OUTPATIENT
Start: 2023-01-12 | End: 2023-04-12

## 2023-01-12 RX ORDER — FERROUS SULFATE TAB EC 324 MG (65 MG FE EQUIVALENT) 324 (65 FE) MG
324 TABLET DELAYED RESPONSE ORAL EVERY OTHER DAY
Qty: 15 TABLET | Refills: 1 | Status: CANCELLED | OUTPATIENT
Start: 2023-01-12 | End: 2023-04-12

## 2023-01-12 NOTE — ASSESSMENT & PLAN NOTE
Currently evaluated by [facility]  Currently on Abilify  Recently started on Lexapro 10 mg  Atarax increased to 25 mg 3 times daily as needed

## 2023-01-12 NOTE — PROGRESS NOTES
INTERNAL MEDICINE OFFICE VISIT NOTE  [facility] Internal Medicine [place]    NAME: Ayaz Pyle  AGE: 42 y o  SEX: female    DATE OF ENCOUNTER: 1/12/2023       Chief Complaint   Patient presents with   • Follow-up     1 week follow up      Last visit on 12/2022  During recovery from Surendra without complications  Her levothyroxine was recently increased by her endocrinologist  Evaluated by psych recently and started on Lexapro  Review of Systems  10 point ROS negative except per HPI    OBJECTIVE:  Vitals:    01/12/23 1538   BP: 122/60   BP Location: Left arm   Patient Position: Sitting   Cuff Size: Large   Pulse: 83   Temp: 97 8 °F (36 6 °C)   SpO2: 98%   Weight: 102 kg (225 lb)   Height: 5' 7" (1 702 m)       Physical Exam:   GENERAL: NAD, Normal appearance  Non diaphoretic, non-toxic, not ill-appearing, well-developed, well-nourished  NEUROLOGIC:  Alert/oriented x3  HEENT:  NC/AT, EOMI, MMM, no scleral icterus  CARDIAC:  RRR, +S1/S2, no S3/S4 heard, no m/g/r  PULMONARY:  non-labored breathing, CTA B/L, no wheezing/rales/rhonci appreciated at time of encounter  ABDOMEN:  Soft, NT/ND, +BS, no rebound/guarding/rigidity  Extremities:  2+ Pulses in DP/PT  No edema, cyanosis  SKIN:  No rashes or erythema    ASSESSMENT/PLAN:    1  Vitamin D deficiency  -     ergocalciferol (VITAMIN D2) 50,000 units; Take 1 capsule (50,000 Units total) by mouth once a week for 6 days    2  Avascular necrosis of scaphoid [facility])  Assessment & Plan:  History of right scaphoid fracture in 2002  No  C/o current pain      3  Abnormal uterine bleeding (AUB)  Assessment & Plan:  Likely because of her iron deficiency  Advised to schedule a visit with gynecology  Referral given on last visit  4  B12 deficiency  Assessment & Plan:  Most recent levels within normal limits  Advised to buy OTC multivitamin with vitamin B12      5   Iron deficiency anemia due to chronic blood loss  Assessment & Plan:  Due to menses   Most recent hemoglobin levels within normal limits however iron studies continue to show iron deficiency  History of iron infusions secondary to poor response to oral supplementation  Will order venofer infusion      6  Hypothyroidism due to Hashimoto's thyroiditis  Assessment & Plan:  Levothyroxine recently increased by her endocrinologist to 175 mcg daily      7  Moderate episode of recurrent major depressive disorder ([facility] [address] )  Assessment & Plan:  Currently evaluated by [facility]  Currently on Abilify  Recently started on Lexapro 10 mg  Atarax increased to 25 mg 3 times daily as needed      8  Prediabetes  Assessment & Plan:  Lab Results   Component Value Date    HGBA1C 5 6 01/09/2023    HGBA1C 5 9 (H) 06/06/2019     Currently not on medication  Reviewed lifestyle measures to avoid progression to diabetes      9  Mood disorder ([facility] )  Assessment & Plan:  Recently established with [facility]          Current Outpatient Medications:   •  ARIPiprazole (ABILIFY) 5 mg tablet, Take 1 tablet (5 mg total) by mouth daily, Disp: 30 tablet, Rfl: 1  •  escitalopram (Lexapro) 10 mg tablet, Take 0 5 tablets (5 mg total) by mouth daily for 7 days, THEN 1 tablet (10 mg total) daily for 23 days  , Disp: 27 tablet, Rfl: 0  •  hydrOXYzine HCL (ATARAX) 25 mg tablet, Take 1 tablet (25 mg total) by mouth 3 (three) times a day as needed for anxiety, Disp: 90 tablet, Rfl: 1  •  levothyroxine 175 mcg tablet, Take 175 mcg by mouth daily, Disp: , Rfl:   •  liothyronine (CYTOMEL) 5 mcg tablet, Take 5 mcg by mouth 2 (two) times a day, Disp: , Rfl:     TCM Call     None      TCM Call     None             Mabel Canela MD  [facility] Internal Medicine [place]

## 2023-01-12 NOTE — ASSESSMENT & PLAN NOTE
Due to menses   Most recent hemoglobin levels within normal limits however iron studies continue to show iron deficiency  History of iron infusions secondary to poor response to oral supplementation  Will order venofer infusion

## 2023-01-12 NOTE — ASSESSMENT & PLAN NOTE
Likely because of her iron deficiency  Advised to schedule a visit with gynecology  Referral given on last visit

## 2023-01-12 NOTE — ASSESSMENT & PLAN NOTE
Lab Results   Component Value Date    HGBA1C 5 6 01/09/2023    HGBA1C 5 9 (H) 06/06/2019        Currently not on medication  Reviewed lifestyle measures to avoid progression to diabetes

## 2023-01-16 ENCOUNTER — OFFICE VISIT (OUTPATIENT)
Dept: URGENT CARE | Age: 38
End: 2023-01-16

## 2023-01-16 VITALS
BODY MASS INDEX: 35.28 KG/M2 | TEMPERATURE: 98 F | HEART RATE: 70 BPM | HEIGHT: 67 IN | RESPIRATION RATE: 18 BRPM | OXYGEN SATURATION: 99 % | WEIGHT: 224.8 LBS

## 2023-01-16 DIAGNOSIS — R05.2 SUBACUTE COUGH: ICD-10-CM

## 2023-01-16 DIAGNOSIS — R06.02 SHORTNESS OF BREATH: ICD-10-CM

## 2023-01-16 DIAGNOSIS — B34.9 VIRAL SYNDROME: Primary | ICD-10-CM

## 2023-01-16 RX ORDER — METHYLPREDNISOLONE 4 MG/1
TABLET ORAL
Qty: 21 EACH | Refills: 0 | Status: SHIPPED | OUTPATIENT
Start: 2023-01-16

## 2023-01-16 RX ORDER — ALBUTEROL SULFATE 90 UG/1
2 AEROSOL, METERED RESPIRATORY (INHALATION) EVERY 6 HOURS PRN
Qty: 8.5 G | Refills: 0 | Status: SHIPPED | OUTPATIENT
Start: 2023-01-16

## 2023-01-16 NOTE — PROGRESS NOTES
[facility]        NAME: Edvin Tai is a 42 y o  female  : 1985    MRN: [id number]  DATE: 2023  TIME: 10:16 AM    Assessment and Plan   Viral syndrome [B34 9]  1  Viral syndrome        2  Shortness of breath  albuterol (ProAir HFA) 90 mcg/act inhaler      3  Subacute cough  methylPREDNISolone 4 MG tablet therapy pack            Patient Instructions     Cough & SOB x 2 weeks  +COVID 22  Treated w Paxlovid & Amoxil  Lungs CTAB  No cough on exam  Well-appearing, NAD  Consistent with post-COVID syndrome vs viral bronchitis  Discussed risk/benefit of steroid therapy on top of recent antiviral & antibiotic treatments  Pt would like to proceed with steroid  Albuterol PRN for SOB  Follow up with PCP in 2-3 days  Proceed to ER if symptoms worsen  Chief Complaint     Chief Complaint   Patient presents with   • Cough     Has had dry cough for over two weeks and recently has gotten more phlemy, has not been sure what medications to take due to contradictions in other medications, short of breath due to cough         History of Present Illness     42 y o  F  Cough & SOB x 2 weeks  +COVID-19 infection   Treated with Paxlovid & Amoxicillin  No OTC meds  Denies fevers  No CP    Review of Systems   Review of Systems   Constitutional: Negative for chills, fatigue and fever  HENT: Negative for congestion, ear pain, facial swelling, hearing loss, rhinorrhea, sinus pressure, sneezing, sore throat and trouble swallowing  Eyes: Negative for pain, redness and visual disturbance  Respiratory: Positive for cough and shortness of breath  Negative for chest tightness and wheezing  Cardiovascular: Negative for chest pain and palpitations  Gastrointestinal: Negative for abdominal pain, diarrhea, nausea and vomiting  Genitourinary: Negative for dysuria, flank pain, hematuria and pelvic pain  Musculoskeletal: Negative for arthralgias, back pain and myalgias     Skin: Negative for color change and rash  Neurological: Negative for dizziness, seizures, syncope, weakness, light-headedness and headaches  Psychiatric/Behavioral: Negative for confusion, hallucinations and sleep disturbance  The patient is not nervous/anxious  All other systems reviewed and are negative  Current Medications       Current Outpatient Medications:   •  albuterol (ProAir HFA) 90 mcg/act inhaler, Inhale 2 puffs every 6 (six) hours as needed for wheezing or shortness of breath, Disp: 8 5 g, Rfl: 0  •  ARIPiprazole (ABILIFY) 5 mg tablet, Take 1 tablet (5 mg total) by mouth daily, Disp: 30 tablet, Rfl: 1  •  ergocalciferol (VITAMIN D2) 50,000 units, Take 1 capsule (50,000 Units total) by mouth once a week for 6 days, Disp: 6 capsule, Rfl: 0  •  escitalopram (Lexapro) 10 mg tablet, Take 0 5 tablets (5 mg total) by mouth daily for 7 days, THEN 1 tablet (10 mg total) daily for 23 days  , Disp: 27 tablet, Rfl: 0  •  hydrOXYzine HCL (ATARAX) 25 mg tablet, Take 1 tablet (25 mg total) by mouth 3 (three) times a day as needed for anxiety, Disp: 90 tablet, Rfl: 1  •  levothyroxine 175 mcg tablet, Take 175 mcg by mouth daily, Disp: , Rfl:   •  liothyronine (CYTOMEL) 5 mcg tablet, Take 5 mcg by mouth 2 (two) times a day, Disp: , Rfl:   •  methylPREDNISolone 4 MG tablet therapy pack, Use as directed on package, Disp: 21 each, Rfl: 0    Current Allergies     Allergies as of 01/16/2023 - Reviewed 01/16/2023   Allergen Reaction Noted   • Bactrim [sulfamethoxazole-trimethoprim] Hives 04/06/2016   • Other Hives 04/06/2016   • Shellfish-derived products - food allergy  09/07/2018   • Sulfamethoxazole Hives 05/25/2018   • Trimethoprim Hives 05/25/2018            The following portions of the patient's history were reviewed and updated as appropriate: allergies, current medications, past family history, past medical history, past social history, past surgical history and problem list      Past Medical History:   Diagnosis Date • Anxiety    • Chlamydial infection 4/7/2017   • Crohn's disease ([address] )    • Depression    • Disease of thyroid gland    • Fibromyalgia    • Gestational diabetes    • Gestational hypertension     previous pregnancy   • Hashimoto's thyroiditis    • HPV (human papilloma virus) infection    • Oligohydramnios in [place]vez pregnancy in second trimester 9/12/2017   • Retained placenta    • Urogenital trichomoniasis 4/7/2017       Past Surgical History:   Procedure Laterality Date   • CHOLECYSTECTOMY     • DILATION AND CURETTAGE OF UTERUS     • HERNIA REPAIR     • UMBILICAL HERNIA REPAIR         Family History   Problem Relation Age of Onset   • Anuerysm Mother    • Thyroid disease Mother    • Hepatitis Mother    • Lung cancer Maternal Grandmother    • Colon cancer Maternal Grandfather          Medications have been verified  Objective   Pulse 70   Temp 98 °F (36 7 °C)   Resp 18   Ht 5' 7" (1 702 m)   Wt 102 kg (224 lb 12 8 oz)   LMP  (LMP Unknown)   SpO2 99%   BMI 35 21 kg/m²   No LMP recorded (lmp unknown)  Physical Exam     Physical Exam  Vitals reviewed  Constitutional:       General: She is not in acute distress  Appearance: Normal appearance  She is not toxic-appearing  HENT:      Head: Normocephalic  Right Ear: Tympanic membrane normal  Tympanic membrane is not erythematous or bulging  Left Ear: Tympanic membrane normal  Tympanic membrane is not erythematous or bulging  Nose: No congestion or rhinorrhea  Right Sinus: No maxillary sinus tenderness or frontal sinus tenderness  Left Sinus: No maxillary sinus tenderness or frontal sinus tenderness  Mouth/Throat:      Pharynx: Uvula midline  No oropharyngeal exudate, posterior oropharyngeal erythema or uvula swelling  Tonsils: No tonsillar exudate or tonsillar abscesses  Eyes:      Extraocular Movements: Extraocular movements intact        Conjunctiva/sclera: Conjunctivae normal       Pupils: Pupils are equal, round, and reactive to light  Cardiovascular:      Rate and Rhythm: Normal rate and regular rhythm  Pulses: Normal pulses  Heart sounds: Normal heart sounds  Pulmonary:      Effort: Pulmonary effort is normal  No tachypnea or respiratory distress  Breath sounds: Normal breath sounds and air entry  No decreased breath sounds, wheezing, rhonchi or rales  Comments:   CTAB no wheezing or stridor  No conversational dyspnea  Respirations unlabored  99% RA  No cough on exam  Abdominal:      General: Bowel sounds are normal       Palpations: Abdomen is soft  Tenderness: There is no abdominal tenderness  There is no right CVA tenderness or guarding  Musculoskeletal:         General: Normal range of motion  Cervical back: Normal range of motion  Lymphadenopathy:      Cervical: No cervical adenopathy  Skin:     General: Skin is warm and dry  Neurological:      General: No focal deficit present  Mental Status: She is alert  Sensory: Sensation is intact  Motor: Motor function is intact  Coordination: Coordination is intact  Gait: Gait is intact  Deep Tendon Reflexes: Reflexes are normal and symmetric

## 2023-01-20 ENCOUNTER — TELEMEDICINE (OUTPATIENT)
Dept: INTERNAL MEDICINE CLINIC | Facility: CLINIC | Age: 38
End: 2023-01-20

## 2023-01-20 VITALS — WEIGHT: 224 LBS | BODY MASS INDEX: 35.16 KG/M2 | HEIGHT: 67 IN

## 2023-01-20 DIAGNOSIS — R05.8 POST-VIRAL COUGH SYNDROME: Primary | ICD-10-CM

## 2023-01-20 RX ORDER — BENZONATATE 100 MG/1
100 CAPSULE ORAL 3 TIMES DAILY PRN
Qty: 40 CAPSULE | Refills: 1 | Status: SHIPPED | OUTPATIENT
Start: 2023-01-20

## 2023-01-20 NOTE — ASSESSMENT & PLAN NOTE
[address] since her dx of COVID  Low suspicion for lower respiratory infection  -Start [employer]

## 2023-01-20 NOTE — PROGRESS NOTES
Virtual Regular Visit    Verification of patient location:    Patient is located in the following state in which I hold an active license [place]    Cough  This has been ongoing since diagnosed with COVID a few weeks ago that was treated with Paxlovid and amoxicillin  Recently evaluated at and urgent care and prescribed a course of steroids that she is still taking with minimal relief of her cough  Has not been taking any cough medication concerns of adverse effects combined with her Abilify and Lexapro  Fevers, chills, shortness of breath,, vomiting  Assessment/Plan:    Problem List Items Addressed This Visit        Respiratory    Post-viral cough syndrome - Primary     [place] since her dx of COVID  Low suspicion for lower respiratory infection  -Start [employer]                 Reason for visit is   Chief Complaint   Patient presents with   • Cough     Cough,chest congestion with yellow phlegm, shortness of breath   • Virtual Regular Visit        Encounter provider Arleen Paz MD    Provider located at [facility] [id number]      Recent Visits  No visits were found meeting these conditions  Showing recent visits within past 7 days and meeting all other requirements  Today's Visits  Date Type Provider Dept   01/20/23 Telemedicine Arleen Paz MD [facility] [place]   Showing today's visits and meeting all other requirements  Future Appointments  No visits were found meeting these conditions  Showing future appointments within next 150 days and meeting all other requirements       The patient was identified by name and date of birth  Joi Agosto was informed that this is a telemedicine visit and that the visit is being conducted through the [employer]  She agrees to proceed     My office door was closed  No one else was in the room    She acknowledged consent and understanding of privacy and security of the video platform  The patient has agreed to participate and understands they can discontinue the visit at any time  Patient is aware this is a billable service  Marcella Somers is a 42 y o  female    HPI     Past Medical History:   Diagnosis Date   • Anxiety    • Chlamydial infection 4/7/2017   • Crohn's disease ([address] )    • Depression    • Disease of thyroid gland    • Fibromyalgia    • Gestational diabetes    • Gestational hypertension     previous pregnancy   • Hashimoto's thyroiditis    • HPV (human papilloma virus) infection    • Oligohydramnios in [place] pregnancy in second trimester 9/12/2017   • Retained placenta    • Urogenital trichomoniasis 4/7/2017       Past Surgical History:   Procedure Laterality Date   • CHOLECYSTECTOMY     • DILATION AND CURETTAGE OF UTERUS     • HERNIA REPAIR     • UMBILICAL HERNIA REPAIR         Current Outpatient Medications   Medication Sig Dispense Refill   • albuterol (ProAir HFA) 90 mcg/act inhaler Inhale 2 puffs every 6 (six) hours as needed for wheezing or shortness of breath 8 5 g 0   • ARIPiprazole (ABILIFY) 5 mg tablet Take 1 tablet (5 mg total) by mouth daily 30 tablet 1   • ergocalciferol (VITAMIN D2) 50,000 units Take 1 capsule (50,000 Units total) by mouth once a week for 6 days 6 capsule 0   • escitalopram (Lexapro) 10 mg tablet Take 0 5 tablets (5 mg total) by mouth daily for 7 days, THEN 1 tablet (10 mg total) daily for 23 days  27 tablet 0   • hydrOXYzine HCL (ATARAX) 25 mg tablet Take 1 tablet (25 mg total) by mouth 3 (three) times a day as needed for anxiety 90 tablet 1   • levothyroxine 175 mcg tablet Take 175 mcg by mouth daily     • liothyronine (CYTOMEL) 5 mcg tablet Take 5 mcg by mouth 2 (two) times a day     • methylPREDNISolone 4 MG tablet therapy pack Use as directed on package 21 each 0     No current facility-administered medications for this visit          Allergies   Allergen Reactions   • Bactrim [Sulfamethoxazole-Trimethoprim] Hives   • Other Hives     seafood   • Shellfish-Derived Products - Food Allergy    • Sulfamethoxazole Hives   • Trimethoprim Hives       Review of Systems    Video Exam    Vitals:    01/20/23 1555   Weight: 102 kg (224 lb)   Height: 5' 7" (1 702 m)       Physical Exam   Physical Exam: limited, performed thorough video  GENERAL: NAD  HEENT:  Grossly normal  CARDIAC:  Unable to assess  PULMONARY:  non-labored breathing, no conversational dyspnea noted   ABDOMEN:  Grossly normal  Extremities: Grossly normal  NEUROLOGIC:  Alert/oriented x3      I spent 15 minutes directly with the patient during this visit

## 2023-02-06 ENCOUNTER — OFFICE VISIT (OUTPATIENT)
Dept: PSYCHIATRY | Facility: CLINIC | Age: 38
End: 2023-02-06

## 2023-02-06 DIAGNOSIS — F33.2 MAJOR DEPRESSIVE DISORDER, RECURRENT SEVERE WITHOUT PSYCHOTIC FEATURES (HCC): ICD-10-CM

## 2023-02-06 DIAGNOSIS — F41.0 GENERALIZED ANXIETY DISORDER WITH PANIC ATTACKS: ICD-10-CM

## 2023-02-06 DIAGNOSIS — F41.1 GENERALIZED ANXIETY DISORDER WITH PANIC ATTACKS: ICD-10-CM

## 2023-02-06 DIAGNOSIS — F33.2 MAJOR DEPRESSIVE DISORDER, RECURRENT SEVERE WITHOUT PSYCHOTIC FEATURES (HCC): Primary | ICD-10-CM

## 2023-02-06 DIAGNOSIS — F43.10 PTSD (POST-TRAUMATIC STRESS DISORDER): ICD-10-CM

## 2023-02-06 RX ORDER — ARIPIPRAZOLE 5 MG/1
5 TABLET ORAL DAILY
Qty: 30 TABLET | Refills: 2 | Status: SHIPPED | OUTPATIENT
Start: 2023-02-06 | End: 2023-05-07

## 2023-02-06 RX ORDER — ESCITALOPRAM OXALATE 10 MG/1
15 TABLET ORAL DAILY
Qty: 45 TABLET | Refills: 2 | Status: SHIPPED | OUTPATIENT
Start: 2023-02-06 | End: 2023-02-06

## 2023-02-06 RX ORDER — ESCITALOPRAM OXALATE 5 MG/1
5 TABLET ORAL DAILY
Qty: 30 TABLET | Refills: 2 | Status: SHIPPED | OUTPATIENT
Start: 2023-02-06 | End: 2023-05-07

## 2023-02-06 NOTE — BH TREATMENT PLAN
TREATMENT PLAN (Medication Management Only)        [facility]    Name and Date of Birth:  Zen Todd 42 y o  1985  Date of Treatment Plan: February 6, 2023  Diagnosis/Diagnoses:    1  Major depressive disorder, recurrent severe without psychotic features ([facility] [address] )    2  Generalized anxiety disorder with panic attacks    3  PTSD (post-traumatic stress disorder)      Strengths/Personal Resources for Self-Care: supportive family, taking medications as prescribed, ability to adapt to life changes, ability to communicate well, ability to listen, ability to reason  Area/Areas of need (in own words): anxiety, depression  1  Long Term Goal: Feel better about self  Target Date:6 months - 8/6/2023  Person/Persons responsible for completion of goal: Pb  2  Short Term Objective (s) - How will we reach this goal?:   A  Provider new recommended medication/dosage changes and/or continue medication(s): continue current medications as prescribed  B  N/A   C  N/A  Target Date:6 months - 8/6/2023  Person/Persons Responsible for Completion of Goal: Pb  Progress Towards Goals: continuing treatment  Treatment Modality: medication management every 3 months  Review due 180 days from date of this plan: 6 months - 8/6/2023  Expected length of service: ongoing treatment  My Physician/PA/NP and I have developed this plan together and I agree to work on the goals and objectives  I understand the treatment goals that were developed for my treatment

## 2023-02-06 NOTE — PSYCH
MEDICATION MANAGEMENT NOTE        [facility]      Name and Date of Birth:  Jey Tafoya 42 y o  1985 MRN: [id number]    Date of Visit: February 6, 2023    Reason for Visit: Follow-up for medication management  Subjective: The patient had seen me 1 month back for initial psychiatric evaluation  She was started on Lexapro and increase in dose of hydroxyzine  Was continued on Abilify with no change  The patient reports her mood has been much better since last visit  Reports depression has significantly improved  Denies feeling hopeless or tearful nowadays  She also reports she changed her job and started new job a few weeks back and it has been going well  She reports her anxiety also has been much better  Reports occasionally feeling panicky but is able to recognize it and distract herself  Reports sleep mostly has been good  Reports appetite has been fine  Denies any suicidal thoughts or any urges to hurt others  Reports mood overall has been stable and denies any significant anger issues or outbursts  Reports compliant with the medication and denies any side effect  Denies any other concerns today  She also denies any significant PTSD symptoms nowadays        Review Of Systems:      Constitutional negative   ENT negative   Cardiovascular negative   Respiratory negative   Gastrointestinal negative   Genitourinary negative   Musculoskeletal negative   Integumentary negative   Neurological negative   Endocrine negative   Other Symptoms none       Alcohol/Substance Abuse: Denies        Past Medical History:    Past Medical History:   Diagnosis Date   • Anxiety    • Chlamydial infection 4/7/2017   • Crohn's disease ([facility] [address] )    • Depression    • Disease of thyroid gland    • Fibromyalgia    • Gestational diabetes    • Gestational hypertension     previous pregnancy   • Hashimoto's thyroiditis    • HPV (human papilloma virus) infection    • Oligohydramnios in galvez pregnancy in second trimester 9/12/2017   • Retained placenta    • Urogenital trichomoniasis 4/7/2017        Past Surgical History:   Procedure Laterality Date   • CHOLECYSTECTOMY     • DILATION AND CURETTAGE OF UTERUS     • HERNIA REPAIR     • UMBILICAL HERNIA REPAIR       Allergies   Allergen Reactions   • Bactrim [Sulfamethoxazole-Trimethoprim] Hives   • Other Hives     seafood   • Shellfish-Derived Products - Food Allergy    • Sulfamethoxazole Hives   • Trimethoprim Hives       Current Medications:       Current Outpatient Medications:   •  ARIPiprazole (ABILIFY) 5 mg tablet, Take 1 tablet (5 mg total) by mouth daily, Disp: 30 tablet, Rfl: 2  •  escitalopram (Lexapro) 10 mg tablet, Take 1 5 tablets (15 mg total) by mouth daily, Disp: 45 tablet, Rfl: 2  •  albuterol (ProAir HFA) 90 mcg/act inhaler, Inhale 2 puffs every 6 (six) hours as needed for wheezing or shortness of breath, Disp: 8 5 g, Rfl: 0  •  benzonatate (TESSALON PERLES) 100 mg capsule, Take 1 capsule (100 mg total) by mouth 3 (three) times a day as needed for cough for up to 40 doses, Disp: 40 capsule, Rfl: 1  •  ergocalciferol (VITAMIN D2) 50,000 units, Take 1 capsule (50,000 Units total) by mouth once a week for 6 days, Disp: 6 capsule, Rfl: 0  •  hydrOXYzine HCL (ATARAX) 25 mg tablet, Take 1 tablet (25 mg total) by mouth 3 (three) times a day as needed for anxiety, Disp: 90 tablet, Rfl: 1  •  levothyroxine 175 mcg tablet, Take 175 mcg by mouth daily, Disp: , Rfl:   •  liothyronine (CYTOMEL) 5 mcg tablet, Take 5 mcg by mouth 2 (two) times a day, Disp: , Rfl:   •  methylPREDNISolone 4 MG tablet therapy pack, Use as directed on package, Disp: 21 each, Rfl: 0       History Review:  The following portions of the patient's history were reviewed and updated as appropriate: allergies, current medications, past family history, past medical history, past social history, past surgical history and problem list          OBJECTIVE: Vital signs in last 24 hours: There were no vitals filed for this visit      Mental Status Evaluation:    Appearance age appropriate, casually dressed   Behavior cooperative, calm   Speech normal rate, normal volume, normal pitch   Mood improved, minimally anxious, mildly depressed   Affect normal range and intensity, appropriate   Thought Processes organized, goal directed   Associations intact associations   Thought Content no overt delusions   Perceptual Disturbances: no auditory hallucinations, no visual hallucinations   Abnormal Thoughts  Risk Potential Suicidal ideation - None  Homicidal ideation - None  Potential for aggression - No   Orientation oriented to person, place, time/date and situation   Memory recent and remote memory grossly intact   Consciousness alert and awake   Attention Span Concentration Span attention span and concentration are age appropriate   Intellect appears to be of average intelligence   Insight intact   Judgement intact   Muscle Strength and  Gait normal gait and normal balance   Motor activity no abnormal movements   Language no difficulty naming common objects, no difficulty repeating a phrase   Fund of Knowledge adequate knowledge of current events  adequate fund of knowledge regarding past history  adequate fund of knowledge regarding vocabulary    Pain none   Pain Scale 0       Laboratory Results:   Most Recent Labs:   Lab Results   Component Value Date    WBC 4 92 01/09/2023    RBC 4 66 01/09/2023    HGB 13 1 01/09/2023    HCT 40 7 01/09/2023     01/09/2023    RDW 13 3 01/09/2023    NEUTROABS 2 70 09/25/2021    SODIUM 139 01/09/2023    K 3 6 01/09/2023     01/09/2023    CO2 27 01/09/2023    BUN 5 01/09/2023    CREATININE 0 77 01/09/2023    CALCIUM 9 3 01/09/2023    AST 24 01/09/2023    ALT 31 01/09/2023    ALKPHOS 96 01/09/2023    TP 7 6 01/09/2023    TBILI 0 46 01/09/2023    CHOLESTEROL 179 01/09/2023    TRIG 87 01/09/2023    HDL 39 (L) 01/09/2023 LDLCALC 123 (H) 01/09/2023    [facility] 136 09/25/2021    ZUY7RGENHTEI 6 218 (H) 01/09/2023    FREET4 0 97 01/09/2023    RPR Non-Reactive 12/21/2017     I have personally reviewed all pertinent laboratory/tests results  Assessment/Plan: The patient overall has been doing well with improvement in depression and anxiety and PTSD symptoms  No SI or HI  Plan is to increase the dose of Lexapro to 12 5 mg by mouth daily for depression, anxiety and PTSD  Her other medications will be continued with no change  She was again educated about her medications and advised to call me if there is any concern and to call crisis or visit nearby ER in case of any emergency or having SI  Patient verbalized understanding and agrees with the plan  Diagnoses and all orders for this visit:    Major depressive disorder, recurrent severe without psychotic features ([facility] [address] )  -     escitalopram (Lexapro) 10 mg tablet; Take 1 5 tablets (15 mg total) by mouth daily  -     ARIPiprazole (ABILIFY) 5 mg tablet; Take 1 tablet (5 mg total) by mouth daily    Generalized anxiety disorder with panic attacks  -     escitalopram (Lexapro) 10 mg tablet; Take 1 5 tablets (15 mg total) by mouth daily  -     ARIPiprazole (ABILIFY) 5 mg tablet; Take 1 tablet (5 mg total) by mouth daily    PTSD (post-traumatic stress disorder)  -     escitalopram (Lexapro) 10 mg tablet; Take 1 5 tablets (15 mg total) by mouth daily          Treatment Recommendations/Precautions:      Aware of 24 hour and weekend coverage for urgent situations accessed by calling [facility] main practice number    Risks/Benefits      Risks, Benefits And Possible Side Effects Of Medications:    Risks, benefits, and possible side effects of medications explained to THE [facility] MAR[facility] and she verbalizes understanding and agreement for treatment      Controlled Medication Discussion:     Not applicable    Psychotherapy Provided:     Individual psychotherapy provided: Medications, treatment progress and treatment plan reviewed with Pb  Medication changes discussed with Pb  Medication education provided to THE [facility]  Reassurance and supportive therapy provided  Crisis/safety plan discussed with Pb       Treatment Plan;    Completed and signed during the session: Yes - with Shayy Hines MD 02/06/23

## 2023-02-17 PROBLEM — Z12.4 CERVICAL CANCER SCREENING: Status: RESOLVED | Noted: 2022-12-19 | Resolved: 2023-02-17

## 2023-02-28 DIAGNOSIS — F41.1 GENERALIZED ANXIETY DISORDER WITH PANIC ATTACKS: ICD-10-CM

## 2023-02-28 DIAGNOSIS — F41.0 GENERALIZED ANXIETY DISORDER WITH PANIC ATTACKS: ICD-10-CM

## 2023-02-28 DIAGNOSIS — F33.2 MAJOR DEPRESSIVE DISORDER, RECURRENT SEVERE WITHOUT PSYCHOTIC FEATURES (HCC): ICD-10-CM

## 2023-03-01 DIAGNOSIS — F33.2 MAJOR DEPRESSIVE DISORDER, RECURRENT SEVERE WITHOUT PSYCHOTIC FEATURES (HCC): ICD-10-CM

## 2023-03-01 DIAGNOSIS — F41.0 GENERALIZED ANXIETY DISORDER WITH PANIC ATTACKS: ICD-10-CM

## 2023-03-01 DIAGNOSIS — F41.1 GENERALIZED ANXIETY DISORDER WITH PANIC ATTACKS: ICD-10-CM

## 2023-03-01 DIAGNOSIS — F43.10 PTSD (POST-TRAUMATIC STRESS DISORDER): ICD-10-CM

## 2023-03-01 RX ORDER — ESCITALOPRAM OXALATE 5 MG/1
5 TABLET ORAL DAILY
Qty: 90 TABLET | Refills: 1 | Status: SHIPPED | OUTPATIENT
Start: 2023-03-01 | End: 2023-05-30

## 2023-03-01 RX ORDER — HYDROXYZINE HYDROCHLORIDE 25 MG/1
TABLET, FILM COATED ORAL
Qty: 270 TABLET | Refills: 1 | Status: SHIPPED | OUTPATIENT
Start: 2023-03-01

## 2023-03-01 RX ORDER — ESCITALOPRAM OXALATE 10 MG/1
TABLET ORAL
Qty: 90 TABLET | Refills: 1 | Status: SHIPPED | OUTPATIENT
Start: 2023-03-01

## 2023-03-01 RX ORDER — ARIPIPRAZOLE 5 MG/1
5 TABLET ORAL DAILY
Qty: 90 TABLET | Refills: 1 | Status: SHIPPED | OUTPATIENT
Start: 2023-03-01 | End: 2023-05-30

## 2023-04-06 ENCOUNTER — RA CDI HCC (OUTPATIENT)
Dept: OTHER | Facility: HOSPITAL | Age: 38
End: 2023-04-06

## 2023-04-06 NOTE — PROGRESS NOTES
[facility]  coding opportunities       Chart reviewed, no opportunity found: CHART REVIEWED, NO OPPORTUNITY FOUND        Patients Insurance        Commercial Insurance: [address] [place] [address]

## 2023-04-28 ENCOUNTER — OFFICE VISIT (OUTPATIENT)
Dept: URGENT CARE | Age: 38
End: 2023-04-28

## 2023-04-28 VITALS
RESPIRATION RATE: 18 BRPM | TEMPERATURE: 96 F | BODY MASS INDEX: 34.77 KG/M2 | DIASTOLIC BLOOD PRESSURE: 59 MMHG | SYSTOLIC BLOOD PRESSURE: 124 MMHG | HEART RATE: 64 BPM | WEIGHT: 222 LBS | OXYGEN SATURATION: 100 %

## 2023-04-28 DIAGNOSIS — H60.501 ACUTE OTITIS EXTERNA OF RIGHT EAR, UNSPECIFIED TYPE: Primary | ICD-10-CM

## 2023-04-28 RX ORDER — OFLOXACIN 3 MG/ML
10 SOLUTION AURICULAR (OTIC) DAILY
Qty: 3.5 ML | Refills: 0 | Status: SHIPPED | OUTPATIENT
Start: 2023-04-28 | End: 2023-05-05

## 2023-04-28 NOTE — PATIENT INSTRUCTIONS
Use antibiotic ear drops as directed  Acetaminophen for pain  Recommend warm compresses  PCP follow-up in 3-5 days  Proceed to the ER if symptoms worsen

## 2023-04-28 NOTE — PROGRESS NOTES
[facility]        NAME: Beatriz Michelle is a 42 y o  female  : 1985    MRN: [id number]  DATE: 2023  TIME: 6:11 PM      Assessment and Plan     Acute otitis externa of right ear, unspecified type [H60 501]  1  Acute otitis externa of right ear, unspecified type  ofloxacin (FLOXIN) 0 3 % otic solution            Patient Instructions   Use antibiotic ear drops as directed  Acetaminophen for pain  Recommend warm compresses  PCP follow-up in 3-5 days  Proceed to the ER if symptoms worsen  Chief Complaint     Chief Complaint   Patient presents with   • Earache     Sharp stabbing pain right ear  Started earlier today  Hurts with turning neck  No discharge  History of Present Illness     Patient is a 75-year-old female who presents with a right ear pain that started this morning  Reports pain radiates down side of face  Reports headache  Reports chills  Denies vomiting or diarrhea  Denies cough  Review of Systems     Review of Systems   Constitutional: Positive for chills  Negative for fever  HENT: Positive for ear pain  Negative for congestion and sore throat  Respiratory: Negative for cough  Gastrointestinal: Negative for diarrhea and vomiting  Neurological: Positive for headaches  All other systems reviewed and are negative          Current Medications       Current Outpatient Medications:   •  escitalopram (LEXAPRO) 10 mg tablet, TAKE 1 TAB (10 MG) BY MOUTH DAILY IN ADDITION TO 5 MG DAILY FOR TOTAL DOSE OF 15 MG DAILY, Disp: 90 tablet, Rfl: 1  •  escitalopram (LEXAPRO) 5 mg tablet, TAKE 1 TABLET (5 MG TOTAL) BY MOUTH DAILY IN ADDITION TO 10 MG DAILY, Disp: 90 tablet, Rfl: 1  •  hydrOXYzine HCL (ATARAX) 25 mg tablet, TAKE 1 TABLET BY MOUTH 3 TIMES A DAY AS NEEDED FOR ANXIETY , Disp: 270 tablet, Rfl: 1  •  levothyroxine 175 mcg tablet, Take 175 mcg by mouth daily, Disp: , Rfl:   •  liothyronine (CYTOMEL) 5 mcg tablet, Take 5 mcg by mouth 2 (two) times a day, Disp: , Rfl:   •  ofloxacin (FLOXIN) 0 3 % otic solution, Administer 10 drops to the right ear daily for 7 days, Disp: 3 5 mL, Rfl: 0  •  albuterol (ProAir HFA) 90 mcg/act inhaler, Inhale 2 puffs every 6 (six) hours as needed for wheezing or shortness of breath (Patient not taking: Reported on 4/28/2023), Disp: 8 5 g, Rfl: 0  •  ARIPiprazole (ABILIFY) 5 mg tablet, TAKE 1 TABLET (5 MG TOTAL) BY MOUTH DAILY   (Patient not taking: Reported on 4/28/2023), Disp: 90 tablet, Rfl: 1  •  benzonatate (TESSALON PERLES) 100 mg capsule, Take 1 capsule (100 mg total) by mouth 3 (three) times a day as needed for cough for up to 40 doses (Patient not taking: Reported on 4/28/2023), Disp: 40 capsule, Rfl: 1  •  ergocalciferol (VITAMIN D2) 50,000 units, Take 1 capsule (50,000 Units total) by mouth once a week for 6 days, Disp: 6 capsule, Rfl: 0  •  methylPREDNISolone 4 MG tablet therapy pack, Use as directed on package (Patient not taking: Reported on 4/28/2023), Disp: 21 each, Rfl: 0    Current Allergies     Allergies as of 04/28/2023 - Reviewed 04/28/2023   Allergen Reaction Noted   • Bactrim [sulfamethoxazole-trimethoprim] Hives 04/06/2016   • Other Hives 04/06/2016   • Shellfish-derived products - food allergy  09/07/2018   • Sulfamethoxazole Hives 05/25/2018   • Trimethoprim Hives 05/25/2018              The following portions of the patient's history were reviewed and updated as appropriate: allergies, current medications, past family history, past medical history, past social history, past surgical history and problem list      Past Medical History:   Diagnosis Date   • Anxiety    • Chlamydial infection 4/7/2017   • Crohn's disease ([facility] [address] )    • Depression    • Disease of thyroid gland    • Fibromyalgia    • Gestational diabetes    • Gestational hypertension     previous pregnancy   • Hashimoto's thyroiditis    • HPV (human papilloma virus) infection    • Oligohydramnios in galvez pregnancy in second trimester 9/12/2017   • Retained placenta    • Urogenital trichomoniasis 4/7/2017       Past Surgical History:   Procedure Laterality Date   • CHOLECYSTECTOMY     • DILATION AND CURETTAGE OF UTERUS     • HERNIA REPAIR     • UMBILICAL HERNIA REPAIR         Family History   Problem Relation Age of Onset   • Anuerysm Mother    • Thyroid disease Mother    • Hepatitis Mother    • Lung cancer Maternal Grandmother    • Colon cancer Maternal Grandfather          Medications have been verified  Objective     /59   Pulse 64   Temp (!) 96 °F (35 6 °C) (Tympanic)   Resp 18   Wt 101 kg (222 lb)   SpO2 100%   BMI 34 77 kg/m²   No LMP recorded  Physical Exam     Physical Exam  Vitals and nursing note reviewed  Constitutional:       General: She is awake  She is not in acute distress  Appearance: Normal appearance  She is not ill-appearing, toxic-appearing or diaphoretic  HENT:      Right Ear: Swelling and tenderness (Erythema to right ear canal consistent with otitis externa) present  Tympanic membrane is not injected or erythematous  Left Ear: Ear canal and external ear normal  Tympanic membrane is not injected or erythematous  Nose: Nose normal       Mouth/Throat:      Lips: Pink  Mouth: Mucous membranes are moist       Pharynx: Oropharynx is clear  Uvula midline  No oropharyngeal exudate or posterior oropharyngeal erythema  Cardiovascular:      Rate and Rhythm: Normal rate  Pulses: Normal pulses  Heart sounds: Normal heart sounds, S1 normal and S2 normal    Pulmonary:      Effort: Pulmonary effort is normal       Breath sounds: Normal breath sounds and air entry  Skin:     General: Skin is warm  Capillary Refill: Capillary refill takes less than 2 seconds  Neurological:      Mental Status: She is alert  Psychiatric:         Mood and Affect: Mood normal          Behavior: Behavior normal          Thought Content:  Thought content normal          Judgment: Judgment normal

## 2023-05-09 ENCOUNTER — OFFICE VISIT (OUTPATIENT)
Dept: PSYCHIATRY | Facility: CLINIC | Age: 38
End: 2023-05-09

## 2023-05-09 DIAGNOSIS — F33.2 MAJOR DEPRESSIVE DISORDER, RECURRENT SEVERE WITHOUT PSYCHOTIC FEATURES (HCC): Primary | ICD-10-CM

## 2023-05-09 DIAGNOSIS — F41.0 GENERALIZED ANXIETY DISORDER WITH PANIC ATTACKS: ICD-10-CM

## 2023-05-09 DIAGNOSIS — F43.10 PTSD (POST-TRAUMATIC STRESS DISORDER): ICD-10-CM

## 2023-05-09 DIAGNOSIS — F41.1 GENERALIZED ANXIETY DISORDER WITH PANIC ATTACKS: ICD-10-CM

## 2023-05-09 DIAGNOSIS — R79.89 LOW VITAMIN D LEVEL: ICD-10-CM

## 2023-05-09 RX ORDER — ESCITALOPRAM OXALATE 20 MG/1
20 TABLET ORAL DAILY
Qty: 30 TABLET | Refills: 2 | Status: SHIPPED | OUTPATIENT
Start: 2023-05-09 | End: 2023-08-07

## 2023-05-09 NOTE — PSYCH
MEDICATION MANAGEMENT NOTE        [facility]      Name and Date of Birth:  Ranjith Han 42 y o  1985 MRN: [id number]    Date of Visit: May 9, 2023    Reason for Visit: Follow-up for medication management  Subjective: The patient reports that depression has been well controlled but still feels anxious for last few months  Reports started around the time she was moving to her new home  Reports since then has been feeling anxious on a regular basis but denies any major trigger or stressor  Reports sleep has been overall good  Denies any hopelessness or suicidal thoughts  Reports appetite, energy level has been okay  The patient reported that when she wakes up in the morning she feels very tired and pain in the legs  At times feels very cold  Had followed with rheumatologist and was told that it could be possibly fibromyalgia  Though no treatment has been started  She also reported that she would do well if she is walking around or moving all day but when she sits down she will feel a lot of pain and coldness in her legs  The patient vitamin D was low earlier this year and she is taking vitamin D for it  She also reported her  TSH was high earlier this year and she is due for another test soon  Her concerns were validated and provided with support  The patient was also encouraged to eat healthy and regular exercise  I also will order the vitamin D level again today to check if it is back to normal range  Given the patient concern of daytime tiredness she also was advised to consider getting sleep study done to rule out sleep apnea  The patient agrees to talk to her primary care physician for referral   Denies any other mental health concern  The patient reports she has been taking Lexapro as prescribed and hydroxyzine when needed but has stopped taking Abilify as her insurance was not covering it and it was very expensive    She stopped 1 month back but reports her mood has been stable with no worsening of depression since being off that  Reports mild increase in anxiety but also feels anxiety was already there even before she stopped the medication  No other concerns reported  Reports work has been going well  Review Of Systems:      Constitutional negative   ENT negative   Cardiovascular negative   Respiratory negative   Gastrointestinal negative   Genitourinary negative   Musculoskeletal negative   Integumentary negative   Neurological negative   Endocrine negative   Other Symptoms none       Alcohol/Substance Abuse: Denies        Past Medical History:    Past Medical History:   Diagnosis Date   • Anxiety    • Chlamydial infection 4/7/2017   • Crohn's disease ([facility] [address] )    • Depression    • Disease of thyroid gland    • Fibromyalgia    • Gestational diabetes    • Gestational hypertension     previous pregnancy   • Hashimoto's thyroiditis    • HPV (human papilloma virus) infection    • Oligohydramnios in galvez pregnancy in second trimester 9/12/2017   • Retained placenta    • Urogenital trichomoniasis 4/7/2017        Past Surgical History:   Procedure Laterality Date   • CHOLECYSTECTOMY     • DILATION AND CURETTAGE OF UTERUS     • HERNIA REPAIR     • UMBILICAL HERNIA REPAIR       Allergies   Allergen Reactions   • Bactrim [Sulfamethoxazole-Trimethoprim] Hives   • Other Hives     seafood   • Shellfish-Derived Products - Food Allergy    • Sulfamethoxazole Hives   • Trimethoprim Hives       Current Medications:       Current Outpatient Medications:   •  escitalopram (LEXAPRO) 20 mg tablet, Take 1 tablet (20 mg total) by mouth daily, Disp: 30 tablet, Rfl: 2  •  albuterol (ProAir HFA) 90 mcg/act inhaler, Inhale 2 puffs every 6 (six) hours as needed for wheezing or shortness of breath (Patient not taking: Reported on 4/28/2023), Disp: 8 5 g, Rfl: 0  •  ARIPiprazole (ABILIFY) 5 mg tablet, TAKE 1 TABLET (5 MG TOTAL) BY MOUTH DAILY   (Patient not taking: Reported on 4/28/2023), Disp: 90 tablet, Rfl: 1  •  benzonatate (TESSALON PERLES) 100 mg capsule, Take 1 capsule (100 mg total) by mouth 3 (three) times a day as needed for cough for up to 40 doses (Patient not taking: Reported on 4/28/2023), Disp: 40 capsule, Rfl: 1  •  ergocalciferol (VITAMIN D2) 50,000 units, Take 1 capsule (50,000 Units total) by mouth once a week for 6 days, Disp: 6 capsule, Rfl: 0  •  hydrOXYzine HCL (ATARAX) 25 mg tablet, TAKE 1 TABLET BY MOUTH 3 TIMES A DAY AS NEEDED FOR ANXIETY , Disp: 270 tablet, Rfl: 1  •  levothyroxine 175 mcg tablet, Take 175 mcg by mouth daily, Disp: , Rfl:   •  liothyronine (CYTOMEL) 5 mcg tablet, Take 5 mcg by mouth 2 (two) times a day, Disp: , Rfl:   •  methylPREDNISolone 4 MG tablet therapy pack, Use as directed on package (Patient not taking: Reported on 4/28/2023), Disp: 21 each, Rfl: 0       History Review: The following portions of the patient's history were reviewed and updated as appropriate: allergies, current medications, past family history, past medical history, past social history, past surgical history and problem list          OBJECTIVE:     Vital signs in last 24 hours: There were no vitals filed for this visit      Mental Status Evaluation:    Appearance age appropriate, casually dressed   Behavior cooperative, calm   Speech normal rate, normal volume, normal pitch   Mood anxious   Affect normal range and intensity, appropriate   Thought Processes organized, goal directed   Associations intact associations   Thought Content no overt delusions   Perceptual Disturbances: no auditory hallucinations, no visual hallucinations   Abnormal Thoughts  Risk Potential Suicidal ideation - None  Homicidal ideation - None  Potential for aggression - No   Orientation oriented to person, place, time/date and situation   Memory recent and remote memory grossly intact   Consciousness alert and awake   Attention Span Concentration Span attention span and concentration are age appropriate   Intellect appears to be of average intelligence   Insight intact   Judgement intact   Muscle Strength and  Gait normal gait and normal balance   Motor activity no abnormal movements   Language no difficulty naming common objects, no difficulty repeating a phrase   Fund of Knowledge adequate knowledge of current events  adequate fund of knowledge regarding past history  adequate fund of knowledge regarding vocabulary    Pain none   Pain Scale 0       Laboratory Results:   Most Recent Labs:   Lab Results   Component Value Date    WBC 4 92 01/09/2023    RBC 4 66 01/09/2023    HGB 13 1 01/09/2023    HCT 40 7 01/09/2023     01/09/2023    RDW 13 3 01/09/2023    NEUTROABS 2 70 09/25/2021    SODIUM 139 01/09/2023    K 3 6 01/09/2023     01/09/2023    CO2 27 01/09/2023    BUN 5 01/09/2023    CREATININE 0 77 01/09/2023    CALCIUM 9 3 01/09/2023    AST 24 01/09/2023    ALT 31 01/09/2023    ALKPHOS 96 01/09/2023    TP 7 6 01/09/2023    TBILI 0 46 01/09/2023    CHOLESTEROL 179 01/09/2023    TRIG 87 01/09/2023    HDL 39 (L) 01/09/2023    LDLCALC 123 (H) 01/09/2023    [facility] 136 09/25/2021    BLI2UEWVLFFX 6 218 (H) 01/09/2023    FREET4 0 97 01/09/2023    RPR Non-Reactive 12/21/2017     I have personally reviewed all pertinent laboratory/tests results  Assessment/Plan: Patient concerned about anxiety over the last few months though denies any depression nowadays  Plan is to increase the dose of Lexapro to 20 mg daily for management of anxiety  We will continue with hydroxyzine as needed for anxiety  In the future if the patient continues struggle with anxiety and leg pain might consider starting gabapentin  The patient educated about her medications in detail including benefit, risk, side effect, alternative, contraindication, dosage and frequency  She was advised to call me if there is any concern and to call crisis or visit nearby ER in case of any emergency    The patient verbalized understanding and agrees with the plan  Diagnoses and all orders for this visit:    Major depressive disorder, recurrent severe without psychotic features ([facility] [address] )  -     escitalopram (LEXAPRO) 20 mg tablet; Take 1 tablet (20 mg total) by mouth daily    Low vitamin D level  -     Vitamin D 25 hydroxy; Future    Generalized anxiety disorder with panic attacks  -     escitalopram (LEXAPRO) 20 mg tablet; Take 1 tablet (20 mg total) by mouth daily    PTSD (post-traumatic stress disorder)  -     escitalopram (LEXAPRO) 20 mg tablet; Take 1 tablet (20 mg total) by mouth daily          Treatment Recommendations/Precautions:      Aware of 24 hour and weekend coverage for urgent situations accessed by calling [facility] main practice number    Risks/Benefits      Risks, Benefits And Possible Side Effects Of Medications:    Risks, benefits, and possible side effects of medications explained to [facility] and she verbalizes understanding and agreement for treatment  Risks of medications in pregnancy explained to [facility]  She verbalizes understanding and agrees to notify her doctor if she becomes pregnant  Controlled Medication Discussion:     Not applicable    Psychotherapy Provided:     Individual psychotherapy provided: Counseling was provided during the session today for 10 minutes  Medications, treatment progress and treatment plan reviewed with Pb  Medication changes discussed with Pb  Medication education provided to [facility]  Reassurance and supportive therapy provided  Crisis/safety plan discussed with Pb       Treatment Plan;    Completed and signed during the session: Not applicable - Treatment Plan not due at this session    Bishop Peter MD 05/09/23

## 2023-05-23 ENCOUNTER — TELEPHONE (OUTPATIENT)
Dept: PSYCHIATRY | Facility: CLINIC | Age: 38
End: 2023-05-23

## 2023-05-23 NOTE — TELEPHONE ENCOUNTER
Gilford Arn left a message at [address] regarding medication  Lexapro was increased to 20 mg at last visit  If she takes it during the day, it makes her sleepy, if she takes it at night, she is anxious during the day  Asking if she can split the dose to take 10 mg and 10 mg at HS?

## 2023-05-23 NOTE — TELEPHONE ENCOUNTER
Follow up call to Murphy Cox  Informed her that typically Lexapro dosing is just once daily but due to her concerns, Dr Neda Arnold is ok with her taking the 20 MG tablet 1/2 in the am and 1/2 in the pm   Also asked about her sleep referral from her PCP and she informed me that she has not been able to see her PCP yet but she will be having her blood work done this week

## 2023-06-07 ENCOUNTER — TELEPHONE (OUTPATIENT)
Dept: INTERNAL MEDICINE CLINIC | Facility: CLINIC | Age: 38
End: 2023-06-07

## 2023-06-07 NOTE — TELEPHONE ENCOUNTER
Pt called she stated that she saw he psychiatrist and she would like for you to order a sleep study for Diag:day time tiredness   Thank you

## 2023-06-13 ENCOUNTER — OFFICE VISIT (OUTPATIENT)
Dept: INTERNAL MEDICINE CLINIC | Facility: CLINIC | Age: 38
End: 2023-06-13
Payer: COMMERCIAL

## 2023-06-13 VITALS
SYSTOLIC BLOOD PRESSURE: 116 MMHG | OXYGEN SATURATION: 98 % | HEIGHT: 67 IN | DIASTOLIC BLOOD PRESSURE: 82 MMHG | WEIGHT: 221.8 LBS | RESPIRATION RATE: 18 BRPM | HEART RATE: 62 BPM | BODY MASS INDEX: 34.81 KG/M2 | TEMPERATURE: 97.8 F

## 2023-06-13 DIAGNOSIS — E55.9 VITAMIN D DEFICIENCY: ICD-10-CM

## 2023-06-13 DIAGNOSIS — E06.3 HYPOTHYROIDISM DUE TO HASHIMOTO'S THYROIDITIS: ICD-10-CM

## 2023-06-13 DIAGNOSIS — F41.0 GENERALIZED ANXIETY DISORDER WITH PANIC ATTACKS: ICD-10-CM

## 2023-06-13 DIAGNOSIS — F41.1 GENERALIZED ANXIETY DISORDER WITH PANIC ATTACKS: ICD-10-CM

## 2023-06-13 DIAGNOSIS — E03.8 HYPOTHYROIDISM DUE TO HASHIMOTO'S THYROIDITIS: ICD-10-CM

## 2023-06-13 DIAGNOSIS — Z91.89 AT RISK FOR OBSTRUCTIVE SLEEP APNEA: Primary | ICD-10-CM

## 2023-06-13 PROBLEM — M87.039 AVASCULAR NECROSIS OF SCAPHOID (HCC): Status: RESOLVED | Noted: 2022-12-19 | Resolved: 2023-06-13

## 2023-06-13 PROBLEM — L02.224 BOIL OF GROIN: Status: RESOLVED | Noted: 2020-09-15 | Resolved: 2023-06-13

## 2023-06-13 PROBLEM — R05.8 POST-VIRAL COUGH SYNDROME: Status: RESOLVED | Noted: 2023-01-20 | Resolved: 2023-06-13

## 2023-06-13 PROBLEM — L73.2 HIDRADENITIS: Status: RESOLVED | Noted: 2021-02-15 | Resolved: 2023-06-13

## 2023-06-13 PROBLEM — M92.529 SCHLATTER-OSGOOD DISEASE: Status: RESOLVED | Noted: 2017-04-21 | Resolved: 2023-06-13

## 2023-06-13 PROCEDURE — 99214 OFFICE O/P EST MOD 30 MIN: CPT | Performed by: STUDENT IN AN ORGANIZED HEALTH CARE EDUCATION/TRAINING PROGRAM

## 2023-06-13 RX ORDER — MULTIVITAMIN
1 TABLET ORAL DAILY
Qty: 90 TABLET | Refills: 1 | Status: SHIPPED | OUTPATIENT
Start: 2023-06-13 | End: 2023-09-11

## 2023-06-13 RX ORDER — CHOLESTYRAMINE 4 G/9G
POWDER, FOR SUSPENSION ORAL
COMMUNITY
Start: 2023-06-05

## 2023-06-13 RX ORDER — PANTOPRAZOLE SODIUM 40 MG/1
TABLET, DELAYED RELEASE ORAL
COMMUNITY
Start: 2023-05-25

## 2023-06-13 NOTE — PROGRESS NOTES
"INTERNAL MEDICINE OFFICE VISIT NOTE  [facility] [place]    NAME: María Elena Benavides  AGE: 42 y o  SEX: female    DATE OF ENCOUNTER: 6/13/2023       Chief Complaint   Patient presents with   • Follow-up     Pt would like to discuss sleep study recommended by psychiatrist      Reports she has been following with psychiatry who recommended a sleep study, hence she wants to discuss this  Reports episodes of \"Breathing heavy\" during her sleep, occasional morning headaches, daytime somnolence  Review of Systems  10 point ROS negative except per HPI    OBJECTIVE:  Vitals:    06/13/23 1611   BP: 116/82   BP Location: Left arm   Patient Position: Sitting   Cuff Size: Large   Pulse: 62   Resp: 18   Temp: 97 8 °F (36 6 °C)   SpO2: 98%   Weight: 101 kg (221 lb 12 8 oz)   Height: 5' 7\" (1 702 m)       Physical Exam:   GENERAL: NAD, Normal appearance, well-developed, well-nourished  NEUROLOGIC:  Alert/oriented x3  HEENT:  NC/AT, EOMI, MMM, no scleral icterus  CARDIAC:  RRR, +S1/S2, no S3/S4 heard, no m/g/r  PULMONARY:  non-labored breathing, CTA B/L, no wheezing/rales/rhonci appreciated at time of encounter  ABDOMEN:  Obese, Soft, NT/ND, +BS, no rebound/guarding/rigidity  Extremities:  2+ Pulses in DP/PT  No edema, cyanosis  SKIN:  No rashes or erythema    ASSESSMENT/PLAN:    1  At risk for obstructive sleep apnea  -     Ambulatory Referral to Sleep Medicine; Future    2  Vitamin D deficiency  -     Multiple Vitamin (multivitamin) tablet; Take 1 tablet by mouth daily    3  Generalized anxiety disorder with panic attacks  Assessment & Plan:  Following with psychiatry  Currently on Lexapro 20 mg and Atarax 25 mg as needed      4   Hypothyroidism due to Hashimoto's thyroiditis  Assessment & Plan:  Following with endocrinology at [facility]  Currently on levothyroxine 175 mcg daily          Current Outpatient Medications:   •  cholestyramine (QUESTRAN) 4 GM/DOSE powder, , Disp: , Rfl:   •  ergocalciferol " (VITAMIN D2) 50,000 units, Take 1 capsule (50,000 Units total) by mouth once a week for 6 days, Disp: 6 capsule, Rfl: 0  •  escitalopram (LEXAPRO) 20 mg tablet, Take 1 tablet (20 mg total) by mouth daily, Disp: 30 tablet, Rfl: 2  •  hydrOXYzine HCL (ATARAX) 25 mg tablet, TAKE 1 TABLET BY MOUTH 3 TIMES A DAY AS NEEDED FOR ANXIETY , Disp: 270 tablet, Rfl: 1  •  levothyroxine 175 mcg tablet, Take 175 mcg by mouth daily, Disp: , Rfl:   •  liothyronine (CYTOMEL) 5 mcg tablet, Take 5 mcg by mouth 2 (two) times a day, Disp: , Rfl:   •  pantoprazole (PROTONIX) 40 mg tablet, take 1 tablet by mouth before breakfast, Disp: , Rfl:   •  albuterol (ProAir HFA) 90 mcg/act inhaler, Inhale 2 puffs every 6 (six) hours as needed for wheezing or shortness of breath (Patient not taking: Reported on 4/28/2023), Disp: 8 5 g, Rfl: 0    TCM Call     None      TCM Call     None             Radha May MD  [facility] [place]    * Please Note: This note was completed in part utilizing a dictation software  Grammatical errors, random word insertions, spelling mistakes, and incomplete sentences may be an occasional consequence of this system secondary to software limitations, ambient noise, and hardware issues  If you have any questions or concerns about the content, text, or information contained within the body of this dictation, please contact the provider for clarification  **

## 2023-06-13 NOTE — ASSESSMENT & PLAN NOTE
Following with endocrinology at [facility]  Currently on levothyroxine 175 mcg daily

## 2023-08-08 ENCOUNTER — APPOINTMENT (OUTPATIENT)
Dept: LAB | Facility: HOSPITAL | Age: 38
End: 2023-08-08
Payer: COMMERCIAL

## 2023-08-08 ENCOUNTER — OFFICE VISIT (OUTPATIENT)
Dept: GYNECOLOGY | Facility: CLINIC | Age: 38
End: 2023-08-08
Payer: COMMERCIAL

## 2023-08-08 VITALS
DIASTOLIC BLOOD PRESSURE: 74 MMHG | BODY MASS INDEX: 34.72 KG/M2 | SYSTOLIC BLOOD PRESSURE: 116 MMHG | WEIGHT: 221.2 LBS | HEIGHT: 67 IN

## 2023-08-08 DIAGNOSIS — Z12.31 ENCOUNTER FOR MAMMOGRAM TO ESTABLISH BASELINE MAMMOGRAM: ICD-10-CM

## 2023-08-08 DIAGNOSIS — N94.6 DYSMENORRHEA: ICD-10-CM

## 2023-08-08 DIAGNOSIS — Z86.2 HISTORY OF ANEMIA: ICD-10-CM

## 2023-08-08 DIAGNOSIS — Z01.419 WOMEN'S ANNUAL ROUTINE GYNECOLOGICAL EXAMINATION: ICD-10-CM

## 2023-08-08 DIAGNOSIS — Z30.011 ENCOUNTER FOR INITIAL PRESCRIPTION OF CONTRACEPTIVE PILLS: ICD-10-CM

## 2023-08-08 DIAGNOSIS — N92.1 MENORRHAGIA WITH IRREGULAR CYCLE: ICD-10-CM

## 2023-08-08 DIAGNOSIS — E03.9 HYPOTHYROIDISM (ACQUIRED): ICD-10-CM

## 2023-08-08 DIAGNOSIS — Z86.59 HISTORY OF ANXIETY: ICD-10-CM

## 2023-08-08 DIAGNOSIS — R10.2 PELVIC PAIN: ICD-10-CM

## 2023-08-08 DIAGNOSIS — Z64.1 GRAND MULTIPARITY: ICD-10-CM

## 2023-08-08 LAB
BASOPHILS # BLD AUTO: 0.05 THOUSANDS/ÂΜL (ref 0–0.1)
BASOPHILS NFR BLD AUTO: 1 % (ref 0–1)
EOSINOPHIL # BLD AUTO: 0.08 THOUSAND/ÂΜL (ref 0–0.61)
EOSINOPHIL NFR BLD AUTO: 1 % (ref 0–6)
ERYTHROCYTE [DISTWIDTH] IN BLOOD BY AUTOMATED COUNT: 14.4 % (ref 11.6–15.1)
HCT VFR BLD AUTO: 36.2 % (ref 34.8–46.1)
HGB BLD-MCNC: 10.9 G/DL (ref 11.5–15.4)
IMM GRANULOCYTES # BLD AUTO: 0.02 THOUSAND/UL (ref 0–0.2)
IMM GRANULOCYTES NFR BLD AUTO: 0 % (ref 0–2)
LYMPHOCYTES # BLD AUTO: 1.84 THOUSANDS/ÂΜL (ref 0.6–4.47)
LYMPHOCYTES NFR BLD AUTO: 27 % (ref 14–44)
MCH RBC QN AUTO: 24.2 PG (ref 26.8–34.3)
MCHC RBC AUTO-ENTMCNC: 30.1 G/DL (ref 31.4–37.4)
MCV RBC AUTO: 80 FL (ref 82–98)
MONOCYTES # BLD AUTO: 0.55 THOUSAND/ÂΜL (ref 0.17–1.22)
MONOCYTES NFR BLD AUTO: 8 % (ref 4–12)
NEUTROPHILS # BLD AUTO: 4.36 THOUSANDS/ÂΜL (ref 1.85–7.62)
NEUTS SEG NFR BLD AUTO: 63 % (ref 43–75)
NRBC BLD AUTO-RTO: 0 /100 WBCS
PLATELET # BLD AUTO: 231 THOUSANDS/UL (ref 149–390)
PMV BLD AUTO: 11.5 FL (ref 8.9–12.7)
RBC # BLD AUTO: 4.51 MILLION/UL (ref 3.81–5.12)
TSH SERPL DL<=0.05 MIU/L-ACNC: 5.84 UIU/ML (ref 0.45–4.5)
WBC # BLD AUTO: 6.9 THOUSAND/UL (ref 4.31–10.16)

## 2023-08-08 PROCEDURE — 84443 ASSAY THYROID STIM HORMONE: CPT

## 2023-08-08 PROCEDURE — 85025 COMPLETE CBC W/AUTO DIFF WBC: CPT

## 2023-08-08 PROCEDURE — 36415 COLL VENOUS BLD VENIPUNCTURE: CPT

## 2023-08-08 PROCEDURE — S0610 ANNUAL GYNECOLOGICAL EXAMINA: HCPCS | Performed by: OBSTETRICS & GYNECOLOGY

## 2023-08-08 PROCEDURE — 84439 ASSAY OF FREE THYROXINE: CPT

## 2023-08-08 RX ORDER — NORETHINDRONE ACETATE AND ETHINYL ESTRADIOL, ETHINYL ESTRADIOL AND FERROUS FUMARATE 1MG-10(24)
1 KIT ORAL DAILY
Qty: 84 TABLET | Refills: 1 | Status: SHIPPED | OUTPATIENT
Start: 2023-08-08 | End: 2023-10-31

## 2023-08-08 NOTE — PROGRESS NOTES
Assessment   Annual well woman exam  Grand multiparity,  8 para 7-0-1-8  Menorrhagia with irregular cycle  Dysmenorrhea  Pelvic pain  History of anemia hypothyroidism        Plan  New start OCPs, lo Loestrin  Screening laboratory studies ordered  Pelvic ultrasound ordered  History of cervical dysplasia, normal Pap in , next Pap due in   Reviewed self breast exam techniques  Follow-up in 1 week for pelvic ultrasound and test results   All questions answered. Subjective here for annual exam     Mary Olivia is a 42 y.o. female  8 para 7 complains of menorrhagia with irregular cycles. Who presents for annual exam.  Currently in a same-sex relationship for the past several years. Periods are irregular, lasting 7 to 10 days days. Dysmenorrhea:moderate, occurring throughout menses. Cyclic symptoms include anxiety, irritability and pelvic pain. No intermenstrual bleeding, spotting, or discharge. The patient reports that there is not domestic violence in her life. Current contraception: none  History of abnormal Pap smear: yes -history of cervical dysplasia and several years ago  Family history of uterine or ovarian cancer: no  Regular self breast exam: yes  History of abnormal mammogram: no  Family history of breast cancer: no  History of abnormal lipids: no  Menstrual History:  OB History        8    Para   7    Term   5       2    AB   1    Living   6       SAB   1    IAB   0    Ectopic   0    Multiple   0    Live Births   9                Menarche age: 13  Patient's last menstrual period was 2023. Review of Systems  Pertinent items are noted in HPI.       Objective no acute distress   /74 (BP Location: Left arm, Patient Position: Sitting, Cuff Size: Standard)   Ht 5' 7" (1.702 m)   Wt 100 kg (221 lb 3.2 oz)   LMP 2023   BMI 34.64 kg/m²     General:   alert and oriented, in no acute distress, alert, appears stated age and cooperative   Heart: regular rate and rhythm, S1, S2 normal, no murmur, click, rub or gallop   Lungs: clear to auscultation bilaterally   Abdomen: soft, non-tender, without masses or organomegaly   Vulva: normal, Bartholin's, Urethra, Weaver's normal, female escutcheon   Vagina: normal mucosa, normal discharge, no palpable nodules   Cervix: anteverted, multiparous appearance, no cervical motion tenderness, no lesions and Pap smear deferred   Uterus: normal size, anteverted, mobile, non-tender, normal shape and consistency   Adnexa: normal adnexa no adnexal masses   Bilateral breast exam in the sitting and supine position with chaperone present, no visible or palpable breast lesions identified. No breast masses noted. No supraclavicular or axillary lymphadenopathy noted. No nipple discharge. Reviewed self-breast exam techniques.    Rectal exam,  deferred

## 2023-08-09 DIAGNOSIS — D50.0 IRON DEFICIENCY ANEMIA DUE TO CHRONIC BLOOD LOSS: Primary | ICD-10-CM

## 2023-08-09 LAB — T4 FREE SERPL-MCNC: 0.66 NG/DL (ref 0.61–1.12)

## 2023-08-09 RX ORDER — FERROUS SULFATE TAB EC 324 MG (65 MG FE EQUIVALENT) 324 (65 FE) MG
324 TABLET DELAYED RESPONSE ORAL
Qty: 30 TABLET | Refills: 3 | Status: SHIPPED | OUTPATIENT
Start: 2023-08-09 | End: 2023-09-08

## 2023-08-15 ENCOUNTER — OFFICE VISIT (OUTPATIENT)
Dept: URGENT CARE | Age: 38
End: 2023-08-15
Payer: COMMERCIAL

## 2023-08-15 VITALS
OXYGEN SATURATION: 99 % | SYSTOLIC BLOOD PRESSURE: 127 MMHG | HEART RATE: 88 BPM | TEMPERATURE: 96.4 F | DIASTOLIC BLOOD PRESSURE: 59 MMHG | RESPIRATION RATE: 18 BRPM

## 2023-08-15 DIAGNOSIS — R05.1 ACUTE COUGH: Primary | ICD-10-CM

## 2023-08-15 LAB
SARS-COV-2 AG UPPER RESP QL IA: NEGATIVE
VALID CONTROL: NORMAL

## 2023-08-15 PROCEDURE — 87811 SARS-COV-2 COVID19 W/OPTIC: CPT | Performed by: EMERGENCY MEDICINE

## 2023-08-15 PROCEDURE — 99213 OFFICE O/P EST LOW 20 MIN: CPT | Performed by: EMERGENCY MEDICINE

## 2023-08-15 PROCEDURE — 87635 SARS-COV-2 COVID-19 AMP PRB: CPT | Performed by: EMERGENCY MEDICINE

## 2023-08-16 LAB — SARS-COV-2 RNA RESP QL NAA+PROBE: NEGATIVE

## 2023-08-16 NOTE — PROGRESS NOTES
[facility]        NAME: Liliane Lewis is a 42 y.o. female  : 1985    MRN: [id number]  DATE: August 15, 2023  TIME: 8:58 PM    Assessment and Plan   Acute cough [R05.1]  1. Acute cough  Poct Covid 19 Rapid Antigen Test    COVID Only -Office Collect            Patient Instructions       Follow up with PCP in 3-5 days. Proceed to  ER if symptoms worsen. Chief Complaint     Chief Complaint   Patient presents with   • Cough     Cough, runny nose, HA, body aches. Sx for 2 days . 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 -Point-of-care COVID testing negative, will send formal PCR  -Patient given work note, advised to continue supportive care at home to include over-the-counter decongestions and cold and flu medication as well as rest and fluid hydration  -Advised patient to seek care in ED if she develops worsening symptoms to include intractable vomiting, chest pain, worsening shortness of breath  -All questions answered at bedside, patient is amenable to plan and voiced understanding    History of Present Illness       26-year-old female with history of anxiety, Crohn's disease presents with chief complaint of 2 days of generalized body aches, dry nonproductive cough, nasal congestion, sinus pain and pressure and generalized malaise. 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 She denies associated nausea, vomiting or diarrhea. Also endorsing a bifrontal headache without associated photophobia, neck pain or focal sensorimotor deficits in her extremities dysarthria or seizures. Cough  This is a new problem. The current episode started in the past 7 days. The problem has been unchanged. The cough is non-productive.  Associated symptoms include chills, headaches, nasal congestion, postnasal drip, rhinorrhea and a sore throat. Pertinent negatives include no chest pain, ear congestion, ear pain, fever, heartburn, hemoptysis, myalgias, rash, shortness of breath, sweats, weight loss or wheezing. Review of Systems   Review of Systems   Constitutional: Positive for activity change, appetite change, chills and fatigue. Negative for diaphoresis, fever and weight loss. HENT: Positive for postnasal drip, rhinorrhea and sore throat. Negative for congestion, ear pain, sinus pressure, sinus pain and sneezing. Eyes: Negative for pain and visual disturbance. Respiratory: Positive for cough. Negative for hemoptysis, choking, shortness of breath, wheezing and stridor. Cardiovascular: Negative for chest pain and palpitations. Gastrointestinal: Negative for abdominal pain, heartburn, nausea and vomiting. Genitourinary: Negative for dysuria and hematuria. Musculoskeletal: Positive for joint swelling. Negative for arthralgias, back pain, gait problem, myalgias, neck pain and neck stiffness. Skin: Negative for color change and rash. Neurological: Positive for headaches. Negative for dizziness, tremors, seizures, syncope, facial asymmetry, speech difficulty, weakness, light-headedness and numbness. All other systems reviewed and are negative.         Current Medications       Current Outpatient Medications:   •  ferrous sulfate 324 (65 Fe) mg, Take 1 tablet (324 mg total) by mouth daily before breakfast, Disp: 30 tablet, Rfl: 3  •  hydrOXYzine HCL (ATARAX) 25 mg tablet, TAKE 1 TABLET BY MOUTH 3 TIMES A DAY AS NEEDED FOR ANXIETY., Disp: 270 tablet, Rfl: 1  •  levothyroxine 175 mcg tablet, Take 150 mcg by mouth daily, Disp: , Rfl:   •  Multiple Vitamin (multivitamin) tablet, Take 1 tablet by mouth daily, Disp: 90 tablet, Rfl: 1  •  Norethin-Eth Estrad-Fe Biphas (Lo Loestrin Fe) 1 MG-10 MCG / 10 MCG TABS, Take 1 caplet by mouth in the morning Take 2 pills daily for the next 48 hours then 1 pill daily, Disp: 84 tablet, Rfl: 1  •  cholestyramine (QUESTRAN) 4 GM/DOSE powder, , Disp: , Rfl:   •  escitalopram (LEXAPRO) 20 mg tablet, Take 1 tablet (20 mg total) by mouth daily, Disp: 30 tablet, Rfl: 2  •  liothyronine (CYTOMEL) 5 mcg tablet, Take 5 mcg by mouth 2 (two) times a day, Disp: , Rfl:   •  pantoprazole (PROTONIX) 40 mg tablet, take 1 tablet by mouth before breakfast (Patient not taking: Reported on 8/15/2023), Disp: , Rfl:     Current Allergies     Allergies as of 08/15/2023 - Reviewed 08/15/2023   Allergen Reaction Noted   • Bactrim [sulfamethoxazole-trimethoprim] Hives 04/06/2016   • Other Hives 04/06/2016   • Shellfish-derived products - food allergy  09/07/2018   • Sulfamethoxazole Hives 05/25/2018   • Trimethoprim Hives 05/25/2018            The following portions of the patient's history were reviewed and updated as appropriate: allergies, current medications, past family history, past medical history, past social history, past surgical history and problem list.     Past Medical History:   Diagnosis Date   • Anxiety    • Avascular necrosis of scaphoid ([address]) 12/19/2022   • Boil of groin 9/15/2020    Formatting of this note might be different from the original. Left side  Last Assessment & Plan:  Formatting of this note might be different from the original. Advised on warm compresses and antibiotics for now. No e/o abscess or sepsis. Precautions given.    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 Objective   /59   Pulse 88   Temp (!) 96.4 °F (35.8 °C) (Tympanic)   Resp 18   LMP 08/04/2023   SpO2 99%   Patient's last menstrual period was 08/04/2023. Physical Exam     Physical Exam  Vitals and nursing note reviewed. Constitutional:       General: She is not in acute distress. Appearance: Normal appearance. She is not ill-appearing, toxic-appearing or diaphoretic. HENT:      Head: Normocephalic and atraumatic. Right Ear: Tympanic membrane, ear canal and external ear normal. There is no impacted cerumen. Left Ear: Tympanic membrane and external ear normal. There is no impacted cerumen. Nose: Congestion and rhinorrhea present. Mouth/Throat:      Mouth: Mucous membranes are moist.      Pharynx: Posterior oropharyngeal erythema present. No oropharyngeal exudate. Eyes:      General:         Right eye: No discharge. Left eye: No discharge. Extraocular Movements: Extraocular movements intact. Pupils: Pupils are equal, round, and reactive to light. Cardiovascular:      Rate and Rhythm: Normal rate and regular rhythm. Pulses: Normal pulses. Carotid pulses are 2+ on the right side and 2+ on the left side. Radial pulses are 2+ on the right side and 2+ on the left side. Heart sounds: No murmur heard. Pulmonary:      Effort: Pulmonary effort is normal. No respiratory distress. Breath sounds: Normal breath sounds. No stridor. No wheezing, rhonchi or rales. Chest:      Chest wall: No tenderness. Abdominal:      General: Abdomen is flat. There is no distension. Tenderness:  There is no abdominal tenderness. There is no right CVA tenderness, left CVA tenderness, guarding or rebound. Hernia: No hernia is present. Musculoskeletal:      Right lower leg: No edema. Left lower leg: No edema. Skin:     General: Skin is warm and dry. Neurological:      General: No focal deficit present. Mental Status: She is alert and oriented to person, place, and time. Cranial Nerves: No cranial nerve deficit. Sensory: No sensory deficit. Motor: No weakness.       Coordination: Coordination normal.      Gait: Gait normal.   Psychiatric:         Mood and Affect: Mood normal.

## 2023-08-17 ENCOUNTER — HOSPITAL ENCOUNTER (EMERGENCY)
Facility: HOSPITAL | Age: 38
Discharge: HOME | End: 2023-08-17
Attending: EMERGENCY MEDICINE
Payer: COMMERCIAL

## 2023-08-17 ENCOUNTER — APPOINTMENT (EMERGENCY)
Dept: RADIOLOGY | Facility: HOSPITAL | Age: 38
End: 2023-08-17
Payer: COMMERCIAL

## 2023-08-17 ENCOUNTER — TELEPHONE (OUTPATIENT)
Dept: INTERNAL MEDICINE CLINIC | Facility: CLINIC | Age: 38
End: 2023-08-17

## 2023-08-17 VITALS
TEMPERATURE: 98.3 F | SYSTOLIC BLOOD PRESSURE: 125 MMHG | RESPIRATION RATE: 18 BRPM | OXYGEN SATURATION: 98 % | DIASTOLIC BLOOD PRESSURE: 58 MMHG | BODY MASS INDEX: 34.6 KG/M2 | HEART RATE: 88 BPM | WEIGHT: 220.9 LBS

## 2023-08-17 DIAGNOSIS — J45.909 REACTIVE AIRWAY DISEASE: Primary | ICD-10-CM

## 2023-08-17 DIAGNOSIS — J39.9 UPPER RESPIRATORY DISEASE: ICD-10-CM

## 2023-08-17 LAB — S PYO DNA THROAT QL NAA+PROBE: NOT DETECTED

## 2023-08-17 PROCEDURE — 71046 X-RAY EXAM CHEST 2 VIEWS: CPT

## 2023-08-17 PROCEDURE — 99284 EMERGENCY DEPT VISIT MOD MDM: CPT | Performed by: EMERGENCY MEDICINE

## 2023-08-17 PROCEDURE — 99285 EMERGENCY DEPT VISIT HI MDM: CPT

## 2023-08-17 PROCEDURE — 87651 STREP A DNA AMP PROBE: CPT | Performed by: EMERGENCY MEDICINE

## 2023-08-17 PROCEDURE — 94640 AIRWAY INHALATION TREATMENT: CPT

## 2023-08-17 RX ORDER — ALBUTEROL SULFATE 2.5 MG/3ML
5 SOLUTION RESPIRATORY (INHALATION) ONCE
Status: COMPLETED | OUTPATIENT
Start: 2023-08-17 | End: 2023-08-17

## 2023-08-17 RX ORDER — ALBUTEROL SULFATE 90 UG/1
2 AEROSOL, METERED RESPIRATORY (INHALATION) ONCE
Status: COMPLETED | OUTPATIENT
Start: 2023-08-17 | End: 2023-08-17

## 2023-08-17 RX ORDER — PREDNISONE 20 MG/1
60 TABLET ORAL DAILY
Qty: 15 TABLET | Refills: 0 | Status: SHIPPED | OUTPATIENT
Start: 2023-08-17

## 2023-08-17 RX ADMIN — ALBUTEROL SULFATE 5 MG: 2.5 SOLUTION RESPIRATORY (INHALATION) at 10:18

## 2023-08-17 RX ADMIN — IPRATROPIUM BROMIDE 0.5 MG: 0.5 SOLUTION RESPIRATORY (INHALATION) at 10:18

## 2023-08-17 RX ADMIN — ALBUTEROL SULFATE 2 PUFF: 90 AEROSOL, METERED RESPIRATORY (INHALATION) at 10:49

## 2023-08-17 NOTE — TELEPHONE ENCOUNTER
Patient called stated since Monday has sore throat. Patient went to Urgent Care on Tuesday and they did a Rapid and PCR Covid test that was Negative. She stated she is still not feeling well and still has a sore throat, cough and very short of breath. Patient was advised to go to ER for further evaluation.     Patient understood

## 2023-08-17 NOTE — ED PROVIDER NOTES
History  Chief Complaint   Patient presents with   • Nasal Congestion     Pt c/o sore throat and nasal congestion for the past x 3 days. Pt denies cp/sob/n/v/d or fevevrs     43 YO female presents with cough, chest congestion. Patient has had URI symptoms for the last 3 days, states originally with sore, scratchy throat and congestion, body aches. States much of symptoms have improved but she now has congestion in the chest and a dry cough. She was evaluated for this in an urgent care 2 days prior, had a COVID test done which was negative. 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 Patient states she has shortness of breath with ambulation. Pt denies CP/F/C/N/V/D/C, no dysuria, burning on urination or blood in urine. History provided by:  Patient   used: No        Prior to Admission Medications   Prescriptions Last Dose Informant Patient Reported? Taking? Multiple Vitamin (multivitamin) tablet   No No   Sig: Take 1 tablet by mouth daily   Norethin-Eth Estrad-Fe Biphas (Lo Loestrin Fe) 1 MG-10 MCG / 10 MCG TABS   No No   Sig: Take 1 caplet by mouth in the morning Take 2 pills daily for the next 48 hours then 1 pill daily   cholestyramine (QUESTRAN) 4 GM/DOSE powder   Yes No   escitalopram (LEXAPRO) 20 mg tablet   No No   Sig: Take 1 tablet (20 mg total) by mouth daily   ferrous sulfate 324 (65 Fe) mg   No No   Sig: Take 1 tablet (324 mg total) by mouth daily before breakfast   hydrOXYzine HCL (ATARAX) 25 mg tablet   No No   Sig: TAKE 1 TABLET BY MOUTH 3 TIMES A DAY AS NEEDED FOR ANXIETY.    levothyroxine 175 mcg tablet   Yes No   Sig: Take 150 mcg by mouth daily   liothyronine (CYTOMEL) 5 mcg tablet  Self Yes No   Sig: Take 5 mcg by mouth 2 (two) times a day   pantoprazole (PROTONIX) 40 mg tablet   Yes No   Sig: take 1 tablet by mouth before breakfast   Patient not taking: Reported on 8/15/2023      Facility-Administered Medications: None       Past Medical History: Diagnosis Date   • Anxiety    • Avascular necrosis of scaphoid ([address]) 12/19/2022   • Boil of groin 9/15/2020    Formatting of this note might be different from the original. Left side  Last Assessment & Plan:  Formatting of this note might be different from the original. Advised on warm compresses and antibiotics for now. No e/o abscess or sepsis. Precautions given. • Chlamydial infection 4/7/2017   • Crohn's disease ([address])    • Depression    • Disease of thyroid gland    • Fibromyalgia    • Gestational diabetes    • Gestational hypertension     previous pregnancy   • Hashimoto's thyroiditis    • HPV (human papilloma virus) infection    • Oligohydramnios in [place] pregnancy in second trimester 9/12/2017   • Post-viral cough syndrome 1/20/2023   • Retained placenta    • Schlatter-Osgood disease 4/21/2017   • Urogenital trichomoniasis 4/7/2017       Past Surgical History:   Procedure Laterality Date   • CHOLECYSTECTOMY     • DILATION AND CURETTAGE OF UTERUS     • HERNIA REPAIR     • UMBILICAL HERNIA REPAIR         Family History   Problem Relation Age of Onset   • Anuerysm Mother    • Thyroid disease Mother    • Hepatitis Mother    • Lung cancer Maternal Grandmother    • Colon cancer Maternal Grandfather      I have reviewed and agree with the history as documented. E-Cigarette/Vaping   • E-Cigarette Use Never User      E-Cigarette/Vaping Substances     Social History     Tobacco Use   • Smoking status: Former   • Smokeless tobacco: Never   Vaping Use   • Vaping Use: Never used   Substance Use Topics   • Alcohol use: No   • Drug use: No       Review of Systems   Constitutional: Negative for chills, fatigue and fever. HENT: Negative for dental problem. Eyes: Negative for visual disturbance. Respiratory: Positive for cough and shortness of breath. Cardiovascular: Negative for chest pain. Gastrointestinal: Negative for abdominal pain, diarrhea and vomiting.    Genitourinary: Negative for dysuria and frequency. Musculoskeletal: Negative for arthralgias. Skin: Negative for rash. Neurological: Negative for dizziness, weakness and light-headedness. Psychiatric/Behavioral: Negative for agitation, behavioral problems and confusion. All other systems reviewed and are negative. Physical Exam  Physical Exam  Vitals and nursing note reviewed. Constitutional:       Appearance: Normal appearance. HENT:      Head: Normocephalic and atraumatic. Eyes:      Extraocular Movements: Extraocular movements intact. Conjunctiva/sclera: Conjunctivae normal.   Cardiovascular:      Rate and Rhythm: Normal rate and regular rhythm. Pulses: Normal pulses. Heart sounds: Normal heart sounds. Pulmonary:      Effort: Pulmonary effort is normal.      Breath sounds: Normal breath sounds. Abdominal:      General: There is no distension. Musculoskeletal:         General: Normal range of motion. Cervical back: Normal range of motion. Skin:     Findings: No rash. Neurological:      General: No focal deficit present. Mental Status: She is alert. Cranial Nerves: No cranial nerve deficit.    Psychiatric:         Mood and Affect: Mood normal.         Vital Signs  ED Triage Vitals [08/17/23 0914]   Temperature Pulse Respirations Blood Pressure SpO2   98.3 °F (36.8 °C) 88 18 125/58 100 %      Temp Source Heart Rate Source Patient Position - Orthostatic VS BP Location FiO2 (%)   Oral Monitor Sitting Right arm --      Pain Score       3           Vitals:    08/17/23 0914   BP: 125/58   Pulse: 88   Patient Position - Orthostatic VS: Sitting         Visual Acuity      ED Medications  Medications   albuterol inhalation solution 5 mg (5 mg Nebulization Given 8/17/23 1018)   ipratropium (ATROVENT) 0.02 % inhalation solution 0.5 mg (0.5 mg Nebulization Given 8/17/23 1018)   albuterol (PROVENTIL HFA,VENTOLIN HFA) inhaler 2 puff (2 puffs Inhalation Given 8/17/23 1049)       Diagnostic Studies  Results Reviewed     Procedure Component Value Units Date/Time    Strep A PCR [[id number]]  (Normal) Collected: 08/17/23 1018    Lab Status: Final result Specimen: Throat Updated: 08/17/23 1104     STREP A PCR Not Detected                 XR chest 2 views   ED Interpretation by Liz Hernandez MD (08/17 1021)   No pneumonia or pneumothorax. Final Result by Pedro Perez MD (08/17 1039)      No acute cardiopulmonary disease. Workstation performed: [id number]                    Procedures  Procedures         ED Course  ED Course as of 08/17/23 1712   Thu Aug 17, 2023   1021 Chest x-ray evaluated by me, interpretation:  No pneumonia   1046 Patient states breathing and coughing improved after breathing Tx. Medical Decision Making  1. Cough - Patient with recent URI symptoms, negative COVID. Will check CXR to rule out pneumonia, strep swab. Will try breathing Tx and check ambulatory pulse ox. Likely represents viral illness. Reactive airway disease: acute illness or injury  Upper respiratory disease: acute illness or injury  Amount and/or Complexity of Data Reviewed  Labs: ordered. Radiology: ordered and independent interpretation performed. Decision-making details documented in ED Course. Risk  Prescription drug management. Disposition  Final diagnoses:   Reactive airway disease   Upper respiratory disease     Time reflects when diagnosis was documented in both MDM as applicable and the Disposition within this note     Time User Action Codes Description Comment    8/17/2023 10:45 AM Ceci Reyan [J45.909] Reactive airway disease     8/17/2023 10:45 AM Ceci Reyna [J39.9] Upper respiratory disease       ED Disposition     ED Disposition   Discharge    Condition   Stable    Date/Time   u Aug 17, 2023 10:44 AM    Comment   Yahaira Liriano discharge to home/self care.                Follow-up Information     Follow up With Specialties Details Why [address], MD Internal Medicine   [address] [id number] [phone]            Discharge Medication List as of 8/17/2023 10:46 AM      START taking these medications    Details   predniSONE 20 mg tablet Take 3 tablets (60 mg total) by mouth daily, Starting Thu 8/17/2023, Normal         CONTINUE these medications which have NOT CHANGED    Details   cholestyramine (QUESTRAN) 4 GM/DOSE powder Starting Mon 6/5/2023, Historical Med      escitalopram (LEXAPRO) 20 mg tablet Take 1 tablet (20 mg total) by mouth daily, Starting Tue 5/9/2023, Until Tue 8/8/2023, Normal      ferrous sulfate 324 (65 Fe) mg Take 1 tablet (324 mg total) by mouth daily before breakfast, Starting Wed 8/9/2023, Until Fri 9/8/2023, Normal      hydrOXYzine HCL (ATARAX) 25 mg tablet TAKE 1 TABLET BY MOUTH 3 TIMES A DAY AS NEEDED FOR ANXIETY., Normal      levothyroxine 175 mcg tablet Take 150 mcg by mouth daily, Historical Med      liothyronine (CYTOMEL) 5 mcg tablet Take 5 mcg by mouth 2 (two) times a day, Starting Fri 3/18/2022, Historical Med      Multiple Vitamin (multivitamin) tablet Take 1 tablet by mouth daily, Starting Tue 6/13/2023, Until Mon 9/11/2023, Normal      Norethin-Eth Estrad-Fe Biphas (Lo Loestrin Fe) 1 MG-10 MCG / 10 MCG TABS Take 1 caplet by mouth in the morning Take 2 pills daily for the next 48 hours then 1 pill daily, Starting Tue 8/8/2023, Until Tue 10/31/2023, Normal      pantoprazole (PROTONIX) 40 mg tablet take 1 tablet by mouth before breakfast, Historical Med             No discharge procedures on file.     PDMP Review       Value Time User    PDMP Reviewed  Yes 6/13/2023  4:46 PM Thiago Aquino MD          ED Provider  Electronically Signed by           Josué Wang MD  08/17/23 1963

## 2023-08-17 NOTE — DISCHARGE INSTRUCTIONS
Take the Prednisone, 3 pills daily for the next 5 days. Use the albuterol as needed for shortness of breath and wheezing. Return to the ER if your breathing worsens despite taking the medications.

## 2023-09-01 ENCOUNTER — TELEMEDICINE (OUTPATIENT)
Dept: PSYCHIATRY | Facility: CLINIC | Age: 38
End: 2023-09-01
Payer: COMMERCIAL

## 2023-09-01 DIAGNOSIS — F41.0 GENERALIZED ANXIETY DISORDER WITH PANIC ATTACKS: ICD-10-CM

## 2023-09-01 DIAGNOSIS — F41.1 GENERALIZED ANXIETY DISORDER WITH PANIC ATTACKS: ICD-10-CM

## 2023-09-01 DIAGNOSIS — F33.0 MDD (MAJOR DEPRESSIVE DISORDER), RECURRENT EPISODE, MILD (HCC): ICD-10-CM

## 2023-09-01 DIAGNOSIS — F43.10 PTSD (POST-TRAUMATIC STRESS DISORDER): ICD-10-CM

## 2023-09-01 PROCEDURE — 99214 OFFICE O/P EST MOD 30 MIN: CPT | Performed by: PSYCHIATRY & NEUROLOGY

## 2023-09-01 RX ORDER — ESCITALOPRAM OXALATE 10 MG/1
10 TABLET ORAL DAILY
Qty: 30 TABLET | Refills: 2 | Status: SHIPPED | OUTPATIENT
Start: 2023-09-01 | End: 2023-11-30

## 2023-09-01 RX ORDER — GABAPENTIN 100 MG/1
100 CAPSULE ORAL
Qty: 30 CAPSULE | Refills: 2 | Status: SHIPPED | OUTPATIENT
Start: 2023-09-01

## 2023-09-01 NOTE — PSYCH
Assessment /Plan     For exam results, see Encounter Report.    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, severe stage    Status post cataract extraction and insertion of intraocular lens, unspecified laterality            [facility]  Strong FMHx Glc / Blindness --> Justyn Creole Dz / mom's side    Complex ocular hx  multiple sx --> trabs, tubes and tube removes etc   Too numerous to count      POAG  2/2 CACG OD  Traumatic Glaucoma severe OU      CCT  ==>  06/23/2023 --> clear PKP OS    Target --> diff assessment with k-failure OD --> thick CCT with PKPs --> mid teens    Right eye  PKP OD   11/10/2022 --> thick / edema  PKP failure  -->  10/26/2017   PKP  PC IOL  Fibrotic anterior capsule membrane  Tubes x 2 in place ST & IN --> removed ST BV 2/27/2017  Glc Shunt graft site OD  Revision 04/09/2013    Glaucoma Incisional Surgery  Patient with exposed ST Tube OD  SP OD ST BV Removal 2/27/2017    Left eye  SP PKP // AC IOL placement  05/10/2021 & 11/10/2022    Both eyes --> good adherence // Tolerating well -->CSM  Cosopt BID  Xal q day -->  patient c/o increased blurred Va OS with use --> continue to Hold  FML using TID OD // BID OS  Idania 128 BID    PF1%  BID --> steroid response --> Holding    Generic Zioptan not covered      MGD --> improved surface  Dry Eye Syndrome: discussed use of warm compresses, preserved & non-preserved artificial tears, gel and PM ointment options.  Also discussed options utilizing medications.    Right eye  Mucomyst 10% BID / PRN --> not using  EES q hs --> PRN // Holding      RD precautions:  Discussed symptoms of RD with increased flashes, floaters, decreasing vision.  Patient/Family to call and return immediately to clinic should the symptoms of RD occur. Voiced good understanding with Q &  Virtual Regular Visit    Verification of patient location:    Patient is located at Home in the following state in which I hold an active license [place]      Assessment/Plan:    Problem List Items Addressed This Visit        Other    PTSD (post-traumatic stress disorder)    Relevant Medications    escitalopram (LEXAPRO) 10 mg tablet    Generalized anxiety disorder with panic attacks    Relevant Medications    escitalopram (LEXAPRO) 10 mg tablet    gabapentin (Neurontin) 100 mg capsule    Other Relevant Orders    Comprehensive metabolic panel    HEMOGLOBIN A1C W/ EAG ESTIMATION   Other Visit Diagnoses     MDD (major depressive disorder), recurrent episode, mild (HCC)        Relevant Medications    escitalopram (LEXAPRO) 10 mg tablet    gabapentin (Neurontin) 100 mg capsule    Other Relevant Orders    Comprehensive metabolic panel    HEMOGLOBIN A1C W/ EAG ESTIMATION          Goals addressed in session: Goal 1, Goal 2 and Goal 3           Reason for visit is   Chief Complaint   Patient presents with   • Virtual Regular Visit        Encounter provider eJssee Butler MD    Provider located at [address] [place] [address] Road [id number]      Recent Visits  No visits were found meeting these conditions. Showing recent visits within past 7 days and meeting all other requirements  Today's Visits  Date Type Provider Dept   09/01/23 Telemedicine Jessee Butler MD [facility] today's visits and meeting all other requirements  Future Appointments  No visits were found meeting these conditions. Showing future appointments within next 150 days and meeting all other requirements       The patient was identified by name and date of birth. Jovan Shonna was informed that this is a telemedicine visit and that the visit is being conducted Beyond Verbal. She agrees to proceed. .  My office door was A.      Plan  RTC  P [place]  as scheduled --> p PKP OD  RTC [place] 3 months with IOP & adherence --> recheck CCT OS  RTC sooner prn with understanding                      closed. No one else was in the room. She acknowledged consent and understanding of privacy and security of the video platform. The patient has agreed to participate and understands they can discontinue the visit at any time. Patient is aware this is a billable service. Subjective  see below      HPI     Past Medical History:   Diagnosis Date   • Anxiety    • Avascular necrosis of scaphoid ([address]) 12/19/2022   • Boil of groin 9/15/2020    Formatting of this note might be different from the original. Left side  Last Assessment & Plan:  Formatting of this note might be different from the original. Advised on warm compresses and antibiotics for now. No e/o abscess or sepsis. Precautions given. • Chlamydial infection 4/7/2017   • Crohn's disease ([address])    • Depression    • Disease of thyroid gland    • Fibromyalgia    • Gestational diabetes    • Gestational hypertension     previous pregnancy   • Hashimoto's thyroiditis    • HPV (human papilloma virus) infection    • Oligohydramnios in galvez pregnancy in second trimester 9/12/2017   • Post-viral cough syndrome 1/20/2023   • Retained placenta    • Schlatter-Osgood disease 4/21/2017   • Urogenital trichomoniasis 4/7/2017       Past Surgical History:   Procedure Laterality Date   • CHOLECYSTECTOMY     • DILATION AND CURETTAGE OF UTERUS     • HERNIA REPAIR     • UMBILICAL HERNIA REPAIR         Current Outpatient Medications   Medication Sig Dispense Refill   • escitalopram (LEXAPRO) 10 mg tablet Take 1 tablet (10 mg total) by mouth daily 30 tablet 2   • gabapentin (Neurontin) 100 mg capsule Take 1 capsule (100 mg total) by mouth daily at bedtime 30 capsule 2   • cholestyramine (QUESTRAN) 4 GM/DOSE powder      • ferrous sulfate 324 (65 Fe) mg Take 1 tablet (324 mg total) by mouth daily before breakfast 30 tablet 3   • hydrOXYzine HCL (ATARAX) 25 mg tablet TAKE 1 TABLET BY MOUTH 3 TIMES A DAY AS NEEDED FOR ANXIETY.  270 tablet 1   • levothyroxine 175 mcg tablet Take 150 mcg by mouth daily     • liothyronine (CYTOMEL) 5 mcg tablet Take 5 mcg by mouth 2 (two) times a day     • Multiple Vitamin (multivitamin) tablet Take 1 tablet by mouth daily 90 tablet 1   • Norethin-Eth Estrad-Fe Biphas (Lo Loestrin Fe) 1 MG-10 MCG / 10 MCG TABS Take 1 caplet by mouth in the morning Take 2 pills daily for the next 48 hours then 1 pill daily 84 tablet 1   • pantoprazole (PROTONIX) 40 mg tablet take 1 tablet by mouth before breakfast (Patient not taking: Reported on 8/15/2023)     • predniSONE 20 mg tablet Take 3 tablets (60 mg total) by mouth daily 15 tablet 0     No current facility-administered medications for this visit. Allergies   Allergen Reactions   • Bactrim [Sulfamethoxazole-Trimethoprim] Hives   • Other Hives     seafood   • Shellfish-Derived Products - Food Allergy    • Sulfamethoxazole Hives   • Trimethoprim Hives       Review of Systems    Video Exam    There were no vitals filed for this visit. Physical Exam     Visit Time    Visit Start Time: 4 PM  Visit Stop Time: 4:21 PM  Total Visit Duration: 21 minutes    MEDICATION MANAGEMENT NOTE        [address] [place] [address]      Name and Date of Birth:  Jethro Duke 42 y.o. 1985 MRN: [id number]    Date of Visit: September 1, 2023    Reason for Visit: Follow-up for medication management. Subjective: The patient reports she feels her mood overall has been stable. Denies feeling depressed. Reports occasional anxiety but not present every day. Able to manage it well. The patient to express significant frustration regarding her leg pain. Reports had followed with her prescribed those but no specific cause could be found. The patient feels it is due to her abnormal thyroid function but as per her endocrinologist does not feel that is the cause. The patient also reports she has been feeling tired all the time. The patient reports she also has been oversleeping at night.   The patient was initially scheduled to follow up with sleep physician in December initially but it had to be canceled later. The patient is trying to schedule appointment again with sleep medicine sooner. She denies any other concern. Denies any SI or HI. Reports appetite has been fine. Reports energy level as mentioned is low. Concentration has been okay. Does not endorse anhedonia. Reports PTSD symptoms are well controlled. The patient had called us after her last visit in May and wanted to take Lexapro 10 mg twice a day rather than 20 mg once a day due to concern about feeling very tired when she took 20 mg in the morning and anxiety increasing next day if she takes at nighttime. I was okay with that but the patient today stated that she was missing her evening dose of 10 mg due to which she started only taking 10 mg once a day in the morning. The patient though feels it has been helpful and does not want increasing the dose. She also reports taking hydroxyzine very rarely as it also increase her tiredness. The patient wanted to try gabapentin but wanted to try at low-dose given my concern about it also increasing tiredness. She denies any other concern today. Review Of Systems:      Constitutional negative   ENT negative   Cardiovascular negative   Respiratory negative   Gastrointestinal negative   Genitourinary negative   Musculoskeletal negative   Integumentary negative   Neurological negative   Endocrine negative   Other Symptoms none       Alcohol/Substance Abuse: denies        Past Medical History:    Past Medical History:   Diagnosis Date   • Anxiety    • Avascular necrosis of scaphoid ([address]) 12/19/2022   • Boil of groin 9/15/2020    Formatting of this note might be different from the original. Left side  Last Assessment & Plan:  Formatting of this note might be different from the original. Advised on warm compresses and antibiotics for now. No e/o abscess or sepsis. Precautions given.    • Chlamydial infection 4/7/2017   • Crohn's disease ([address] [facility])    • Depression    • Disease of thyroid gland    • Fibromyalgia    • Gestational diabetes    • Gestational hypertension     previous pregnancy   • Hashimoto's thyroiditis    • HPV (human papilloma virus) infection    • Oligohydramnios in [place] pregnancy in second trimester 9/12/2017   • Post-viral cough syndrome 1/20/2023   • Retained placenta    • Schlatter-Osgood disease 4/21/2017   • Urogenital trichomoniasis 4/7/2017        Past Surgical History:   Procedure Laterality Date   • CHOLECYSTECTOMY     • DILATION AND CURETTAGE OF UTERUS     • HERNIA REPAIR     • UMBILICAL HERNIA REPAIR       Allergies   Allergen Reactions   • Bactrim [Sulfamethoxazole-Trimethoprim] Hives   • Other Hives     seafood   • Shellfish-Derived Products - Food Allergy    • Sulfamethoxazole Hives   • Trimethoprim Hives       Current Medications:       Current Outpatient Medications:   •  escitalopram (LEXAPRO) 10 mg tablet, Take 1 tablet (10 mg total) by mouth daily, Disp: 30 tablet, Rfl: 2  •  gabapentin (Neurontin) 100 mg capsule, Take 1 capsule (100 mg total) by mouth daily at bedtime, Disp: 30 capsule, Rfl: 2  •  cholestyramine (QUESTRAN) 4 GM/DOSE powder, , Disp: , Rfl:   •  ferrous sulfate 324 (65 Fe) mg, Take 1 tablet (324 mg total) by mouth daily before breakfast, Disp: 30 tablet, Rfl: 3  •  hydrOXYzine HCL (ATARAX) 25 mg tablet, TAKE 1 TABLET BY MOUTH 3 TIMES A DAY AS NEEDED FOR ANXIETY., Disp: 270 tablet, Rfl: 1  •  levothyroxine 175 mcg tablet, Take 150 mcg by mouth daily, Disp: , Rfl:   •  liothyronine (CYTOMEL) 5 mcg tablet, Take 5 mcg by mouth 2 (two) times a day, Disp: , Rfl:   •  Multiple Vitamin (multivitamin) tablet, Take 1 tablet by mouth daily, Disp: 90 tablet, Rfl: 1  •  Norethin-Eth Estrad-Fe Biphas (Lo Loestrin Fe) 1 MG-10 MCG / 10 MCG TABS, Take 1 caplet by mouth in the morning Take 2 pills daily for the next 48 hours then 1 pill daily, Disp: 84 tablet, Rfl: 1  •  pantoprazole (PROTONIX) 40 mg tablet, take 1 tablet by mouth before breakfast (Patient not taking: Reported on 8/15/2023), Disp: , Rfl:   •  predniSONE 20 mg tablet, Take 3 tablets (60 mg total) by mouth daily, Disp: 15 tablet, Rfl: 0       History Review: The following portions of the patient's history were reviewed and updated as appropriate: allergies, current medications, past family history, past medical history, past social history, past surgical history and problem list.         OBJECTIVE:     Vital signs in last 24 hours: There were no vitals filed for this visit.     Mental Status Evaluation:    Appearance age appropriate, casually dressed   Behavior cooperative, calm   Speech normal rate, normal volume, normal pitch   Mood normal   Affect normal range and intensity, appropriate   Thought Processes organized, goal directed   Associations intact associations   Thought Content no overt delusions   Perceptual Disturbances: no auditory hallucinations, no visual hallucinations   Abnormal Thoughts  Risk Potential Suicidal ideation - None  Homicidal ideation - None  Potential for aggression - No   Orientation oriented to person, place, time/date and situation   Memory recent and remote memory grossly intact   Consciousness alert and awake   Attention Span Concentration Span attention span and concentration are age appropriate   Intellect appears to be of average intelligence   Insight intact   Judgement intact   Muscle Strength and  Gait unable to assess today due to virtual visit   Motor activity unable to assess today due to virtual visit   Language no difficulty naming common objects, no difficulty repeating a phrase   Fund of Knowledge adequate knowledge of current events  adequate fund of knowledge regarding past history  adequate fund of knowledge regarding vocabulary    Pain none   Pain Scale 0       Laboratory Results:   Most Recent Labs:   Lab Results   Component Value Date    WBC 6.90 08/08/2023 RBC 4.51 08/08/2023    HGB 10.9 (L) 08/08/2023    HCT 36.2 08/08/2023     08/08/2023    RDW 14.4 08/08/2023    NEUTROABS 4.36 08/08/2023    SODIUM 139 01/09/2023    K 3.6 01/09/2023     01/09/2023    CO2 27 01/09/2023    BUN 5 01/09/2023    CREATININE 0.77 01/09/2023    CALCIUM 9.3 01/09/2023    AST 24 01/09/2023    ALT 31 01/09/2023    ALKPHOS 96 01/09/2023    TP 7.6 01/09/2023    TBILI 0.46 01/09/2023    CHOLESTEROL 179 01/09/2023    TRIG 87 01/09/2023    HDL 39 (L) 01/09/2023    LDLCALC 123 (H) 01/09/2023    [address] 09/25/2021    [id number] 5.839 (H) 08/08/2023    FREET4 0.66 08/08/2023    RPR Non-Reactive 12/21/2017     I have personally reviewed all pertinent laboratory/tests results. Assessment/Plan: Patient depression, anxiety and PTSD has been overall well controlled. Occasional anxiety but manageable. Main concern has been feeling tired all the time and leg pain. Not clear the cause for the leg pain but had followed with dermatologist and later endocrinologist for it. The patient feels it is due to her thyroid issues. Denies any other concern. The plan is to add gabapentin 100 mg 1 tablet at bedtime to help with her anxiety and leg pain. I will also change the Lexapro to 10 mg 1 tablet daily given the patient has been taking this dose for last few months. We will continue with hydroxyzine as needed for anxiety. I will also order blood work including CMP given her concern about leg cramps. The patient also wanted to have a hemoglobin A1c done as she felt it has not been done in a while. Patient educated about the meds in detail including benefit, risk, side effect, alternative, contraindication, dosage and frequency. She was advised to call me if there is any concern and to call crisis or visit nearby ER in case of any emergency. Patient verbalized understanding and agrees with the plan. She will follow up with me in 3 months or sooner if needed.       Diagnoses and all orders for this visit:    MDD (major depressive disorder), recurrent episode, mild ([address])  -     Comprehensive metabolic panel; Future  -     HEMOGLOBIN A1C W/ EAG ESTIMATION; Future  -     escitalopram (LEXAPRO) 10 mg tablet; Take 1 tablet (10 mg total) by mouth daily  -     gabapentin (Neurontin) 100 mg capsule; Take 1 capsule (100 mg total) by mouth daily at bedtime    Generalized anxiety disorder with panic attacks  -     Comprehensive metabolic panel; Future  -     HEMOGLOBIN A1C W/ EAG ESTIMATION; Future  -     escitalopram (LEXAPRO) 10 mg tablet; Take 1 tablet (10 mg total) by mouth daily  -     gabapentin (Neurontin) 100 mg capsule; Take 1 capsule (100 mg total) by mouth daily at bedtime    PTSD (post-traumatic stress disorder)  -     escitalopram (LEXAPRO) 10 mg tablet; Take 1 tablet (10 mg total) by mouth daily          Treatment Recommendations/Precautions:      Aware of 24 hour and weekend coverage for urgent situations accessed by calling [facility] main practice number    Risks/Benefits      Risks, Benefits And Possible Side Effects Of Medications:    Risks, benefits, and possible side effects of medications explained to [facility] and she verbalizes understanding and agreement for treatment. Risks of medications in pregnancy explained to [facility]. She verbalizes understanding and agrees to notify her doctor if she becomes pregnant. Controlled Medication Discussion:     Not applicable    Psychotherapy Provided:     Individual psychotherapy provided: Counseling was provided during the session today for 10 minutes. Medications, treatment progress and treatment plan reviewed with Pb. Medication changes discussed with Pb. Medication education provided to [facility]. Reassurance and supportive therapy provided. Crisis/safety plan discussed with Pb.      Treatment Plan;    Completed and signed during the session: Not applicable - Treatment Plan not due at this session    Josiah Wheatley MD 09/01/23

## 2023-11-24 ENCOUNTER — APPOINTMENT (OUTPATIENT)
Dept: URGENT CARE | Facility: MEDICAL CENTER | Age: 38
End: 2023-11-24

## 2023-11-24 ENCOUNTER — APPOINTMENT (OUTPATIENT)
Dept: LAB | Facility: MEDICAL CENTER | Age: 38
End: 2023-11-24

## 2023-11-24 DIAGNOSIS — Z02.1 ENCOUNTER FOR PRE-EMPLOYMENT HEALTH SCREENING EXAMINATION: ICD-10-CM

## 2023-11-24 DIAGNOSIS — Z02.1 ENCOUNTER FOR PRE-EMPLOYMENT HEALTH SCREENING EXAMINATION: Primary | ICD-10-CM

## 2023-11-24 LAB
MEV IGG SER QL IA: NORMAL
MUV IGG SER QL IA: NORMAL
RUBV IGG SERPL IA-ACNC: 40.5 IU/ML
VZV IGG SER QL IA: NORMAL

## 2023-11-24 PROCEDURE — 86787 VARICELLA-ZOSTER ANTIBODY: CPT

## 2023-11-24 PROCEDURE — 86762 RUBELLA ANTIBODY: CPT

## 2023-11-24 PROCEDURE — 86765 RUBEOLA ANTIBODY: CPT

## 2023-11-24 PROCEDURE — 36415 COLL VENOUS BLD VENIPUNCTURE: CPT

## 2023-11-24 PROCEDURE — 86735 MUMPS ANTIBODY: CPT

## 2023-11-24 PROCEDURE — 86480 TB TEST CELL IMMUN MEASURE: CPT

## 2023-11-27 LAB
GAMMA INTERFERON BACKGROUND BLD IA-ACNC: <0 IU/ML
M TB IFN-G BLD-IMP: NEGATIVE
M TB IFN-G CD4+ BCKGRND COR BLD-ACNC: 0 IU/ML
M TB IFN-G CD4+ BCKGRND COR BLD-ACNC: 0 IU/ML
MITOGEN IGNF BCKGRD COR BLD-ACNC: 10 IU/ML

## 2023-11-28 ENCOUNTER — OFFICE VISIT (OUTPATIENT)
Dept: PSYCHIATRY | Facility: CLINIC | Age: 38
End: 2023-11-28
Payer: COMMERCIAL

## 2023-11-28 DIAGNOSIS — F41.1 GENERALIZED ANXIETY DISORDER WITH PANIC ATTACKS: ICD-10-CM

## 2023-11-28 DIAGNOSIS — F33.0 MDD (MAJOR DEPRESSIVE DISORDER), RECURRENT EPISODE, MILD (HCC): ICD-10-CM

## 2023-11-28 DIAGNOSIS — F41.0 GENERALIZED ANXIETY DISORDER WITH PANIC ATTACKS: ICD-10-CM

## 2023-11-28 DIAGNOSIS — F43.10 PTSD (POST-TRAUMATIC STRESS DISORDER): ICD-10-CM

## 2023-11-28 PROCEDURE — 99214 OFFICE O/P EST MOD 30 MIN: CPT | Performed by: PSYCHIATRY & NEUROLOGY

## 2023-11-28 RX ORDER — ESCITALOPRAM OXALATE 10 MG/1
10 TABLET ORAL DAILY
Qty: 30 TABLET | Refills: 2 | Status: SHIPPED | OUTPATIENT
Start: 2023-11-28 | End: 2024-02-26

## 2023-11-28 RX ORDER — GABAPENTIN 100 MG/1
100 CAPSULE ORAL
Qty: 30 CAPSULE | Refills: 2 | Status: SHIPPED | OUTPATIENT
Start: 2023-11-28

## 2023-11-28 NOTE — PSYCH
MEDICATION MANAGEMENT NOTE         [facility]      Name and Date of Birth:  Tete Jasso 34 y.o. 1985 MRN: [id number]    Date of Visit: November 28, 2023    Reason for Visit: Follow-up for medication management    Subjective: The patient reports she has been doing much better since last visit. Reports depression and anxiety has significantly improved. Denies any panic attack nowadays. Reports sleep, appetite, energy level also has been good. Denies any concern about concentration. Does not endorse anhedonia. The medical concerns she had last visit including feeling fatigue, leg cramps also has significantly improved. Denies feeling tired nowadays. Reports he really might have like hands but otherwise no other concerns. Her thyroid medications were adjusted by her family physician and reports it helped. Her thyroid function test also was normal when she repeated again few months back. Patient denies any other concerns. Denies any symptoms of PTSD. Denies any nightmares or flashbacks. Compliant with medications and denies any side effect. Currently taking Lexapro 10 mg daily and gabapentin 100 mg at bedtime and feels very well on it. Does not want any changes. She reports she has not taken hydroxyzine for the last 4 to 5 months and do not feel she needs it. 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 Looking forward to it. Denies any other concerns.       Review Of Systems:      Constitutional negative   ENT negative   Cardiovascular negative   Respiratory negative   Gastrointestinal negative   Genitourinary negative   Musculoskeletal negative   Integumentary negative   Neurological negative   Endocrine negative   Other Symptoms none       Alcohol/Substance Abuse: Denies        Past Medical History:    Past Medical History:   Diagnosis Date    Anxiety     Avascular necrosis of scaphoid ([address]) 12/19/2022    Boil of groin 9/15/2020    Formatting of this note might be different from the original. Left side  Last Assessment & Plan:  Formatting of this note might be different from the original. Advised on warm compresses and antibiotics for now. No e/o abscess or sepsis. Precautions given.     Chlamydial infection 4/7/2017    Crohn's disease ([address])     Depression     Disease of thyroid gland     Fibromyalgia     Gestational diabetes     Gestational hypertension     previous pregnancy    Hashimoto's thyroiditis     HPV (human papilloma virus) infection     Oligohydramnios in [place] pregnancy in second trimester 9/12/2017    Post-viral cough syndrome 1/20/2023    Retained placenta     Schlatter-Osgood disease 4/21/2017    Urogenital trichomoniasis 4/7/2017        Past Surgical History:   Procedure Laterality Date    CHOLECYSTECTOMY      DILATION AND CURETTAGE OF UTERUS      HERNIA REPAIR      UMBILICAL HERNIA REPAIR       Allergies   Allergen Reactions    Bactrim [Sulfamethoxazole-Trimethoprim] Hives    Other Hives     seafood    Shellfish-Derived Products - Food Allergy     Sulfamethoxazole Hives    Trimethoprim Hives       Current Medications:       Current Outpatient Medications:     escitalopram (LEXAPRO) 10 mg tablet, Take 1 tablet (10 mg total) by mouth daily, Disp: 30 tablet, Rfl: 2    gabapentin (Neurontin) 100 mg capsule, Take 1 capsule (100 mg total) by mouth daily at bedtime, Disp: 30 capsule, Rfl: 2    cholestyramine (QUESTRAN) 4 GM/DOSE powder, , Disp: , Rfl:     ferrous sulfate 324 (65 Fe) mg, Take 1 tablet (324 mg total) by mouth daily before breakfast, Disp: 30 tablet, Rfl: 3    hydrOXYzine HCL (ATARAX) 25 mg tablet, TAKE 1 TABLET BY MOUTH 3 TIMES A DAY AS NEEDED FOR ANXIETY., Disp: 270 tablet, Rfl: 1    levothyroxine 175 mcg tablet, Take 150 mcg by mouth daily, Disp: , Rfl:     liothyronine (CYTOMEL) 5 mcg tablet, Take 5 mcg by mouth 2 (two) times a day, Disp: , Rfl:     Multiple Vitamin (multivitamin) tablet, Take 1 tablet by mouth daily, Disp: 90 tablet, Rfl: 1    Norethin-Eth Estrad-Fe Biphas (Lo Loestrin Fe) 1 MG-10 MCG / 10 MCG TABS, Take 1 caplet by mouth in the morning Take 2 pills daily for the next 48 hours then 1 pill daily, Disp: 84 tablet, Rfl: 1    pantoprazole (PROTONIX) 40 mg tablet, take 1 tablet by mouth before breakfast (Patient not taking: Reported on 8/15/2023), Disp: , Rfl:     predniSONE 20 mg tablet, Take 3 tablets (60 mg total) by mouth daily, Disp: 15 tablet, Rfl: 0       History Review: The following portions of the patient's history were reviewed and updated as appropriate: allergies, current medications, past family history, past medical history, past social history, past surgical history, and problem list.         OBJECTIVE:     Vital signs in last 24 hours: There were no vitals filed for this visit.     Mental Status Evaluation:    Appearance age appropriate, casually dressed   Behavior cooperative, calm   Speech normal rate, normal volume, normal pitch   Mood improved, mildly anxious, minimally depressed   Affect normal range and intensity, appropriate   Thought Processes organized, goal directed   Associations intact associations   Thought Content no overt delusions   Perceptual Disturbances: no auditory hallucinations, no visual hallucinations   Abnormal Thoughts  Risk Potential Suicidal ideation - None  Homicidal ideation - None  Potential for aggression - No   Orientation oriented to person, place, time/date, and situation   Memory recent and remote memory grossly intact   Consciousness alert and awake   Attention Span Concentration Span attention span and concentration are age appropriate   Intellect appears to be of average intelligence   Insight intact   Judgement intact   Muscle Strength and  Gait normal gait and normal balance   Motor activity no abnormal movements   Language no difficulty naming common objects, no difficulty repeating a phrase   Fund of Knowledge adequate knowledge of current events  adequate fund of knowledge regarding past history  adequate fund of knowledge regarding vocabulary    Pain none   Pain Scale 0       Laboratory Results: Most Recent Labs:   Lab Results   Component Value Date    WBC 6.90 08/08/2023    RBC 4.51 08/08/2023    HGB 10.9 (L) 08/08/2023    HCT 36.2 08/08/2023     08/08/2023    RDW 14.4 08/08/2023    NEUTROABS 4.36 08/08/2023    SODIUM 139 01/09/2023    K 3.6 01/09/2023     01/09/2023    CO2 27 01/09/2023    BUN 5 01/09/2023    CREATININE 0.77 01/09/2023    CALCIUM 9.3 01/09/2023    AST 24 01/09/2023    ALT 31 01/09/2023    ALKPHOS 96 01/09/2023    TP 7.6 01/09/2023    TBILI 0.46 01/09/2023    CHOLESTEROL 179 01/09/2023    TRIG 87 01/09/2023    HDL 39 (L) 01/09/2023    LDLCALC 123 (H) 01/09/2023    [facility] 136 09/25/2021    GDW9QGLRYONV 5.839 (H) 08/08/2023    FREET4 0.66 08/08/2023    RPR Non-Reactive 12/21/2017     I have personally reviewed all pertinent laboratory/tests results. Assessment/Plan: The patient overall doing well and denies any significant concern. The patient has not done the CMP yet and was advised to have it done soon. Plan is to continue with the current medication at this time with no changes. Patient educated about her medications again and advised to call us if there is any concern and to call crisis, 911 or visit nearby ER in case of any emergency or having SI or HI. The patient agrees. We will follow with me in 3 months or sooner if needed. Diagnoses and all orders for this visit:    PTSD (post-traumatic stress disorder)  -     escitalopram (LEXAPRO) 10 mg tablet; Take 1 tablet (10 mg total) by mouth daily    MDD (major depressive disorder), recurrent episode, mild (HCC)  -     escitalopram (LEXAPRO) 10 mg tablet; Take 1 tablet (10 mg total) by mouth daily  -     gabapentin (Neurontin) 100 mg capsule;  Take 1 capsule (100 mg total) by mouth daily at bedtime    Generalized anxiety disorder with panic attacks  -     escitalopram (LEXAPRO) 10 mg tablet; Take 1 tablet (10 mg total) by mouth daily  -     gabapentin (Neurontin) 100 mg capsule; Take 1 capsule (100 mg total) by mouth daily at bedtime          Treatment Recommendations/Precautions:      Aware of 24 hour and weekend coverage for urgent situations accessed by calling [facility] main practice number    Risks/Benefits      Risks, Benefits And Possible Side Effects Of Medications:    Risks, benefits, and possible side effects of medications explained to [facility] and she verbalizes understanding and agreement for treatment. Risks of medications in pregnancy explained to [facility]. She verbalizes understanding and agrees to notify her doctor if she becomes pregnant. Controlled Medication Discussion:     Not applicable    Psychotherapy Provided:     Individual psychotherapy provided: Medications, treatment progress and treatment plan reviewed with Pb. Medication education provided to [facility]. Reassurance and supportive therapy provided. Crisis/safety plan discussed with Pb.      Treatment Plan;    Completed and signed during the session: Not applicable - Treatment Plan not due at this session    Enedelia Peabody, MD 11/28/23

## 2023-12-14 ENCOUNTER — APPOINTMENT (OUTPATIENT)
Dept: LAB | Facility: HOSPITAL | Age: 38
End: 2023-12-14
Payer: COMMERCIAL

## 2023-12-14 DIAGNOSIS — N92.6 IRREGULAR MENSTRUATION, UNSPECIFIED: Primary | ICD-10-CM

## 2023-12-14 DIAGNOSIS — N92.6 IRREGULAR MENSTRUATION, UNSPECIFIED: ICD-10-CM

## 2023-12-14 LAB
B-HCG SERPL-ACNC: <1 MIU/ML (ref 0–5)
FSH SERPL-ACNC: 7.2 MIU/ML
LH SERPL-ACNC: 6.5 MIU/ML

## 2023-12-14 PROCEDURE — 84702 CHORIONIC GONADOTROPIN TEST: CPT

## 2023-12-14 PROCEDURE — 36415 COLL VENOUS BLD VENIPUNCTURE: CPT

## 2023-12-14 PROCEDURE — 83001 ASSAY OF GONADOTROPIN (FSH): CPT

## 2023-12-14 PROCEDURE — 83002 ASSAY OF GONADOTROPIN (LH): CPT

## 2024-01-06 ENCOUNTER — OFFICE VISIT (OUTPATIENT)
Dept: URGENT CARE | Age: 39
End: 2024-01-06

## 2024-01-06 VITALS
DIASTOLIC BLOOD PRESSURE: 78 MMHG | HEIGHT: 67 IN | OXYGEN SATURATION: 99 % | HEART RATE: 62 BPM | RESPIRATION RATE: 18 BRPM | SYSTOLIC BLOOD PRESSURE: 126 MMHG | BODY MASS INDEX: 35.31 KG/M2 | TEMPERATURE: 97.9 F | WEIGHT: 225 LBS

## 2024-01-06 DIAGNOSIS — R35.0 URINARY FREQUENCY: ICD-10-CM

## 2024-01-06 DIAGNOSIS — H65.91 FLUID LEVEL BEHIND TYMPANIC MEMBRANE OF RIGHT EAR: Primary | ICD-10-CM

## 2024-01-06 LAB
SL AMB  POCT GLUCOSE, UA: NEGATIVE
SL AMB LEUKOCYTE ESTERASE,UA: NEGATIVE
SL AMB POCT BILIRUBIN,UA: NEGATIVE
SL AMB POCT BLOOD,UA: NEGATIVE
SL AMB POCT CLARITY,UA: CLEAR
SL AMB POCT COLOR,UA: YELLOW
SL AMB POCT KETONES,UA: NEGATIVE
SL AMB POCT NITRITE,UA: NEGATIVE
SL AMB POCT PH,UA: 5
SL AMB POCT SPECIFIC GRAVITY,UA: 1.02
SL AMB POCT URINE HCG: NEGATIVE
SL AMB POCT URINE PROTEIN: NEGATIVE
SL AMB POCT UROBILINOGEN: 0.2

## 2024-01-06 PROCEDURE — 99213 OFFICE O/P EST LOW 20 MIN: CPT | Performed by: EMERGENCY MEDICINE

## 2024-01-06 PROCEDURE — 81002 URINALYSIS NONAUTO W/O SCOPE: CPT

## 2024-01-06 PROCEDURE — 87086 URINE CULTURE/COLONY COUNT: CPT

## 2024-01-06 PROCEDURE — 81025 URINE PREGNANCY TEST: CPT

## 2024-01-06 RX ORDER — FLUTICASONE PROPIONATE 50 MCG
2 SPRAY, SUSPENSION (ML) NASAL DAILY
Qty: 18.2 ML | Refills: 0 | Status: SHIPPED | OUTPATIENT
Start: 2024-01-06

## 2024-01-06 NOTE — PATIENT INSTRUCTIONS
Start Flonase as prescribed  Start over the counter anti-histamine such as Claritin, Zyrtec, or Allegra  Vitamin D3 2000 IU daily  Vitamin C 1000mg twice per day  Multivitamin daily  Fluids and rest  Over the counter cold medication as needed (EX: Mucinex, tylenol/motrin)  Follow up with PCP in 3-5 days.  Proceed to ER if symptoms worsen.

## 2024-01-06 NOTE — PROGRESS NOTES
[facility]        NAME: Pb Suarez is a 38 y.o. female  : 1985    MRN: [id number]  DATE: 2024  TIME: 3:58 PM    Assessment and Plan   Fluid level behind tympanic membrane of right ear [H65.91]  1. Fluid level behind tympanic membrane of right ear  fluticasone (FLONASE) 50 mcg/act nasal spray      2. Urinary frequency  Urine culture    POCT urine dip    POCT urine HCG            Patient Instructions     Start Flonase as prescribed  Start over the counter anti-histamine such as Claritin, Zyrtec, or Allegra  Vitamin D3 2000 IU daily  Vitamin C 1000mg twice per day  Multivitamin daily  Fluids and rest  Over the counter cold medication as needed (EX: Mucinex, tylenol/motrin)  Follow up with PCP in 3-5 days.  Proceed to  ER if symptoms worsen.    Chief Complaint     Chief Complaint   Patient presents with    Earache     Right ear pain x 2 days, urinary frequency, pelvic pressure x 1 week         History of Present Illness       Patient is a 37 yo female with no significant PMH presenting in the clinic today for cold sx x 2 days. Admits right ear pain, sore throat, and headache. Denies fever, chills, body aches, fatigue, cough, congestion, rhinorrhea, tinnitus, ear discharge, chest pain, SOB, n/v/d. Denies the use of OTC tx for sx management. Positive recent sick contacts as patient's son recently diagnosed with the flu.    Patient also notes UTI sx x 1 week. Admits suprapubic pressure, malodorous urine, and urinary frequency. Denies flank pain, urinary urgency, and hematuria. Denies the use of OTC tx  for symptom management.        Review of Systems   Review of Systems   Constitutional:  Negative for chills, fatigue and fever.   HENT:  Positive for ear pain and sore throat. Negative for congestion, postnasal drip, rhinorrhea, sinus pressure and sinus pain.    Respiratory:  Negative for cough and shortness of breath.    Cardiovascular:  Negative for chest pain.   Gastrointestinal:   Negative for abdominal pain, diarrhea, nausea and vomiting.   Genitourinary:  Positive for frequency. Negative for dysuria, flank pain, hematuria and urgency.   Musculoskeletal:  Negative for myalgias.   Skin:  Negative for rash.   Neurological:  Positive for headaches.         Current Medications       Current Outpatient Medications:     escitalopram (LEXAPRO) 10 mg tablet, Take 1 tablet (10 mg total) by mouth daily, Disp: 30 tablet, Rfl: 2    fluticasone (FLONASE) 50 mcg/act nasal spray, 2 sprays into each nostril daily, Disp: 18.2 mL, Rfl: 0    levothyroxine 175 mcg tablet, Take 150 mcg by mouth daily, Disp: , Rfl:     liothyronine (CYTOMEL) 5 mcg tablet, Take 5 mcg by mouth 2 (two) times a day, Disp: , Rfl:     cholestyramine (QUESTRAN) 4 GM/DOSE powder, , Disp: , Rfl:     ferrous sulfate 324 (65 Fe) mg, Take 1 tablet (324 mg total) by mouth daily before breakfast, Disp: 30 tablet, Rfl: 3    gabapentin (Neurontin) 100 mg capsule, Take 1 capsule (100 mg total) by mouth daily at bedtime (Patient not taking: Reported on 1/6/2024), Disp: 30 capsule, Rfl: 2    hydrOXYzine HCL (ATARAX) 25 mg tablet, TAKE 1 TABLET BY MOUTH 3 TIMES A DAY AS NEEDED FOR ANXIETY. (Patient not taking: Reported on 1/6/2024), Disp: 270 tablet, Rfl: 1    Multiple Vitamin (multivitamin) tablet, Take 1 tablet by mouth daily, Disp: 90 tablet, Rfl: 1    Norethin-Eth Estrad-Fe Biphas (Lo Loestrin Fe) 1 MG-10 MCG / 10 MCG TABS, Take 1 caplet by mouth in the morning Take 2 pills daily for the next 48 hours then 1 pill daily, Disp: 84 tablet, Rfl: 1    pantoprazole (PROTONIX) 40 mg tablet, take 1 tablet by mouth before breakfast (Patient not taking: Reported on 8/15/2023), Disp: , Rfl:     predniSONE 20 mg tablet, Take 3 tablets (60 mg total) by mouth daily (Patient not taking: Reported on 1/6/2024), Disp: 15 tablet, Rfl: 0    Current Allergies     Allergies as of 01/06/2024 - Reviewed 01/06/2024   Allergen Reaction Noted    Bactrim  "[sulfamethoxazole-trimethoprim] Hives 04/06/2016    Other Hives 04/06/2016    Shellfish-derived products - food allergy  09/07/2018    Sulfamethoxazole Hives 05/25/2018    Trimethoprim Hives 05/25/2018            The following portions of the patient's history were reviewed and updated as appropriate: allergies, current medications, past family history, past medical history, past social history, past surgical history and problem list.     Past Medical History:   Diagnosis Date    Anxiety     Avascular necrosis of scaphoid (HCC) 12/19/2022    Boil of groin 9/15/2020    Formatting of this note might be different from the original. Left side  Last Assessment & Plan:  Formatting of this note might be different from the original. Advised on warm compresses and antibiotics for now. No e/o abscess or sepsis. Precautions given.    Chlamydial infection 4/7/2017    Crohn's disease (HCC)     Depression     Disease of thyroid gland     Fibromyalgia     Gestational diabetes     Gestational hypertension     previous pregnancy    Hashimoto's thyroiditis     HPV (human papilloma virus) infection     Oligohydramnios in [place] pregnancy in second trimester 9/12/2017    Post-viral cough syndrome 1/20/2023    Retained placenta     Schlatter-Osgood disease 4/21/2017    Urogenital trichomoniasis 4/7/2017       Past Surgical History:   Procedure Laterality Date    CHOLECYSTECTOMY      DILATION AND CURETTAGE OF UTERUS      HERNIA REPAIR      UMBILICAL HERNIA REPAIR         Family History   Problem Relation Age of Onset    Anuerysm Mother     Thyroid disease Mother     Hepatitis Mother     Lung cancer Maternal Grandmother     Colon cancer Maternal Grandfather          Medications have been verified.        Objective   /78   Pulse 62   Temp 97.9 °F (36.6 °C)   Resp 18   Ht 5' 7\" (1.702 m)   Wt 102 kg (225 lb)   SpO2 99%   BMI 35.24 kg/m²        Physical Exam     Physical Exam  Vitals reviewed.   Constitutional:       General: " She is not in acute distress.     Appearance: Normal appearance. She is normal weight. She is not ill-appearing.   HENT:      Head: Normocephalic.      Right Ear: Hearing, ear canal and external ear normal. Tenderness present. No swelling. A middle ear effusion is present. There is no impacted cerumen. Tympanic membrane is not erythematous or bulging.      Left Ear: Hearing, tympanic membrane, ear canal and external ear normal. No swelling or tenderness.  No middle ear effusion. There is no impacted cerumen. Tympanic membrane is not erythematous or bulging.      Nose: Nose normal. No congestion or rhinorrhea.      Right Sinus: No maxillary sinus tenderness or frontal sinus tenderness.      Left Sinus: No maxillary sinus tenderness or frontal sinus tenderness.      Mouth/Throat:      Lips: Pink.      Mouth: Mucous membranes are moist.      Pharynx: Oropharynx is clear. Uvula midline. No pharyngeal swelling, oropharyngeal exudate, posterior oropharyngeal erythema or uvula swelling.      Tonsils: No tonsillar exudate or tonsillar abscesses. 1+ on the right. 1+ on the left.   Eyes:      General:         Right eye: No discharge.         Left eye: No discharge.      Conjunctiva/sclera: Conjunctivae normal.   Cardiovascular:      Rate and Rhythm: Normal rate and regular rhythm.      Pulses: Normal pulses.      Heart sounds: Normal heart sounds. No murmur heard.     No friction rub. No gallop.   Pulmonary:      Effort: Pulmonary effort is normal. No respiratory distress.      Breath sounds: Normal breath sounds. No stridor. No wheezing, rhonchi or rales.   Abdominal:      General: Abdomen is flat. Bowel sounds are normal. There is no distension.      Palpations: Abdomen is soft.      Tenderness: There is no abdominal tenderness. There is no right CVA tenderness, left CVA tenderness or guarding.   Musculoskeletal:      Cervical back: Normal range of motion and neck supple. No tenderness.   Lymphadenopathy:      Cervical: No  cervical adenopathy.   Skin:     General: Skin is warm.      Findings: No rash.   Neurological:      Mental Status: She is alert.   Psychiatric:         Mood and Affect: Mood normal.         Behavior: Behavior normal.

## 2024-01-08 LAB — BACTERIA UR CULT: NORMAL

## 2024-01-16 DIAGNOSIS — E03.8 HYPOTHYROIDISM DUE TO HASHIMOTO'S THYROIDITIS: Primary | ICD-10-CM

## 2024-01-16 DIAGNOSIS — E06.3 HYPOTHYROIDISM DUE TO HASHIMOTO'S THYROIDITIS: Primary | ICD-10-CM

## 2024-01-16 DIAGNOSIS — E03.8 HYPOTHYROIDISM DUE TO HASHIMOTO'S THYROIDITIS: ICD-10-CM

## 2024-01-16 DIAGNOSIS — E06.3 HYPOTHYROIDISM DUE TO HASHIMOTO'S THYROIDITIS: ICD-10-CM

## 2024-01-16 RX ORDER — LEVOTHYROXINE SODIUM 175 UG/1
175 TABLET ORAL DAILY
Qty: 90 TABLET | Refills: 0 | Status: SHIPPED | OUTPATIENT
Start: 2024-01-16 | End: 2024-01-18

## 2024-01-16 RX ORDER — LEVOTHYROXINE SODIUM 175 UG/1
175 TABLET ORAL DAILY
Qty: 90 TABLET | Refills: 0 | Status: CANCELLED | OUTPATIENT
Start: 2024-01-16

## 2024-01-18 ENCOUNTER — TELEPHONE (OUTPATIENT)
Age: 39
End: 2024-01-18

## 2024-01-18 DIAGNOSIS — E06.3 HYPOTHYROIDISM DUE TO HASHIMOTO'S THYROIDITIS: ICD-10-CM

## 2024-01-18 DIAGNOSIS — H65.91 RIGHT NON-SUPPURATIVE OTITIS MEDIA: Primary | ICD-10-CM

## 2024-01-18 DIAGNOSIS — E03.8 HYPOTHYROIDISM DUE TO HASHIMOTO'S THYROIDITIS: ICD-10-CM

## 2024-01-18 RX ORDER — LEVOTHYROXINE SODIUM 175 MCG
175 TABLET ORAL
Qty: 90 TABLET | Refills: 1 | Status: SHIPPED | OUTPATIENT
Start: 2024-01-18

## 2024-01-18 RX ORDER — AMOXICILLIN 500 MG/1
1000 CAPSULE ORAL EVERY 8 HOURS SCHEDULED
Qty: 42 CAPSULE | Refills: 0 | Status: SHIPPED | OUTPATIENT
Start: 2024-01-18 | End: 2024-01-25

## 2024-01-18 NOTE — TELEPHONE ENCOUNTER
Patient states that she needs Synthroid sent in not levothyroxine. She states is brand necessary.

## 2024-03-09 ENCOUNTER — APPOINTMENT (OUTPATIENT)
Dept: LAB | Age: 39
End: 2024-03-09
Payer: COMMERCIAL

## 2024-03-09 DIAGNOSIS — F33.0 MDD (MAJOR DEPRESSIVE DISORDER), RECURRENT EPISODE, MILD (HCC): ICD-10-CM

## 2024-03-09 DIAGNOSIS — F41.0 GENERALIZED ANXIETY DISORDER WITH PANIC ATTACKS: ICD-10-CM

## 2024-03-09 DIAGNOSIS — E03.8 HYPOTHYROIDISM DUE TO HASHIMOTO'S THYROIDITIS: ICD-10-CM

## 2024-03-09 DIAGNOSIS — R79.89 LOW VITAMIN D LEVEL: ICD-10-CM

## 2024-03-09 DIAGNOSIS — F41.1 GENERALIZED ANXIETY DISORDER WITH PANIC ATTACKS: ICD-10-CM

## 2024-03-09 DIAGNOSIS — E06.3 HYPOTHYROIDISM DUE TO HASHIMOTO'S THYROIDITIS: ICD-10-CM

## 2024-03-09 LAB
25(OH)D3 SERPL-MCNC: 17.5 NG/ML (ref 30–100)
ALBUMIN SERPL BCP-MCNC: 4.1 G/DL (ref 3.5–5)
ALP SERPL-CCNC: 87 U/L (ref 34–104)
ALT SERPL W P-5'-P-CCNC: 20 U/L (ref 7–52)
ANION GAP SERPL CALCULATED.3IONS-SCNC: 6 MMOL/L
AST SERPL W P-5'-P-CCNC: 18 U/L (ref 13–39)
BILIRUB SERPL-MCNC: 0.54 MG/DL (ref 0.2–1)
BUN SERPL-MCNC: 10 MG/DL (ref 5–25)
CALCIUM SERPL-MCNC: 9.3 MG/DL (ref 8.4–10.2)
CHLORIDE SERPL-SCNC: 104 MMOL/L (ref 96–108)
CO2 SERPL-SCNC: 29 MMOL/L (ref 21–32)
CREAT SERPL-MCNC: 0.6 MG/DL (ref 0.6–1.3)
EST. AVERAGE GLUCOSE BLD GHB EST-MCNC: 137 MG/DL
GFR SERPL CREATININE-BSD FRML MDRD: 114 ML/MIN/1.73SQ M
GLUCOSE P FAST SERPL-MCNC: 108 MG/DL (ref 65–99)
HBA1C MFR BLD: 6.4 %
POTASSIUM SERPL-SCNC: 4.6 MMOL/L (ref 3.5–5.3)
PROT SERPL-MCNC: 6.9 G/DL (ref 6.4–8.4)
SODIUM SERPL-SCNC: 139 MMOL/L (ref 135–147)
TSH SERPL DL<=0.05 MIU/L-ACNC: 0.89 UIU/ML (ref 0.45–4.5)

## 2024-03-09 PROCEDURE — 83036 HEMOGLOBIN GLYCOSYLATED A1C: CPT

## 2024-03-09 PROCEDURE — 80053 COMPREHEN METABOLIC PANEL: CPT

## 2024-03-09 PROCEDURE — 82306 VITAMIN D 25 HYDROXY: CPT

## 2024-03-13 ENCOUNTER — OFFICE VISIT (OUTPATIENT)
Dept: FAMILY MEDICINE CLINIC | Facility: CLINIC | Age: 39
End: 2024-03-13
Payer: COMMERCIAL

## 2024-03-13 VITALS
WEIGHT: 230 LBS | BODY MASS INDEX: 36.1 KG/M2 | OXYGEN SATURATION: 97 % | HEART RATE: 78 BPM | HEIGHT: 67 IN | SYSTOLIC BLOOD PRESSURE: 155 MMHG | TEMPERATURE: 98.1 F | DIASTOLIC BLOOD PRESSURE: 70 MMHG

## 2024-03-13 DIAGNOSIS — G47.19 EXCESSIVE DAYTIME SLEEPINESS: ICD-10-CM

## 2024-03-13 DIAGNOSIS — Z82.49 FAMILY HISTORY OF ANEURYSM: ICD-10-CM

## 2024-03-13 DIAGNOSIS — F33.1 MODERATE EPISODE OF RECURRENT MAJOR DEPRESSIVE DISORDER (HCC): ICD-10-CM

## 2024-03-13 DIAGNOSIS — E55.9 VITAMIN D DEFICIENCY: Primary | ICD-10-CM

## 2024-03-13 DIAGNOSIS — Z00.00 ROUTINE ADULT HEALTH MAINTENANCE: ICD-10-CM

## 2024-03-13 PROCEDURE — 99204 OFFICE O/P NEW MOD 45 MIN: CPT | Performed by: FAMILY MEDICINE

## 2024-03-13 PROCEDURE — 99386 PREV VISIT NEW AGE 40-64: CPT | Performed by: FAMILY MEDICINE

## 2024-03-13 RX ORDER — CHOLECALCIFEROL (VITAMIN D3) 1250 MCG
50000 CAPSULE ORAL WEEKLY
Qty: 12 CAPSULE | Refills: 1 | Status: SHIPPED | OUTPATIENT
Start: 2024-03-13

## 2024-03-13 RX ORDER — BUPROPION HYDROCHLORIDE 150 MG/1
150 TABLET ORAL EVERY MORNING
Qty: 30 TABLET | Refills: 1 | Status: SHIPPED | OUTPATIENT
Start: 2024-03-13 | End: 2024-09-09

## 2024-03-15 PROBLEM — Z00.00 ROUTINE ADULT HEALTH MAINTENANCE: Status: ACTIVE | Noted: 2024-03-15

## 2024-03-15 PROBLEM — Z82.49 FAMILY HISTORY OF ANEURYSM: Status: ACTIVE | Noted: 2024-03-15

## 2024-03-15 NOTE — PROGRESS NOTES
Assessment/Plan:    37 y/o woman with: hypothyroidism, MDD, excessive daytime sleepiness and Vit. D deficiency and family h/o aneurysm along with annual well visit. Will continue meds. Discussed treatment options. Will begin trial of Wellbutrin. Discussed supportive care and return parameters. Will check CTA head for screening. Discussed supportive care and return parameters.     Regarding Annual well visit. Discussed various safety and health maintenance issues including healthy diet like the Mediterranean diet, exercise, ample sleep, stress reduction, and healthy weight as tolerated. Discussed supportive care and return parameters.     No problem-specific Assessment & Plan notes found for this encounter.       Diagnoses and all orders for this visit:    Vitamin D deficiency  -     Cholecalciferol (Vitamin D3) 1.25 MG (43260 UT) capsule; Take 1 capsule (50,000 Units total) by mouth once a week    Moderate episode of recurrent major depressive disorder (HCC)  -     buPROPion (WELLBUTRIN XL) 150 mg 24 hr tablet; Take 1 tablet (150 mg total) by mouth every morning    Excessive daytime sleepiness  -     Ambulatory Referral to Sleep Medicine; Future    Family history of aneurysm  -     CTA head and neck w wo contrast; Future    Routine adult health maintenance          Subjective:     Chief Complaint   Patient presents with    New Patient Visit     Establish care    Well Check     Annual physical due, discuss labs. No further concerns, ng        Patient ID: Pb Suarez is a 38 y.o. female.    Patient is a 37 y/o woman who presents to establish care in this practice. Pt has hypothyroidism, MDD, excessive daytime sleepiness and Vit. D deficiency and family h/o aneurysm. Pt admits being otherwise stable on meds. And denies acute complaints otherwise. No fevers chills nausea or vomiting. Pt also here for annual well visit admits being active, eats well.        The following portions of the patient's history were  "reviewed and updated as appropriate: allergies, current medications, past family history, past medical history, past social history, past surgical history and problem list.    Review of Systems   Constitutional: Negative.    HENT: Negative.     Eyes: Negative.    Respiratory: Negative.     Cardiovascular: Negative.    Gastrointestinal: Negative.    Endocrine: Negative.    Genitourinary: Negative.    Musculoskeletal: Negative.    Allergic/Immunologic: Negative.    Neurological: Negative.    Hematological: Negative.    Psychiatric/Behavioral:  Positive for dysphoric mood. The patient is nervous/anxious.    All other systems reviewed and are negative.        Objective:      /70 (BP Location: Left arm, Patient Position: Sitting, Cuff Size: Large)   Pulse 78   Temp 98.1 °F (36.7 °C) (Temporal)   Ht 5' 7\" (1.702 m)   Wt 104 kg (230 lb)   SpO2 97%   BMI 36.02 kg/m²          Physical Exam  Constitutional:       Appearance: She is well-developed.   HENT:      Head: Atraumatic.      Right Ear: External ear normal.      Left Ear: External ear normal.   Eyes:      Conjunctiva/sclera: Conjunctivae normal.      Pupils: Pupils are equal, round, and reactive to light.   Cardiovascular:      Rate and Rhythm: Normal rate and regular rhythm.      Heart sounds: Normal heart sounds.   Pulmonary:      Effort: Pulmonary effort is normal. No respiratory distress.      Breath sounds: Normal breath sounds.   Abdominal:      General: There is no distension.      Palpations: Abdomen is soft.      Tenderness: There is no abdominal tenderness. There is no guarding or rebound.   Musculoskeletal:         General: Normal range of motion.      Cervical back: Normal range of motion.   Skin:     General: Skin is warm and dry.   Neurological:      Mental Status: She is alert and oriented to person, place, and time.      Cranial Nerves: No cranial nerve deficit.   Psychiatric:         Behavior: Behavior normal.         Thought Content: " Thought content normal.         Judgment: Judgment normal.         Depression Screening and Follow-up Plan: Patient's depression screening was positive with a PHQ-9 score of 6. Patient assessed for underlying major depression. Brief counseling provided and recommend additional follow-up/re-evaluation next office visit.

## 2024-03-19 DIAGNOSIS — I10 HYPERTENSION, UNSPECIFIED TYPE: ICD-10-CM

## 2024-03-19 DIAGNOSIS — E66.9 CLASS 2 OBESITY WITH BODY MASS INDEX (BMI) OF 36.0 TO 36.9 IN ADULT, UNSPECIFIED OBESITY TYPE, UNSPECIFIED WHETHER SERIOUS COMORBIDITY PRESENT: ICD-10-CM

## 2024-03-19 DIAGNOSIS — R06.81 WITNESSED EPISODE OF APNEA: Primary | ICD-10-CM

## 2024-03-19 DIAGNOSIS — R06.83 SNORING: ICD-10-CM

## 2024-03-19 DIAGNOSIS — G47.19 EXCESSIVE DAYTIME SLEEPINESS: ICD-10-CM

## 2024-03-20 DIAGNOSIS — N39.0 URINARY TRACT INFECTION WITHOUT HEMATURIA, SITE UNSPECIFIED: Primary | ICD-10-CM

## 2024-03-20 RX ORDER — NITROFURANTOIN 25; 75 MG/1; MG/1
100 CAPSULE ORAL 2 TIMES DAILY
Qty: 10 CAPSULE | Refills: 0 | Status: SHIPPED | OUTPATIENT
Start: 2024-03-20 | End: 2024-03-25

## 2024-04-08 ENCOUNTER — OFFICE VISIT (OUTPATIENT)
Dept: FAMILY MEDICINE CLINIC | Facility: CLINIC | Age: 39
End: 2024-04-08
Payer: COMMERCIAL

## 2024-04-08 VITALS
WEIGHT: 229 LBS | OXYGEN SATURATION: 100 % | HEART RATE: 68 BPM | SYSTOLIC BLOOD PRESSURE: 116 MMHG | HEIGHT: 67 IN | BODY MASS INDEX: 35.94 KG/M2 | TEMPERATURE: 97.8 F | DIASTOLIC BLOOD PRESSURE: 68 MMHG

## 2024-04-08 DIAGNOSIS — R05.1 ACUTE COUGH: Primary | ICD-10-CM

## 2024-04-08 DIAGNOSIS — R05.1 ACUTE COUGH: ICD-10-CM

## 2024-04-08 PROCEDURE — 99213 OFFICE O/P EST LOW 20 MIN: CPT | Performed by: FAMILY MEDICINE

## 2024-04-08 PROCEDURE — 87636 SARSCOV2 & INF A&B AMP PRB: CPT | Performed by: FAMILY MEDICINE

## 2024-04-08 NOTE — PROGRESS NOTES
"Assessment/Plan: Supportive care recommended.  Viral testing done at this time.       Diagnoses and all orders for this visit:    Acute cough            Subjective:        Patient ID: Pb Suarez is a 38 y.o. female.      Patient is here with headache, congestion, sinus pressure sore throat fatigue over the past day or so.  Patient did use Motrin.  Some nausea but no diarrhea.  Patient with cough also.  No viral testing done.          The following portions of the patient's history were reviewed and updated as appropriate: allergies, current medications, past family history, past medical history, past social history, past surgical history and problem list.      Review of Systems   Constitutional:  Positive for fatigue. Negative for fever.   HENT:  Positive for congestion, rhinorrhea, sinus pressure, sinus pain and sore throat.    Eyes: Negative.    Respiratory:  Positive for cough.    Cardiovascular: Negative.    Gastrointestinal: Negative.    Endocrine: Negative.    Genitourinary: Negative.    Musculoskeletal: Negative.    Skin: Negative.    Allergic/Immunologic: Negative.    Neurological: Negative.    Hematological: Negative.    Psychiatric/Behavioral: Negative.             Objective:        Depression Screening and Follow-up Plan: Patient was screened for depression during today's encounter. They screened negative with a PHQ-9 score of 0.            /68 (BP Location: Right arm, Patient Position: Sitting, Cuff Size: Large)   Pulse 68   Temp 97.8 °F (36.6 °C) (Temporal)   Ht 5' 7\" (1.702 m)   Wt 104 kg (229 lb)   SpO2 100%   BMI 35.87 kg/m²          Physical Exam  Vitals and nursing note reviewed.   Constitutional:       General: She is not in acute distress.     Appearance: She is ill-appearing. She is not toxic-appearing or diaphoretic.   HENT:      Head: Normocephalic and atraumatic.      Right Ear: Tympanic membrane, ear canal and external ear normal. There is no impacted cerumen.      Left " Ear: Tympanic membrane, ear canal and external ear normal. There is no impacted cerumen.      Nose: Nose normal. No congestion or rhinorrhea.      Mouth/Throat:      Mouth: Mucous membranes are moist.      Pharynx: Oropharyngeal exudate present. No posterior oropharyngeal erythema.   Eyes:      General: No scleral icterus.        Right eye: No discharge.         Left eye: No discharge.   Neck:      Vascular: No carotid bruit.   Cardiovascular:      Rate and Rhythm: Normal rate and regular rhythm.      Pulses: Normal pulses.      Heart sounds: Normal heart sounds. No murmur heard.     No friction rub. No gallop.   Pulmonary:      Effort: Pulmonary effort is normal. No respiratory distress.      Breath sounds: Normal breath sounds. No stridor. No wheezing, rhonchi or rales.   Chest:      Chest wall: No tenderness.   Musculoskeletal:         General: No swelling, tenderness, deformity or signs of injury. Normal range of motion.      Cervical back: Normal range of motion and neck supple. No rigidity. No muscular tenderness.      Right lower leg: No edema.      Left lower leg: No edema.   Lymphadenopathy:      Cervical: Cervical adenopathy present.   Skin:     General: Skin is warm and dry.      Capillary Refill: Capillary refill takes less than 2 seconds.      Coloration: Skin is not jaundiced.      Findings: No bruising, erythema, lesion or rash.   Neurological:      General: No focal deficit present.      Mental Status: She is alert and oriented to person, place, and time.      Cranial Nerves: No cranial nerve deficit.      Sensory: No sensory deficit.      Motor: No weakness.      Coordination: Coordination normal.      Gait: Gait normal.   Psychiatric:         Mood and Affect: Mood normal.         Behavior: Behavior normal.         Thought Content: Thought content normal.         Judgment: Judgment normal.

## 2024-04-09 LAB
FLUAV RNA RESP QL NAA+PROBE: NEGATIVE
FLUBV RNA RESP QL NAA+PROBE: NEGATIVE
SARS-COV-2 RNA RESP QL NAA+PROBE: NEGATIVE

## 2024-04-10 ENCOUNTER — TELEPHONE (OUTPATIENT)
Dept: FAMILY MEDICINE CLINIC | Facility: CLINIC | Age: 39
End: 2024-04-10

## 2024-04-10 DIAGNOSIS — R05.1 ACUTE COUGH: Primary | ICD-10-CM

## 2024-04-10 RX ORDER — AMOXICILLIN 500 MG/1
500 TABLET, FILM COATED ORAL 2 TIMES DAILY
Qty: 14 TABLET | Refills: 0 | Status: SHIPPED | OUTPATIENT
Start: 2024-04-10 | End: 2024-04-17

## 2024-04-10 RX ORDER — AMOXICILLIN 500 MG/1
1000 CAPSULE ORAL EVERY 12 HOURS SCHEDULED
Qty: 28 CAPSULE | Refills: 0 | Status: SHIPPED | OUTPATIENT
Start: 2024-04-10 | End: 2024-04-17

## 2024-04-10 NOTE — TELEPHONE ENCOUNTER
Patient still not feeling well and very congested, testing negative  and would like an antibiotic called in.

## 2024-04-12 DIAGNOSIS — Z00.6 ENCOUNTER FOR EXAMINATION FOR NORMAL COMPARISON OR CONTROL IN CLINICAL RESEARCH PROGRAM: ICD-10-CM

## 2024-04-15 ENCOUNTER — TELEPHONE (OUTPATIENT)
Age: 39
End: 2024-04-15

## 2024-04-15 DIAGNOSIS — J01.10 ACUTE NON-RECURRENT FRONTAL SINUSITIS: Primary | ICD-10-CM

## 2024-04-15 PROCEDURE — 96372 THER/PROPH/DIAG INJ SC/IM: CPT

## 2024-04-15 RX ORDER — METHYLPREDNISOLONE ACETATE 40 MG/ML
40 INJECTION, SUSPENSION INTRA-ARTICULAR; INTRALESIONAL; INTRAMUSCULAR; SOFT TISSUE ONCE
Status: COMPLETED | OUTPATIENT
Start: 2024-04-15 | End: 2024-04-15

## 2024-04-15 RX ADMIN — METHYLPREDNISOLONE ACETATE 40 MG: 40 INJECTION, SUSPENSION INTRA-ARTICULAR; INTRALESIONAL; INTRAMUSCULAR; SOFT TISSUE at 11:00

## 2024-04-15 NOTE — TELEPHONE ENCOUNTER
----- Message from Pb Suarez sent at 4/15/2024 10:11 AM EDT -----  Regarding: Sari  Contact: [phone]  Good morning my daughter is a pt of yours Sari Main 8/10/2009 she does not have a my chart however her periods are so bad she’s having to miss school due to heavy bleeding and cramps that make her vomit the pills you gave her last time aren’t helping any other things we can try cause I am getting into trouble with her having to miss  school every month due to these issues thanks arminda

## 2024-04-16 ENCOUNTER — TELEPHONE (OUTPATIENT)
Age: 39
End: 2024-04-16

## 2024-04-16 NOTE — TELEPHONE ENCOUNTER
"----- Message from C William Riedel, DO sent at 4/15/2024 12:25 PM EDT -----  Regarding: FW: Sari  Contact: [phone]  Recommend completing laboratory studies prior to ultrasound and visit  Orders are in the system      ----- Message -----  From: Massiel Wilburn  Sent: 4/15/2024  10:52 AM EDT  To: C William Riedel, DO; #  Subject: FW: Arpitcarlajenaro                                     12 year old patient's mother called.  Patient's LMP today 4/15/24, heavy flow with severe cramps. Patient stopped BCPs after 3 packs because they made her feel sick. Taking Advil with some relief. Patient's Mother works for a [employer] and cannot take off from work.  Patient's Grandmother can bring her to AT office on Tuesday at 1:30 but grandmother is not presently on HIPAA.  Please call mother to update HIPAA.  Please schedule appointment for 4/16/24.  ----- Message -----  From: Pb Suarez \"Arminda\"  Sent: 4/15/2024  10:11 AM EDT  To: [facility]  Subject: Sari                                         Good morning my daughter is a pt of yours Sari Main [birth year]/2009 she does not have a my chart however her periods are so bad she’s having to miss school due to heavy bleeding and cramps that make her vomit the pills you gave her last time aren’t helping any other things we can try cause I am getting into trouble with her having to miss  school every month due to these issues thanks arminda     "

## 2024-04-30 ENCOUNTER — OFFICE VISIT (OUTPATIENT)
Dept: FAMILY MEDICINE CLINIC | Facility: CLINIC | Age: 39
End: 2024-04-30
Payer: COMMERCIAL

## 2024-04-30 VITALS
HEART RATE: 70 BPM | WEIGHT: 221 LBS | SYSTOLIC BLOOD PRESSURE: 112 MMHG | BODY MASS INDEX: 34.69 KG/M2 | HEIGHT: 67 IN | TEMPERATURE: 98.4 F | OXYGEN SATURATION: 99 % | DIASTOLIC BLOOD PRESSURE: 80 MMHG

## 2024-04-30 DIAGNOSIS — R22.1 MASS OF LEFT SIDE OF NECK: Primary | ICD-10-CM

## 2024-04-30 PROCEDURE — 99213 OFFICE O/P EST LOW 20 MIN: CPT | Performed by: STUDENT IN AN ORGANIZED HEALTH CARE EDUCATION/TRAINING PROGRAM

## 2024-04-30 NOTE — PROGRESS NOTES
Name: Pb Suarez      : 1985      MRN: [id number]  Encounter Provider: Óscar Saini MD  Encounter Date: 2024   Encounter department: [facility]    Assessment & Plan     1. Mass of left side of neck  -     US head neck soft tissue; Future; Expected date: 2024      Second time in past few weeks for patient.  Lesion feels bigger to patient this time.  Likely reactive lymph node, ultrasound ordered to further assess.  Will follow-up results with patient.  Advised patient to reach out to office if symptoms worsen or fail to improve.    Recently completed labs reviewed, appear stable.         Subjective      Neck Pain   This is a recurrent problem. The current episode started yesterday. The problem has been gradually worsening. The pain is present in the left side. Pain scale: only if you touch it. The pain is Same all the time. Pertinent negatives include no chest pain, fever or headaches. She has tried nothing for the symptoms.     Patient reports that thyroid symptoms have been stable and have not acutely worsened with appearance of lesion.    Review of Systems   Constitutional:  Negative for chills and fever.   HENT:  Negative for sore throat.    Respiratory:  Negative for cough and shortness of breath.    Cardiovascular:  Negative for chest pain and palpitations.   Gastrointestinal:  Negative for abdominal pain, constipation, diarrhea, nausea and vomiting.   Genitourinary:  Negative for difficulty urinating and dysuria.   Musculoskeletal:  Positive for neck pain.   Neurological:  Negative for dizziness and headaches.       Current Outpatient Medications on File Prior to Visit   Medication Sig   • buPROPion (WELLBUTRIN XL) 150 mg 24 hr tablet Take 1 tablet (150 mg total) by mouth every morning   • Cholecalciferol (Vitamin D3) 1.25 MG (05474 UT) capsule Take 1 capsule (50,000 Units total) by mouth once a week   • escitalopram (LEXAPRO) 10 mg tablet Take 1  "tablet (10 mg total) by mouth daily   • liothyronine (CYTOMEL) 5 mcg tablet Take 5 mcg by mouth 2 (two) times a day   • Synthroid 175 MCG tablet Take 1 tablet (175 mcg total) by mouth daily in the early morning       Objective     /80 (BP Location: Right arm, Patient Position: Sitting, Cuff Size: Large)   Pulse 70   Temp 98.4 °F (36.9 °C) (Temporal)   Ht 5' 7\" (1.702 m)   Wt 100 kg (221 lb)   SpO2 99%   BMI 34.61 kg/m²     Physical Exam  Constitutional:       Appearance: Normal appearance.   HENT:      Head: Normocephalic and atraumatic.   Cardiovascular:      Rate and Rhythm: Normal rate and regular rhythm.      Heart sounds: Normal heart sounds. No murmur heard.  Pulmonary:      Effort: Pulmonary effort is normal. No respiratory distress.      Breath sounds: Normal breath sounds. No wheezing.   Abdominal:      Palpations: Abdomen is soft.      Tenderness: There is no abdominal tenderness.   Musculoskeletal:      Right lower leg: No edema.      Left lower leg: No edema.   Lymphadenopathy:      Cervical: Cervical adenopathy present.      Left cervical: Superficial cervical adenopathy (About 1 cm in diameter, painful to touch) present.   Neurological:      Mental Status: She is alert.       Óscar Saini MD    "

## 2024-05-01 PROBLEM — Z00.00 ROUTINE ADULT HEALTH MAINTENANCE: Status: RESOLVED | Noted: 2024-03-15 | Resolved: 2024-05-01

## 2024-05-03 ENCOUNTER — HOSPITAL ENCOUNTER (OUTPATIENT)
Dept: ULTRASOUND IMAGING | Facility: HOSPITAL | Age: 39
Discharge: HOME | End: 2024-05-03
Payer: COMMERCIAL

## 2024-05-03 DIAGNOSIS — R22.1 MASS OF LEFT SIDE OF NECK: ICD-10-CM

## 2024-05-03 PROCEDURE — 76536 US EXAM OF HEAD AND NECK: CPT

## 2024-05-08 PROBLEM — R05.1 ACUTE COUGH: Status: RESOLVED | Noted: 2024-04-08 | Resolved: 2024-05-08

## 2024-05-09 ENCOUNTER — TELEPHONE (OUTPATIENT)
Dept: FAMILY MEDICINE CLINIC | Facility: CLINIC | Age: 39
End: 2024-05-09

## 2024-05-09 NOTE — TELEPHONE ENCOUNTER
Regarding: Anxiety   Contact: [phone]  ----- Message from Brook Will MA sent at 5/9/2024  9:15 AM EDT -----  Please review and please send back directly to patient if you can, thank you.     ----- Message from Janette Suarez to Patrick Carranza MD sent at 5/9/2024  8:51 AM -----   Good morning I wanted to know if you could possibly prescribe me something for my anxiety. It’s been through the roof lately and I can’t handle it anymore. I recently have been going through [employer] and when I went to my mediation I felt like was gonna throw up and pass out. That’s my anxiety ever since then I’ve been having it every single day feeling like someone sitting on my chest like I can’t breathe. I feel my heart beating in my back and it hurts. I’ve been dizzy multiple times a day because my anxiety is so bad that I can’t focus And I’m on the verge of tears every day. I wanted to know if you can help me with something I don’t want Xanax cause I don’t like how that makes me feel. I’ve tried hydroxyzine before and I’ve tried Ativan before and they both worked well. Thank you.

## 2024-05-10 ENCOUNTER — OFFICE VISIT (OUTPATIENT)
Dept: FAMILY MEDICINE CLINIC | Facility: CLINIC | Age: 39
End: 2024-05-10
Payer: COMMERCIAL

## 2024-05-10 VITALS
BODY MASS INDEX: 34.69 KG/M2 | WEIGHT: 221 LBS | DIASTOLIC BLOOD PRESSURE: 70 MMHG | SYSTOLIC BLOOD PRESSURE: 124 MMHG | HEIGHT: 67 IN

## 2024-05-10 DIAGNOSIS — F41.0 GENERALIZED ANXIETY DISORDER WITH PANIC ATTACKS: ICD-10-CM

## 2024-05-10 DIAGNOSIS — F33.0 MDD (MAJOR DEPRESSIVE DISORDER), RECURRENT EPISODE, MILD (HCC): ICD-10-CM

## 2024-05-10 DIAGNOSIS — F41.1 GENERALIZED ANXIETY DISORDER WITH PANIC ATTACKS: ICD-10-CM

## 2024-05-10 DIAGNOSIS — F43.10 PTSD (POST-TRAUMATIC STRESS DISORDER): ICD-10-CM

## 2024-05-10 PROCEDURE — 99214 OFFICE O/P EST MOD 30 MIN: CPT | Performed by: FAMILY MEDICINE

## 2024-05-10 RX ORDER — HYDROXYZINE HYDROCHLORIDE 10 MG/1
10 TABLET, FILM COATED ORAL 2 TIMES DAILY PRN
Qty: 30 TABLET | Refills: 0 | Status: SHIPPED | OUTPATIENT
Start: 2024-05-10

## 2024-05-10 RX ORDER — ESCITALOPRAM OXALATE 20 MG/1
20 TABLET ORAL DAILY
Qty: 30 TABLET | Refills: 2 | Status: SHIPPED | OUTPATIENT
Start: 2024-05-10 | End: 2024-08-08

## 2024-05-10 NOTE — LETTER
May 10, 2024     Patient: Pb Suarez  YOB: 1985  Date of Visit: 5/10/2024      To Whom it May Concern:    Pb Suarez is under my professional care. Pb was seen in my office on 5/10/2024. Pb is being treated for anxiety, and is requested to not drive long distances or on the highway for the time being.    If you have any questions or concerns, please don't hesitate to call.         Sincerely,          Patrick Carranza MD        CC: No Recipients

## 2024-05-14 PROBLEM — F33.0 MDD (MAJOR DEPRESSIVE DISORDER), RECURRENT EPISODE, MILD (HCC): Status: ACTIVE | Noted: 2024-05-14

## 2024-05-14 NOTE — PROGRESS NOTES
Assessment/Plan:    39 y/o woman with: MDD, SHANNEN and PTSD Will titrate up lexapro and give hydroxyzine for breakthrough symptoms. Discussed supportive care and return parameters. Will reassess in 1 mo.    No problem-specific Assessment & Plan notes found for this encounter.       Diagnoses and all orders for this visit:    PTSD (post-traumatic stress disorder)  -     escitalopram (LEXAPRO) 20 mg tablet; Take 1 tablet (20 mg total) by mouth daily    MDD (major depressive disorder), recurrent episode, mild (HCC)  -     escitalopram (LEXAPRO) 20 mg tablet; Take 1 tablet (20 mg total) by mouth daily    Generalized anxiety disorder with panic attacks  -     escitalopram (LEXAPRO) 20 mg tablet; Take 1 tablet (20 mg total) by mouth daily  -     hydrOXYzine HCL (ATARAX) 10 mg tablet; Take 1 tablet (10 mg total) by mouth 2 (two) times a day as needed for anxiety          Subjective:     Chief Complaint   Patient presents with    Anxiety     Follow up, discuss medication for anxiety         Patient ID: Pb Suarez is a 38 y.o. female.    Patient is a 39 y/o woman who presents for follow-up on MDD, SHANNEN and PTSD. Pt admits breakthrough anxiety and stress, no SI / HI, no fevers chills nausea or vomiting.    Anxiety  Symptoms include nervous/anxious behavior.           The following portions of the patient's history were reviewed and updated as appropriate: allergies, current medications, past family history, past medical history, past social history, past surgical history and problem list.    Review of Systems   Constitutional: Negative.    HENT: Negative.     Eyes: Negative.    Respiratory: Negative.     Cardiovascular: Negative.    Gastrointestinal: Negative.    Endocrine: Negative.    Genitourinary: Negative.    Musculoskeletal: Negative.    Allergic/Immunologic: Negative.    Neurological: Negative.    Hematological: Negative.    Psychiatric/Behavioral:  Positive for dysphoric mood. The patient is nervous/anxious.    All  "other systems reviewed and are negative.        Objective:      /70 (BP Location: Right arm, Patient Position: Sitting, Cuff Size: Large)   Ht 5' 7\" (1.702 m)   Wt 100 kg (221 lb)   LMP 05/02/2024 (Exact Date)   BMI 34.61 kg/m²          Physical Exam  Constitutional:       Appearance: She is well-developed.   HENT:      Head: Atraumatic.      Right Ear: External ear normal.      Left Ear: External ear normal.   Eyes:      Conjunctiva/sclera: Conjunctivae normal.      Pupils: Pupils are equal, round, and reactive to light.   Pulmonary:      Effort: Pulmonary effort is normal. No respiratory distress.   Abdominal:      General: There is no distension.   Musculoskeletal:         General: Normal range of motion.      Cervical back: Normal range of motion.   Skin:     General: Skin is warm and dry.   Neurological:      Mental Status: She is alert and oriented to person, place, and time.      Cranial Nerves: No cranial nerve deficit.   Psychiatric:         Behavior: Behavior normal.         Thought Content: Thought content normal.         Judgment: Judgment normal.         "

## 2024-05-20 ENCOUNTER — HOSPITAL ENCOUNTER (EMERGENCY)
Facility: HOSPITAL | Age: 39
Discharge: HOME | End: 2024-05-20
Attending: EMERGENCY MEDICINE
Payer: COMMERCIAL

## 2024-05-20 VITALS
TEMPERATURE: 98.2 F | RESPIRATION RATE: 18 BRPM | WEIGHT: 231.48 LBS | HEART RATE: 78 BPM | DIASTOLIC BLOOD PRESSURE: 75 MMHG | BODY MASS INDEX: 36.26 KG/M2 | OXYGEN SATURATION: 100 % | SYSTOLIC BLOOD PRESSURE: 144 MMHG

## 2024-05-20 DIAGNOSIS — Z20.9 EXPOSURE TO BAT WITHOUT KNOWN BITE: Primary | ICD-10-CM

## 2024-05-20 DIAGNOSIS — Z23 NEED FOR PROPHYLACTIC VACCINATION AGAINST RABIES: ICD-10-CM

## 2024-05-20 PROCEDURE — 96372 THER/PROPH/DIAG INJ SC/IM: CPT

## 2024-05-20 PROCEDURE — 90675 RABIES VACCINE IM: CPT

## 2024-05-20 PROCEDURE — 90471 IMMUNIZATION ADMIN: CPT

## 2024-05-20 PROCEDURE — 90375 RABIES IG IM/SC: CPT

## 2024-05-20 PROCEDURE — 99282 EMERGENCY DEPT VISIT SF MDM: CPT

## 2024-05-20 PROCEDURE — 99284 EMERGENCY DEPT VISIT MOD MDM: CPT

## 2024-05-20 RX ADMIN — RABIES IMMUNE GLOBULIN (HUMAN) 2100 UNITS: 300 INJECTION, SOLUTION INFILTRATION; INTRAMUSCULAR at 15:46

## 2024-05-20 RX ADMIN — Medication 1 ML: at 15:46

## 2024-05-20 NOTE — ED PROVIDER NOTES
History  Chief Complaint   Patient presents with    Bat Bite or Exposure     Exposed to bat. Requesting rabies vaccine.      Patient is a 38-year-old female presenting for evaluation after finding a bat in her house.  She does not have any known bites.  The bat has been caught and has been removed from the house.  No other symptoms.  She would like to be vaccinated for rabies.      Bat Bite or Exposure  Associated symptoms: no fever and no rash        Prior to Admission Medications   Prescriptions Last Dose Informant Patient Reported? Taking?   Cholecalciferol (Vitamin D3) 1.25 MG (92054 UT) capsule   No No   Sig: Take 1 capsule (50,000 Units total) by mouth once a week   Synthroid 175 MCG tablet   No No   Sig: Take 1 tablet (175 mcg total) by mouth daily in the early morning   buPROPion (WELLBUTRIN XL) 150 mg 24 hr tablet   No No   Sig: Take 1 tablet (150 mg total) by mouth every morning   escitalopram (LEXAPRO) 20 mg tablet   No No   Sig: Take 1 tablet (20 mg total) by mouth daily   hydrOXYzine HCL (ATARAX) 10 mg tablet   No No   Sig: Take 1 tablet (10 mg total) by mouth 2 (two) times a day as needed for anxiety   liothyronine (CYTOMEL) 5 mcg tablet  Self Yes No   Sig: Take 5 mcg by mouth 2 (two) times a day      Facility-Administered Medications: None       Past Medical History:   Diagnosis Date    Anemia 7/2020    Anxiety     Avascular necrosis of scaphoid (HCC) 12/19/2022    Boil of groin 09/15/2020    Formatting of this note might be different from the original. Left side  Last Assessment & Plan:  Formatting of this note might be different from the original. Advised on warm compresses and antibiotics for now. No e/o abscess or sepsis. Precautions given.    Chlamydial infection 04/07/2017    Crohn's disease (HCC)     Depression     Disease of thyroid gland     Fibromyalgia     GERD (gastroesophageal reflux disease) 2012    Gestational diabetes     Gestational hypertension     previous pregnancy    Hashimoto's  thyroiditis     HPV (human papilloma virus) infection     Oligohydramnios in galvez pregnancy in second trimester 09/12/2017    Post-viral cough syndrome 01/20/2023    Retained placenta     Schlatter-Osgood disease 04/21/2017    Urinary tract infection     Urogenital trichomoniasis 04/07/2017    Visual impairment        Past Surgical History:   Procedure Laterality Date    CHOLECYSTECTOMY      DILATION AND CURETTAGE OF UTERUS      HERNIA REPAIR      UMBILICAL HERNIA REPAIR         Family History   Problem Relation Age of Onset    Anuerysm Mother     Thyroid disease Mother     Hepatitis Mother     Lung cancer Maternal Grandmother     Arthritis Maternal Grandmother     Cancer Maternal Grandmother         Grandma on both sides    Colon cancer Maternal Grandfather     Depression Father         Mom dad grndma aunt uncle cousins    Anxiety disorder Father     Bipolar disorder Father     Self-Injury Father     Diabetes Paternal Grandmother     ADD / ADHD Cousin      I have reviewed and agree with the history as documented.    E-Cigarette/Vaping    E-Cigarette Use Never User      E-Cigarette/Vaping Substances     Social History     Tobacco Use    Smoking status: Former     Current packs/day: 1.00     Average packs/day: 1 pack/day for 10.0 years (10.0 ttl pk-yrs)     Types: Cigarettes    Smokeless tobacco: Never   Vaping Use    Vaping status: Never Used   Substance Use Topics    Alcohol use: No    Drug use: No       Review of Systems   Constitutional:  Negative for chills and fever.   HENT:  Negative for ear pain and sore throat.    Eyes:  Negative for pain and visual disturbance.   Respiratory:  Negative for cough and shortness of breath.    Cardiovascular:  Negative for chest pain and palpitations.   Gastrointestinal:  Negative for abdominal pain and vomiting.   Genitourinary:  Negative for dysuria and hematuria.   Musculoskeletal:  Negative for arthralgias and back pain.   Skin:  Negative for color change and rash.    Neurological:  Negative for seizures and syncope.   All other systems reviewed and are negative.      Physical Exam  Physical Exam  Vitals and nursing note reviewed.   Constitutional:       General: She is not in acute distress.     Appearance: Normal appearance.   HENT:      Head: Normocephalic and atraumatic.      Right Ear: External ear normal.      Left Ear: External ear normal.      Nose: Nose normal.   Eyes:      General: No scleral icterus.        Right eye: No discharge.         Left eye: No discharge.   Cardiovascular:      Rate and Rhythm: Normal rate.      Pulses: Normal pulses.   Pulmonary:      Effort: Pulmonary effort is normal. No respiratory distress.   Musculoskeletal:         General: No tenderness, deformity or signs of injury.      Cervical back: Normal range of motion and neck supple.   Skin:     General: Skin is dry.      Coloration: Skin is not jaundiced.      Findings: No erythema or rash.   Neurological:      General: No focal deficit present.      Mental Status: She is alert and oriented to person, place, and time. Mental status is at baseline.      Motor: No weakness.      Gait: Gait normal.   Psychiatric:         Mood and Affect: Mood normal.         Behavior: Behavior normal.         Thought Content: Thought content normal.         Vital Signs  ED Triage Vitals [05/20/24 1328]   Temperature Pulse Respirations Blood Pressure SpO2   98.2 °F (36.8 °C) 78 18 144/75 100 %      Temp src Heart Rate Source Patient Position - Orthostatic VS BP Location FiO2 (%)   -- -- -- -- --      Pain Score       --           Vitals:    05/20/24 1328   BP: 144/75   Pulse: 78         Visual Acuity      ED Medications  Medications   rabies immune globulin, human (HyperRAB) injection 2,100 Units (2,100 Units Infiltration Given 5/20/24 1546)   rabies vaccine, human diploid IM injection 1 mL (1 mL Intramuscular Given 5/20/24 1546)       Diagnostic Studies  Results Reviewed       None                   No orders  to display              Procedures  Procedures         ED Course                                             Medical Decision Making  Patient is a 38-year-old female presenting for initiation of rabies series following exposure to bat.  Vitals are stable on arrival and she is in no acute distress.  Will give vaccine and immunoglobulin today.  Advised to follow-up on days 3, 7, 14 for repeat vaccination.    I have discussed findings and plan for discharge with the patient/caregiver. Follow up with the appropriate providers including primary care physician was discussed. Return precautions discussed with patient/caregiver as outlined in AVS. Patient/caregiver verbally expressed understanding. Patient stable at time of discharge and ambulated out of the emergency department.       Risk  Prescription drug management.             Disposition  Final diagnoses:   Exposure to bat without known bite   Need for prophylactic vaccination against rabies     Time reflects when diagnosis was documented in both MDM as applicable and the Disposition within this note       Time User Action Codes Description Comment    5/20/2024  3:37 PM Ksenia Cano [Z20.9] Exposure to bat without known bite     5/20/2024  3:37 PM Ksenia Cano [Z23] Need for prophylactic vaccination against rabies           ED Disposition       ED Disposition   Discharge    Condition   Stable    Date/Time   Mon May 20, 2024  3:37 PM    Comment   Pb Suarez discharge to home/self care.                   Follow-up Information       Follow up With Specialties Details Why Contact Info Additional Information    [facility]   [address], [facility] [address]  [facility] [address]  [phone] [facility] [place], [phone]     Via the [address] [place] [address] (North/South) Take [address] toward [place]. Take the [facility] [address]. Keep right and follow signs for [address]/  [place]. Merge onto [address]. In a half mile, take the exit for [address] toward [place]. In 0.7 miles take the [facility] Exit. Merge onto [facility]. In 500 feet, turn left on [email or web address] and drive 0.3 miles. [facility] will be on your left.    [address]ia [address] ([address]) Take [address] toward [place]. Take the [facility] Exit. Merge onto [facility] [address]. In 500 feet, turn left on [email or web address] and drive 0.3 miles. [facility] will be on your left.  [address]ia [address] ([address]) Take [address] towards [place]. In 0.7 miles take the [facility] Exit. Merge onto [facility]. In 500 feet, turn left on [email or web address] and drive 0.3 miles. [facility] will be on your left.            Discharge Medication List as of 5/20/2024  3:43 PM        CONTINUE these medications which have NOT CHANGED    Details   buPROPion (WELLBUTRIN XL) 150 mg 24 hr tablet Take 1 tablet (150 mg total) by mouth every morning, Starting Wed 3/13/2024, Until Mon 9/9/2024, Normal      Cholecalciferol (Vitamin D3) 1.25 MG (75561 UT) capsule Take 1 capsule (50,000 Units total) by mouth once a week, Starting Wed 3/13/2024, Normal      escitalopram (LEXAPRO) 20 mg tablet Take 1 tablet (20 mg total) by mouth daily, Starting Fri 5/10/2024, Until Thu 8/8/2024, Normal      hydrOXYzine HCL (ATARAX) 10 mg tablet Take 1 tablet (10 mg total) by mouth 2 (two) times a day as needed for anxiety, Starting Fri 5/10/2024, Normal      liothyronine (CYTOMEL) 5 mcg tablet Take 5 mcg by mouth 2 (two) times a day, Starting Fri 3/18/2022, Historical Med      Synthroid 175 MCG tablet Take 1 tablet (175 mcg total) by mouth daily in the early morning, Starting Thu 1/18/2024, Normal             No discharge procedures on file.    PDMP Review         Value Time User    PDMP Reviewed  Yes 6/13/2023  4:46 PM Kane Fowler MD            ED  Provider  Electronically Signed by             Ksenia Cano PA-C  05/20/24 1731

## 2024-05-20 NOTE — Clinical Note
Pb Suarez was seen and treated in our emergency department on 5/20/2024.                Diagnosis:     Pb  may return to work on return date.    She may return on this date: 05/21/2024         If you have any questions or concerns, please don't hesitate to call.      Ksenia Cano PA-C    ______________________________           _______________          _______________  Hospital Representative                              Date                                Time

## 2024-05-20 NOTE — DISCHARGE INSTRUCTIONS
Return to ED or urgent care on day 3 (5/23), day 7 (5/27), and day 14 (6/3) for repeat rabies vaccines.

## 2024-05-23 ENCOUNTER — HOSPITAL ENCOUNTER (EMERGENCY)
Facility: HOSPITAL | Age: 39
Discharge: HOME | End: 2024-05-23
Attending: EMERGENCY MEDICINE
Payer: MEDICARE

## 2024-05-23 VITALS
OXYGEN SATURATION: 99 % | RESPIRATION RATE: 18 BRPM | HEART RATE: 72 BPM | BODY MASS INDEX: 35.7 KG/M2 | DIASTOLIC BLOOD PRESSURE: 68 MMHG | WEIGHT: 227.96 LBS | SYSTOLIC BLOOD PRESSURE: 145 MMHG | TEMPERATURE: 98.6 F

## 2024-05-23 DIAGNOSIS — Z23 ENCOUNTER FOR REPEAT ADMINISTRATION OF RABIES VACCINATION: Primary | ICD-10-CM

## 2024-05-23 PROCEDURE — 90471 IMMUNIZATION ADMIN: CPT

## 2024-05-23 PROCEDURE — 90675 RABIES VACCINE IM: CPT

## 2024-05-23 PROCEDURE — 99284 EMERGENCY DEPT VISIT MOD MDM: CPT

## 2024-05-23 RX ADMIN — Medication 1 ML: at 18:22

## 2024-05-23 NOTE — ED PROVIDER NOTES
History  Chief Complaint   Patient presents with    Follow Up Rabies     Patient needs 2nd rabies vaccine. Pt reports going to urgent care prior to coming and they told her that they did not have access to the rabies vaccine.      Patient is a 38-year-old female presenting for repeat rabies vaccine after exposure to bat without known bite.  She tolerated the previous vaccines well and reports that she was only tired afterwards.  Denies other complaints.          Prior to Admission Medications   Prescriptions Last Dose Informant Patient Reported? Taking?   Cholecalciferol (Vitamin D3) 1.25 MG (43070 UT) capsule   No No   Sig: Take 1 capsule (50,000 Units total) by mouth once a week   Synthroid 175 MCG tablet   No No   Sig: Take 1 tablet (175 mcg total) by mouth daily in the early morning   buPROPion (WELLBUTRIN XL) 150 mg 24 hr tablet   No No   Sig: Take 1 tablet (150 mg total) by mouth every morning   escitalopram (LEXAPRO) 20 mg tablet   No No   Sig: Take 1 tablet (20 mg total) by mouth daily   hydrOXYzine HCL (ATARAX) 10 mg tablet   No No   Sig: Take 1 tablet (10 mg total) by mouth 2 (two) times a day as needed for anxiety   liothyronine (CYTOMEL) 5 mcg tablet  Self Yes No   Sig: Take 5 mcg by mouth 2 (two) times a day      Facility-Administered Medications: None       Past Medical History:   Diagnosis Date    Anemia 7/2020    Anxiety     Avascular necrosis of scaphoid (HCC) 12/19/2022    Boil of groin 09/15/2020    Formatting of this note might be different from the original. Left side  Last Assessment & Plan:  Formatting of this note might be different from the original. Advised on warm compresses and antibiotics for now. No e/o abscess or sepsis. Precautions given.    Chlamydial infection 04/07/2017    Crohn's disease (HCC)     Depression     Disease of thyroid gland     Fibromyalgia     GERD (gastroesophageal reflux disease) 2012    Gestational diabetes     Gestational hypertension     previous pregnancy     Hashimoto's thyroiditis     HPV (human papilloma virus) infection     Oligohydramnios in galvez pregnancy in second trimester 09/12/2017    Post-viral cough syndrome 01/20/2023    Retained placenta     Schlatter-Osgood disease 04/21/2017    Urinary tract infection     Urogenital trichomoniasis 04/07/2017    Visual impairment        Past Surgical History:   Procedure Laterality Date    CHOLECYSTECTOMY      DILATION AND CURETTAGE OF UTERUS      HERNIA REPAIR      UMBILICAL HERNIA REPAIR         Family History   Problem Relation Age of Onset    Anuerysm Mother     Thyroid disease Mother     Hepatitis Mother     Lung cancer Maternal Grandmother     Arthritis Maternal Grandmother     Cancer Maternal Grandmother         Grandma on both sides    Colon cancer Maternal Grandfather     Depression Father         Mom dad grndma aunt uncle cousins    Anxiety disorder Father     Bipolar disorder Father     Self-Injury Father     Diabetes Paternal Grandmother     ADD / ADHD Cousin      I have reviewed and agree with the history as documented.    E-Cigarette/Vaping    E-Cigarette Use Never User      E-Cigarette/Vaping Substances     Social History     Tobacco Use    Smoking status: Former     Current packs/day: 1.00     Average packs/day: 1 pack/day for 10.0 years (10.0 ttl pk-yrs)     Types: Cigarettes    Smokeless tobacco: Never   Vaping Use    Vaping status: Never Used   Substance Use Topics    Alcohol use: No    Drug use: No       Review of Systems   Constitutional:  Negative for chills and fever.   HENT:  Negative for ear pain and sore throat.    Eyes:  Negative for pain and visual disturbance.   Respiratory:  Negative for cough and shortness of breath.    Cardiovascular:  Negative for chest pain and palpitations.   Gastrointestinal:  Negative for abdominal pain and vomiting.   Genitourinary:  Negative for dysuria and hematuria.   Musculoskeletal:  Negative for arthralgias and back pain.   Skin:  Negative for color change  and rash.   Neurological:  Negative for seizures and syncope.   All other systems reviewed and are negative.      Physical Exam  Physical Exam  Vitals and nursing note reviewed.   Constitutional:       General: She is not in acute distress.     Appearance: Normal appearance.   HENT:      Head: Normocephalic and atraumatic.      Right Ear: External ear normal.      Left Ear: External ear normal.      Nose: Nose normal.   Eyes:      General: No scleral icterus.        Right eye: No discharge.         Left eye: No discharge.   Cardiovascular:      Rate and Rhythm: Normal rate.      Pulses: Normal pulses.   Pulmonary:      Effort: Pulmonary effort is normal. No respiratory distress.   Musculoskeletal:         General: No tenderness, deformity or signs of injury.      Cervical back: Normal range of motion and neck supple.   Skin:     General: Skin is dry.      Coloration: Skin is not jaundiced.      Findings: No erythema or rash.   Neurological:      General: No focal deficit present.      Mental Status: She is alert and oriented to person, place, and time. Mental status is at baseline.      Motor: No weakness.      Gait: Gait normal.   Psychiatric:         Mood and Affect: Mood normal.         Behavior: Behavior normal.         Thought Content: Thought content normal.         Vital Signs  ED Triage Vitals   Temperature Pulse Respirations Blood Pressure SpO2   05/23/24 1802 05/23/24 1804 05/23/24 1804 05/23/24 1804 05/23/24 1804   98.6 °F (37 °C) 72 18 145/68 99 %      Temp Source Heart Rate Source Patient Position - Orthostatic VS BP Location FiO2 (%)   05/23/24 1802 05/23/24 1804 05/23/24 1804 05/23/24 1804 --   Oral Monitor Sitting Right arm       Pain Score       05/23/24 1804       No Pain           Vitals:    05/23/24 1804   BP: 145/68   Pulse: 72   Patient Position - Orthostatic VS: Sitting         Visual Acuity      ED Medications  Medications   rabies vaccine, human diploid IM injection 1 mL (1 mL Intramuscular  Given 5/23/24 1822)       Diagnostic Studies  Results Reviewed       None                   No orders to display              Procedures  Procedures         ED Course                                             Medical Decision Making  Patient is a 38-year-old female presenting for repeat rabies vaccine.  Vitals are stable and she is in no acute distress.  Tolerated prior vaccine well.  Administered rabies booster in the ED.  Advised to follow-up on days 7 and 14 for final 2 doses.    I have discussed findings and plan for discharge with the patient/caregiver. Follow up with the appropriate providers including primary care physician was discussed. Return precautions discussed with patient/caregiver as outlined in AVS. Patient/caregiver verbally expressed understanding. Patient stable at time of discharge and ambulated out of the emergency department.       Risk  Prescription drug management.             Disposition  Final diagnoses:   Encounter for repeat administration of rabies vaccination     Time reflects when diagnosis was documented in both MDM as applicable and the Disposition within this note       Time User Action Codes Description Comment    5/23/2024  6:11 PM Ksenia Cano Add [Z23] Encounter for repeat administration of rabies vaccination           ED Disposition       ED Disposition   Discharge    Condition   Stable    Date/Time   Thu May 23, 2024  6:11 PM    Comment   Pb Suarez discharge to home/self care.                   Follow-up Information       Follow up With Specialties Details Why Contact Info    Patrick Carranza MD Family Medicine   [address] [place] [address]  [phone]              Discharge Medication List as of 5/23/2024  6:13 PM        CONTINUE these medications which have NOT CHANGED    Details   buPROPion (WELLBUTRIN XL) 150 mg 24 hr tablet Take 1 tablet (150 mg total) by mouth every morning, Starting Wed 3/13/2024, Until Mon 9/9/2024, Normal       Cholecalciferol (Vitamin D3) 1.25 MG (50614 UT) capsule Take 1 capsule (50,000 Units total) by mouth once a week, Starting Wed 3/13/2024, Normal      escitalopram (LEXAPRO) 20 mg tablet Take 1 tablet (20 mg total) by mouth daily, Starting Fri 5/10/2024, Until Thu 8/8/2024, Normal      hydrOXYzine HCL (ATARAX) 10 mg tablet Take 1 tablet (10 mg total) by mouth 2 (two) times a day as needed for anxiety, Starting Fri 5/10/2024, Normal      liothyronine (CYTOMEL) 5 mcg tablet Take 5 mcg by mouth 2 (two) times a day, Starting Fri 3/18/2022, Historical Med      Synthroid 175 MCG tablet Take 1 tablet (175 mcg total) by mouth daily in the early morning, Starting Thu 1/18/2024, Normal             No discharge procedures on file.    PDMP Review         Value Time User    PDMP Reviewed  Yes 6/13/2023  4:46 PM Kane Fowler MD            ED Provider  Electronically Signed by             Ksenia Cano PA-C  05/23/24 8691

## 2024-05-27 ENCOUNTER — CLINICAL SUPPORT (OUTPATIENT)
Dept: URGENT CARE | Age: 39
End: 2024-05-27
Payer: COMMERCIAL

## 2024-05-27 VITALS
DIASTOLIC BLOOD PRESSURE: 60 MMHG | HEART RATE: 72 BPM | RESPIRATION RATE: 18 BRPM | TEMPERATURE: 97.1 F | OXYGEN SATURATION: 100 % | SYSTOLIC BLOOD PRESSURE: 126 MMHG

## 2024-05-27 DIAGNOSIS — Z23 NEED FOR IMMUNIZATION AGAINST RABIES: Primary | ICD-10-CM

## 2024-05-27 PROCEDURE — 90471 IMMUNIZATION ADMIN: CPT

## 2024-05-27 PROCEDURE — 90675 RABIES VACCINE IM: CPT

## 2024-06-03 ENCOUNTER — CLINICAL SUPPORT (OUTPATIENT)
Dept: URGENT CARE | Age: 39
End: 2024-06-03
Payer: COMMERCIAL

## 2024-06-03 VITALS — TEMPERATURE: 97.6 F | SYSTOLIC BLOOD PRESSURE: 140 MMHG | DIASTOLIC BLOOD PRESSURE: 72 MMHG

## 2024-06-03 PROCEDURE — 99213 OFFICE O/P EST LOW 20 MIN: CPT

## 2024-06-03 PROCEDURE — 90471 IMMUNIZATION ADMIN: CPT

## 2024-06-03 PROCEDURE — 90675 RABIES VACCINE IM: CPT

## 2024-06-19 ENCOUNTER — OFFICE VISIT (OUTPATIENT)
Dept: FAMILY MEDICINE CLINIC | Facility: CLINIC | Age: 39
End: 2024-06-19
Payer: COMMERCIAL

## 2024-06-19 VITALS
OXYGEN SATURATION: 100 % | WEIGHT: 229 LBS | TEMPERATURE: 96.7 F | HEIGHT: 67 IN | SYSTOLIC BLOOD PRESSURE: 125 MMHG | BODY MASS INDEX: 35.94 KG/M2 | DIASTOLIC BLOOD PRESSURE: 68 MMHG | HEART RATE: 67 BPM

## 2024-06-19 DIAGNOSIS — F41.1 GENERALIZED ANXIETY DISORDER WITH PANIC ATTACKS: Primary | ICD-10-CM

## 2024-06-19 DIAGNOSIS — R73.01 IMPAIRED FASTING GLUCOSE: ICD-10-CM

## 2024-06-19 DIAGNOSIS — F41.0 GENERALIZED ANXIETY DISORDER WITH PANIC ATTACKS: Primary | ICD-10-CM

## 2024-06-19 DIAGNOSIS — E66.01 SEVERE OBESITY (BMI 35.0-39.9) WITH COMORBIDITY (HCC): ICD-10-CM

## 2024-06-19 DIAGNOSIS — F43.10 PTSD (POST-TRAUMATIC STRESS DISORDER): ICD-10-CM

## 2024-06-19 DIAGNOSIS — E03.8 HYPOTHYROIDISM DUE TO HASHIMOTO'S THYROIDITIS: ICD-10-CM

## 2024-06-19 DIAGNOSIS — E06.3 HYPOTHYROIDISM DUE TO HASHIMOTO'S THYROIDITIS: ICD-10-CM

## 2024-06-19 DIAGNOSIS — M79.10 MYALGIA: ICD-10-CM

## 2024-06-19 PROCEDURE — 99214 OFFICE O/P EST MOD 30 MIN: CPT | Performed by: FAMILY MEDICINE

## 2024-06-19 RX ORDER — BUSPIRONE HYDROCHLORIDE 5 MG/1
5 TABLET ORAL 2 TIMES DAILY
Qty: 60 TABLET | Refills: 1 | Status: SHIPPED | OUTPATIENT
Start: 2024-06-19

## 2024-06-21 NOTE — PROGRESS NOTES
Assessment/Plan:    39 y/o woman with: SHANNEN, PTSD, severe obesity with comorbidity, hypothyroidism, myalgias, and IFG. Will continue meds. Check labs and add buspar. Discussed supportive care and return parameters.     No problem-specific Assessment & Plan notes found for this encounter.       Diagnoses and all orders for this visit:    Generalized anxiety disorder with panic attacks  -     busPIRone (BUSPAR) 5 mg tablet; Take 1 tablet (5 mg total) by mouth 2 (two) times a day  -     CBC and differential; Future  -     Comprehensive metabolic panel; Future  -     TSH, 3rd generation with Free T4 reflex; Future  -     CK; Future  -     Magnesium; Future  -     Phosphorus; Future  -     C-reactive protein; Future  -     GRACIELA w/Reflex if Positive; Future  -     Aldolase; Future  -     Hemoglobin A1C; Future    PTSD (post-traumatic stress disorder)  -     busPIRone (BUSPAR) 5 mg tablet; Take 1 tablet (5 mg total) by mouth 2 (two) times a day  -     CBC and differential; Future  -     Comprehensive metabolic panel; Future  -     TSH, 3rd generation with Free T4 reflex; Future  -     CK; Future  -     Magnesium; Future  -     Phosphorus; Future  -     C-reactive protein; Future  -     GRACIELA w/Reflex if Positive; Future  -     Aldolase; Future  -     Hemoglobin A1C; Future    Severe obesity (BMI 35.0-39.9) with comorbidity (HCC)  -     CBC and differential; Future  -     Comprehensive metabolic panel; Future  -     TSH, 3rd generation with Free T4 reflex; Future  -     CK; Future  -     Magnesium; Future  -     Phosphorus; Future  -     C-reactive protein; Future  -     GRACIELA w/Reflex if Positive; Future  -     Aldolase; Future  -     Hemoglobin A1C; Future    Hypothyroidism due to Hashimoto's thyroiditis  -     CBC and differential; Future  -     Comprehensive metabolic panel; Future  -     TSH, 3rd generation with Free T4 reflex; Future  -     CK; Future  -     Magnesium; Future  -     Phosphorus; Future  -     C-reactive  protein; Future  -     GRACIELA w/Reflex if Positive; Future  -     Aldolase; Future  -     Hemoglobin A1C; Future    Myalgia  -     CBC and differential; Future  -     Comprehensive metabolic panel; Future  -     TSH, 3rd generation with Free T4 reflex; Future  -     CK; Future  -     Magnesium; Future  -     Phosphorus; Future  -     C-reactive protein; Future  -     GRACIELA w/Reflex if Positive; Future  -     Aldolase; Future  -     Hemoglobin A1C; Future    Impaired fasting glucose  -     CBC and differential; Future  -     Comprehensive metabolic panel; Future  -     TSH, 3rd generation with Free T4 reflex; Future  -     CK; Future  -     Magnesium; Future  -     Phosphorus; Future  -     C-reactive protein; Future  -     GRACIELA w/Reflex if Positive; Future  -     Aldolase; Future  -     Hemoglobin A1C; Future          Subjective:     Chief Complaint   Patient presents with    Follow-up     1 month follow up appointment to anxiety and patient would like to discuss having thyroid rechecked. No further concerns, ng        Patient ID: Pb Suarez is a 38 y.o. female.    Patient is a 37 y/o woman who presents for follow-up on SHANNEN, PTSD, severe obesity with comorbidity, hypothyroidism, myalgias, and IFG. Pt c/o breakthrough anxiety no fevers chills nausea or vomiting.        The following portions of the patient's history were reviewed and updated as appropriate: allergies, current medications, past family history, past medical history, past social history, past surgical history and problem list.    Review of Systems   Constitutional: Negative.    HENT: Negative.     Eyes: Negative.    Respiratory: Negative.     Cardiovascular: Negative.    Gastrointestinal: Negative.    Endocrine: Negative.    Genitourinary: Negative.    Musculoskeletal: Negative.    Allergic/Immunologic: Negative.    Neurological: Negative.    Hematological: Negative.    Psychiatric/Behavioral: Negative.     All other systems reviewed and are negative.    "     Objective:      /68 (BP Location: Left arm, Patient Position: Sitting, Cuff Size: Large)   Pulse 67   Temp (!) 96.7 °F (35.9 °C) (Temporal)   Ht 5' 7\" (1.702 m)   Wt 104 kg (229 lb)   SpO2 100%   BMI 35.87 kg/m²          Physical Exam  Constitutional:       Appearance: She is well-developed.   HENT:      Head: Atraumatic.      Right Ear: External ear normal.      Left Ear: External ear normal.   Eyes:      Extraocular Movements: EOM normal.      Conjunctiva/sclera: Conjunctivae normal.      Pupils: Pupils are equal, round, and reactive to light.   Cardiovascular:      Rate and Rhythm: Normal rate and regular rhythm.      Heart sounds: Normal heart sounds.   Pulmonary:      Effort: Pulmonary effort is normal. No respiratory distress.      Breath sounds: Normal breath sounds.   Abdominal:      General: There is no distension.      Palpations: Abdomen is soft.      Tenderness: There is no abdominal tenderness. There is no guarding or rebound.   Musculoskeletal:         General: Normal range of motion.      Cervical back: Normal range of motion.   Skin:     General: Skin is warm and dry.   Neurological:      Mental Status: She is alert and oriented to person, place, and time.      Cranial Nerves: No cranial nerve deficit.   Psychiatric:         Mood and Affect: Mood and affect normal.         Behavior: Behavior normal.         Thought Content: Thought content normal.         Judgment: Judgment normal.         "

## 2024-06-24 ENCOUNTER — OFFICE VISIT (OUTPATIENT)
Dept: FAMILY MEDICINE CLINIC | Facility: CLINIC | Age: 39
End: 2024-06-24
Payer: COMMERCIAL

## 2024-06-24 VITALS
WEIGHT: 228 LBS | DIASTOLIC BLOOD PRESSURE: 78 MMHG | HEART RATE: 62 BPM | TEMPERATURE: 97.3 F | BODY MASS INDEX: 35.79 KG/M2 | HEIGHT: 67 IN | OXYGEN SATURATION: 98 % | SYSTOLIC BLOOD PRESSURE: 118 MMHG

## 2024-06-24 DIAGNOSIS — H65.01 NON-RECURRENT ACUTE SEROUS OTITIS MEDIA OF RIGHT EAR: Primary | ICD-10-CM

## 2024-06-24 PROCEDURE — 99213 OFFICE O/P EST LOW 20 MIN: CPT | Performed by: FAMILY MEDICINE

## 2024-06-24 RX ORDER — AMOXICILLIN 500 MG/1
1000 CAPSULE ORAL EVERY 12 HOURS SCHEDULED
Qty: 28 CAPSULE | Refills: 0 | Status: SHIPPED | OUTPATIENT
Start: 2024-06-24 | End: 2024-07-01

## 2024-06-24 RX ORDER — OFLOXACIN 3 MG/ML
10 SOLUTION AURICULAR (OTIC) 2 TIMES DAILY
Qty: 5 ML | Refills: 0 | Status: SHIPPED | OUTPATIENT
Start: 2024-06-24

## 2024-06-24 NOTE — PROGRESS NOTES
"Assessment/Plan:       Diagnoses and all orders for this visit:    Non-recurrent acute serous otitis media of right ear  -     amoxicillin (AMOXIL) 500 mg capsule; Take 2 capsules (1,000 mg total) by mouth every 12 (twelve) hours for 7 days  -     ofloxacin (FLOXIN) 0.3 % otic solution; Administer 10 drops to the right ear 2 (two) times a day            Subjective:        Patient ID: Pb Suarez is a 38 y.o. female.      Patient is here with right ear pain over the past 24 hours or so.  [place]  No hearing loss noted.  No vertigo.  No significant other URI symptoms at this time.  Left ear slightly involved also.  No fevers.  Patient to use Motrin.  No otorrhea or tinnitus.    Earache           The following portions of the patient's history were reviewed and updated as appropriate: allergies, current medications, past family history, past medical history, past social history, past surgical history and problem list.      Review of Systems   Constitutional: Negative.    HENT:  Positive for ear pain.    Eyes: Negative.    Respiratory: Negative.     Cardiovascular: Negative.    Gastrointestinal: Negative.    Endocrine: Negative.    Genitourinary: Negative.    Musculoskeletal: Negative.    Skin: Negative.    Allergic/Immunologic: Negative.    Neurological: Negative.    Hematological: Negative.    Psychiatric/Behavioral: Negative.             Objective:               /78 (BP Location: Right arm, Patient Position: Sitting, Cuff Size: Standard)   Pulse 62   Temp (!) 97.3 °F (36.3 °C) (Temporal)   Ht 5' 7\" (1.702 m)   Wt 103 kg (228 lb)   LMP 06/03/2024 (Approximate)   SpO2 98%   BMI 35.71 kg/m²          Physical Exam  Vitals and nursing note reviewed.   Constitutional:       General: She is not in acute distress.     Appearance: Normal appearance. She is not ill-appearing, toxic-appearing or diaphoretic.   HENT:      Head: Normocephalic and atraumatic.      Comments: Serous " fluid behind right TM.     Right Ear: Ear canal and external ear normal. There is no impacted cerumen.      Left Ear: Tympanic membrane, ear canal and external ear normal. There is no impacted cerumen.      Nose: Nose normal. No congestion or rhinorrhea.      Mouth/Throat:      Mouth: Mucous membranes are moist.      Pharynx: No oropharyngeal exudate or posterior oropharyngeal erythema.   Eyes:      General: No scleral icterus.        Right eye: No discharge.         Left eye: No discharge.   Neck:      Vascular: No carotid bruit.   Cardiovascular:      Rate and Rhythm: Normal rate and regular rhythm.      Pulses: Normal pulses.      Heart sounds: Normal heart sounds. No murmur heard.     No friction rub. No gallop.   Pulmonary:      Effort: Pulmonary effort is normal. No respiratory distress.      Breath sounds: Normal breath sounds. No stridor. No wheezing, rhonchi or rales.   Chest:      Chest wall: No tenderness.   Musculoskeletal:         General: No swelling, tenderness, deformity or signs of injury. Normal range of motion.      Cervical back: Normal range of motion and neck supple. No rigidity. No muscular tenderness.      Right lower leg: No edema.      Left lower leg: No edema.   Lymphadenopathy:      Cervical: No cervical adenopathy.   Skin:     General: Skin is warm and dry.      Capillary Refill: Capillary refill takes less than 2 seconds.      Coloration: Skin is not jaundiced.      Findings: No bruising, erythema, lesion or rash.   Neurological:      Mental Status: She is alert and oriented to person, place, and time. Mental status is at baseline.      Cranial Nerves: No cranial nerve deficit.      Sensory: No sensory deficit.      Motor: No weakness.      Coordination: Coordination normal.      Gait: Gait normal.   Psychiatric:         Mood and Affect: Mood normal.         Behavior: Behavior normal.         Thought Content: Thought content normal.         Judgment: Judgment normal.

## 2024-06-27 ENCOUNTER — TELEPHONE (OUTPATIENT)
Dept: FAMILY MEDICINE CLINIC | Facility: CLINIC | Age: 39
End: 2024-06-27

## 2024-06-27 DIAGNOSIS — Z01.84 IMMUNITY STATUS TESTING: Primary | ICD-10-CM

## 2024-06-28 DIAGNOSIS — K64.4 EXTERNAL HEMORRHOID: Primary | ICD-10-CM

## 2024-06-28 RX ORDER — HYDROCORTISONE ACETATE PRAMOXINE HCL 1; 1 G/100G; G/100G
1 CREAM TOPICAL 3 TIMES DAILY
Qty: 30 G | Refills: 0 | Status: SHIPPED | OUTPATIENT
Start: 2024-06-28 | End: 2024-07-03

## 2024-07-01 ENCOUNTER — HOSPITAL ENCOUNTER (EMERGENCY)
Facility: HOSPITAL | Age: 39
Discharge: HOME | End: 2024-07-01
Attending: EMERGENCY MEDICINE
Payer: COMMERCIAL

## 2024-07-01 VITALS
WEIGHT: 227.51 LBS | HEART RATE: 85 BPM | OXYGEN SATURATION: 99 % | SYSTOLIC BLOOD PRESSURE: 135 MMHG | HEIGHT: 67 IN | TEMPERATURE: 99 F | RESPIRATION RATE: 18 BRPM | BODY MASS INDEX: 35.71 KG/M2 | DIASTOLIC BLOOD PRESSURE: 67 MMHG

## 2024-07-01 DIAGNOSIS — K64.5 EXTERNAL HEMORRHOID, THROMBOSED: Primary | ICD-10-CM

## 2024-07-01 PROCEDURE — 99284 EMERGENCY DEPT VISIT MOD MDM: CPT | Performed by: EMERGENCY MEDICINE

## 2024-07-01 PROCEDURE — 99282 EMERGENCY DEPT VISIT SF MDM: CPT

## 2024-07-01 RX ORDER — NITROGLYCERIN 4 MG/G
OINTMENT RECTAL EVERY 12 HOURS SCHEDULED
Qty: 30 G | Refills: 0 | Status: SHIPPED | OUTPATIENT
Start: 2024-07-01 | End: 2024-07-03

## 2024-07-01 RX ORDER — NITROGLYCERIN 4 MG/G
OINTMENT RECTAL EVERY 12 HOURS SCHEDULED
Qty: 30 G | Refills: 0 | Status: SHIPPED | OUTPATIENT
Start: 2024-07-01 | End: 2024-07-01

## 2024-07-01 NOTE — PROGRESS NOTES
"Ambulatory Visit  Name: Pb Suarez      : 1985      MRN: [id number]  Encounter Provider: Carolina Orta MD  Encounter Date: 7/3/2024   Encounter department: [facility] COLON AND RECTAL SURGERY [place]    Assessment & Plan   1. External hemorrhoid  -     Ambulatory Referral to Colorectal Surgery  Patient has a thrombosed right anterior external hemorrhoid  At this point, symptoms and tenderness have slowly improved  Incision and drainage deferred today  Recommended sitz baths, ice, and topical calmoseptine  Return to office in 1 month to assess hemorrhoidal symptoms or if pain worsens    History of Present Illness     Pb Suarez is a 38 y.o. female who presents for evaluation of thrombosed external hemorrhoid.     The patient notes painful anal lump she has had for around a week now; she notes this seems to be getting hard.     The patient was seen at the emergency room on 2024. Physical examination showed: \"Appears she has a thrombosed external hemorrhoid approximately 1 to 2 cm.  Quite firm. Be located at about the 9 o'clock position.  I see no blood.  I see no fissure. This does not appear to be an abscess.  There is no surrounding erythema.\"    Last colonoscopy/EGD performed on 2021 by Dr. Mazariegos, colonoscopy showed: Indication: Diarrhea, iron deficiency anemia. Impression: Normal colon and terminal ileum. Biopsies taken.  10 year recall. Colonoscopy pathology showed benign colonic mucosa with no acute or microscopic colitis and no dysplasia or malignancy.     Family history of colon cancer in maternal grandfather.     Review of Systems   Constitutional:  Negative for chills and fever.   HENT:  Negative for ear pain and sore throat.    Eyes:  Negative for pain and visual disturbance.   Respiratory:  Negative for cough and shortness of breath.    Cardiovascular:  Negative for chest pain and palpitations.   Gastrointestinal:  Positive for rectal pain. Negative for abdominal " pain and vomiting.   Genitourinary:  Negative for dysuria and hematuria.   Musculoskeletal:  Negative for arthralgias and back pain.   Skin:  Negative for color change and rash.   Neurological:  Negative for seizures and syncope.   All other systems reviewed and are negative.    Past Medical History   Past Medical History:   Diagnosis Date    Anemia 7/2020    Anxiety     Avascular necrosis of scaphoid (HCC) 12/19/2022    Boil of groin 09/15/2020    Formatting of this note might be different from the original. Left side  Last Assessment & Plan:  Formatting of this note might be different from the original. Advised on warm compresses and antibiotics for now. No e/o abscess or sepsis. Precautions given.    Chlamydial infection 04/07/2017    Crohn's disease (HCC)     Depression     Disease of thyroid gland     Fibromyalgia     GERD (gastroesophageal reflux disease) 2012    Gestational diabetes     Gestational hypertension     previous pregnancy    Hashimoto's thyroiditis     HPV (human papilloma virus) infection     Oligohydramnios in galvez pregnancy in second trimester 09/12/2017    Post-viral cough syndrome 01/20/2023    Retained placenta     Schlatter-Osgood disease 04/21/2017    Urinary tract infection     Urogenital trichomoniasis 04/07/2017    Visual impairment      Past Surgical History:   Procedure Laterality Date    CHOLECYSTECTOMY      DILATION AND CURETTAGE OF UTERUS      HERNIA REPAIR      UMBILICAL HERNIA REPAIR       Family History   Problem Relation Age of Onset    Anuerysm Mother     Thyroid disease Mother     Hepatitis Mother     Colon polyps Mother     Depression Father         Mom dad grndma aunt uncle cousins    Anxiety disorder Father     Bipolar disorder Father     Self-Injury Father     Lung cancer Maternal Grandmother     Arthritis Maternal Grandmother     Cancer Maternal Grandmother         Grandma on both sides    Colon cancer Maternal Grandfather     Diabetes Paternal Grandmother     ADD /  ADHD Cousin      Current Outpatient Medications on File Prior to Visit   Medication Sig Dispense Refill    buPROPion (WELLBUTRIN XL) 150 mg 24 hr tablet Take 1 tablet (150 mg total) by mouth every morning (Patient not taking: Reported on 6/19/2024) 30 tablet 1    busPIRone (BUSPAR) 5 mg tablet Take 1 tablet (5 mg total) by mouth 2 (two) times a day 60 tablet 1    Cholecalciferol (Vitamin D3) 1.25 MG (52146 UT) capsule Take 1 capsule (50,000 Units total) by mouth once a week 12 capsule 1    escitalopram (LEXAPRO) 20 mg tablet Take 1 tablet (20 mg total) by mouth daily 30 tablet 2    hydrOXYzine HCL (ATARAX) 10 mg tablet Take 1 tablet (10 mg total) by mouth 2 (two) times a day as needed for anxiety (Patient not taking: Reported on 6/19/2024) 30 tablet 0    liothyronine (CYTOMEL) 5 mcg tablet Take 5 mcg by mouth 2 (two) times a day      ofloxacin (FLOXIN) 0.3 % otic solution Administer 10 drops to the right ear 2 (two) times a day 5 mL 0    Synthroid 175 MCG tablet Take 1 tablet (175 mcg total) by mouth daily in the early morning 90 tablet 1    [DISCONTINUED] Hydrocortisone Ace-Pramoxine 1-1 % CREA Apply 1 Application topically 3 (three) times a day 30 g 0    [DISCONTINUED] nitroglycerin (RECTIV) 0.4 % Insert into the rectum every 12 (twelve) hours (Patient not taking: Reported on 7/3/2024) 30 g 0     No current facility-administered medications on file prior to visit.     Allergies   Allergen Reactions    Bactrim [Sulfamethoxazole-Trimethoprim] Hives    Other Hives     seafood    Shellfish-Derived Products - Food Allergy     Sulfamethoxazole Hives    Trimethoprim Hives      Objective     LMP 06/25/2024 (Approximate)     Physical Exam  Vitals and nursing note reviewed.   Constitutional:       General: She is not in acute distress.     Appearance: She is well-developed.   HENT:      Head: Normocephalic and atraumatic.   Eyes:      Conjunctiva/sclera: Conjunctivae normal.   Cardiovascular:      Rate and Rhythm: Normal  rate and regular rhythm.      Heart sounds: No murmur heard.  Pulmonary:      Effort: Pulmonary effort is normal. No respiratory distress.      Breath sounds: Normal breath sounds.   Abdominal:      Palpations: Abdomen is soft.      Tenderness: There is no abdominal tenderness.   Genitourinary:     Comments: Large thrombosed right anterior external hemorrhoid with skin wrinkles  Musculoskeletal:         General: No swelling.      Cervical back: Neck supple.   Skin:     General: Skin is warm and dry.      Capillary Refill: Capillary refill takes less than 2 seconds.   Neurological:      Mental Status: She is alert.   Psychiatric:         Mood and Affect: Mood normal.       Administrative Statements   I have spent a total time of 31 minutes in caring for this patient on the day of the visit/encounter including Diagnostic results, Prognosis, Risks and benefits of tx options, Instructions for management, Patient and family education, Importance of tx compliance, Risk factor reductions, Impressions, Counseling / Coordination of care, Documenting in the medical record, Reviewing / ordering tests, medicine, procedures  , Obtaining or reviewing history  , and Communicating with other healthcare professionals .

## 2024-07-01 NOTE — ED PROVIDER NOTES
"History  Chief Complaint   Patient presents with    Abscess     Pt reports cyst right above rectum. Pt reports using at home remedies to reduce size with no luck. Pt reports abscess is approx size of quarter. Colorectal surgery canceled appointment due to emergency at clinic.     HPI  Several days of rectal pain and feeling a \"cyst\" over the last several days that is getting worse and she has severe pain and spasm.  Has never had this before.  Was supposed to see colorectal surgery today and they apparently canceled the appointment and rescheduled for Wednesday and told her to go to the emergency department so she is here.  She has irritable bowel so has usually soft watery stools but is having a lot of discomfort with position.  She has had no fevers or chills.  When the pain gets very bad she gets a little nauseous but there is no significant nausea or vomiting or diarrhea.  She has been putting some lidocaine over the area that provides a little bit of relief.  Prior to Admission Medications   Prescriptions Last Dose Informant Patient Reported? Taking?   Cholecalciferol (Vitamin D3) 1.25 MG (27871 UT) capsule   No No   Sig: Take 1 capsule (50,000 Units total) by mouth once a week   Hydrocortisone Ace-Pramoxine 1-1 % CREA   No No   Sig: Apply 1 Application topically 3 (three) times a day   Synthroid 175 MCG tablet   No No   Sig: Take 1 tablet (175 mcg total) by mouth daily in the early morning   amoxicillin (AMOXIL) 500 mg capsule   No No   Sig: Take 2 capsules (1,000 mg total) by mouth every 12 (twelve) hours for 7 days   buPROPion (WELLBUTRIN XL) 150 mg 24 hr tablet   No No   Sig: Take 1 tablet (150 mg total) by mouth every morning   Patient not taking: Reported on 6/19/2024   busPIRone (BUSPAR) 5 mg tablet   No No   Sig: Take 1 tablet (5 mg total) by mouth 2 (two) times a day   escitalopram (LEXAPRO) 20 mg tablet   No No   Sig: Take 1 tablet (20 mg total) by mouth daily   hydrOXYzine HCL (ATARAX) 10 mg tablet   " No No   Sig: Take 1 tablet (10 mg total) by mouth 2 (two) times a day as needed for anxiety   Patient not taking: Reported on 6/19/2024   liothyronine (CYTOMEL) 5 mcg tablet  Self Yes No   Sig: Take 5 mcg by mouth 2 (two) times a day   ofloxacin (FLOXIN) 0.3 % otic solution   No No   Sig: Administer 10 drops to the right ear 2 (two) times a day      Facility-Administered Medications: None       Past Medical History:   Diagnosis Date    Anemia 7/2020    Anxiety     Avascular necrosis of scaphoid (HCC) 12/19/2022    Boil of groin 09/15/2020    Formatting of this note might be different from the original. Left side  Last Assessment & Plan:  Formatting of this note might be different from the original. Advised on warm compresses and antibiotics for now. No e/o abscess or sepsis. Precautions given.    Chlamydial infection 04/07/2017    Crohn's disease (HCC)     Depression     Disease of thyroid gland     Fibromyalgia     GERD (gastroesophageal reflux disease) 2012    Gestational diabetes     Gestational hypertension     previous pregnancy    Hashimoto's thyroiditis     HPV (human papilloma virus) infection     Oligohydramnios in [place] pregnancy in second trimester 09/12/2017    Post-viral cough syndrome 01/20/2023    Retained placenta     Schlatter-Osgood disease 04/21/2017    Urinary tract infection     Urogenital trichomoniasis 04/07/2017    Visual impairment        Past Surgical History:   Procedure Laterality Date    CHOLECYSTECTOMY      DILATION AND CURETTAGE OF UTERUS      HERNIA REPAIR      UMBILICAL HERNIA REPAIR         Family History   Problem Relation Age of Onset    Anuerysm Mother     Thyroid disease Mother     Hepatitis Mother     Lung cancer Maternal Grandmother     Arthritis Maternal Grandmother     Cancer Maternal Grandmother         Grandma on both sides    Colon cancer Maternal Grandfather     Depression Father         Mom dad grndma aunt uncle cousins    Anxiety disorder Father     Bipolar  disorder Father     Self-Injury Father     Diabetes Paternal Grandmother     ADD / ADHD Cousin      I have reviewed and agree with the history as documented.    E-Cigarette/Vaping    E-Cigarette Use Never User      E-Cigarette/Vaping Substances     Social History     Tobacco Use    Smoking status: Former     Current packs/day: 1.00     Average packs/day: 1 pack/day for 10.0 years (10.0 ttl pk-yrs)     Types: Cigarettes    Smokeless tobacco: Never   Vaping Use    Vaping status: Never Used   Substance Use Topics    Alcohol use: No    Drug use: No       Review of Systems    Physical Exam  Physical Exam  Vitals and nursing note reviewed. Exam conducted with a chaperone present.   Constitutional:       General: She is not in acute distress.     Appearance: Normal appearance. She is not ill-appearing, toxic-appearing or diaphoretic.   HENT:      Head: Normocephalic and atraumatic.   Genitourinary:     Exam position: Knee-chest position.      Comments: Appears she has a thrombosed external hemorrhoid approximately 1 to 2 cm.  Quite firm.  Be located at about the 9 o'clock position.  I see no blood.  I see no fissure.  This does not appear to be an abscess.  There is no surrounding erythema.  Skin:     General: Skin is warm.      Findings: No bruising or erythema.   Neurological:      General: No focal deficit present.      Mental Status: She is alert.      Sensory: No sensory deficit.      Motor: No weakness.      Gait: Gait normal.         Vital Signs  ED Triage Vitals   Temperature Pulse Respirations Blood Pressure SpO2   07/01/24 1227 07/01/24 1224 07/01/24 1224 07/01/24 1227 07/01/24 1224   99 °F (37.2 °C) 85 18 135/67 99 %      Temp Source Heart Rate Source Patient Position - Orthostatic VS BP Location FiO2 (%)   07/01/24 1227 07/01/24 1224 07/01/24 1227 07/01/24 1227 --   Oral Monitor Sitting Left arm       Pain Score       07/01/24 1224       6           Vitals:    07/01/24 1224 07/01/24 1227   BP:  135/67   Pulse:  85    Patient Position - Orthostatic VS:  Sitting         Visual Acuity      ED Medications  Medications - No data to display    Diagnostic Studies  Results Reviewed       None                   No orders to display              Procedures  Procedures         ED Course                               SBIRT 22yo+      Flowsheet Row Most Recent Value   Initial Alcohol Screen: US AUDIT-C     1. How often do you have a drink containing alcohol? 0 Filed at: 07/01/2024 1226   2. How many drinks containing alcohol do you have on a typical day you are drinking?  0 Filed at: 07/01/2024 1226   3b. FEMALE Any Age, or MALE 65+: How often do you have 4 or more drinks on one occassion? 0 Filed at: 07/01/2024 1226   Audit-C Score 0 Filed at: 07/01/2024 1226   LAKESHA: How many times in the past year have you...    Used an illegal drug or used a prescription medication for non-medical reasons? Never Filed at: 07/01/2024 1226                      Medical Decision Making  Thrombosed hemorrhoid.  She will need to see colorectal.  She has an appointment on Wednesday.  We can try some nitroglycerin ointment which our pharmacy does not carry so I did send that to the compounding pharmacy.  Give her a note for work as well secondary to the severe pain.  Declined stronger pain medications than her ibuprofen and diclofenac that she already has at home.    Risk  Prescription drug management.             Disposition  Final diagnoses:   External hemorrhoid, thrombosed     Time reflects when diagnosis was documented in both MDM as applicable and the Disposition within this note       Time User Action Codes Description Comment    7/1/2024  1:04 PM Óscar Pond Add [K64.5] External hemorrhoid, thrombosed     7/1/2024  1:04 PM Óscar Pond Remove [K64.5] External hemorrhoid, thrombosed     7/1/2024  1:04 PM Óscar Pond Add [K64.5] External hemorrhoid, thrombosed           ED Disposition       ED Disposition   Discharge     Condition   Stable    Date/Time   Mon Jul 1, 2024 1304    Comment   Pb Suarez discharge to home/self care.                   Follow-up Information       Follow up With Specialties Details Why Contact Info    Colorectal Surgery  Go on 7/3/2024              Discharge Medication List as of 7/1/2024  1:19 PM        CONTINUE these medications which have CHANGED    Details   nitroglycerin (RECTIV) 0.4 % Insert into the rectum every 12 (twelve) hours, Starting Mon 7/1/2024, Print           CONTINUE these medications which have NOT CHANGED    Details   amoxicillin (AMOXIL) 500 mg capsule Take 2 capsules (1,000 mg total) by mouth every 12 (twelve) hours for 7 days, Starting Mon 6/24/2024, Until Mon 7/1/2024, Normal      buPROPion (WELLBUTRIN XL) 150 mg 24 hr tablet Take 1 tablet (150 mg total) by mouth every morning, Starting Wed 3/13/2024, Until Mon 9/9/2024, Normal      busPIRone (BUSPAR) 5 mg tablet Take 1 tablet (5 mg total) by mouth 2 (two) times a day, Starting Wed 6/19/2024, Normal      Cholecalciferol (Vitamin D3) 1.25 MG (30414 UT) capsule Take 1 capsule (50,000 Units total) by mouth once a week, Starting Wed 3/13/2024, Normal      escitalopram (LEXAPRO) 20 mg tablet Take 1 tablet (20 mg total) by mouth daily, Starting Fri 5/10/2024, Until Thu 8/8/2024, Normal      Hydrocortisone Ace-Pramoxine 1-1 % CREA Apply 1 Application topically 3 (three) times a day, Starting Fri 6/28/2024, Normal      hydrOXYzine HCL (ATARAX) 10 mg tablet Take 1 tablet (10 mg total) by mouth 2 (two) times a day as needed for anxiety, Starting Fri 5/10/2024, Normal      liothyronine (CYTOMEL) 5 mcg tablet Take 5 mcg by mouth 2 (two) times a day, Starting Fri 3/18/2022, Historical Med      ofloxacin (FLOXIN) 0.3 % otic solution Administer 10 drops to the right ear 2 (two) times a day, Starting Mon 6/24/2024, Normal      Synthroid 175 MCG tablet Take 1 tablet (175 mcg total) by mouth daily in the early morning, Starting Thu 1/18/2024,  Normal             No discharge procedures on file.    PDMP Review         Value Time User    PDMP Reviewed  Yes 6/13/2023  4:46 PM Kane Fowler MD            ED Provider  Electronically Signed by             Óscar Pond MD  07/01/24 [id number]

## 2024-07-01 NOTE — Clinical Note
Pb Suarez was seen and treated in our emergency department on 7/1/2024.                Diagnosis:     Pb  may return to work on return date.    She may return on this date: 07/05/2024         If you have any questions or concerns, please don't hesitate to call.      Óscar Pond MD    ______________________________           _______________          _______________  Hospital Representative                              Date                                Time

## 2024-07-03 ENCOUNTER — OFFICE VISIT (OUTPATIENT)
Age: 39
End: 2024-07-03
Payer: COMMERCIAL

## 2024-07-03 VITALS — HEIGHT: 67 IN | BODY MASS INDEX: 35.47 KG/M2 | WEIGHT: 226 LBS

## 2024-07-03 DIAGNOSIS — K64.4 EXTERNAL HEMORRHOID: Primary | ICD-10-CM

## 2024-07-03 PROCEDURE — 99203 OFFICE O/P NEW LOW 30 MIN: CPT | Performed by: SURGERY

## 2024-07-09 ENCOUNTER — TELEPHONE (OUTPATIENT)
Age: 39
End: 2024-07-09

## 2024-07-09 NOTE — TELEPHONE ENCOUNTER
Good morning I seen you sometime last week we discussed that this throbosed hemorrhoid would go on its own, however it does go to down to about a nickel size but when I have a bowel movement it goes back up to the size it was at your office maybe a little bigger and when I have to have a bowel movement I am unable to hold it it comes right out and I poop my pants and it hurts and burns sooooo bad I’m literally screaming and crying and the only thing that stops the burning is to immediately get into a hot bath I can not go in this way please can we do something thank you Janette       Home

## 2024-07-09 NOTE — PROGRESS NOTES
Ambulatory Visit  Name: Pb Suarez      : 1985      MRN: [id number]  Encounter Provider: Carolina Orta MD  Encounter Date: 7/10/2024   Encounter department: [facility] COLON AND RECTAL SURGERY [place]    Assessment & Plan   1. External hemorrhoid  Patient had worsening thrombosis of a right-sided external hemorrhoid  Incision and drainage was performed in the office today  Patient will follow-up in approximately 1 month for wound check and to assess for need for surgical intervention    History of Present Illness       Pb Suarez is a 38 y.o. female who presents for a follow up for thrombosed external hemorrhoid. She was last seen in the office on 7/3/24.     The patient states that her thrombosed hemorrhoid has reduced some in size overall. It does flare after bowel movements but then reduces. Her pain has increased. She also still has a burning sensation that has increased in severity too.     She reports bright red blood upon wiping and blood in the toilet bowl with her last bowel movement.     She also reports being unable to control her bowel movements. When she has the urge to have a bowel movement she has to run to the bathroom or she will have a bowel movement in her underwear.     Sitz baths give her some relief.     Last colonoscopy performed on 2021 by Dr. Mazariegos.    Review of Systems   Constitutional:  Negative for chills and fever.   HENT:  Negative for ear pain and sore throat.    Eyes:  Negative for pain and visual disturbance.   Respiratory:  Negative for cough and shortness of breath.    Cardiovascular:  Negative for chest pain and palpitations.   Gastrointestinal:  Positive for rectal pain. Negative for abdominal pain and vomiting.   Genitourinary:  Negative for dysuria and hematuria.   Musculoskeletal:  Negative for arthralgias and back pain.   Skin:  Negative for color change and rash.   Neurological:  Negative for seizures and syncope.   All other systems  reviewed and are negative.    Past Medical History   Past Medical History:   Diagnosis Date    Anemia 7/2020    Anxiety     Avascular necrosis of scaphoid (HCC) 12/19/2022    Boil of groin 09/15/2020    Formatting of this note might be different from the original. Left side  Last Assessment & Plan:  Formatting of this note might be different from the original. Advised on warm compresses and antibiotics for now. No e/o abscess or sepsis. Precautions given.    Chlamydial infection 04/07/2017    Crohn's disease (HCC)     Depression     Disease of thyroid gland     Fibromyalgia     GERD (gastroesophageal reflux disease) 2012    Gestational diabetes     Gestational hypertension     previous pregnancy    Hashimoto's thyroiditis     HPV (human papilloma virus) infection     Oligohydramnios in [place]vez pregnancy in second trimester 09/12/2017    Post-viral cough syndrome 01/20/2023    Retained placenta     Schlatter-Osgood disease 04/21/2017    Urinary tract infection     Urogenital trichomoniasis 04/07/2017    Visual impairment      Past Surgical History:   Procedure Laterality Date    CHOLECYSTECTOMY      DILATION AND CURETTAGE OF UTERUS      HERNIA REPAIR      UMBILICAL HERNIA REPAIR       Family History   Problem Relation Age of Onset    Anuerysm Mother     Thyroid disease Mother     Hepatitis Mother     Colon polyps Mother     Depression Father         Mom dad grndma aunt uncle cousins    Anxiety disorder Father     Bipolar disorder Father     Self-Injury Father     Lung cancer Maternal Grandmother     Arthritis Maternal Grandmother     Cancer Maternal Grandmother         Grandma on both sides    Colon cancer Maternal Grandfather     Diabetes Paternal Grandmother     ADD / ADHD Cousin      Current Outpatient Medications on File Prior to Visit   Medication Sig Dispense Refill    busPIRone (BUSPAR) 5 mg tablet Take 1 tablet (5 mg total) by mouth 2 (two) times a day 60 tablet 1    Cholecalciferol (Vitamin D3) 1.25 MG  (56894 UT) capsule Take 1 capsule (50,000 Units total) by mouth once a week 12 capsule 1    escitalopram (LEXAPRO) 20 mg tablet Take 1 tablet (20 mg total) by mouth daily 30 tablet 2    liothyronine (CYTOMEL) 5 mcg tablet Take 5 mcg by mouth 2 (two) times a day      ofloxacin (FLOXIN) 0.3 % otic solution Administer 10 drops to the right ear 2 (two) times a day 5 mL 0    Synthroid 175 MCG tablet Take 1 tablet (175 mcg total) by mouth daily in the early morning 90 tablet 1    buPROPion (WELLBUTRIN XL) 150 mg 24 hr tablet Take 1 tablet (150 mg total) by mouth every morning (Patient not taking: Reported on 6/19/2024) 30 tablet 1    hydrOXYzine HCL (ATARAX) 10 mg tablet Take 1 tablet (10 mg total) by mouth 2 (two) times a day as needed for anxiety (Patient not taking: Reported on 6/19/2024) 30 tablet 0     No current facility-administered medications on file prior to visit.     Allergies   Allergen Reactions    Bactrim [Sulfamethoxazole-Trimethoprim] Hives    Other Hives     seafood    Shellfish-Derived Products - Food Allergy     Sulfamethoxazole Hives    Trimethoprim Hives     to  Objective     LMP 06/25/2024 (Approximate)     Physical Exam  Vitals and nursing note reviewed.   Constitutional:       General: She is not in acute distress.     Appearance: She is well-developed.   HENT:      Head: Normocephalic and atraumatic.   Eyes:      Conjunctiva/sclera: Conjunctivae normal.   Cardiovascular:      Rate and Rhythm: Normal rate and regular rhythm.      Heart sounds: No murmur heard.  Pulmonary:      Effort: Pulmonary effort is normal. No respiratory distress.      Breath sounds: Normal breath sounds.   Abdominal:      Palpations: Abdomen is soft.      Tenderness: There is no abdominal tenderness.   Genitourinary:     Comments: Large, tender right-sided external hemorrhoid  Musculoskeletal:         General: No swelling.      Cervical back: Neck supple.   Skin:     General: Skin is warm and dry.      Capillary Refill:  Capillary refill takes less than 2 seconds.   Neurological:      Mental Status: She is alert.   Psychiatric:         Mood and Affect: Mood normal.     Anal Procedures    Performed by: Carolina Orta MD  Authorized by: Carolina Orta MD    Universal Protocol:     Verbal consent obtained?: Yes      Written consent obtained?: Yes      Consent given by:  Patient    Patient identity confirmed:  Verbally with patient    Time out: Immediately prior to the procedure a time out was called    A time out verifies correct patient, procedure, equipment, support staff and site/side marked as required:   Procedure Type: incision of thrombosed external hemorrhoid    Hemorrhoidectomy Details:   Hemorrhoid type: external    Number of columns/groups removed:  1  Incision and Drainage Details:   Scalpel size:  15  Complexity:  Simple  Incision Type:  Cruciate  Additiional Procedure Intervention Details:  Large clot evacuated from right lateral external hemorrhoid      Administrative Statements   I have spent a total time of 22 minutes in caring for this patient on the day of the visit/encounter including okay postop I right okay coolDiagnostic results, Prognosis, Risks and benefits of tx options, Instructions for management, Patient and family education, Importance of tx compliance, Risk factor reductions, Impressions, Counseling / Coordination of care, Documenting in the medical record, Reviewing / ordering tests, medicine, procedures  , Obtaining or reviewing history  , and Communicating with other healthcare professionals .

## 2024-07-10 ENCOUNTER — OFFICE VISIT (OUTPATIENT)
Age: 39
End: 2024-07-10
Payer: COMMERCIAL

## 2024-07-10 VITALS
BODY MASS INDEX: 35.63 KG/M2 | DIASTOLIC BLOOD PRESSURE: 78 MMHG | WEIGHT: 227 LBS | HEIGHT: 67 IN | SYSTOLIC BLOOD PRESSURE: 124 MMHG

## 2024-07-10 DIAGNOSIS — K64.4 EXTERNAL HEMORRHOID: Primary | ICD-10-CM

## 2024-07-10 PROCEDURE — 46083 INC THROMBOSED HROID XTRNL: CPT | Performed by: SURGERY

## 2024-07-10 PROCEDURE — 99213 OFFICE O/P EST LOW 20 MIN: CPT | Performed by: SURGERY

## 2024-07-10 NOTE — PATIENT INSTRUCTIONS
High fiber diet/increased hydration, 20-30 grams fiber per day, increased fruits/vegetables    Start fiber supplementation with benefiber. You may put this in your coffee/tea/juice in the morning. Start with 2 tsp once per day. If you experience bloating or gassiness, you may start with 1 tsp once per day. You may increase fiber supplementation to UP TO 2 tsp three times per day in order to achieve 1 formed bowel movement once per day.    Aim to drink at least 64 oz of water per day      High Fiber Diet   AMBULATORY CARE:   A high-fiber diet  includes foods that have a high amount of fiber. Fiber is the part of fruits, vegetables, and grains that is not broken down by your body. Fiber keeps your bowel movements regular. Fiber can also help lower your cholesterol level, control blood sugar in people with diabetes, and relieve constipation. Fiber can also help you control your weight because it helps you feel full faster. Most adults should eat 25 to 35 grams of fiber each day. Talk to your dietitian or healthcare provider about the amount of fiber you need.  Good sources of fiber:       Foods with at least 4 grams of fiber per serving:      ? to ½ cup of high-fiber cereal (check the nutrition label on the box)    ½ cup of blackberries or raspberries    4 dried prunes    1 cooked artichoke    ½ cup of cooked legumes, such as lentils, or red, kidney, and christianson beans    Foods with 1 to 3 grams of fiber per servin slice of whole-wheat, pumpernickel, or rye bread    ½ cup of cooked brown rice    4 whole-wheat crackers    1 cup of oatmeal    ½ cup of cereal with 1 to 3 grams of fiber per serving (check the nutrition label on the box)    1 small piece of fruit, such as an apple, banana, pear, kiwi, or orange    3 dates    ½ cup of canned apricots, fruit cocktail, peaches, or pears    ½ cup of raw or cooked vegetables, such as carrots, cauliflower, cabbage, spinach, squash, or corn  Ways that you can increase fiber  in your diet:   Choose brown or wild rice instead of white rice.     Use whole wheat flour in recipes instead of white or all-purpose flour.     Add beans and peas to casseroles or soups.     Choose fresh fruit and vegetables with peels or skins on instead of juices.    Other diet guidelines to follow:   Add fiber to your diet slowly.  You may have abdominal discomfort, bloating, and gas if you add fiber to your diet too quickly.     Drink plenty of liquids as you add fiber to your diet.  You may have nausea or develop constipation if you do not drink enough water. Ask how much liquid to drink each day and which liquids are best for you.    © Copyright [employer] 2023 Information is for End User's use only and may not be sold, redistributed or otherwise used for commercial purposes.  The above information is an  only. It is not intended as medical advice for individual conditions or treatments. Talk to your doctor, nurse or pharmacist before following any medical regimen to see if it is safe and effective for you.

## 2024-07-22 ENCOUNTER — TELEPHONE (OUTPATIENT)
Age: 39
End: 2024-07-22

## 2024-07-22 NOTE — TELEPHONE ENCOUNTER
VM to r/s 7/31/24 appointment with Dr Orta at [facility].  Waiting response from patient.

## 2024-07-24 PROBLEM — H65.01 NON-RECURRENT ACUTE SEROUS OTITIS MEDIA OF RIGHT EAR: Status: RESOLVED | Noted: 2024-06-24 | Resolved: 2024-07-24

## 2024-07-31 ENCOUNTER — NURSE TRIAGE (OUTPATIENT)
Age: 39
End: 2024-07-31

## 2024-07-31 NOTE — TELEPHONE ENCOUNTER
Please advise    Pt calling in, reports bright red rectal bleeding with BM and when she is having a BM feel like razor blades. Pt only have one BM a day but having blood mixed in with stools and on toilet paper.   She was in office on 7/10 for an external hemorrhoid which pt reports is better now. But thinks the blood is coming from internally possible internal hemorrhoids.     No lightheadedness/ dizziness.     Pt asking if she can use prep H OTC or anusol cream?       Pt would like recommendations sent via [employer].   Additional Information  • Affirmative: The patient has streaks of blood on the stool, and does not feel a hemorrhoid    Answer Questions - Hemorrhoids  When did your symptoms start:  4 days ago     Is there bright red blood when wiping or streaks in the stool: yes    How many occurrences have you had in the last 24 hours: 1    Have your symptoms: stable    Do you feel this is a mild, moderate ,or severe amount of blood:  mild     Are you experiencing more diarrhea or constipation:      Do you have anything prescribed or over the counter for this:     Are you experiencing any additional symptoms such as:    Protocols used: Hemorrhoid

## 2024-08-02 ENCOUNTER — NURSE TRIAGE (OUTPATIENT)
Age: 39
End: 2024-08-02

## 2024-08-02 NOTE — TELEPHONE ENCOUNTER
"SPOKE WITH PT, REPORTS CONTINUED BRBPR WITH DAILY BM, BLOOD MIXED WITH STOOL AND ON TOILET TISSUE. PT SEEN ON 7/10/24 FOR EXTERNAL HEMORRHOID WHICH IS IMPROVED. PT REPORTS RECTAL PAIN, 8/10, \"RAZOR BLADES\" WHEN PASSING BM. DENIES FEVER, CHILLS, ABDOMINAL PAIN, SOB, DIZZINESS, LIGHTHEADEDNESS. PT ADVISED SITZ BATHS, HIGH FIBER DIET, HYDRATION, RESTART COLACE. PT SCHEDULED FOR OV 8/7/24. PLEASE ADVISE ANY ADDITIONAL RECOMMENDATIONS.           Answer Assessment - Initial Assessment Questions  1. APPEARANCE of BLOOD: \"What color is it?\" \"Is it passed separately, on the surface of the stool, or mixed in with the stool?\"       BRBPR MIXED WITH STOOL  2. AMOUNT: \"How much blood was passed?\"       SMALL AMOUNT  3. FREQUENCY: \"How many times has blood been passed with the stools?\"       ONCE DAILY  4. ONSET: \"When was the blood first seen in the stools?\" (Days or weeks)       1 WEEK  5. DIARRHEA: \"Is there also some diarrhea?\" If Yes, ask: \"How many diarrhea stools were passed in past 24 hours?\"       DENIES  6. CONSTIPATION: \"Do you have constipation?\" If Yes, ask: \"How bad is it?\"      DENIES  7. RECURRENT SYMPTOMS: \"Have you had blood in your stools before?\" If Yes, ask: \"When was the last time?\" and \"What happened that time?\"       SEEN ON 7/10/24 FOR THROMBOSED EXTERNAL HEMORRHOID  9. OTHER SYMPTOMS: \"Do you have any other symptoms?\"  (e.g., abdominal pain, vomiting, dizziness, fever)      RECTAL PAIN 8/10    Protocols used: Rectal Bleeding-ADULT-[place]    "

## 2024-08-02 NOTE — PROGRESS NOTES
Ambulatory Visit  Name: bP Suarez      : 1985      MRN: [id number]  Encounter Provider: Carolina Orta MD  Encounter Date: 2024   Encounter department: [facility] COLON AND RECTAL SURGERY [place]    Assessment & Plan   1. Fissure in ano  -     NIFEdipine 0.3%-lidocaine 5% rectal ointment; Apply 1 Application topically 3 (three) times a day Apply a small amount to anal fissure  Patient has posterior midline anal fissure present on exam today  She has residual right-sided hemorrhoidal tissue  Recommended topical nifedipine, fiber supplementation, and mineral oil to the aid in symptoms  We did discuss surgical options for treating fissure should her symptoms be refractory to medical management  She will return in 2 months to assess pain and for possible skin tag removal    History of Present Illness       Pb Suarez is a 38 y.o. female who presents for a follow up evaluation.    She was last seen in the office on 7/10/24 where she underwent an incision and drainage of a right-sided external hemorrhoid.     The patient reports that since the end of July she began to feel rectal pain with bowel movements. She states that the pain feels like a razor blade or shards of glass and lasts for hours after. Days later she also began to experience rectal bleeding with every bowel movement. The bleeding resolved yesterday but she is still experiencing the pain. After she has a bowel movements she also feels a burning sensation and then a rectal pressure.     She states that she no longer has pain from the hemorrhoid that was drained last office visit. She states that the hemorrhoid is still large in size and has not reduced.     Last colonoscopy performed on 2021 by Dr. Mazariegos     Review of Systems   Constitutional:  Negative for chills and fever.   HENT:  Negative for ear pain and sore throat.    Eyes:  Negative for pain and visual disturbance.   Respiratory:  Negative for cough and  shortness of breath.    Cardiovascular:  Negative for chest pain and palpitations.   Gastrointestinal:  Negative for abdominal pain and vomiting.   Genitourinary:  Negative for dysuria and hematuria.   Musculoskeletal:  Negative for arthralgias and back pain.   Skin:  Negative for color change and rash.   Neurological:  Negative for seizures and syncope.   All other systems reviewed and are negative.    Past Medical History   Past Medical History:   Diagnosis Date    Anemia 7/2020    Anxiety     Avascular necrosis of scaphoid (HCC) 12/19/2022    Boil of groin 09/15/2020    Formatting of this note might be different from the original. Left side  Last Assessment & Plan:  Formatting of this note might be different from the original. Advised on warm compresses and antibiotics for now. No e/o abscess or sepsis. Precautions given.    Chlamydial infection 04/07/2017    Crohn's disease (HCC)     Depression     Disease of thyroid gland     Fibromyalgia     GERD (gastroesophageal reflux disease) 2012    Gestational diabetes     Gestational hypertension     previous pregnancy    Hashimoto's thyroiditis     HPV (human papilloma virus) infection     Oligohydramnios in [place] pregnancy in second trimester 09/12/2017    Post-viral cough syndrome 01/20/2023    Retained placenta     Schlatter-Osgood disease 04/21/2017    Urinary tract infection     Urogenital trichomoniasis 04/07/2017    Visual impairment      Past Surgical History:   Procedure Laterality Date    CHOLECYSTECTOMY      DILATION AND CURETTAGE OF UTERUS      HERNIA REPAIR      UMBILICAL HERNIA REPAIR       Family History   Problem Relation Age of Onset    Anuerysm Mother     Thyroid disease Mother     Hepatitis Mother     Colon polyps Mother     Depression Father         Mom dad grndma aunt uncle cousins    Anxiety disorder Father     Bipolar disorder Father     Self-Injury Father     Lung cancer Maternal Grandmother     Arthritis Maternal Grandmother     Cancer  Maternal Grandmother         Grandma on both sides    Colon cancer Maternal Grandfather     Diabetes Paternal Grandmother     ADD / ADHD Cousin      Current Outpatient Medications on File Prior to Visit   Medication Sig Dispense Refill    buPROPion (WELLBUTRIN XL) 150 mg 24 hr tablet Take 1 tablet (150 mg total) by mouth every morning (Patient not taking: Reported on 6/19/2024) 30 tablet 1    busPIRone (BUSPAR) 5 mg tablet Take 1 tablet (5 mg total) by mouth 2 (two) times a day 60 tablet 1    Cholecalciferol (Vitamin D3) 1.25 MG (18973 UT) capsule Take 1 capsule (50,000 Units total) by mouth once a week 12 capsule 1    escitalopram (LEXAPRO) 20 mg tablet Take 1 tablet (20 mg total) by mouth daily 30 tablet 2    hydrOXYzine HCL (ATARAX) 10 mg tablet Take 1 tablet (10 mg total) by mouth 2 (two) times a day as needed for anxiety (Patient not taking: Reported on 6/19/2024) 30 tablet 0    liothyronine (CYTOMEL) 5 mcg tablet Take 5 mcg by mouth 2 (two) times a day      ofloxacin (FLOXIN) 0.3 % otic solution Administer 10 drops to the right ear 2 (two) times a day 5 mL 0    Synthroid 175 MCG tablet Take 1 tablet (175 mcg total) by mouth daily in the early morning 90 tablet 1     No current facility-administered medications on file prior to visit.     Allergies   Allergen Reactions    Bactrim [Sulfamethoxazole-Trimethoprim] Hives    Other Hives     seafood    Shellfish-Derived Products - Food Allergy     Sulfamethoxazole Hives    Trimethoprim Hives      Objective     There were no vitals taken for this visit.    Physical Exam  Vitals and nursing note reviewed.   Constitutional:       General: She is not in acute distress.     Appearance: She is well-developed.   HENT:      Head: Normocephalic and atraumatic.   Eyes:      Conjunctiva/sclera: Conjunctivae normal.   Cardiovascular:      Rate and Rhythm: Normal rate and regular rhythm.      Heart sounds: No murmur heard.  Pulmonary:      Effort: Pulmonary effort is normal. No  respiratory distress.      Breath sounds: Normal breath sounds.   Abdominal:      Palpations: Abdomen is soft.      Tenderness: There is no abdominal tenderness.   Genitourinary:     Comments: Palpable and tender posterior midline anal fissure  Musculoskeletal:         General: No swelling.      Cervical back: Neck supple.   Skin:     General: Skin is warm and dry.      Capillary Refill: Capillary refill takes less than 2 seconds.   Neurological:      Mental Status: She is alert.   Psychiatric:         Mood and Affect: Mood normal.       Administrative Statements   I have spent a total time of 15 minutes in caring for this patient on the day of the visit/encounter including Diagnostic results, Prognosis, Risks and benefits of tx options, Instructions for management, Patient and family education, Importance of tx compliance, Risk factor reductions, Impressions, Counseling / Coordination of care, Documenting in the medical record, Reviewing / ordering tests, medicine, procedures  , Obtaining or reviewing history  , and Communicating with other healthcare professionals .

## 2024-08-07 ENCOUNTER — OFFICE VISIT (OUTPATIENT)
Age: 39
End: 2024-08-07
Payer: COMMERCIAL

## 2024-08-07 VITALS
WEIGHT: 224 LBS | BODY MASS INDEX: 35.16 KG/M2 | HEIGHT: 67 IN | DIASTOLIC BLOOD PRESSURE: 78 MMHG | SYSTOLIC BLOOD PRESSURE: 130 MMHG

## 2024-08-07 DIAGNOSIS — K60.2 FISSURE IN ANO: Primary | ICD-10-CM

## 2024-08-07 PROCEDURE — 99212 OFFICE O/P EST SF 10 MIN: CPT | Performed by: SURGERY

## 2024-08-07 NOTE — PATIENT INSTRUCTIONS
Apply topical nifedipine (compounded ointment) around the anus three times a day for two months.    You may take 1 tablespoon of mineral oil at night    High fiber diet/increased hydration, 20-30 grams fiber per day, increased fruits/vegetables    Start fiber supplementation with benefiber. You may put this in your coffee/tea/juice in the morning. Start with 2 tsp once per day. If you experience bloating or gassiness, you may start with 1 tsp once per day. You may increase fiber supplementation to UP TO 2 tsp three times per day in order to achieve 1 formed bowel movement once per day.    Aim to drink at least 64 oz of water per day      High Fiber Diet   AMBULATORY CARE:   A high-fiber diet  includes foods that have a high amount of fiber. Fiber is the part of fruits, vegetables, and grains that is not broken down by your body. Fiber keeps your bowel movements regular. Fiber can also help lower your cholesterol level, control blood sugar in people with diabetes, and relieve constipation. Fiber can also help you control your weight because it helps you feel full faster. Most adults should eat 25 to 35 grams of fiber each day. Talk to your dietitian or healthcare provider about the amount of fiber you need.  Good sources of fiber:       Foods with at least 4 grams of fiber per serving:      ? to ½ cup of high-fiber cereal (check the nutrition label on the box)    ½ cup of blackberries or raspberries    4 dried prunes    1 cooked artichoke    ½ cup of cooked legumes, such as lentils, or red, kidney, and christianson beans    Foods with 1 to 3 grams of fiber per servin slice of whole-wheat, pumpernickel, or rye bread    ½ cup of cooked brown rice    4 whole-wheat crackers    1 cup of oatmeal    ½ cup of cereal with 1 to 3 grams of fiber per serving (check the nutrition label on the box)    1 small piece of fruit, such as an apple, banana, pear, kiwi, or orange    3 dates    ½ cup of canned apricots, fruit cocktail,  peaches, or pears    ½ cup of raw or cooked vegetables, such as carrots, cauliflower, cabbage, spinach, squash, or corn  Ways that you can increase fiber in your diet:   Choose brown or wild rice instead of white rice.     Use whole wheat flour in recipes instead of white or all-purpose flour.     Add beans and peas to casseroles or soups.     Choose fresh fruit and vegetables with peels or skins on instead of juices.    Other diet guidelines to follow:   Add fiber to your diet slowly.  You may have abdominal discomfort, bloating, and gas if you add fiber to your diet too quickly.     Drink plenty of liquids as you add fiber to your diet.  You may have nausea or develop constipation if you do not drink enough water. Ask how much liquid to drink each day and which liquids are best for you.    © Copyright [employer] 2023 Information is for End User's use only and may not be sold, redistributed or otherwise used for commercial purposes.  The above information is an  only. It is not intended as medical advice for individual conditions or treatments. Talk to your doctor, nurse or pharmacist before following any medical regimen to see if it is safe and effective for you.

## 2024-08-10 ENCOUNTER — APPOINTMENT (OUTPATIENT)
Dept: LAB | Age: 39
End: 2024-08-10
Payer: COMMERCIAL

## 2024-08-10 DIAGNOSIS — E06.3 HYPOTHYROIDISM DUE TO HASHIMOTO'S THYROIDITIS: ICD-10-CM

## 2024-08-10 DIAGNOSIS — F43.10 PTSD (POST-TRAUMATIC STRESS DISORDER): ICD-10-CM

## 2024-08-10 DIAGNOSIS — F41.0 GENERALIZED ANXIETY DISORDER WITH PANIC ATTACKS: ICD-10-CM

## 2024-08-10 DIAGNOSIS — F41.1 GENERALIZED ANXIETY DISORDER WITH PANIC ATTACKS: ICD-10-CM

## 2024-08-10 DIAGNOSIS — R73.01 IMPAIRED FASTING GLUCOSE: ICD-10-CM

## 2024-08-10 DIAGNOSIS — E03.8 HYPOTHYROIDISM DUE TO HASHIMOTO'S THYROIDITIS: ICD-10-CM

## 2024-08-10 DIAGNOSIS — M79.10 MYALGIA: ICD-10-CM

## 2024-08-10 DIAGNOSIS — Z01.84 IMMUNITY STATUS TESTING: ICD-10-CM

## 2024-08-10 DIAGNOSIS — E66.01 SEVERE OBESITY (BMI 35.0-39.9) WITH COMORBIDITY (HCC): ICD-10-CM

## 2024-08-10 LAB
ALBUMIN SERPL BCG-MCNC: 4 G/DL (ref 3.5–5)
ALP SERPL-CCNC: 92 U/L (ref 34–104)
ALT SERPL W P-5'-P-CCNC: 16 U/L (ref 7–52)
ANION GAP SERPL CALCULATED.3IONS-SCNC: 8 MMOL/L (ref 4–13)
AST SERPL W P-5'-P-CCNC: 16 U/L (ref 13–39)
BASOPHILS # BLD AUTO: 0.06 THOUSANDS/ÂΜL (ref 0–0.1)
BASOPHILS NFR BLD AUTO: 1 % (ref 0–1)
BILIRUB SERPL-MCNC: 0.52 MG/DL (ref 0.2–1)
BUN SERPL-MCNC: 7 MG/DL (ref 5–25)
CALCIUM SERPL-MCNC: 9.2 MG/DL (ref 8.4–10.2)
CHLORIDE SERPL-SCNC: 104 MMOL/L (ref 96–108)
CK SERPL-CCNC: 42 U/L (ref 26–192)
CO2 SERPL-SCNC: 27 MMOL/L (ref 21–32)
CREAT SERPL-MCNC: 0.68 MG/DL (ref 0.6–1.3)
CRP SERPL QL: 13.3 MG/L
EOSINOPHIL # BLD AUTO: 0.1 THOUSAND/ÂΜL (ref 0–0.61)
EOSINOPHIL NFR BLD AUTO: 2 % (ref 0–6)
ERYTHROCYTE [DISTWIDTH] IN BLOOD BY AUTOMATED COUNT: 17.8 % (ref 11.6–15.1)
GFR SERPL CREATININE-BSD FRML MDRD: 109 ML/MIN/1.73SQ M
GLUCOSE SERPL-MCNC: 107 MG/DL (ref 65–140)
HCT VFR BLD AUTO: 31.9 % (ref 34.8–46.1)
HGB BLD-MCNC: 9.2 G/DL (ref 11.5–15.4)
IMM GRANULOCYTES # BLD AUTO: 0.01 THOUSAND/UL (ref 0–0.2)
IMM GRANULOCYTES NFR BLD AUTO: 0 % (ref 0–2)
LYMPHOCYTES # BLD AUTO: 1.32 THOUSANDS/ÂΜL (ref 0.6–4.47)
LYMPHOCYTES NFR BLD AUTO: 26 % (ref 14–44)
MAGNESIUM SERPL-MCNC: 2.3 MG/DL (ref 1.9–2.7)
MCH RBC QN AUTO: 20.9 PG (ref 26.8–34.3)
MCHC RBC AUTO-ENTMCNC: 28.8 G/DL (ref 31.4–37.4)
MCV RBC AUTO: 73 FL (ref 82–98)
MONOCYTES # BLD AUTO: 0.43 THOUSAND/ÂΜL (ref 0.17–1.22)
MONOCYTES NFR BLD AUTO: 8 % (ref 4–12)
NEUTROPHILS # BLD AUTO: 3.2 THOUSANDS/ÂΜL (ref 1.85–7.62)
NEUTS SEG NFR BLD AUTO: 63 % (ref 43–75)
NRBC BLD AUTO-RTO: 0 /100 WBCS
PHOSPHATE SERPL-MCNC: 3.6 MG/DL (ref 2.7–4.5)
PLATELET # BLD AUTO: 243 THOUSANDS/UL (ref 149–390)
PMV BLD AUTO: 11.5 FL (ref 8.9–12.7)
POTASSIUM SERPL-SCNC: 4.6 MMOL/L (ref 3.5–5.3)
PROT SERPL-MCNC: 7 G/DL (ref 6.4–8.4)
RBC # BLD AUTO: 4.4 MILLION/UL (ref 3.81–5.12)
SODIUM SERPL-SCNC: 139 MMOL/L (ref 135–147)
T4 FREE SERPL-MCNC: 1.13 NG/DL (ref 0.61–1.12)
TSH SERPL DL<=0.05 MIU/L-ACNC: 0.28 UIU/ML (ref 0.45–4.5)
WBC # BLD AUTO: 5.12 THOUSAND/UL (ref 4.31–10.16)

## 2024-08-10 PROCEDURE — 84439 ASSAY OF FREE THYROXINE: CPT

## 2024-08-10 PROCEDURE — 85025 COMPLETE CBC W/AUTO DIFF WBC: CPT

## 2024-08-10 PROCEDURE — 86140 C-REACTIVE PROTEIN: CPT

## 2024-08-10 PROCEDURE — 82085 ASSAY OF ALDOLASE: CPT

## 2024-08-10 PROCEDURE — 84443 ASSAY THYROID STIM HORMONE: CPT

## 2024-08-10 PROCEDURE — 82550 ASSAY OF CK (CPK): CPT

## 2024-08-10 PROCEDURE — 80053 COMPREHEN METABOLIC PANEL: CPT

## 2024-08-10 PROCEDURE — 36415 COLL VENOUS BLD VENIPUNCTURE: CPT

## 2024-08-10 PROCEDURE — 84100 ASSAY OF PHOSPHORUS: CPT

## 2024-08-10 PROCEDURE — 86038 ANTINUCLEAR ANTIBODIES: CPT

## 2024-08-10 PROCEDURE — 83735 ASSAY OF MAGNESIUM: CPT

## 2024-08-10 PROCEDURE — 86707 HEPATITIS BE ANTIBODY: CPT

## 2024-08-10 PROCEDURE — 83036 HEMOGLOBIN GLYCOSYLATED A1C: CPT

## 2024-08-11 LAB
ANA SER QL IA: NEGATIVE
EST. AVERAGE GLUCOSE BLD GHB EST-MCNC: 131 MG/DL
HBA1C MFR BLD: 6.2 %

## 2024-08-12 LAB — HBV E AB SERPL QL IA: NORMAL

## 2024-08-13 LAB — ALDOLASE SERPL-CCNC: 3.5 U/L (ref 3.3–10.3)

## 2024-08-16 ENCOUNTER — OFFICE VISIT (OUTPATIENT)
Dept: FAMILY MEDICINE CLINIC | Facility: CLINIC | Age: 39
End: 2024-08-16
Payer: COMMERCIAL

## 2024-08-16 ENCOUNTER — TELEPHONE (OUTPATIENT)
Dept: FAMILY MEDICINE CLINIC | Facility: CLINIC | Age: 39
End: 2024-08-16

## 2024-08-16 VITALS
HEIGHT: 67 IN | SYSTOLIC BLOOD PRESSURE: 110 MMHG | OXYGEN SATURATION: 100 % | HEART RATE: 68 BPM | TEMPERATURE: 98 F | WEIGHT: 227.2 LBS | BODY MASS INDEX: 35.66 KG/M2 | DIASTOLIC BLOOD PRESSURE: 80 MMHG

## 2024-08-16 DIAGNOSIS — Z12.31 ENCOUNTER FOR SCREENING MAMMOGRAM FOR BREAST CANCER: ICD-10-CM

## 2024-08-16 DIAGNOSIS — F33.1 MODERATE EPISODE OF RECURRENT MAJOR DEPRESSIVE DISORDER (HCC): ICD-10-CM

## 2024-08-16 DIAGNOSIS — R73.01 IMPAIRED FASTING GLUCOSE: ICD-10-CM

## 2024-08-16 DIAGNOSIS — D50.9 IRON DEFICIENCY ANEMIA, UNSPECIFIED IRON DEFICIENCY ANEMIA TYPE: Primary | ICD-10-CM

## 2024-08-16 PROCEDURE — 99214 OFFICE O/P EST MOD 30 MIN: CPT | Performed by: FAMILY MEDICINE

## 2024-08-20 NOTE — PROGRESS NOTES
Assessment/Plan:    37 y/o woman with: MDD, iron-deficiency anemia, low TSH and IFG. Reviewed labs. Discussed workup and treatment options. Pt opts to see hematology and check follow-up labs in 3 mo. Will continue meds. Discussed supportive care and return parameters.     No problem-specific Assessment & Plan notes found for this encounter.       Diagnoses and all orders for this visit:    Iron deficiency anemia, unspecified iron deficiency anemia type  -     TSH, 3rd generation; Future  -     T4, free; Future  -     T3; Future  -     Hemoglobin A1C; Future  -     CBC and differential; Future  -     Comprehensive metabolic panel; Future  -     Iron Panel (Includes Ferritin, Iron Sat%, Iron, and TIBC); Future    Encounter for screening mammogram for breast cancer  -     Mammo screening bilateral w 3d & cad; Future  -     TSH, 3rd generation; Future  -     T4, free; Future  -     T3; Future  -     Hemoglobin A1C; Future  -     CBC and differential; Future  -     Comprehensive metabolic panel; Future  -     Iron Panel (Includes Ferritin, Iron Sat%, Iron, and TIBC); Future    Moderate episode of recurrent major depressive disorder (HCC)  -     TSH, 3rd generation; Future  -     T4, free; Future  -     T3; Future  -     Hemoglobin A1C; Future  -     CBC and differential; Future  -     Comprehensive metabolic panel; Future  -     Iron Panel (Includes Ferritin, Iron Sat%, Iron, and TIBC); Future    Impaired fasting glucose  -     TSH, 3rd generation; Future  -     T4, free; Future  -     T3; Future  -     Hemoglobin A1C; Future  -     CBC and differential; Future  -     Comprehensive metabolic panel; Future  -     Iron Panel (Includes Ferritin, Iron Sat%, Iron, and TIBC); Future          Subjective:     Chief Complaint   Patient presents with    Follow-up     Follow up to discuss recent labs, no further concerns, ng        Patient ID: Pb Suarez is a 38 y.o. female.    Patient is a 37 y/o woman who presents for  "follow-up on MDD and review of recent labs showing low TSH, iron-deficiency anemia and IFG. She admits being otherwise stable on meds. No fevers chills nausea or vomiting.        The following portions of the patient's history were reviewed and updated as appropriate: allergies, current medications, past family history, past medical history, past social history, past surgical history and problem list.    Review of Systems   Constitutional: Negative.    HENT: Negative.     Eyes: Negative.    Respiratory: Negative.     Cardiovascular: Negative.    Gastrointestinal: Negative.    Endocrine: Negative.    Genitourinary: Negative.    Musculoskeletal: Negative.    Allergic/Immunologic: Negative.    Neurological: Negative.    Hematological: Negative.    Psychiatric/Behavioral: Negative.     All other systems reviewed and are negative.        Objective:      /80 (BP Location: Left arm, Patient Position: Sitting, Cuff Size: Standard)   Pulse 68   Temp 98 °F (36.7 °C) (Temporal)   Ht 5' 7\" (1.702 m)   Wt 103 kg (227 lb 3.2 oz)   SpO2 100%   BMI 35.58 kg/m²          Physical Exam  Constitutional:       Appearance: She is well-developed.   HENT:      Head: Atraumatic.      Right Ear: External ear normal.      Left Ear: External ear normal.   Eyes:      Extraocular Movements: EOM normal.      Conjunctiva/sclera: Conjunctivae normal.      Pupils: Pupils are equal, round, and reactive to light.   Cardiovascular:      Rate and Rhythm: Normal rate and regular rhythm.      Heart sounds: Normal heart sounds.   Pulmonary:      Effort: Pulmonary effort is normal. No respiratory distress.      Breath sounds: Normal breath sounds.   Abdominal:      General: There is no distension.      Palpations: Abdomen is soft.      Tenderness: There is no abdominal tenderness. There is no guarding or rebound.   Musculoskeletal:         General: Normal range of motion.      Cervical back: Normal range of motion.   Skin:     General: Skin is " warm and dry.   Neurological:      Mental Status: She is alert and oriented to person, place, and time.      Cranial Nerves: No cranial nerve deficit.      Sensory: No sensory deficit.   Psychiatric:         Mood and Affect: Mood and affect normal.         Behavior: Behavior normal.         Thought Content: Thought content normal.         Judgment: Judgment normal.

## 2024-09-04 ENCOUNTER — TELEPHONE (OUTPATIENT)
Dept: FAMILY MEDICINE CLINIC | Facility: CLINIC | Age: 39
End: 2024-09-04

## 2024-09-04 NOTE — TELEPHONE ENCOUNTER
Pt has been waiting on a response for her medication issue since yesterday morning . Patient is not feeling well and is not suffering from loose stool. Please review meds as well

## 2024-09-30 ENCOUNTER — HOSPITAL ENCOUNTER (OUTPATIENT)
Dept: MAMMOGRAPHY | Facility: CLINIC | Age: 39
Discharge: HOME | End: 2024-09-30
Payer: COMMERCIAL

## 2024-09-30 ENCOUNTER — TRANSCRIBE ORDERS (OUTPATIENT)
Dept: ADMINISTRATIVE | Facility: HOSPITAL | Age: 39
End: 2024-09-30

## 2024-09-30 VITALS — WEIGHT: 226 LBS | HEIGHT: 67 IN | BODY MASS INDEX: 35.47 KG/M2

## 2024-09-30 DIAGNOSIS — N92.1 MENOMETRORRHAGIA: Primary | ICD-10-CM

## 2024-09-30 DIAGNOSIS — Z12.31 ENCOUNTER FOR SCREENING MAMMOGRAM FOR BREAST CANCER: ICD-10-CM

## 2024-09-30 PROCEDURE — 77063 BREAST TOMOSYNTHESIS BI: CPT

## 2024-09-30 PROCEDURE — 77067 SCR MAMMO BI INCL CAD: CPT

## 2024-10-01 ENCOUNTER — LAB (OUTPATIENT)
Age: 39
End: 2024-10-01
Payer: COMMERCIAL

## 2024-10-01 DIAGNOSIS — D50.9 IRON DEFICIENCY ANEMIA, UNSPECIFIED IRON DEFICIENCY ANEMIA TYPE: ICD-10-CM

## 2024-10-01 DIAGNOSIS — F33.1 MODERATE EPISODE OF RECURRENT MAJOR DEPRESSIVE DISORDER (HCC): ICD-10-CM

## 2024-10-01 DIAGNOSIS — N91.2 AMENORRHEA: Primary | ICD-10-CM

## 2024-10-01 DIAGNOSIS — Z12.31 ENCOUNTER FOR SCREENING MAMMOGRAM FOR BREAST CANCER: ICD-10-CM

## 2024-10-01 DIAGNOSIS — N91.2 AMENORRHEA: ICD-10-CM

## 2024-10-01 DIAGNOSIS — R73.01 IMPAIRED FASTING GLUCOSE: ICD-10-CM

## 2024-10-01 LAB
BASOPHILS # BLD AUTO: 0.04 THOUSANDS/ÂΜL (ref 0–0.1)
BASOPHILS NFR BLD AUTO: 1 % (ref 0–1)
EOSINOPHIL # BLD AUTO: 0.08 THOUSAND/ÂΜL (ref 0–0.61)
EOSINOPHIL NFR BLD AUTO: 2 % (ref 0–6)
ERYTHROCYTE [DISTWIDTH] IN BLOOD BY AUTOMATED COUNT: 18.2 % (ref 11.6–15.1)
EST. AVERAGE GLUCOSE BLD GHB EST-MCNC: 131 MG/DL
FERRITIN SERPL-MCNC: 4 NG/ML (ref 11–307)
HBA1C MFR BLD: 6.2 %
HCG SERPL QL: NEGATIVE
HCT VFR BLD AUTO: 31.3 % (ref 34.8–46.1)
HGB BLD-MCNC: 8.6 G/DL (ref 11.5–15.4)
IMM GRANULOCYTES # BLD AUTO: 0.02 THOUSAND/UL (ref 0–0.2)
IMM GRANULOCYTES NFR BLD AUTO: 0 % (ref 0–2)
LYMPHOCYTES # BLD AUTO: 1.51 THOUSANDS/ÂΜL (ref 0.6–4.47)
LYMPHOCYTES NFR BLD AUTO: 28 % (ref 14–44)
MCH RBC QN AUTO: 19.6 PG (ref 26.8–34.3)
MCHC RBC AUTO-ENTMCNC: 27.5 G/DL (ref 31.4–37.4)
MCV RBC AUTO: 72 FL (ref 82–98)
MONOCYTES # BLD AUTO: 0.38 THOUSAND/ÂΜL (ref 0.17–1.22)
MONOCYTES NFR BLD AUTO: 7 % (ref 4–12)
NEUTROPHILS # BLD AUTO: 3.32 THOUSANDS/ÂΜL (ref 1.85–7.62)
NEUTS SEG NFR BLD AUTO: 62 % (ref 43–75)
NRBC BLD AUTO-RTO: 0 /100 WBCS
PLATELET # BLD AUTO: 247 THOUSANDS/UL (ref 149–390)
PMV BLD AUTO: 11.3 FL (ref 8.9–12.7)
RBC # BLD AUTO: 4.38 MILLION/UL (ref 3.81–5.12)
T3 SERPL-MCNC: 0.9 NG/ML
T4 FREE SERPL-MCNC: 0.71 NG/DL (ref 0.61–1.12)
TSH SERPL DL<=0.05 MIU/L-ACNC: 12.56 UIU/ML (ref 0.45–4.5)
WBC # BLD AUTO: 5.35 THOUSAND/UL (ref 4.31–10.16)

## 2024-10-01 PROCEDURE — 83036 HEMOGLOBIN GLYCOSYLATED A1C: CPT

## 2024-10-01 PROCEDURE — 82728 ASSAY OF FERRITIN: CPT

## 2024-10-01 PROCEDURE — 80053 COMPREHEN METABOLIC PANEL: CPT

## 2024-10-01 PROCEDURE — 84703 CHORIONIC GONADOTROPIN ASSAY: CPT

## 2024-10-01 PROCEDURE — 84439 ASSAY OF FREE THYROXINE: CPT

## 2024-10-01 PROCEDURE — 83540 ASSAY OF IRON: CPT

## 2024-10-01 PROCEDURE — 83550 IRON BINDING TEST: CPT

## 2024-10-01 PROCEDURE — 36415 COLL VENOUS BLD VENIPUNCTURE: CPT

## 2024-10-01 PROCEDURE — 85025 COMPLETE CBC W/AUTO DIFF WBC: CPT

## 2024-10-01 PROCEDURE — 84480 ASSAY TRIIODOTHYRONINE (T3): CPT

## 2024-10-01 PROCEDURE — 84443 ASSAY THYROID STIM HORMONE: CPT

## 2024-10-02 ENCOUNTER — TELEPHONE (OUTPATIENT)
Age: 39
End: 2024-10-02

## 2024-10-02 DIAGNOSIS — E53.8 B12 DEFICIENCY: ICD-10-CM

## 2024-10-02 DIAGNOSIS — D50.0 IRON DEFICIENCY ANEMIA DUE TO CHRONIC BLOOD LOSS: Primary | ICD-10-CM

## 2024-10-02 DIAGNOSIS — D50.9 IRON DEFICIENCY ANEMIA, UNSPECIFIED IRON DEFICIENCY ANEMIA TYPE: Primary | ICD-10-CM

## 2024-10-02 DIAGNOSIS — N93.9 ABNORMAL UTERINE BLEEDING (AUB): ICD-10-CM

## 2024-10-02 DIAGNOSIS — E06.3 HYPOTHYROIDISM DUE TO HASHIMOTO'S THYROIDITIS: ICD-10-CM

## 2024-10-02 LAB
ALBUMIN SERPL BCG-MCNC: 4.5 G/DL (ref 3.5–5)
ALP SERPL-CCNC: 82 U/L (ref 34–104)
ALT SERPL W P-5'-P-CCNC: 12 U/L (ref 7–52)
ANION GAP SERPL CALCULATED.3IONS-SCNC: 8 MMOL/L (ref 4–13)
AST SERPL W P-5'-P-CCNC: 14 U/L (ref 13–39)
BILIRUB SERPL-MCNC: 0.53 MG/DL (ref 0.2–1)
BUN SERPL-MCNC: 7 MG/DL (ref 5–25)
CALCIUM SERPL-MCNC: 9.4 MG/DL (ref 8.4–10.2)
CHLORIDE SERPL-SCNC: 102 MMOL/L (ref 96–108)
CO2 SERPL-SCNC: 27 MMOL/L (ref 21–32)
CREAT SERPL-MCNC: 0.63 MG/DL (ref 0.6–1.3)
GFR SERPL CREATININE-BSD FRML MDRD: 111 ML/MIN/1.73SQ M
GLUCOSE SERPL-MCNC: 98 MG/DL (ref 65–140)
IRON SATN MFR SERPL: 2 % (ref 15–50)
IRON SERPL-MCNC: 18 UG/DL (ref 50–212)
POTASSIUM SERPL-SCNC: 3.9 MMOL/L (ref 3.5–5.3)
PROT SERPL-MCNC: 7.4 G/DL (ref 6.4–8.4)
SODIUM SERPL-SCNC: 137 MMOL/L (ref 135–147)
TIBC SERPL-MCNC: 824 UG/DL (ref 250–450)
UIBC SERPL-MCNC: 806 UG/DL (ref 155–355)

## 2024-10-02 RX ORDER — SODIUM CHLORIDE 9 MG/ML
20 INJECTION, SOLUTION INTRAVENOUS ONCE
Status: CANCELLED | OUTPATIENT
Start: 2024-10-04

## 2024-10-02 NOTE — TELEPHONE ENCOUNTER
----- Message from Patrick Carranza MD sent at 10/2/2024  8:49 AM EDT -----  Please call patient. Labs are stable but iron-deficiency anemia has worsened. Will refer to hematology.

## 2024-10-02 NOTE — RESULT ENCOUNTER NOTE
Please call patient. Labs are stable but iron-deficiency anemia has worsened. Will refer to hematology.

## 2024-10-03 ENCOUNTER — HOSPITAL ENCOUNTER (EMERGENCY)
Facility: HOSPITAL | Age: 39
Discharge: HOME | End: 2024-10-03
Attending: EMERGENCY MEDICINE
Payer: COMMERCIAL

## 2024-10-03 VITALS
RESPIRATION RATE: 17 BRPM | OXYGEN SATURATION: 100 % | DIASTOLIC BLOOD PRESSURE: 66 MMHG | HEART RATE: 62 BPM | WEIGHT: 226.41 LBS | TEMPERATURE: 98.9 F | SYSTOLIC BLOOD PRESSURE: 120 MMHG | BODY MASS INDEX: 35.46 KG/M2

## 2024-10-03 DIAGNOSIS — D50.9 IDA (IRON DEFICIENCY ANEMIA): Primary | ICD-10-CM

## 2024-10-03 DIAGNOSIS — E87.6 HYPOKALEMIA: ICD-10-CM

## 2024-10-03 LAB
ABO GROUP BLD: NORMAL
ALBUMIN SERPL BCG-MCNC: 4.6 G/DL (ref 3.5–5)
ALP SERPL-CCNC: 79 U/L (ref 34–104)
ALT SERPL W P-5'-P-CCNC: 9 U/L (ref 7–52)
ANION GAP SERPL CALCULATED.3IONS-SCNC: 8 MMOL/L (ref 4–13)
AST SERPL W P-5'-P-CCNC: 12 U/L (ref 13–39)
ATRIAL RATE: 76 BPM
BASOPHILS # BLD AUTO: 0.06 THOUSANDS/ÂΜL (ref 0–0.1)
BASOPHILS NFR BLD AUTO: 1 % (ref 0–1)
BILIRUB SERPL-MCNC: 0.47 MG/DL (ref 0.2–1)
BLD GP AB SCN SERPL QL: NEGATIVE
BUN SERPL-MCNC: 6 MG/DL (ref 5–25)
CALCIUM SERPL-MCNC: 9.9 MG/DL (ref 8.4–10.2)
CHLORIDE SERPL-SCNC: 102 MMOL/L (ref 96–108)
CO2 SERPL-SCNC: 28 MMOL/L (ref 21–32)
CREAT SERPL-MCNC: 0.71 MG/DL (ref 0.6–1.3)
EOSINOPHIL # BLD AUTO: 0.08 THOUSAND/ÂΜL (ref 0–0.61)
EOSINOPHIL NFR BLD AUTO: 2 % (ref 0–6)
ERYTHROCYTE [DISTWIDTH] IN BLOOD BY AUTOMATED COUNT: 17.9 % (ref 11.6–15.1)
EXT PREGNANCY TEST URINE: NEGATIVE
EXT. CONTROL: NORMAL
GFR SERPL CREATININE-BSD FRML MDRD: 106 ML/MIN/1.73SQ M
GLUCOSE SERPL-MCNC: 111 MG/DL (ref 65–140)
HCT VFR BLD AUTO: 31.7 % (ref 34.8–46.1)
HGB BLD-MCNC: 8.8 G/DL (ref 11.5–15.4)
IMM GRANULOCYTES # BLD AUTO: 0.04 THOUSAND/UL (ref 0–0.2)
IMM GRANULOCYTES NFR BLD AUTO: 1 % (ref 0–2)
LYMPHOCYTES # BLD AUTO: 1.32 THOUSANDS/ÂΜL (ref 0.6–4.47)
LYMPHOCYTES NFR BLD AUTO: 27 % (ref 14–44)
MCH RBC QN AUTO: 19 PG (ref 26.8–34.3)
MCHC RBC AUTO-ENTMCNC: 27.8 G/DL (ref 31.4–37.4)
MCV RBC AUTO: 69 FL (ref 82–98)
MONOCYTES # BLD AUTO: 0.35 THOUSAND/ÂΜL (ref 0.17–1.22)
MONOCYTES NFR BLD AUTO: 7 % (ref 4–12)
NEUTROPHILS # BLD AUTO: 3.03 THOUSANDS/ÂΜL (ref 1.85–7.62)
NEUTS SEG NFR BLD AUTO: 62 % (ref 43–75)
NRBC BLD AUTO-RTO: 0 /100 WBCS
P AXIS: 68 DEGREES
PLATELET # BLD AUTO: 241 THOUSANDS/UL (ref 149–390)
PMV BLD AUTO: 10.4 FL (ref 8.9–12.7)
POTASSIUM SERPL-SCNC: 3.4 MMOL/L (ref 3.5–5.3)
PR INTERVAL: 148 MS
PROT SERPL-MCNC: 7.7 G/DL (ref 6.4–8.4)
QRS AXIS: 69 DEGREES
QRSD INTERVAL: 82 MS
QT INTERVAL: 404 MS
QTC INTERVAL: 454 MS
RBC # BLD AUTO: 4.62 MILLION/UL (ref 3.81–5.12)
RH BLD: POSITIVE
SODIUM SERPL-SCNC: 138 MMOL/L (ref 135–147)
SPECIMEN EXPIRATION DATE: NORMAL
T WAVE AXIS: 55 DEGREES
T4 FREE SERPL-MCNC: 0.7 NG/DL (ref 0.61–1.12)
TSH SERPL DL<=0.05 MIU/L-ACNC: 13.86 UIU/ML (ref 0.45–4.5)
VENTRICULAR RATE: 76 BPM
WBC # BLD AUTO: 4.88 THOUSAND/UL (ref 4.31–10.16)

## 2024-10-03 PROCEDURE — 93010 ELECTROCARDIOGRAM REPORT: CPT | Performed by: INTERNAL MEDICINE

## 2024-10-03 PROCEDURE — 85025 COMPLETE CBC W/AUTO DIFF WBC: CPT

## 2024-10-03 PROCEDURE — 86901 BLOOD TYPING SEROLOGIC RH(D): CPT

## 2024-10-03 PROCEDURE — 93005 ELECTROCARDIOGRAM TRACING: CPT

## 2024-10-03 PROCEDURE — 86850 RBC ANTIBODY SCREEN: CPT

## 2024-10-03 PROCEDURE — 81025 URINE PREGNANCY TEST: CPT

## 2024-10-03 PROCEDURE — 80053 COMPREHEN METABOLIC PANEL: CPT

## 2024-10-03 PROCEDURE — 84443 ASSAY THYROID STIM HORMONE: CPT

## 2024-10-03 PROCEDURE — 84439 ASSAY OF FREE THYROXINE: CPT

## 2024-10-03 PROCEDURE — 86900 BLOOD TYPING SEROLOGIC ABO: CPT

## 2024-10-03 PROCEDURE — 36415 COLL VENOUS BLD VENIPUNCTURE: CPT

## 2024-10-03 PROCEDURE — 99285 EMERGENCY DEPT VISIT HI MDM: CPT | Performed by: EMERGENCY MEDICINE

## 2024-10-03 PROCEDURE — 99285 EMERGENCY DEPT VISIT HI MDM: CPT

## 2024-10-03 PROCEDURE — 96365 THER/PROPH/DIAG IV INF INIT: CPT

## 2024-10-03 RX ORDER — POTASSIUM CHLORIDE 1500 MG/1
40 TABLET, EXTENDED RELEASE ORAL ONCE
Status: COMPLETED | OUTPATIENT
Start: 2024-10-03 | End: 2024-10-03

## 2024-10-03 RX ADMIN — POTASSIUM CHLORIDE 40 MEQ: 1500 TABLET, EXTENDED RELEASE ORAL at 15:38

## 2024-10-03 RX ADMIN — IRON SUCROSE 100 MG: 20 INJECTION, SOLUTION INTRAVENOUS at 15:52

## 2024-10-03 NOTE — DISCHARGE INSTRUCTIONS
Pb Suarez was seen and evaluated today in the emergency department over your concern of weakness.  The workup that we performed showed iron deficiency anemia, provided iron transfusion.  Please return to the emergency department if you experience turn of your symptoms or any other signs and symptoms that may be concerning to you.  Please follow-up with your primary care doctor within 1 week.  All questions were answered prior to discharge.  Thank you for choosing . [facility] for your care.    Follow-up with primary care provider or hematology

## 2024-10-03 NOTE — Clinical Note
Pb Suarez was seen and treated in our emergency department on 10/3/2024.            as tolerated    Diagnosis: ER visit    Pb  may return to work on return date.    She may return on this date: 10/04/2024         If you have any questions or concerns, please don't hesitate to call.      Narendra Nunez, DO    ______________________________           _______________          _______________  Hospital Representative                              Date                                Time

## 2024-10-03 NOTE — ED ATTENDING ATTESTATION
10/3/2024  ICharlie MD, saw and evaluated the patient. I have discussed the patient with the resident/non-physician practitioner and agree with the resident's/non-physician practitioner's findings, Plan of Care, and MDM as documented in the resident's/non-physician practitioner's note, except where noted. All available labs and Radiology studies were reviewed.  I was present for key portions of any procedure(s) performed by the resident/non-physician practitioner and I was immediately available to provide assistance.       At this point I agree with the current assessment done in the Emergency Department.  I have conducted an independent evaluation of this patient a history and physical is as follows:  Patient is a 39-year-old female, history of iron deficiency anemia, menorrhagia, comes in with complaints of palpitations, patient recently had iron panel done that showed low iron levels, due for Venofer infusion in 2 weeks, is due for her.,  Concerned that she would drop her hemoglobin, felt some palpitations, no chest pain or shortness of breath, no fever or cough, no abdominal or back pain.  Exam, patient is conscious, alert, stable vital signs, no acute distress, lungs clear, heart sounds regular, abdomen soft nontender, no peripheral edema, no calf tenderness or swelling, normal neuro exam. Differential diagnosis: Nonspecific palpitations, rule out anemia, electrolyte derangement, will check labs, EKG.    ED Course     Lab results reviewed, hemoglobin around baseline, Venofer infusion was given.    Critical Care Time  Procedures

## 2024-10-03 NOTE — ED PROVIDER NOTES
Final diagnoses:   YASMANI (iron deficiency anemia)   Hypokalemia     ED Disposition       ED Disposition   Discharge    Condition   Stable    Date/Time   Thu Oct 3, 2024  5:21 PM    Comment   Pb Suarez discharge to home/self care.                   Assessment & Plan       Medical Decision Making      DDx: Deficiency anemia, acute blood loss anemia, electrolyte abnormality, ECG dysrhythmia    Plan: ECG, CMP, CBC, TSH with reflex, type and screen    See ED course for further discussion    Patient received Venofer transfusion emergency department.  Symptoms overall feel mildly improved.  Will follow-up with hematologist and primary care provider for additional Venofer transfusions.  Potassium was repleted.  Labs concerning for microcytic anemia.  All questions answered for patient.  Stable for discharge home all test results discussed.      Amount and/or Complexity of Data Reviewed  Labs: ordered. Decision-making details documented in ED Course.    Risk  Prescription drug management.        ED Course as of 10/03/24 2231   Thu Oct 03, 2024   1512 Conversation with pharmacy.  Could possibly treat with Venofer as long as patient does not need PRBC transfusion   1512 Hemoglobin(!): 8.8   1512 MCV(!): 69   1512 Platelet Count: 241   1512 WBC: 4.88   1529 Venofer ordered   1534 Potassium(!): 3.4  Will replete   1534 Sodium: 138   1534 AST(!): 12   1534 ALT: 9   1534 BUN: 6   1534 Creatinine: 0.71   1604 TSH 3RD GENERATON(!): 13.864  hypothyroidism   1604 Receiving iron transfusion       Medications   iron sucrose (VENOFER) 100 mg in sodium chloride 0.9 % 100 mL IVPB (0 mg Intravenous Stopped 10/3/24 1708)   potassium chloride (Klor-Con M20) CR tablet 40 mEq (40 mEq Oral Given 10/3/24 1538)       ED Risk Strat Scores                           SBIRT 20yo+      Flowsheet Row Most Recent Value   Initial Alcohol Screen: US AUDIT-C     1. How often do you have a drink containing alcohol? 0 Filed at: 10/03/2024 1513   2. How  many drinks containing alcohol do you have on a typical day you are drinking?  0 Filed at: 10/03/2024 1519   3a. Male UNDER 65: How often do you have five or more drinks on one occasion? 0 Filed at: 10/03/2024 1513   3b. FEMALE Any Age, or MALE 65+: How often do you have 4 or more drinks on one occassion? 0 Filed at: 10/03/2024 1513   Audit-C Score 0 Filed at: 10/03/2024 1513   LAKESHA: How many times in the past year have you...    Used an illegal drug or used a prescription medication for non-medical reasons? Never Filed at: 10/03/2024 1513                            History of Present Illness       Chief Complaint   Patient presents with    Palpitations     Pt reports low iron.  Pt reports PCP is concerned because patient menstrual cycle is due.  Pt reports heart racing, nausea, and dry mouth.        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 Left side  Last Assessment & Plan:  Formatting of this note might be different from the original. Advised on warm compresses and antibiotics for now. No e/o abscess or sepsis. Precautions given.    Chlamydial infection 04/07/2017    Crohn's disease (HCC)     Depression     Disease of thyroid gland     Fibromyalgia     GERD (gastroesophageal reflux disease) 2012    Gestational diabetes     Gestational hypertension     previous pregnancy    Hashimoto's thyroiditis     HPV (human papilloma virus) infection     Oligohydramnios in galvez pregnancy in second trimester 09/12/2017    Post-viral cough syndrome 01/20/2023    Retained placenta     Schlatter-Osgood disease 04/21/2017    Urinary tract infection     Urogenital trichomoniasis 04/07/2017    Visual impairment       Past Surgical History:   Procedure Laterality Date    CHOLECYSTECTOMY      DILATION AND CURETTAGE OF UTERUS      HERNIA REPAIR      UMBILICAL  HERNIA REPAIR        Family History   Problem Relation Age of Onset    Anuerysm Mother     Thyroid disease Mother     Hepatitis Mother     Colon polyps Mother     Depression Father         Mom dad grndma aunt uncle cousins    Anxiety disorder Father     Bipolar disorder Father     Self-Injury Father     Lung cancer Maternal Grandmother     Arthritis Maternal Grandmother     Cancer Maternal Grandmother         Grandma on both sides    Colon cancer Maternal Grandfather     Diabetes Paternal Grandmother     ADD / ADHD Cousin     Breast cancer Neg Hx       Social History     Tobacco Use    Smoking status: Former     Current packs/day: 1.00     Average packs/day: 1 pack/day for 10.0 years (10.0 ttl pk-yrs)     Types: Cigarettes    Smokeless tobacco: Never   Vaping Use    Vaping status: Never Used   Substance Use Topics    Alcohol use: No    Drug use: No      E-Cigarette/Vaping    E-Cigarette Use Never User       E-Cigarette/Vaping Substances      I have reviewed and agree with the history as documented.     39-year-old female presents emergency department complaining of fatigue.  Mention she still with hypothyroidism and iron deficiency anemia.  She had a 1 point drop in her hemoglobin over the last 2 months.  Hemoglobin went from 9-8.  She is scheduled for iron transfusions in a few weeks however is concerned that she has been dealing with increased fatigue.  She mentions she gets heavy periods.  She has been unable to take pharmacotherapy due to interactions with her hypothyroid medication.  She also mentions that with a fissure which required surgical repair and is not been prescribed iron as a result.        Review of Systems   Constitutional:  Positive for fatigue.   Neurological:  Positive for weakness.   All other systems reviewed and are negative.          Objective       ED Triage Vitals   Temperature Pulse Blood Pressure Respirations SpO2 Patient Position - Orthostatic VS   10/03/24 1355 10/03/24 1355 10/03/24  1355 10/03/24 1355 10/03/24 1355 10/03/24 1355   98.9 °F (37.2 °C) 75 127/60 16 99 % Sitting      Temp Source Heart Rate Source BP Location FiO2 (%) Pain Score    10/03/24 1355 10/03/24 1354 10/03/24 1355 -- --    Oral Monitor Right arm        Vitals      Date and Time Temp Pulse SpO2 Resp BP Pain Score FACES Pain Rating User   10/03/24 1555 -- 62 100 % 17 120/66 -- -- KA   10/03/24 1355 98.9 °F (37.2 °C) 75 99 % 16 127/60 -- --             Physical Exam  Vitals and nursing note reviewed.   Constitutional:       General: She is not in acute distress.     Appearance: She is well-developed.   HENT:      Head: Normocephalic and atraumatic.   Eyes:      Comments: Bilateral conjunctival pallor   Cardiovascular:      Rate and Rhythm: Normal rate and regular rhythm.      Heart sounds: No murmur heard.  Pulmonary:      Effort: Pulmonary effort is normal. No respiratory distress.      Breath sounds: Normal breath sounds.   Abdominal:      Palpations: Abdomen is soft.      Tenderness: There is no abdominal tenderness.   Musculoskeletal:         General: No swelling.      Cervical back: Neck supple.   Skin:     General: Skin is warm and dry.      Capillary Refill: Capillary refill takes less than 2 seconds.   Neurological:      General: No focal deficit present.      Mental Status: She is alert and oriented to person, place, and time. Mental status is at baseline.      GCS: GCS eye subscore is 4. GCS verbal subscore is 5. GCS motor subscore is 6.      Cranial Nerves: No cranial nerve deficit.      Sensory: No sensory deficit.      Motor: No weakness.      Coordination: Coordination normal.      Gait: Gait normal.   Psychiatric:         Mood and Affect: Mood normal.         Results Reviewed       Procedure Component Value Units Date/Time    T4, free [[id number]]  (Normal) Collected: 10/03/24 1459    Lab Status: Final result Specimen: Blood from Arm, Right Updated: 10/03/24 2218     Free T4 0.70 ng/dL     Narrative:       "  \"Therapeutic range for patients medicated with thyroid disorders: 0.61-1.24 ng/dL.\"    TSH, 3rd generation with Free T4 reflex [[id number]]  (Abnormal) Collected: 10/03/24 1459    Lab Status: Final result Specimen: Blood from Arm, Right Updated: 10/03/24 1548     TSH 3RD GENERATON 13.864 uIU/mL     Comprehensive metabolic panel [[id number]]  (Abnormal) Collected: 10/03/24 1459    Lab Status: Final result Specimen: Blood from Arm, Right Updated: 10/03/24 1533     Sodium 138 mmol/L      Potassium 3.4 mmol/L      Chloride 102 mmol/L      CO2 28 mmol/L      ANION GAP 8 mmol/L      BUN 6 mg/dL      Creatinine 0.71 mg/dL      Glucose 111 mg/dL      Calcium 9.9 mg/dL      AST 12 U/L      ALT 9 U/L      Alkaline Phosphatase 79 U/L      Total Protein 7.7 g/dL      Albumin 4.6 g/dL      Total Bilirubin 0.47 mg/dL      eGFR 106 ml/min/1.73sq m     Narrative:      National Kidney Disease Foundation guidelines for Chronic Kidney Disease (CKD):     Stage 1 with normal or high GFR (GFR > 90 mL/min/1.73 square meters)    Stage 2 Mild CKD (GFR = 60-89 mL/min/1.73 square meters)    Stage 3A Moderate CKD (GFR = 45-59 mL/min/1.73 square meters)    Stage 3B Moderate CKD (GFR = 30-44 mL/min/1.73 square meters)    Stage 4 Severe CKD (GFR = 15-29 mL/min/1.73 square meters)    Stage 5 End Stage CKD (GFR <15 mL/min/1.73 square meters)  Note: GFR calculation is accurate only with a steady state creatinine    CBC and differential [[id number]]  (Abnormal) Collected: 10/03/24 1459    Lab Status: Final result Specimen: Blood from Arm, Right Updated: 10/03/24 1512     WBC 4.88 Thousand/uL      RBC 4.62 Million/uL      Hemoglobin 8.8 g/dL      Hematocrit 31.7 %      MCV 69 fL      MCH 19.0 pg      MCHC 27.8 g/dL      RDW 17.9 %      MPV 10.4 fL      Platelets 241 Thousands/uL      nRBC 0 /100 WBCs      Segmented % 62 %      Immature Grans % 1 %      Lymphocytes % 27 %      Monocytes % 7 %      Eosinophils Relative 2 %      Basophils Relative 1 %     "  Absolute Neutrophils 3.03 Thousands/µL      Absolute Immature Grans 0.04 Thousand/uL      Absolute Lymphocytes 1.32 Thousands/µL      Absolute Monocytes 0.35 Thousand/µL      Eosinophils Absolute 0.08 Thousand/µL      Basophils Absolute 0.06 Thousands/µL     POCT pregnancy, urine [[id number]]  (Normal) Resulted: 10/03/24 1511    Lab Status: Final result Updated: 10/03/24 1511     EXT Preg Test, Ur Negative     Control Valid            No orders to display       ECG 12 Lead Documentation Only    Date/Time: 10/3/2024 2:21 PM    Performed by: Narendra Nunez DO  Authorized by: Narendra Nunez DO    Indications / Diagnosis:  Fatigue  ECG reviewed by me, the ED Provider: yes    Patient location:  ED  Previous ECG:     Previous ECG:  Unavailable    Comparison to cardiac monitor: Yes    Interpretation:     Interpretation: normal    Rate:     ECG rate:  76    ECG rate assessment: normal    Rhythm:     Rhythm: sinus rhythm    Ectopy:     Ectopy: none    QRS:     QRS axis:  Normal    QRS intervals:  Normal  Conduction:     Conduction: normal    ST segments:     ST segments:  Normal  T waves:     T waves: normal        ED Medication and Procedure Management   Prior to Admission Medications   Prescriptions Last Dose Informant Patient Reported? Taking?   Cholecalciferol (Vitamin D3) 1.25 MG (75758 UT) capsule   No No   Sig: Take 1 capsule (50,000 Units total) by mouth once a week   NIFEdipine 0.3%-lidocaine 5% rectal ointment   No No   Sig: Apply 1 Application topically 3 (three) times a day Apply a small amount to anal fissure   Synthroid 150 MCG tablet   No No   Sig: Take 1 tablet (150 mcg total) by mouth daily   Synthroid 175 MCG tablet   No No   Sig: Take 1 tablet (175 mcg total) by mouth daily in the early morning   Patient not taking: Reported on 8/16/2024   buPROPion (WELLBUTRIN XL) 150 mg 24 hr tablet   No No   Sig: Take 1 tablet (150 mg total) by mouth every morning   Patient not taking: Reported on 6/19/2024    busPIRone (BUSPAR) 5 mg tablet   No No   Sig: Take 1 tablet (5 mg total) by mouth 2 (two) times a day   escitalopram (LEXAPRO) 20 mg tablet   No No   Sig: Take 1 tablet (20 mg total) by mouth daily   liothyronine (CYTOMEL) 5 mcg tablet  Self Yes No   Sig: Take 5 mcg by mouth 2 (two) times a day      Facility-Administered Medications: None     Discharge Medication List as of 10/3/2024  5:22 PM        CONTINUE these medications which have NOT CHANGED    Details   buPROPion (WELLBUTRIN XL) 150 mg 24 hr tablet Take 1 tablet (150 mg total) by mouth every morning, Starting Wed 3/13/2024, Until Mon 9/9/2024, Normal      busPIRone (BUSPAR) 5 mg tablet Take 1 tablet (5 mg total) by mouth 2 (two) times a day, Starting Wed 6/19/2024, Normal      Cholecalciferol (Vitamin D3) 1.25 MG (61009 UT) capsule Take 1 capsule (50,000 Units total) by mouth once a week, Starting Wed 3/13/2024, Normal      escitalopram (LEXAPRO) 20 mg tablet Take 1 tablet (20 mg total) by mouth daily, Starting Fri 5/10/2024, Until Thu 8/8/2024, Normal      liothyronine (CYTOMEL) 5 mcg tablet Take 5 mcg by mouth 2 (two) times a day, Starting Fri 3/18/2022, Historical Med      NIFEdipine 0.3%-lidocaine 5% rectal ointment Apply 1 Application topically 3 (three) times a day Apply a small amount to anal fissure, Starting Wed 8/7/2024, Print      !! Synthroid 150 MCG tablet Take 1 tablet (150 mcg total) by mouth daily, Starting Fri 8/16/2024, Normal      !! Synthroid 175 MCG tablet Take 1 tablet (175 mcg total) by mouth daily in the early morning, Starting Thu 1/18/2024, Normal       !! - Potential duplicate medications found. Please discuss with provider.        No discharge procedures on file.  ED SEPSIS DOCUMENTATION   Time reflects when diagnosis was documented in both MDM as applicable and the Disposition within this note       Time User Action Codes Description Comment    10/3/2024  2:27 PM Narendra Nunez Add [D50.9] YASMANI (iron deficiency anemia)      10/3/2024  3:35 PM Narendra Nunez Add [E87.6] Hypokalemia                  Narendra Nunez, DO  10/03/24 2235

## 2024-10-04 ENCOUNTER — OFFICE VISIT (OUTPATIENT)
Age: 39
End: 2024-10-04
Payer: COMMERCIAL

## 2024-10-04 VITALS
DIASTOLIC BLOOD PRESSURE: 74 MMHG | HEART RATE: 72 BPM | BODY MASS INDEX: 35.47 KG/M2 | WEIGHT: 226 LBS | OXYGEN SATURATION: 98 % | SYSTOLIC BLOOD PRESSURE: 122 MMHG | TEMPERATURE: 97.6 F | HEIGHT: 67 IN

## 2024-10-04 DIAGNOSIS — R16.2 HEPATOSPLENOMEGALY: ICD-10-CM

## 2024-10-04 DIAGNOSIS — F40.240 CLAUSTROPHOBIA: Primary | ICD-10-CM

## 2024-10-04 DIAGNOSIS — D50.0 IRON DEFICIENCY ANEMIA DUE TO CHRONIC BLOOD LOSS: ICD-10-CM

## 2024-10-04 DIAGNOSIS — N83.202 BILATERAL OVARIAN CYSTS: ICD-10-CM

## 2024-10-04 DIAGNOSIS — M25.50 ARTHRALGIA, UNSPECIFIED JOINT: ICD-10-CM

## 2024-10-04 DIAGNOSIS — F43.10 PTSD (POST-TRAUMATIC STRESS DISORDER): ICD-10-CM

## 2024-10-04 DIAGNOSIS — Z76.89 ENCOUNTER TO ESTABLISH CARE WITH NEW DOCTOR: Primary | ICD-10-CM

## 2024-10-04 DIAGNOSIS — Z13.1 SCREENING FOR DIABETES MELLITUS: ICD-10-CM

## 2024-10-04 DIAGNOSIS — N92.1 MENOMETRORRHAGIA: ICD-10-CM

## 2024-10-04 DIAGNOSIS — F33.0 MDD (MAJOR DEPRESSIVE DISORDER), RECURRENT EPISODE, MILD (HCC): ICD-10-CM

## 2024-10-04 DIAGNOSIS — Z80.8 FAMILY HISTORY OF MALIGNANT MELANOMA: ICD-10-CM

## 2024-10-04 DIAGNOSIS — Z82.49 FAMILY HISTORY OF BRAIN ANEURYSM: ICD-10-CM

## 2024-10-04 DIAGNOSIS — K76.0 FATTY LIVER DISEASE, NONALCOHOLIC: ICD-10-CM

## 2024-10-04 DIAGNOSIS — Z13.220 SCREENING, LIPID: ICD-10-CM

## 2024-10-04 DIAGNOSIS — E55.9 VITAMIN D DEFICIENCY: ICD-10-CM

## 2024-10-04 DIAGNOSIS — F41.1 GENERALIZED ANXIETY DISORDER WITH PANIC ATTACKS: ICD-10-CM

## 2024-10-04 DIAGNOSIS — E06.3 HYPOTHYROIDISM DUE TO HASHIMOTO'S THYROIDITIS: ICD-10-CM

## 2024-10-04 DIAGNOSIS — F41.0 GENERALIZED ANXIETY DISORDER WITH PANIC ATTACKS: ICD-10-CM

## 2024-10-04 DIAGNOSIS — N83.201 BILATERAL OVARIAN CYSTS: ICD-10-CM

## 2024-10-04 PROCEDURE — 99215 OFFICE O/P EST HI 40 MIN: CPT | Performed by: INTERNAL MEDICINE

## 2024-10-04 RX ORDER — LEVOTHYROXINE SODIUM 175 MCG
TABLET ORAL
Qty: 60 TABLET | Refills: 1 | Status: SHIPPED | OUTPATIENT
Start: 2024-10-04

## 2024-10-04 RX ORDER — ESCITALOPRAM OXALATE 20 MG/1
20 TABLET ORAL DAILY
Qty: 30 TABLET | Refills: 2 | Status: SHIPPED | OUTPATIENT
Start: 2024-10-04 | End: 2025-01-02

## 2024-10-04 RX ORDER — CHOLECALCIFEROL (VITAMIN D3) 1250 MCG
50000 CAPSULE ORAL WEEKLY
Qty: 12 CAPSULE | Refills: 1 | Status: CANCELLED | OUTPATIENT
Start: 2024-10-04

## 2024-10-04 RX ORDER — CHOLECALCIFEROL (VITAMIN D3) 1250 MCG
50000 CAPSULE ORAL WEEKLY
Qty: 12 CAPSULE | Refills: 1 | Status: SHIPPED | OUTPATIENT
Start: 2024-10-04

## 2024-10-04 RX ORDER — LORAZEPAM 0.5 MG/1
TABLET ORAL
Qty: 1 TABLET | Refills: 0 | Status: SHIPPED | OUTPATIENT
Start: 2024-10-04

## 2024-10-04 NOTE — PROGRESS NOTES
Assessment/Plan:           Problem List Items Addressed This Visit       PTSD (post-traumatic stress disorder)    Relevant Medications    escitalopram (LEXAPRO) 20 mg tablet    Iron deficiency anemia due to chronic blood loss    Hypothyroidism due to Hashimoto's thyroiditis    Relevant Medications    Synthroid 175 MCG tablet    Generalized anxiety disorder with panic attacks    Relevant Medications    escitalopram (LEXAPRO) 20 mg tablet    Vitamin D deficiency    Relevant Medications    Cholecalciferol (Vitamin D3) 1.25 MG (48890 UT) CAPS    MDD (major depressive disorder), recurrent episode, mild (HCC)    Relevant Medications    escitalopram (LEXAPRO) 20 mg tablet     Other Visit Diagnoses       Encounter to establish care with new doctor    -  Primary    Hepatosplenomegaly        Relevant Orders    US abdomen complete    Bilateral ovarian cysts        Relevant Orders    US pelvis complete non OB    Family history of brain aneurysm        Relevant Orders    MRA head wo contrast    Arthralgia, unspecified joint        Relevant Orders    GRACIELA Screen w/ Reflex to Titer/Pattern    Cyclic citrul peptide antibody, IgG    RF Screen w/ Reflex to Titer    Sjogren's Antibodies    Cortisol Level,7-9 AM Specimen    Fatty liver disease, nonalcoholic        Relevant Orders    US abdomen complete    Menometrorrhagia        Relevant Orders    US pelvis complete non OB    DHEA-sulfate    FSH and LH    Insulin, random    Testosterone, Free and Weakly Bound    17-Hydroxyprogesterone    Screening, lipid        Relevant Orders    Lipid panel    Screening for diabetes mellitus        Relevant Orders    Comprehensive metabolic panel    Hemoglobin A1C    Family history of malignant melanoma        Relevant Orders    Ambulatory Referral to Dermatology              Subjective:      Patient ID: Pb Suarez is a 39 y.o. female.    HPI  Patient is here to establish care.  Her past medical history significant for   #1 iron deficiency anemia  "which has become more problematic recently while her recent lab studies were found to be low at 8.6 patient was becoming symptomatic and had to go to the ER  was given 1 iron transfusion.  Follow-up hemoglobin 8.8.  Patient is scheduled for infusion treatments in a week.  Patient is having menstrual cycle and will be seeing gynecologist soon to discuss this.  She had  colonoscopy 2021 by Dr. Mazariegos was negative and was asked to come back in 10 years.  To note random biopsy did not support microscopic colitis.  EGD was unremarkable as well.  Duodenal biopsy did not show celiac disease.  #2 menorrhagia pelvic ultrasound 2021 showed right ovarian cystic mass as well as left ovarian cystic mass.  Pelvic ultrasound was repeated PCOS workup would be ordered.  Reviewed orders for PCOS as well.  Diabetes runs in the family.  #3 hepatosplenomegaly: A CT scan in 2021 showed hepatosplenomegaly.  Liver showed fatty liver disease.  I will start with abdominal ultrasound.  May need liver elastography.  #4 hypothyroidism.  Under care of endocrinology.  Thyroid ultrasound 2020 showed heterogeneous thyroid with no discrete nodules.  Currently he is on 150 mcg of Synthroid.  Follow-up blood work in 6 weeks    #5 arthralgia and morning stiffness family history of lupus lab studies will be ordered  #6 family history of brain aneurysm in 2 family members.  MRA will be ordered.    #7 family history of melanoma in her mom and aunt  The following portions of the patient's history were reviewed and updated as appropriate: allergies, current medications, past family history, past medical history, past social history, past surgical history, and problem list.    Review of Systems      Objective:      /74 (BP Location: Left arm, Patient Position: Sitting, Cuff Size: Adult)   Pulse 72   Temp 97.6 °F (36.4 °C) (Tympanic)   Ht 5' 7\" (1.702 m)   Wt 103 kg (226 lb)   LMP 08/05/2024 Comment: Denies pregnancy  SpO2 98%   BMI 35.40 kg/m² "          Physical Exam  Neck:      Vascular: No carotid bruit.   Cardiovascular:      Rate and Rhythm: Normal rate and regular rhythm.      Heart sounds: Normal heart sounds. No murmur heard.     No gallop.   Pulmonary:      Effort: Pulmonary effort is normal. No respiratory distress.      Breath sounds: Normal breath sounds. No wheezing or rales.   Abdominal:      General: There is no distension.      Tenderness: There is no abdominal tenderness. There is no right CVA tenderness, left CVA tenderness or guarding.      Comments: Nonequilibrium hepatomegaly   Musculoskeletal:      Right lower leg: No edema.      Left lower leg: No edema.   Lymphadenopathy:      Cervical: No cervical adenopathy.   Skin:     Coloration: Skin is pale.   Neurological:      Mental Status: She is alert.   Psychiatric:         Mood and Affect: Mood normal.         Behavior: Behavior normal.

## 2024-10-07 ENCOUNTER — APPOINTMENT (OUTPATIENT)
Age: 39
End: 2024-10-07
Payer: COMMERCIAL

## 2024-10-07 DIAGNOSIS — N92.1 MENOMETRORRHAGIA: ICD-10-CM

## 2024-10-07 DIAGNOSIS — M25.50 ARTHRALGIA, UNSPECIFIED JOINT: ICD-10-CM

## 2024-10-07 LAB
ANA SER QL IA: NEGATIVE
CORTIS AM PEAK SERPL-MCNC: 14.3 UG/DL (ref 6.7–22.6)
FSH SERPL-ACNC: 5 MIU/ML
LH SERPL-ACNC: 3.3 MIU/ML

## 2024-10-07 PROCEDURE — 83002 ASSAY OF GONADOTROPIN (LH): CPT

## 2024-10-07 PROCEDURE — 36415 COLL VENOUS BLD VENIPUNCTURE: CPT

## 2024-10-07 PROCEDURE — 82533 TOTAL CORTISOL: CPT

## 2024-10-07 PROCEDURE — 86038 ANTINUCLEAR ANTIBODIES: CPT

## 2024-10-07 PROCEDURE — 86235 NUCLEAR ANTIGEN ANTIBODY: CPT

## 2024-10-07 PROCEDURE — 83001 ASSAY OF GONADOTROPIN (FSH): CPT

## 2024-10-07 PROCEDURE — 86430 RHEUMATOID FACTOR TEST QUAL: CPT

## 2024-10-07 PROCEDURE — 86200 CCP ANTIBODY: CPT

## 2024-10-08 LAB
CCP AB SER IA-ACNC: 1.3
ENA SS-A AB SER-ACNC: <0.2 AI (ref 0–0.9)
ENA SS-B AB SER-ACNC: <0.2 AI (ref 0–0.9)
RHEUMATOID FACT SER QL LA: NEGATIVE

## 2024-10-09 ENCOUNTER — HOSPITAL ENCOUNTER (OUTPATIENT)
Dept: ULTRASOUND IMAGING | Facility: HOSPITAL | Age: 39
Discharge: HOME | End: 2024-10-09
Payer: COMMERCIAL

## 2024-10-09 ENCOUNTER — APPOINTMENT (OUTPATIENT)
Age: 39
End: 2024-10-09
Payer: COMMERCIAL

## 2024-10-09 DIAGNOSIS — N83.202 BILATERAL OVARIAN CYSTS: ICD-10-CM

## 2024-10-09 DIAGNOSIS — R16.2 HEPATOSPLENOMEGALY: ICD-10-CM

## 2024-10-09 DIAGNOSIS — N83.201 BILATERAL OVARIAN CYSTS: ICD-10-CM

## 2024-10-09 DIAGNOSIS — N92.1 MENOMETRORRHAGIA: ICD-10-CM

## 2024-10-09 DIAGNOSIS — K76.0 FATTY LIVER DISEASE, NONALCOHOLIC: ICD-10-CM

## 2024-10-09 LAB
ALBUMIN SERPL BCG-MCNC: 4.4 G/DL (ref 3.5–5)
ALP SERPL-CCNC: 83 U/L (ref 34–104)
ALT SERPL W P-5'-P-CCNC: 11 U/L (ref 7–52)
ANION GAP SERPL CALCULATED.3IONS-SCNC: 10 MMOL/L (ref 4–13)
AST SERPL W P-5'-P-CCNC: 16 U/L (ref 13–39)
BILIRUB SERPL-MCNC: 0.62 MG/DL (ref 0.2–1)
BUN SERPL-MCNC: 7 MG/DL (ref 5–25)
CALCIUM SERPL-MCNC: 9.1 MG/DL (ref 8.4–10.2)
CHLORIDE SERPL-SCNC: 101 MMOL/L (ref 96–108)
CHOLEST SERPL-MCNC: 167 MG/DL
CO2 SERPL-SCNC: 27 MMOL/L (ref 21–32)
CREAT SERPL-MCNC: 0.67 MG/DL (ref 0.6–1.3)
EST. AVERAGE GLUCOSE BLD GHB EST-MCNC: 120 MG/DL
GFR SERPL CREATININE-BSD FRML MDRD: 109 ML/MIN/1.73SQ M
GLUCOSE P FAST SERPL-MCNC: 112 MG/DL (ref 65–99)
HBA1C MFR BLD: 5.8 %
HDLC SERPL-MCNC: 34 MG/DL
INSULIN SERPL-ACNC: 15.18 UIU/ML
LDLC SERPL CALC-MCNC: 112 MG/DL (ref 0–100)
NONHDLC SERPL-MCNC: 133 MG/DL
POTASSIUM SERPL-SCNC: 4.2 MMOL/L (ref 3.5–5.3)
PROT SERPL-MCNC: 7.2 G/DL (ref 6.4–8.4)
SODIUM SERPL-SCNC: 138 MMOL/L (ref 135–147)
TRIGL SERPL-MCNC: 104 MG/DL

## 2024-10-09 PROCEDURE — 80061 LIPID PANEL: CPT | Performed by: INTERNAL MEDICINE

## 2024-10-09 PROCEDURE — 83498 ASY HYDROXYPROGESTERONE 17-D: CPT | Performed by: INTERNAL MEDICINE

## 2024-10-09 PROCEDURE — 76700 US EXAM ABDOM COMPLETE: CPT

## 2024-10-09 PROCEDURE — 76856 US EXAM PELVIC COMPLETE: CPT

## 2024-10-09 PROCEDURE — 84410 TESTOSTERONE BIOAVAILABLE: CPT | Performed by: INTERNAL MEDICINE

## 2024-10-09 PROCEDURE — 36415 COLL VENOUS BLD VENIPUNCTURE: CPT | Performed by: INTERNAL MEDICINE

## 2024-10-09 PROCEDURE — 83525 ASSAY OF INSULIN: CPT | Performed by: INTERNAL MEDICINE

## 2024-10-09 PROCEDURE — 83036 HEMOGLOBIN GLYCOSYLATED A1C: CPT | Performed by: INTERNAL MEDICINE

## 2024-10-09 PROCEDURE — 82627 DEHYDROEPIANDROSTERONE: CPT | Performed by: INTERNAL MEDICINE

## 2024-10-09 PROCEDURE — 80053 COMPREHEN METABOLIC PANEL: CPT | Performed by: INTERNAL MEDICINE

## 2024-10-09 PROCEDURE — 76830 TRANSVAGINAL US NON-OB: CPT

## 2024-10-10 LAB — DHEA-S SERPL-MCNC: 113 UG/DL (ref 57.3–279.2)

## 2024-10-11 ENCOUNTER — HOSPITAL ENCOUNTER (OUTPATIENT)
Dept: INFUSION CENTER | Facility: HOSPITAL | Age: 39
End: 2024-10-11
Payer: COMMERCIAL

## 2024-10-11 VITALS
RESPIRATION RATE: 18 BRPM | TEMPERATURE: 97.9 F | DIASTOLIC BLOOD PRESSURE: 79 MMHG | SYSTOLIC BLOOD PRESSURE: 120 MMHG | HEART RATE: 69 BPM

## 2024-10-11 DIAGNOSIS — D50.0 IRON DEFICIENCY ANEMIA DUE TO CHRONIC BLOOD LOSS: Primary | ICD-10-CM

## 2024-10-11 DIAGNOSIS — E53.8 B12 DEFICIENCY: ICD-10-CM

## 2024-10-11 DIAGNOSIS — N93.9 ABNORMAL UTERINE BLEEDING (AUB): ICD-10-CM

## 2024-10-11 PROCEDURE — 96365 THER/PROPH/DIAG IV INF INIT: CPT

## 2024-10-11 RX ORDER — SODIUM CHLORIDE 9 MG/ML
20 INJECTION, SOLUTION INTRAVENOUS ONCE
Status: CANCELLED | OUTPATIENT
Start: 2024-10-18

## 2024-10-11 RX ORDER — SODIUM CHLORIDE 9 MG/ML
20 INJECTION, SOLUTION INTRAVENOUS ONCE
Status: COMPLETED | OUTPATIENT
Start: 2024-10-11 | End: 2024-10-11

## 2024-10-11 RX ADMIN — SODIUM CHLORIDE 20 ML/HR: 9 INJECTION, SOLUTION INTRAVENOUS at 14:20

## 2024-10-11 RX ADMIN — IRON SUCROSE 100 MG: 20 INJECTION, SOLUTION INTRAVENOUS at 14:23

## 2024-10-11 NOTE — PROGRESS NOTES
Pt tolerated treatment today with mild nausea. Declined AVS, next apt 10/18/24 @ 3:00. Left unit ambulatory with a steady gait.

## 2024-10-12 LAB
DEPRECATED TESTOST FREE FR SERPL: 1 NG/DL (ref 0–9.5)
TESTOST SERPL-MCNC: 5 NG/DL (ref 4–50)
TESTOSTERONE.FREE+WB MFR SERPL: 20.4 % (ref 3–18)

## 2024-10-13 LAB — 17OHP SERPL-MCNC: 38 NG/DL

## 2024-10-14 ENCOUNTER — TELEPHONE (OUTPATIENT)
Age: 39
End: 2024-10-14

## 2024-10-14 DIAGNOSIS — N39.0 URINARY TRACT INFECTION WITH HEMATURIA, SITE UNSPECIFIED: Primary | ICD-10-CM

## 2024-10-14 DIAGNOSIS — R31.9 URINARY TRACT INFECTION WITH HEMATURIA, SITE UNSPECIFIED: ICD-10-CM

## 2024-10-14 DIAGNOSIS — R10.13 DYSPEPSIA: ICD-10-CM

## 2024-10-14 DIAGNOSIS — K76.0 FATTY LIVER DISEASE, NONALCOHOLIC: ICD-10-CM

## 2024-10-14 DIAGNOSIS — K76.0 FATTY LIVER DISEASE, NONALCOHOLIC: Primary | ICD-10-CM

## 2024-10-14 DIAGNOSIS — N30.00 ACUTE CYSTITIS WITHOUT HEMATURIA: ICD-10-CM

## 2024-10-14 DIAGNOSIS — R16.2 HEPATOSPLENOMEGALY: ICD-10-CM

## 2024-10-14 DIAGNOSIS — R31.9 URINARY TRACT INFECTION WITH HEMATURIA, SITE UNSPECIFIED: Primary | ICD-10-CM

## 2024-10-14 DIAGNOSIS — N39.0 URINARY TRACT INFECTION WITH HEMATURIA, SITE UNSPECIFIED: ICD-10-CM

## 2024-10-14 LAB
BACTERIA UR QL AUTO: ABNORMAL /HPF
BILIRUB UR QL STRIP: NEGATIVE
CLARITY UR: ABNORMAL
COLOR UR: YELLOW
GLUCOSE UR STRIP-MCNC: NEGATIVE MG/DL
HGB UR QL STRIP.AUTO: ABNORMAL
KETONES UR STRIP-MCNC: NEGATIVE MG/DL
LEUKOCYTE ESTERASE UR QL STRIP: ABNORMAL
MUCOUS THREADS UR QL AUTO: ABNORMAL
NITRITE UR QL STRIP: NEGATIVE
NON-SQ EPI CELLS URNS QL MICRO: ABNORMAL /HPF
PH UR STRIP.AUTO: 5.5 [PH]
PROT UR STRIP-MCNC: ABNORMAL MG/DL
RBC #/AREA URNS AUTO: ABNORMAL /HPF
SP GR UR STRIP.AUTO: 1.02 (ref 1–1.03)
UROBILINOGEN UR STRIP-ACNC: <2 MG/DL
WBC #/AREA URNS AUTO: ABNORMAL /HPF

## 2024-10-14 PROCEDURE — 81001 URINALYSIS AUTO W/SCOPE: CPT

## 2024-10-14 PROCEDURE — 87086 URINE CULTURE/COLONY COUNT: CPT | Performed by: INTERNAL MEDICINE

## 2024-10-14 RX ORDER — NITROFURANTOIN 25; 75 MG/1; MG/1
100 CAPSULE ORAL 2 TIMES DAILY
Qty: 10 CAPSULE | Refills: 0 | Status: SHIPPED | OUTPATIENT
Start: 2024-10-14 | End: 2024-10-19

## 2024-10-14 NOTE — TELEPHONE ENCOUNTER
PA for (WEGOVY) 0.25 MG/0.5ML SUBMITTED     via    []CMM-KEY:   [x]Surescripts-Case ID # [id number]   []Availity-Auth ID # NDC #   []Faxed to plan   []Other website   []Phone call Case ID #     Office notes sent, clinical questions answered. Awaiting determination    Turnaround time for your insurance to make a decision on your Prior Authorization can take 7-21 business days.

## 2024-10-14 NOTE — PROGRESS NOTES
Prior Authorization Clinical Questions for Weight Management Pharmacotherapy    1. Does the patient have a contrainidcation to medication prescribed for weight management?: No  2. Does the patient have a diagnosis of obesity, confirmed by a BMI greater than or equal to 30 kg/m^2?: Yes  3. Does the patient have a BMI of greater than or equal to 27 kg/m^2 with at least one weight-related comorbidity/risk factor/complication (e.g. diabetes, dyslipidemia, coronary artery disease)?: Yes  4. Weight-related co-morbidities/risk factors: prediabetes, metabolic syndrome, dyslipidemia, polycystic ovary syndrome (PCOS), metabolic dysfunction-associated steatotic liver disease (MASLD)  5. Has the patient been on a weight loss regimen of low-calorie diet, increased physical activity, and lifestyle modifications for a minimum of 6 months?: Yes  6. Has the patient completed a comprehensive weight loss program (ie, Weight Watchers, Noom, Bariatrics, other jessica on phone)? If so, what?: No  7. Does the patient have a history of type 2 diabetes?: No  8. Has the member tried and failed other weight loss medication within the past 12 months?: No  9. Will the member use requested medication in combination with another GLP agonist or weight loss drug?: No  10. Is the medication a controlled substance?: No     Baseline weight (in pounds): 226 lbs

## 2024-10-14 NOTE — TELEPHONE ENCOUNTER
Larisa called from [facility] to inform pt's pcp that there were significant findings on her Pelvic Ultrasound.

## 2024-10-15 LAB — BACTERIA UR CULT: NORMAL

## 2024-10-16 ENCOUNTER — APPOINTMENT (OUTPATIENT)
Age: 39
End: 2024-10-16
Payer: COMMERCIAL

## 2024-10-16 ENCOUNTER — DOCUMENTATION (OUTPATIENT)
Age: 39
End: 2024-10-16

## 2024-10-16 DIAGNOSIS — R10.13 DYSPEPSIA: ICD-10-CM

## 2024-10-16 PROCEDURE — 87338 HPYLORI STOOL AG IA: CPT

## 2024-10-16 NOTE — PROGRESS NOTES
10/17/24 - FML Form was Signed and faxed on 10/16/24 and scanned into patients' chart 10/17/24.    The above form was filled out and given to Dr. Hensley for review signature and date today.

## 2024-10-17 DIAGNOSIS — R11.0 NAUSEA: Primary | ICD-10-CM

## 2024-10-17 RX ORDER — ONDANSETRON 4 MG/1
4 TABLET, ORALLY DISINTEGRATING ORAL ONCE
Status: SHIPPED | OUTPATIENT
Start: 2024-10-18

## 2024-10-18 ENCOUNTER — HOSPITAL ENCOUNTER (OUTPATIENT)
Dept: INFUSION CENTER | Facility: HOSPITAL | Age: 39
End: 2024-10-18
Payer: COMMERCIAL

## 2024-10-18 VITALS
DIASTOLIC BLOOD PRESSURE: 77 MMHG | TEMPERATURE: 97.1 F | SYSTOLIC BLOOD PRESSURE: 110 MMHG | RESPIRATION RATE: 16 BRPM | HEART RATE: 71 BPM

## 2024-10-18 DIAGNOSIS — E53.8 B12 DEFICIENCY: ICD-10-CM

## 2024-10-18 DIAGNOSIS — R11.0 NAUSEA: Primary | ICD-10-CM

## 2024-10-18 DIAGNOSIS — D50.0 IRON DEFICIENCY ANEMIA DUE TO CHRONIC BLOOD LOSS: Primary | ICD-10-CM

## 2024-10-18 DIAGNOSIS — N93.9 ABNORMAL UTERINE BLEEDING (AUB): ICD-10-CM

## 2024-10-18 LAB — H PYLORI AG STL QL IA: NEGATIVE

## 2024-10-18 PROCEDURE — 96365 THER/PROPH/DIAG IV INF INIT: CPT

## 2024-10-18 RX ORDER — ONDANSETRON 4 MG/1
4 TABLET, FILM COATED ORAL EVERY 12 HOURS PRN
Qty: 20 TABLET | Refills: 0 | Status: SHIPPED | OUTPATIENT
Start: 2024-10-18

## 2024-10-18 RX ORDER — SODIUM CHLORIDE 9 MG/ML
20 INJECTION, SOLUTION INTRAVENOUS ONCE
Status: COMPLETED | OUTPATIENT
Start: 2024-10-18 | End: 2024-10-18

## 2024-10-18 RX ORDER — SODIUM CHLORIDE 9 MG/ML
20 INJECTION, SOLUTION INTRAVENOUS ONCE
Status: CANCELLED | OUTPATIENT
Start: 2024-10-25

## 2024-10-18 RX ADMIN — IRON SUCROSE 100 MG: 20 INJECTION, SOLUTION INTRAVENOUS at 15:08

## 2024-10-18 RX ADMIN — SODIUM CHLORIDE 20 ML/HR: 0.9 INJECTION, SOLUTION INTRAVENOUS at 15:05

## 2024-10-18 NOTE — PROGRESS NOTES
Pt. tolerated Venofer infusion without incident, AVS declined.  Next appt. confirmed for 10/25 @ 3:00.

## 2024-10-24 ENCOUNTER — TELEPHONE (OUTPATIENT)
Age: 39
End: 2024-10-24

## 2024-10-24 NOTE — TELEPHONE ENCOUNTER
Aiden called from [employer] to verify the office fax number. As per Aiden he will be sending over a Prior auth for wegovy.     Ref number: [id number]

## 2024-10-24 NOTE — TELEPHONE ENCOUNTER
PA for  (WEGOVY) 0.25 MG/0.5ML APPROVED     Date(s) approved October 14, 2024 to October 14, 2025     Case #[id number]     Patient advised by          []MyChart Message  [x]Phone call   []LMOM  []L/M to call office as no active Communication consent on file  []Unable to leave detailed message as VM not approved on Communication consent       Pharmacy advised by    [x]Fax  []Phone call    Approval letter scanned into Media Yes

## 2024-10-25 ENCOUNTER — HOSPITAL ENCOUNTER (OUTPATIENT)
Dept: INFUSION CENTER | Facility: HOSPITAL | Age: 39
End: 2024-10-25
Payer: COMMERCIAL

## 2024-10-25 VITALS
RESPIRATION RATE: 18 BRPM | DIASTOLIC BLOOD PRESSURE: 80 MMHG | TEMPERATURE: 98 F | SYSTOLIC BLOOD PRESSURE: 134 MMHG | HEART RATE: 62 BPM

## 2024-10-25 DIAGNOSIS — E53.8 B12 DEFICIENCY: ICD-10-CM

## 2024-10-25 DIAGNOSIS — D50.0 IRON DEFICIENCY ANEMIA DUE TO CHRONIC BLOOD LOSS: Primary | ICD-10-CM

## 2024-10-25 DIAGNOSIS — N93.9 ABNORMAL UTERINE BLEEDING (AUB): ICD-10-CM

## 2024-10-25 PROCEDURE — 96365 THER/PROPH/DIAG IV INF INIT: CPT

## 2024-10-25 RX ORDER — SODIUM CHLORIDE 9 MG/ML
20 INJECTION, SOLUTION INTRAVENOUS ONCE
OUTPATIENT
Start: 2024-11-01

## 2024-10-25 RX ORDER — SODIUM CHLORIDE 9 MG/ML
20 INJECTION, SOLUTION INTRAVENOUS ONCE
Status: COMPLETED | OUTPATIENT
Start: 2024-10-25 | End: 2024-10-25

## 2024-10-25 RX ADMIN — SODIUM CHLORIDE 20 ML/HR: 9 INJECTION, SOLUTION INTRAVENOUS at 15:07

## 2024-10-25 RX ADMIN — IRON SUCROSE 100 MG: 20 INJECTION, SOLUTION INTRAVENOUS at 15:22

## 2024-10-25 NOTE — PROGRESS NOTES
Pt tolerated venofer infusion without difficulty.  No s/s reaction noted.  Confirmed next appt on 11/1 at 1500.  AVS declined.  Left ambulatory in stable condition.

## 2024-10-29 ENCOUNTER — OFFICE VISIT (OUTPATIENT)
Dept: OBGYN CLINIC | Facility: CLINIC | Age: 39
End: 2024-10-29
Payer: COMMERCIAL

## 2024-10-29 ENCOUNTER — TELEPHONE (OUTPATIENT)
Dept: GYNECOLOGY | Facility: CLINIC | Age: 39
End: 2024-10-29

## 2024-10-29 VITALS — DIASTOLIC BLOOD PRESSURE: 70 MMHG | BODY MASS INDEX: 35.79 KG/M2 | SYSTOLIC BLOOD PRESSURE: 112 MMHG | WEIGHT: 228.5 LBS

## 2024-10-29 DIAGNOSIS — N93.9 ABNORMAL UTERINE BLEEDING (AUB): Primary | ICD-10-CM

## 2024-10-29 PROCEDURE — 99214 OFFICE O/P EST MOD 30 MIN: CPT | Performed by: OBSTETRICS & GYNECOLOGY

## 2024-10-29 NOTE — TELEPHONE ENCOUNTER
----- Message from Stephanie Zarate MD sent at 10/29/2024  1:34 PM EDT -----  Regarding: D&C  AM  [facility] GYN Department  Surgery Scheduling Sheet    Patient Name: Pb Suarez  : 1985    Provider: Stephanie Zarate MD     Needed: no; Language: N/A    Procedure: exam under anesthesia, dilation and curettage , and hysteroscopy, polypectomy    Diagnosis: AUB    Special Needs or Equipment: Symphion    Anesthesia: choice    Length of stay: outpatient  Does patient have comorbid conditions that will require close perioperative monitoring prior to safe discharge: no    The patient has comorbid conditions that will require close perioperative monitoring prior to safe discharge, including N/A.   This may require acute care beyond the usual and routine recovery period. As such, inpatient admission post-operatively is expected and appropriate, and anticipated hospital length of stay will be >2 midnights.    Pre-Admission Testing Needed: no   Labs that should be ordered: NA    Order PAT that is recommended in prep for procedure?: No    Medical Clearance Needed: no; Provider: N/A    MA Form Signed (tubals/hysterectomy): Not Indicated    Surgical Drink Given: no     How many days out of work: 1 day(s)     How many days no drivin day(s)       Is pre op appt needed?  yes  Interval for post op appt: 2 week(s)     For Surgical Scheduler:     Surgery Scheduled On:  [place]: [facility]    Pre-op Appt:   Post op Appt:  Consult/Medical clearance appt:    ##PLEASE SCHEDULE  AM##

## 2024-10-29 NOTE — PROGRESS NOTES
Subjective    Patient is a 38 yo  who presents today to discuss AUB. She reports a history of heavy bleeding after her most recent delivery, a vaginal delivery in 2017 that unfortunately resulted in a  demise. She has tried hormonal options to control her bleeding, but it always negatively affected her thyroid. She declines endometrial ablation or UAE, and is interested in a hysterectomy for definitive management. She is currently receiving IV iron infusions for anemia related to her AUB; her most recent hemoglobin was 8.8. She is also being evaluated for hepatosplenomegaly that was detected on an abdominal ultrasound. Her most recent pap smear in 2021 was NILM/HPV neg.    A recent ultrasound on 10/9/24 demonstrated:    FINDINGS:     UTERUS:  The uterus is anteverted in position, measuring 9.5 x 4.9 x 6.0 cm.  The uterus has a normal contour and echotexture.  The cervix appears within normal limits.     ENDOMETRIUM:  Upper endometrial lesion with surrounding simple fluid measures 1.6 x 0.7 x 2.2 cm. Endometrial thickness is normal.     OVARIES/ADNEXA:  Right ovary: 4.0 x 2.5 x 2.2 cm. 11.8 mL.  Ovarian Doppler flow is within normal limits.  No suspicious ovarian or adnexal abnormality.     Left ovary: 3.9 x 2.4 x 1.8 cm. 8.8 mL.  Ovarian Doppler flow is within normal limits.  No suspicious ovarian or adnexal abnormality.     OTHER:  No free fluid or loculated fluid collections.     IMPRESSION:     Endometrial lesion may represent a polyp. Recommend direct visualization.    OB History          8    Para   7    Term   5       2    AB   1    Living   6         SAB   1    IAB   0    Ectopic   0    Multiple   0    Live Births   7                        Objective    Vitals:    10/29/24 1222   BP: 112/70   BP Location: Right arm   Patient Position: Sitting   Cuff Size: Standard   Weight: 104 kg (228 lb 8 oz)        OBGyn Exam     Assessment    Patient Active Problem List   Diagnosis     Thyroid nodule    Irritable bowel syndrome with diarrhea    PTSD (post-traumatic stress disorder)    Benign paroxysmal positional vertigo due to bilateral vestibular disorder    Iron deficiency anemia    Stress incontinence of urine    Chronic arthralgias of knees and hips    Paresthesia    Iron deficiency anemia due to chronic blood loss    B12 deficiency    External hemorrhoid    Abnormal uterine bleeding (AUB)    Cervical high risk HPV (human papillomavirus) test positive    Fibromyalgia    History of cervical dysplasia    History of gestational diabetes mellitus    HSV-2 seropositive    Impaired fasting glucose    Mitral valve prolapse    Moderate episode of recurrent major depressive disorder (HCC)    Prediabetes    Hypothyroidism due to Hashimoto's thyroiditis    Class 2 obesity without serious comorbidity with body mass index (BMI) of 35.0 to 35.9 in adult    Generalized anxiety disorder with panic attacks    At risk for obstructive sleep apnea    Excessive daytime sleepiness    Vitamin D deficiency    Family history of aneurysm    MDD (major depressive disorder), recurrent episode, mild (HCC)    Obesity, morbid (HCC)    Myalgia        Plan   - Reviewed need for tissue sampling prior to hysterectomy given ultrasound findings; discussed office endometrial biopsy vs D&C hysteroscopy/polypectomy, which could be curative of her bleeding; patient would prefer D&C hysteroscopy  - Patient also reports issues with urinary incontinence; discussed with patient that a procedure could be performed at the time of hysterectomy to possible alleviate this issue, but would be performed by a Urogynecologist; will consider referral   - Consent signed today for exam under anesthesia, D&C hysteroscopy, polypectomy, all other indicated procedures  - Reviewed risks of procedure, including uterine perforation necessitating laparoscopy, injury to surrounding organs, bleeding, infection  - Reviewed holding Wegovy 1 week prior to the  procedure

## 2024-10-30 ENCOUNTER — OFFICE VISIT (OUTPATIENT)
Age: 39
End: 2024-10-30
Payer: COMMERCIAL

## 2024-10-30 VITALS
DIASTOLIC BLOOD PRESSURE: 70 MMHG | HEIGHT: 68 IN | TEMPERATURE: 97.2 F | SYSTOLIC BLOOD PRESSURE: 112 MMHG | HEART RATE: 74 BPM | BODY MASS INDEX: 33.65 KG/M2 | OXYGEN SATURATION: 99 % | WEIGHT: 222 LBS

## 2024-10-30 DIAGNOSIS — K76.0 METABOLIC DYSFUNCTION-ASSOCIATED STEATOTIC LIVER DISEASE (MASLD): Primary | ICD-10-CM

## 2024-10-30 DIAGNOSIS — R16.2 HEPATOSPLENOMEGALY: ICD-10-CM

## 2024-10-30 DIAGNOSIS — E66.09 CLASS 1 OBESITY DUE TO EXCESS CALORIES WITH BODY MASS INDEX (BMI) OF 33.0 TO 33.9 IN ADULT, UNSPECIFIED WHETHER SERIOUS COMORBIDITY PRESENT: ICD-10-CM

## 2024-10-30 DIAGNOSIS — E66.811 CLASS 1 OBESITY DUE TO EXCESS CALORIES WITH BODY MASS INDEX (BMI) OF 33.0 TO 33.9 IN ADULT, UNSPECIFIED WHETHER SERIOUS COMORBIDITY PRESENT: ICD-10-CM

## 2024-10-30 PROCEDURE — 99203 OFFICE O/P NEW LOW 30 MIN: CPT | Performed by: INTERNAL MEDICINE

## 2024-10-30 NOTE — PROGRESS NOTES
Ambulatory Visit  Name: Pb Suarez      : 1985      MRN: [id number]  Encounter Provider: Shay Fisher MD  Encounter Date: 10/30/2024   Encounter department: [facility] GASTROENTEROLOGY SPECIALTY [address] [place] [address]    Assessment & Plan  Metabolic dysfunction-associated steatotic liver disease (MASLD)  Risk factors include abdominal obesity, impaired fasting glucose and insulin resistance and low HDL.  Fortunately, Janette has no physical, laboratory or radiologic evidence of cirrhosis or portal hypertension, other than splenomegaly.  I discussed with her the natural history of metabolic dysfunction associated steatotic liver disease and the risk of progression to cirrhosis and its complications.  I explained that currently, her fib 4 score is less than 1.3, which more than likely rules out advanced hepatic fibrosis.  However, given splenomegaly, I have requested additional noninvasive testing for hepatic fibrosis, including enhanced liver fibrosis test, in addition to the elastography already ordered by her PCP, Dr. Hensley.  If her ELF and elastography also suggested minimal hepatic fibrosis, she can continue to follow with her PCP, Dr. Hensley and optimize cardiometabolic risk factors with plan for repeat fib 4 and/or ELF/elastography yearly.  Labs show she has immunity to hepatitis B, however I did not see immunity testing for hepatitis A.  I have added this to her next blood draw.  Orders:    Ambulatory Referral to Hepatology    enhanced liver fibrosis (elf) score; Future    Hepatitis A antibody, total; Future    Ambulatory Referral to Weight Management; Future    Class 1 obesity due to excess calories with body mass index (BMI) of 33.0 to 33.9 in adult, unspecified whether serious comorbidity present  I explained the association of obesity with metabolic dysfunction associated fatty liver disease.  Patient has been prescribed Wegovy, which will help with her obesity and will likely also help with her fatty liver  disease by way of weight loss.  Clinical trials are still underway evaluating the use of GLP-1 agonists to improve outcomes in fatty liver disease.  I also suggest she consult with our weight management team, to fortify the concepts I discussed today including diet and exercise.  Orders:    enhanced liver fibrosis (elf) score; Future    Hepatitis A antibody, total; Future    Ambulatory Referral to Weight Management; Future    Hepatosplenomegaly  See above  Orders:    enhanced liver fibrosis (elf) score; Future    Hepatitis A antibody, total; Future    Ambulatory Referral to Weight Management; Future      History of Present Illness     Pb Suarez is a 39 y.o. female who presents for initial consultation for evaluation of hepatosplenomegaly, suspected to be secondary to metabolic dysfunction associated fatty liver disease.    Her medical history is as outlined below and includes prediabetes, abdominal obesity, dyslipidemia, dysfunctional uterine bleeding, associated iron deficiency anemia.    She has a family history of cirrhosis in her mother, however she had chronic hepatitis C and alcohol abuse.    Ms. Suarez denies recent or history of yellow eyes/skin, dark urine, GI bleeding, abdominal distention with fluid, lower extremity swelling, easy bruising, excessive bleeding, pruritus or confusion.     She had a colonoscopy done in 2021 with recommended recall in 2031.    History obtained from : patient  Review of Systems  Medical History Reviewed by provider this encounter:  Problems       Current Outpatient Medications on File Prior to Visit   Medication Sig Dispense Refill    Cholecalciferol (Vitamin D3) 1.25 MG (61310 UT) CAPS Take 1 capsule (50,000 Units total) by mouth once a week 12 capsule 1    escitalopram (LEXAPRO) 20 mg tablet Take 1 tablet (20 mg total) by mouth daily 30 tablet 2    LORazepam (ATIVAN) 0.5 mg tablet Take 1 pill 30 minutes before procedure 1 tablet 0    ondansetron (ZOFRAN) 4 mg tablet  "Take 1 tablet (4 mg total) by mouth every 12 (twelve) hours as needed for nausea or vomiting 20 tablet 0    Synthroid 150 MCG tablet Take 1 tablet (150 mcg total) by mouth daily 90 tablet 1    Synthroid 175 MCG tablet Take 1 pill Monday Wednesday and Friday alternating with 150 other days 60 tablet 1    Semaglutide-Weight Management (WEGOVY) 0.25 MG/0.5ML Inject 0.5 mL (0.25 mg total) under the skin once a week (Patient not taking: Reported on 10/30/2024) 2 mL 0     Current Facility-Administered Medications on File Prior to Visit   Medication Dose Route Frequency Provider Last Rate Last Admin    ondansetron (ZOFRAN-ODT) dispersible tablet 4 mg  4 mg Oral Once           Social History     Tobacco Use    Smoking status: Former     Current packs/day: 1.00     Average packs/day: 1 pack/day for 10.0 years (10.0 ttl pk-yrs)     Types: Cigarettes    Smokeless tobacco: Never   Vaping Use    Vaping status: Never Used   Substance and Sexual Activity    Alcohol use: No    Drug use: No    Sexual activity: Yes     Partners: Male     Birth control/protection: Injection     Comment: Birth control affects my thyroid levels         Objective     /70 (BP Location: Left arm, Patient Position: Sitting, Cuff Size: Adult)   Pulse 74   Temp (!) 97.2 °F (36.2 °C) (Tympanic)   Ht 5' 8\" (1.727 m)   Wt 101 kg (222 lb)   LMP 09/15/2024 (Approximate) Comment: Denies pregnancy  SpO2 99%   BMI 33.75 kg/m²     Physical Exam  Vitals and nursing note reviewed.   Constitutional:       General: She is not in acute distress.     Appearance: She is well-developed. She is obese.   HENT:      Head: Normocephalic and atraumatic.   Eyes:      Conjunctiva/sclera: Conjunctivae normal.   Cardiovascular:      Rate and Rhythm: Normal rate and regular rhythm.      Heart sounds: No murmur heard.  Pulmonary:      Effort: Pulmonary effort is normal. No respiratory distress.      Breath sounds: Normal breath sounds.   Abdominal:      Palpations: Abdomen " is soft. There is splenomegaly. There is no hepatomegaly.      Tenderness: There is no abdominal tenderness.   Musculoskeletal:         General: No swelling.      Cervical back: Neck supple.   Skin:     General: Skin is warm and dry.      Capillary Refill: Capillary refill takes less than 2 seconds.   Neurological:      Mental Status: She is alert.   Psychiatric:         Mood and Affect: Mood normal.       Fibrosis-4 (FIB-4) Score is: .82    Indication for testing Absence of advanced fibrosis Presence of advanced fibrosis Indeterminate result   NAFLD <1.30 >2.67 1.30-2.67   Hepatitis C <1.45 >3.25 1.45-3.25   Hepatitis B <1.00 >2.65 1.00-2.65     FIB-4 Score Component Values:  Component Value Date   Age: 39 y.o.     AST: 16 U/L 10/9/2024   Platelet: 241 Thousands/uL 10/3/2024   ALT: 11 U/L 10/9/2024       Administrative Statements

## 2024-10-30 NOTE — ASSESSMENT & PLAN NOTE
I explained the association of obesity with metabolic dysfunction associated fatty liver disease.  Patient has been prescribed Wegovy, which will help with her obesity and will likely also help with her fatty liver disease by way of weight loss.  Clinical trials are still underway evaluating the use of GLP-1 agonists to improve outcomes in fatty liver disease.  I also suggest she consult with our weight management team, to fortify the concepts I discussed today including diet and exercise.  Orders:    enhanced liver fibrosis (elf) score; Future    Hepatitis A antibody, total; Future    Ambulatory Referral to Weight Management; Future

## 2024-10-30 NOTE — ASSESSMENT & PLAN NOTE
Risk factors include abdominal obesity, impaired fasting glucose and insulin resistance and low HDL.  Fortunately, Janette has no physical, laboratory or radiologic evidence of cirrhosis or portal hypertension, other than splenomegaly.  I discussed with her the natural history of metabolic dysfunction associated steatotic liver disease and the risk of progression to cirrhosis and its complications.  I explained that currently, her fib 4 score is less than 1.3, which more than likely rules out advanced hepatic fibrosis.  However, given splenomegaly, I have requested additional noninvasive testing for hepatic fibrosis, including enhanced liver fibrosis test, in addition to the elastography already ordered by her PCP, Dr. Hensley.  If her ELF and elastography also suggested minimal hepatic fibrosis, she can continue to follow with her PCP, Dr. Hensley and optimize cardiometabolic risk factors with plan for repeat fib 4 and/or ELF/elastography yearly.  Labs show she has immunity to hepatitis B, however I did not see immunity testing for hepatitis A.  I have added this to her next blood draw.  Orders:    Ambulatory Referral to Hepatology    enhanced liver fibrosis (elf) score; Future    Hepatitis A antibody, total; Future    Ambulatory Referral to Weight Management; Future

## 2024-10-30 NOTE — PATIENT INSTRUCTIONS
Have the elastography done as scheduled.  Have the blood work done at your earliest convenience.  See our weight management team.  Start the wegovy.  Continue to work on your weight.

## 2024-11-01 ENCOUNTER — HOSPITAL ENCOUNTER (OUTPATIENT)
Dept: INFUSION CENTER | Facility: HOSPITAL | Age: 39
End: 2024-11-01
Payer: COMMERCIAL

## 2024-11-01 VITALS
DIASTOLIC BLOOD PRESSURE: 69 MMHG | RESPIRATION RATE: 18 BRPM | TEMPERATURE: 97.9 F | SYSTOLIC BLOOD PRESSURE: 128 MMHG | HEART RATE: 82 BPM

## 2024-11-01 DIAGNOSIS — E53.8 B12 DEFICIENCY: ICD-10-CM

## 2024-11-01 DIAGNOSIS — D50.0 IRON DEFICIENCY ANEMIA DUE TO CHRONIC BLOOD LOSS: Primary | ICD-10-CM

## 2024-11-01 DIAGNOSIS — N93.9 ABNORMAL UTERINE BLEEDING (AUB): ICD-10-CM

## 2024-11-01 PROCEDURE — 96365 THER/PROPH/DIAG IV INF INIT: CPT

## 2024-11-01 RX ORDER — SODIUM CHLORIDE 9 MG/ML
20 INJECTION, SOLUTION INTRAVENOUS ONCE
OUTPATIENT
Start: 2024-11-08

## 2024-11-01 RX ORDER — SODIUM CHLORIDE 9 MG/ML
20 INJECTION, SOLUTION INTRAVENOUS ONCE
Status: COMPLETED | OUTPATIENT
Start: 2024-11-01 | End: 2024-11-01

## 2024-11-01 RX ADMIN — IRON SUCROSE 100 MG: 20 INJECTION, SOLUTION INTRAVENOUS at 15:35

## 2024-11-01 RX ADMIN — SODIUM CHLORIDE 20 ML/HR: 9 INJECTION, SOLUTION INTRAVENOUS at 15:13

## 2024-11-01 NOTE — PROGRESS NOTES
Pt tolerated treatment today with no adverse reactions. Declined AVS, Treatment complete at this time.. Left unit ambulatory with a steady gait.

## 2024-11-06 ENCOUNTER — HOSPITAL ENCOUNTER (OUTPATIENT)
Dept: ULTRASOUND IMAGING | Facility: HOSPITAL | Age: 39
Discharge: HOME | End: 2024-11-06
Payer: COMMERCIAL

## 2024-11-06 DIAGNOSIS — K76.0 FATTY LIVER DISEASE, NONALCOHOLIC: ICD-10-CM

## 2024-11-06 PROCEDURE — 76981 USE PARENCHYMA: CPT

## 2024-11-14 ENCOUNTER — TELEPHONE (OUTPATIENT)
Dept: BARIATRICS | Facility: CLINIC | Age: 39
End: 2024-11-14

## 2024-11-14 ENCOUNTER — OFFICE VISIT (OUTPATIENT)
Age: 39
End: 2024-11-14
Payer: COMMERCIAL

## 2024-11-14 VITALS
SYSTOLIC BLOOD PRESSURE: 100 MMHG | TEMPERATURE: 96.5 F | WEIGHT: 217 LBS | DIASTOLIC BLOOD PRESSURE: 70 MMHG | BODY MASS INDEX: 34.06 KG/M2 | HEART RATE: 68 BPM | HEIGHT: 67 IN | OXYGEN SATURATION: 97 % | RESPIRATION RATE: 16 BRPM

## 2024-11-14 DIAGNOSIS — M26.609 TMJ (TEMPOROMANDIBULAR JOINT DISORDER): ICD-10-CM

## 2024-11-14 DIAGNOSIS — J34.2 ACQUIRED DEVIATED NASAL SEPTUM: ICD-10-CM

## 2024-11-14 DIAGNOSIS — H69.93 EUSTACHIAN TUBE DISORDER, BILATERAL: Primary | ICD-10-CM

## 2024-11-14 PROCEDURE — 99213 OFFICE O/P EST LOW 20 MIN: CPT | Performed by: INTERNAL MEDICINE

## 2024-11-14 RX ORDER — ERGOCALCIFEROL 1.25 MG/1
CAPSULE, LIQUID FILLED ORAL
COMMUNITY
Start: 2024-10-04

## 2024-11-14 NOTE — PROGRESS NOTES
Name: Pb Suarez      : 1985      MRN: [id number]  Encounter Provider: Aliya Hensley MD  Encounter Date: 2024   Encounter department: [facility] PRIMARY CARE  :  Assessment & Plan  Eustachian tube disorder, bilateral         TMJ (temporomandibular joint disorder)         Acquired deviated nasal septum         Patient is deficient his septum.  This point I would not give her Flonase.  Will avoid prednisone by mouth.  Patient was started antihistamine saline gargle expected recovery.  Also encouraged her to use nightguard to help with TMJ dysfunction.       History of Present Illness     Earache     Fatigue  Associated symptoms include fatigue.     Patient is here to follow-up on bilateral ear discomfort.  Started about 4 days ago with right ear discomfort and now this morning it is affecting her left ear.  She is mildly stuffy.  Reports no fevers or chills cough sore throat.  Slight postnasal drip.  Does not smoke.  She has a dog in the house.  Reports no reflux symptoms.  On asking she does mention TMJ dysfunction.  Review of Systems   Constitutional:  Positive for fatigue.   HENT:  Positive for ear pain.      Past Medical History   Past Medical History:   Diagnosis Date    Anemia 2020    Anxiety     Avascular necrosis of scaphoid (HCC) 2022    Boil of groin 09/15/2020    Formatting of this note might be different from the original. Left side  Last Assessment & Plan:  Formatting of this note might be different from the original. Advised on warm compresses and antibiotics for now. No e/o abscess or sepsis. Precautions given.    Chlamydial infection 2017    Crohn's disease (HCC)     Depression     Disease of thyroid gland     Fibromyalgia     GERD (gastroesophageal reflux disease) 2012    Gestational diabetes     Gestational hypertension     previous pregnancy    Hashimoto's thyroiditis     HPV (human papilloma virus) infection     Oligohydramnios in galvez pregnancy in  second trimester 09/12/2017    Post-viral cough syndrome 01/20/2023    Retained placenta     Schlatter-Osgood disease 04/21/2017    Urinary tract infection     Urogenital trichomoniasis 04/07/2017    Visual impairment      Past Surgical History:   Procedure Laterality Date    CHOLECYSTECTOMY      DILATION AND CURETTAGE OF UTERUS      HERNIA REPAIR      UMBILICAL HERNIA REPAIR       Family History   Problem Relation Age of Onset    Anuerysm Mother     Thyroid disease Mother     Hepatitis Mother     Colon polyps Mother     Depression Father         Mom dad grndma aunt uncle cousins    Anxiety disorder Father     Bipolar disorder Father     Self-Injury Father     Lung cancer Maternal Grandmother     Arthritis Maternal Grandmother     Cancer Maternal Grandmother         Grandma on both sides    Colon cancer Maternal Grandfather     Diabetes Paternal Grandmother     ADD / ADHD Cousin     Breast cancer Neg Hx       reports that she has quit smoking. Her smoking use included cigarettes. She has a 10 pack-year smoking history. She has been exposed to tobacco smoke. She has never used smokeless tobacco. She reports that she does not drink alcohol and does not use drugs.  Current Outpatient Medications on File Prior to Visit   Medication Sig Dispense Refill    Cholecalciferol (Vitamin D3) 1.25 MG (21665 UT) CAPS Take 1 capsule (50,000 Units total) by mouth once a week 12 capsule 1    ergocalciferol (VITAMIN D2) 50,000 units       escitalopram (LEXAPRO) 20 mg tablet Take 1 tablet (20 mg total) by mouth daily 30 tablet 2    LORazepam (ATIVAN) 0.5 mg tablet Take 1 pill 30 minutes before procedure 1 tablet 0    ondansetron (ZOFRAN) 4 mg tablet Take 1 tablet (4 mg total) by mouth every 12 (twelve) hours as needed for nausea or vomiting 20 tablet 0    Semaglutide-Weight Management (WEGOVY) 0.25 MG/0.5ML Inject 0.5 mL (0.25 mg total) under the skin once a week 2 mL 0    Synthroid 150 MCG tablet Take 1 tablet (150 mcg total) by mouth  daily 90 tablet 1    Synthroid 175 MCG tablet Take 1 pill Monday Wednesday and Friday alternating with 150 other days 60 tablet 1     Current Facility-Administered Medications on File Prior to Visit   Medication Dose Route Frequency Provider Last Rate Last Admin    ondansetron (ZOFRAN-ODT) dispersible tablet 4 mg  4 mg Oral Once          Allergies   Allergen Reactions    Bactrim [Sulfamethoxazole-Trimethoprim] Hives    Other Hives     seafood    Shellfish-Derived Products - Food Allergy     Sulfamethoxazole Hives    Trimethoprim Hives      Current Outpatient Medications on File Prior to Visit   Medication Sig Dispense Refill    Cholecalciferol (Vitamin D3) 1.25 MG (97376 UT) CAPS Take 1 capsule (50,000 Units total) by mouth once a week 12 capsule 1    ergocalciferol (VITAMIN D2) 50,000 units       escitalopram (LEXAPRO) 20 mg tablet Take 1 tablet (20 mg total) by mouth daily 30 tablet 2    LORazepam (ATIVAN) 0.5 mg tablet Take 1 pill 30 minutes before procedure 1 tablet 0    ondansetron (ZOFRAN) 4 mg tablet Take 1 tablet (4 mg total) by mouth every 12 (twelve) hours as needed for nausea or vomiting 20 tablet 0    Semaglutide-Weight Management (WEGOVY) 0.25 MG/0.5ML Inject 0.5 mL (0.25 mg total) under the skin once a week 2 mL 0    Synthroid 150 MCG tablet Take 1 tablet (150 mcg total) by mouth daily 90 tablet 1    Synthroid 175 MCG tablet Take 1 pill Monday Wednesday and Friday alternating with 150 other days 60 tablet 1     Current Facility-Administered Medications on File Prior to Visit   Medication Dose Route Frequency Provider Last Rate Last Admin    ondansetron (ZOFRAN-ODT) dispersible tablet 4 mg  4 mg Oral Once           Social History     Tobacco Use    Smoking status: Former     Current packs/day: 1.00     Average packs/day: 1 pack/day for 10.0 years (10.0 ttl pk-yrs)     Types: Cigarettes     Passive exposure: Past    Smokeless tobacco: Never   Vaping Use    Vaping status: Never Used   Substance and Sexual  "Activity    Alcohol use: No    Drug use: No    Sexual activity: Yes     Partners: Male     Birth control/protection: Injection     Comment: Birth control affects my thyroid levels        Objective   /70 (BP Location: Left arm, Patient Position: Sitting, Cuff Size: Large)   Pulse 68   Temp (!) 96.5 °F (35.8 °C) (Tympanic)   Resp 16   Ht 5' 7\" (1.702 m)   Wt 98.4 kg (217 lb)   LMP 09/15/2024 (Approximate) Comment: Denies pregnancy  SpO2 97%   BMI 33.99 kg/m²      Physical Exam  Constitutional:       General: She is not in acute distress.     Appearance: She is not ill-appearing or diaphoretic.   HENT:      Right Ear: Tympanic membrane normal.      Ears:      Comments: Decreased light reflex left tympanic membrane  Very loud TMJ click left more than the right     Mouth/Throat:      Comments: Postnasal drip  Neurological:      Mental Status: She is alert.       Administrative Statements   I have spent a total time of 15 minutes in caring for this patient on the day of the visit/encounter including Impressions, Counseling / Coordination of care, Documenting in the medical record, Reviewing / ordering tests, medicine, procedures  , and Obtaining or reviewing history  .   "

## 2024-11-14 NOTE — PROGRESS NOTES
H&P Exam  Pb Suarez 39 y.o. female MRN: [id number]  Unit/Bed#:  Encounter: [id number]      HPI:  Pb Suarez is a 39 y.o.  female who will be undergoing a D&C hysteroscopy, polypectomy on 24. She reports a history of heavy bleeding after her most recent delivery, a vaginal delivery in  that unfortunately resulted in a  demise. She has tried hormonal options to control her bleeding, but it always negatively affected her thyroid. She declines endometrial ablation or UAE, and is interested in a hysterectomy for definitive management. She is currently receiving IV iron infusions for anemia related to her AUB; her most recent hemoglobin was 8.8. She is also being evaluated for hepatosplenomegaly that was detected on an abdominal ultrasound. Her most recent pap smear in 2021 was NILM/HPV neg. Today she also reports some spotting after intercourse, which she has had intermittently over the years.    OB History    Para Term  AB Living   8 7 5 2 1 6   SAB IAB Ectopic Multiple Live Births   1 0 0 0 7      # Outcome Date GA Lbr Janes/2nd Weight Sex Type Anes PTL Lv   8  17 34w3d 05:33 / 02:10 2863 g (6 lb 5 oz) F Vag-Spont EPI Y ND   7 Term 16 39w0d   M Vag-Spont   JURGEN   6 Term 14    M Vag-Spont   JURGEN   5 Term 12     Vag-Spont   JURGEN   4 Term 08/10/09 38w0d   F Vag-Spont   JURGEN   3 Term 04 39w0d   M Vag-Spont   JURGEN   2 SAB 02           1  10/17/01 36w0d   F Vag-Spont   JURGEN       Review of Systems   Constitutional:  Negative for chills and fever.   HENT:  Negative for ear pain and sore throat.    Eyes:  Negative for pain and visual disturbance.   Respiratory:  Negative for cough and shortness of breath.    Cardiovascular:  Negative for chest pain and palpitations.   Gastrointestinal:  Negative for abdominal pain and vomiting.   Genitourinary:  Negative for dysuria and hematuria.   Musculoskeletal:  Negative for arthralgias and  back pain.   Skin:  Negative for color change and rash.   Neurological:  Negative for seizures and syncope.   All other systems reviewed and are negative.      Historical Information   Past Medical History:   Diagnosis Date    Anemia 7/2020    Anxiety     Avascular necrosis of scaphoid (HCC) 12/19/2022    Boil of groin 09/15/2020    Formatting of this note might be different from the original. Left side  Last Assessment & Plan:  Formatting of this note might be different from the original. Advised on warm compresses and antibiotics for now. No e/o abscess or sepsis. Precautions given.    Chlamydial infection 04/07/2017    Crohn's disease (HCC)     Depression     Disease of thyroid gland     Fibromyalgia     GERD (gastroesophageal reflux disease) 2012    Gestational diabetes     Gestational hypertension     previous pregnancy    Hashimoto's thyroiditis     HPV (human papilloma virus) infection     Oligohydramnios in galvez pregnancy in second trimester 09/12/2017    Post-viral cough syndrome 01/20/2023    Retained placenta     Schlatter-Osgood disease 04/21/2017    Urinary tract infection     Urogenital trichomoniasis 04/07/2017    Visual impairment      Past Surgical History:   Procedure Laterality Date    CHOLECYSTECTOMY      DILATION AND CURETTAGE OF UTERUS      HERNIA REPAIR      UMBILICAL HERNIA REPAIR       Social History   Social History     Substance and Sexual Activity   Alcohol Use No     Social History     Substance and Sexual Activity   Drug Use No     Social History     Tobacco Use   Smoking Status Former    Current packs/day: 1.00    Average packs/day: 1 pack/day for 10.0 years (10.0 ttl pk-yrs)    Types: Cigarettes    Passive exposure: Past   Smokeless Tobacco Never     Family History: Family history non-contributory    Meds/Allergies    Not in a hospital admission.     Allergies   Allergen Reactions    Bactrim [Sulfamethoxazole-Trimethoprim] Hives    Other Hives     seafood    Shellfish-Derived  "Products - Food Allergy     Sulfamethoxazole Hives    Trimethoprim Hives       OBJECTIVE:    Vitals: Blood pressure 104/72, height 5' 7\" (1.702 m), weight 99.2 kg (218 lb 9.6 oz), last menstrual period 10/15/2024, not currently breastfeeding.Body mass index is 34.24 kg/m².    Physical Exam  Constitutional:       Appearance: Normal appearance.   HENT:      Head: Normocephalic and atraumatic.   Cardiovascular:      Rate and Rhythm: Normal rate.   Pulmonary:      Effort: Pulmonary effort is normal. No respiratory distress.   Musculoskeletal:         General: Normal range of motion.   Skin:     General: Skin is warm and dry.   Neurological:      Mental Status: She is alert.         Assessment & Plan     ASSESSMENT:  38 yo  female who is scheduled for a D&C hysteroscopy, EUA, polypectomy on 24.    PLAN:    1) chlorhexidine wash provided    2) Patient states that her liver ultrasound did not show any cirrhosis, only fatty liver    3) Reviewed preoperative and post operative expectations    Stephanie Zarate MD  2024  10:09 AM               "

## 2024-11-19 ENCOUNTER — CONSULT (OUTPATIENT)
Dept: OBGYN CLINIC | Facility: CLINIC | Age: 39
End: 2024-11-19
Payer: COMMERCIAL

## 2024-11-19 VITALS
SYSTOLIC BLOOD PRESSURE: 104 MMHG | DIASTOLIC BLOOD PRESSURE: 72 MMHG | BODY MASS INDEX: 34.31 KG/M2 | HEIGHT: 67 IN | WEIGHT: 218.6 LBS

## 2024-11-19 DIAGNOSIS — N93.9 ABNORMAL UTERINE BLEEDING (AUB): Primary | ICD-10-CM

## 2024-11-19 DIAGNOSIS — R73.03 PREDIABETES: Primary | ICD-10-CM

## 2024-11-19 PROCEDURE — 99213 OFFICE O/P EST LOW 20 MIN: CPT | Performed by: OBSTETRICS & GYNECOLOGY

## 2024-11-19 RX ORDER — METFORMIN HYDROCHLORIDE 500 MG/1
500 TABLET, EXTENDED RELEASE ORAL
Qty: 30 TABLET | Refills: 3 | Status: SHIPPED | OUTPATIENT
Start: 2024-11-19

## 2024-12-05 ENCOUNTER — ANESTHESIA EVENT (OUTPATIENT)
Dept: PERIOP | Facility: HOSPITAL | Age: 39
End: 2024-12-05
Payer: COMMERCIAL

## 2024-12-12 NOTE — PRE-PROCEDURE INSTRUCTIONS
Pre-Surgery Instructions:   Medication Instructions    ergocalciferol (VITAMIN D2) 50,000 units Weekly on Saturday    escitalopram (LEXAPRO) 20 mg tablet Take day of surgery.    metFORMIN (GLUCOPHAGE-XR) 500 mg 24 hr tablet Take night before surgery    Synthroid 150 MCG tablet Take day of surgery.    Synthroid 175 MCG tablet M-W-F    Medication instructions for day surgery reviewed. Please use only a sip of water to take your instructed medications. Avoid all over the counter vitamins, supplements and NSAIDS for one week prior to surgery per anesthesia guidelines. Tylenol is ok to take as needed.     You will receive a call one business day prior to surgery with an arrival time and hospital directions. If your surgery is scheduled on a Monday, the hospital will be calling you on the Friday prior to your surgery. If you have not heard from anyone by 8pm, please call the hospital supervisor through the hospital  at [phone]. ([place] [phone] or [place] [phone]).    Do not eat or drink anything after midnight the night before your surgery, including candy, mints, lifesavers, or chewing gum. Do not drink alcohol 24hrs before your surgery. Try not to smoke at least 24hrs before your surgery.       Follow the pre surgery showering instructions as listed in the “My Surgical Experience Booklet” or otherwise provided by your surgeon's office. Do not use a blade to shave the surgical area 1 week before surgery. It is okay to use a clean electric clippers up to 24 hours before surgery. Do not apply any lotions, creams, including makeup, cologne, deodorant, or perfumes after showering on the day of your surgery. Do not use dry shampoo, hair spray, hair gel, or any type of hair products.     No contact lenses, eye make-up, or artificial eyelashes. Remove nail polish, including gel polish, and any artificial, gel, or acrylic nails if possible. Remove all jewelry including rings and body piercing jewelry.      Wear causal clothing that is easy to take on and off. Consider your type of surgery.    Keep any valuables, jewelry, piercings at home. Please bring any specially ordered equipment (sling, braces) if indicated.    Arrange for a responsible person to drive you to and from the hospital on the day of your surgery. Please confirm the visitor policy for the day of your procedure when you receive your phone call with an arrival time.     Call the surgeon's office with any new illnesses, exposures, or additional questions prior to surgery.    Please reference your “My Surgical Experience Booklet” for additional information to prepare for your upcoming surgery.

## 2024-12-17 DIAGNOSIS — B00.9 HERPES SIMPLEX: Primary | ICD-10-CM

## 2024-12-17 RX ORDER — VALACYCLOVIR HYDROCHLORIDE 1 G/1
TABLET, FILM COATED ORAL
Qty: 4 TABLET | Refills: 0 | Status: SHIPPED | OUTPATIENT
Start: 2024-12-17 | End: 2024-12-17

## 2024-12-19 ENCOUNTER — HOSPITAL ENCOUNTER (OUTPATIENT)
Facility: HOSPITAL | Age: 39
Setting detail: OUTPATIENT SURGERY
Discharge: HOME | End: 2024-12-19
Attending: OBSTETRICS & GYNECOLOGY | Admitting: OBSTETRICS & GYNECOLOGY
Payer: COMMERCIAL

## 2024-12-19 ENCOUNTER — ANESTHESIA (OUTPATIENT)
Dept: PERIOP | Facility: HOSPITAL | Age: 39
End: 2024-12-19
Payer: COMMERCIAL

## 2024-12-19 VITALS
WEIGHT: 215.61 LBS | SYSTOLIC BLOOD PRESSURE: 128 MMHG | HEIGHT: 67 IN | TEMPERATURE: 97.8 F | HEART RATE: 70 BPM | RESPIRATION RATE: 16 BRPM | OXYGEN SATURATION: 99 % | DIASTOLIC BLOOD PRESSURE: 63 MMHG | BODY MASS INDEX: 33.84 KG/M2

## 2024-12-19 DIAGNOSIS — N93.9 ABNORMAL UTERINE BLEEDING (AUB): ICD-10-CM

## 2024-12-19 DIAGNOSIS — Z98.890 S/P DILATION AND CURETTAGE: Primary | ICD-10-CM

## 2024-12-19 PROBLEM — E11.9 DIABETES (HCC): Status: ACTIVE | Noted: 2024-12-19

## 2024-12-19 LAB
EXT PREGNANCY TEST URINE: NEGATIVE
EXT. CONTROL: NORMAL
GLUCOSE SERPL-MCNC: 125 MG/DL (ref 65–140)
GLUCOSE SERPL-MCNC: 130 MG/DL (ref 65–140)

## 2024-12-19 PROCEDURE — 82948 REAGENT STRIP/BLOOD GLUCOSE: CPT

## 2024-12-19 PROCEDURE — 81025 URINE PREGNANCY TEST: CPT | Performed by: OBSTETRICS & GYNECOLOGY

## 2024-12-19 PROCEDURE — 88305 TISSUE EXAM BY PATHOLOGIST: CPT | Performed by: STUDENT IN AN ORGANIZED HEALTH CARE EDUCATION/TRAINING PROGRAM

## 2024-12-19 PROCEDURE — 58558 HYSTEROSCOPY BIOPSY: CPT | Performed by: OBSTETRICS & GYNECOLOGY

## 2024-12-19 PROCEDURE — NC001 PR NO CHARGE: Performed by: OBSTETRICS & GYNECOLOGY

## 2024-12-19 RX ORDER — DEXAMETHASONE SODIUM PHOSPHATE 10 MG/ML
INJECTION, SOLUTION INTRAMUSCULAR; INTRAVENOUS AS NEEDED
Status: DISCONTINUED | OUTPATIENT
Start: 2024-12-19 | End: 2024-12-19

## 2024-12-19 RX ORDER — LIDOCAINE HYDROCHLORIDE 10 MG/ML
INJECTION, SOLUTION EPIDURAL; INFILTRATION; INTRACAUDAL; PERINEURAL AS NEEDED
Status: DISCONTINUED | OUTPATIENT
Start: 2024-12-19 | End: 2024-12-19

## 2024-12-19 RX ORDER — PROPOFOL 10 MG/ML
INJECTION, EMULSION INTRAVENOUS AS NEEDED
Status: DISCONTINUED | OUTPATIENT
Start: 2024-12-19 | End: 2024-12-19

## 2024-12-19 RX ORDER — FENTANYL CITRATE/PF 50 MCG/ML
25 SYRINGE (ML) INJECTION
Status: DISCONTINUED | OUTPATIENT
Start: 2024-12-19 | End: 2024-12-19 | Stop reason: HOSPADM

## 2024-12-19 RX ORDER — LIDOCAINE HYDROCHLORIDE 10 MG/ML
INJECTION, SOLUTION EPIDURAL; INFILTRATION; INTRACAUDAL; PERINEURAL AS NEEDED
Status: DISCONTINUED | OUTPATIENT
Start: 2024-12-19 | End: 2024-12-19 | Stop reason: HOSPADM

## 2024-12-19 RX ORDER — ONDANSETRON 2 MG/ML
INJECTION INTRAMUSCULAR; INTRAVENOUS AS NEEDED
Status: DISCONTINUED | OUTPATIENT
Start: 2024-12-19 | End: 2024-12-19

## 2024-12-19 RX ORDER — IBUPROFEN 600 MG/1
600 TABLET, FILM COATED ORAL EVERY 6 HOURS PRN
Qty: 30 TABLET | Refills: 0 | Status: SHIPPED | OUTPATIENT
Start: 2024-12-19

## 2024-12-19 RX ORDER — IBUPROFEN 600 MG/1
600 TABLET, FILM COATED ORAL EVERY 6 HOURS PRN
Status: DISCONTINUED | OUTPATIENT
Start: 2024-12-19 | End: 2024-12-19 | Stop reason: HOSPADM

## 2024-12-19 RX ORDER — SENNOSIDES 8.6 MG
650 CAPSULE ORAL EVERY 8 HOURS PRN
Qty: 30 TABLET | Refills: 0 | Status: SHIPPED | OUTPATIENT
Start: 2024-12-19

## 2024-12-19 RX ORDER — MIDAZOLAM HYDROCHLORIDE 2 MG/2ML
INJECTION, SOLUTION INTRAMUSCULAR; INTRAVENOUS AS NEEDED
Status: DISCONTINUED | OUTPATIENT
Start: 2024-12-19 | End: 2024-12-19

## 2024-12-19 RX ORDER — ACETAMINOPHEN 325 MG/1
975 TABLET ORAL EVERY 6 HOURS PRN
Status: DISCONTINUED | OUTPATIENT
Start: 2024-12-19 | End: 2024-12-19 | Stop reason: HOSPADM

## 2024-12-19 RX ORDER — SODIUM CHLORIDE, SODIUM LACTATE, POTASSIUM CHLORIDE, CALCIUM CHLORIDE 600; 310; 30; 20 MG/100ML; MG/100ML; MG/100ML; MG/100ML
125 INJECTION, SOLUTION INTRAVENOUS CONTINUOUS
Status: DISCONTINUED | OUTPATIENT
Start: 2024-12-19 | End: 2024-12-19

## 2024-12-19 RX ORDER — ONDANSETRON 2 MG/ML
4 INJECTION INTRAMUSCULAR; INTRAVENOUS EVERY 6 HOURS PRN
Status: DISCONTINUED | OUTPATIENT
Start: 2024-12-19 | End: 2024-12-19 | Stop reason: HOSPADM

## 2024-12-19 RX ORDER — ACETAMINOPHEN 10 MG/ML
1000 INJECTION, SOLUTION INTRAVENOUS ONCE
Status: COMPLETED | OUTPATIENT
Start: 2024-12-19 | End: 2024-12-19

## 2024-12-19 RX ORDER — ACETAMINOPHEN 325 MG/1
975 TABLET ORAL ONCE
Status: DISCONTINUED | OUTPATIENT
Start: 2024-12-19 | End: 2024-12-19 | Stop reason: SDUPTHER

## 2024-12-19 RX ORDER — SODIUM CHLORIDE, SODIUM LACTATE, POTASSIUM CHLORIDE, CALCIUM CHLORIDE 600; 310; 30; 20 MG/100ML; MG/100ML; MG/100ML; MG/100ML
125 INJECTION, SOLUTION INTRAVENOUS CONTINUOUS
Status: DISCONTINUED | OUTPATIENT
Start: 2024-12-19 | End: 2024-12-19 | Stop reason: HOSPADM

## 2024-12-19 RX ORDER — KETOROLAC TROMETHAMINE 30 MG/ML
INJECTION, SOLUTION INTRAMUSCULAR; INTRAVENOUS AS NEEDED
Status: DISCONTINUED | OUTPATIENT
Start: 2024-12-19 | End: 2024-12-19

## 2024-12-19 RX ORDER — MEPERIDINE HYDROCHLORIDE 25 MG/ML
12.5 INJECTION INTRAMUSCULAR; INTRAVENOUS; SUBCUTANEOUS
Status: DISCONTINUED | OUTPATIENT
Start: 2024-12-19 | End: 2024-12-19 | Stop reason: HOSPADM

## 2024-12-19 RX ORDER — ONDANSETRON 2 MG/ML
4 INJECTION INTRAMUSCULAR; INTRAVENOUS ONCE AS NEEDED
Status: DISCONTINUED | OUTPATIENT
Start: 2024-12-19 | End: 2024-12-19 | Stop reason: HOSPADM

## 2024-12-19 RX ADMIN — DEXAMETHASONE SODIUM PHOSPHATE 10 MG: 10 INJECTION INTRAMUSCULAR; INTRAVENOUS at 07:45

## 2024-12-19 RX ADMIN — ACETAMINOPHEN 1000 MG: 10 INJECTION INTRAVENOUS at 07:43

## 2024-12-19 RX ADMIN — ONDANSETRON 4 MG: 2 INJECTION INTRAMUSCULAR; INTRAVENOUS at 08:00

## 2024-12-19 RX ADMIN — PROPOFOL 200 MG: 10 INJECTION, EMULSION INTRAVENOUS at 07:34

## 2024-12-19 RX ADMIN — LIDOCAINE HYDROCHLORIDE 60 MG: 10 INJECTION, SOLUTION EPIDURAL; INFILTRATION; INTRACAUDAL; PERINEURAL at 07:34

## 2024-12-19 RX ADMIN — SODIUM CHLORIDE, SODIUM LACTATE, POTASSIUM CHLORIDE, AND CALCIUM CHLORIDE 125 ML/HR: .6; .31; .03; .02 INJECTION, SOLUTION INTRAVENOUS at 06:42

## 2024-12-19 RX ADMIN — MIDAZOLAM 2 MG: 1 INJECTION INTRAMUSCULAR; INTRAVENOUS at 07:28

## 2024-12-19 RX ADMIN — KETOROLAC TROMETHAMINE 30 MG: 30 INJECTION, SOLUTION INTRAMUSCULAR; INTRAVENOUS at 08:00

## 2024-12-19 NOTE — OP NOTE
OPERATIVE REPORT  PATIENT NAME: Pb Suarez    :  1985  MRN: [id number]  Pt Location: [facility] OR ROOM 07    SURGERY DATE: 2024    Surgeons and Role:     * Stephanie Zarate MD - Primary     * Jackeline Stewart MD - Assisting    Preop Diagnosis:  Abnormal uterine bleeding (AUB) [N93.9]    Post-Op Diagnosis Codes:     * Abnormal uterine bleeding (AUB) [N93.9]    Procedure(s):  (D&C) W/ HYSTEROSCOPY.    Specimen(s):  ID Type Source Tests Collected by Time Destination   A :  Tissue Endometrium  Stephanie Zarate MD 2024 0800        Estimated Blood Loss:   20 mL    Fluid deficit 500 cc    Anesthesia Type:   General LMA  Paracervical block    Operative Indications:  Abnormal uterine bleeding (AUB) [N93.9]    Operative Findings:  Normal appearing external genitalia  Anteverted uterus, 9-10 weeks sized, mobile, moderate descensus  Normal appearing endometrium without any obvious lesions      Complications:   None    Procedure and Technique:  Patient was taken to the operating room. General LMA anesthesia (LMA) was administered and the patient was positioned on the OR table in the dorsal lithotomy position. All pressure points were padded and a suma hugger was placed to maintain control of core body temperature. A bimanual exam was performed and the uterus was noted to be anteverted, normal in size and consistency with no palpable adnexal masses or fullness. The patient was prepped and draped in the usual sterile fashion.    A time out was performed to confirm correct patient and correct procedure. A straight catheter was introduced into the bladder, which was drained of 20 cc of clear yellow urine. A weighted speculum was inserted into the vagina and a Alejandro retractor was used to visualize the anterior lip of the cervix, which was then grasped with a single toothed tenaculum. The uterus was sounded to 10 cm. The cervix was serially dilated using Blayne dilators for introduction of the hysteroscope.      Hysteroscope was introduced under direct visualization using normal saline solution as the distention media. Hysteroscope was advanced to the uterine fundus and the entire uterine cavity was inspected in a systematic manner. There was noted to be normal appearing endometrium without any obvious lesions. Hysteroscope was withdrawn and sharp curetting was performed, starting at the 12'oclock position and rotating a total of 360 degrees to cover all surfaces. The single toothed tenaculum was removed from the anterior lip of the cervix. Good hemostasis was confirmed at the tenaculum puncture sites. Weighted speculum was then removed from the vagina.    At the conclusion of the procedure, all needle, sponge, and instrument counts were noted to be correct x2.       I was present for the entire procedure.    Patient Disposition:  PACU              SIGNATURE: Stephanie Zarate MD  DATE: December 19, 2024  TIME: 8:00 AM

## 2024-12-19 NOTE — ANESTHESIA POSTPROCEDURE EVALUATION
Post-Op Assessment Note    CV Status:  Stable    Pain management: adequate       Mental Status:  Alert and awake   Hydration Status:  Euvolemic   PONV Controlled:  Controlled   Airway Patency:  Patent     Post Op Vitals Reviewed: Yes    No anethesia notable event occurred.    Staff: Anesthesiologist           Last Filed PACU Vitals:  Vitals Value Taken Time   Temp 97.5 °F (36.4 °C) 12/19/24 0840   Pulse 60 12/19/24 0844   /63 12/19/24 0840   Resp 18 12/19/24 0844   SpO2 98 % 12/19/24 0844   Vitals shown include unfiled device data.    Modified Catalina:  Activity: 2 (12/19/2024  8:40 AM)  Respiration: 2 (12/19/2024  8:40 AM)  Circulation: 2 (12/19/2024  8:40 AM)  Consciousness: 1 (12/19/2024  8:40 AM)  Oxygen Saturation: 2 (12/19/2024  8:40 AM)  Modified Catalina Score: 9 (12/19/2024  8:40 AM)

## 2024-12-19 NOTE — ANESTHESIA PREPROCEDURE EVALUATION
Procedure:  (D&C) W/ HYSTEROSCOPY, polypectomy (Uterus)    Relevant Problems   CARDIO   (+) External hemorrhoid   (+) Mitral valve prolapse      ENDO   (+) Hypothyroidism due to Hashimoto's thyroiditis      GI/HEPATIC   (+) Metabolic dysfunction-associated steatotic liver disease (MASLD)      HEMATOLOGY   (+) Iron deficiency anemia   (+) Iron deficiency anemia due to chronic blood loss      MUSCULOSKELETAL   (+) Fibromyalgia      NEURO/PSYCH   (+) Fibromyalgia   (+) Generalized anxiety disorder with panic attacks   (+) MDD (major depressive disorder), recurrent episode, mild (HCC)   (+) Moderate episode of recurrent major depressive disorder (HCC)   (+) PTSD (post-traumatic stress disorder)   (+) Paresthesia        Physical Exam    Airway    Mallampati score: I  TM Distance: >3 FB  Neck ROM: full     Dental       Cardiovascular  Cardiovascular exam normal    Pulmonary  Pulmonary exam normal     Other Findings  post-pubertal.      Anesthesia Plan  ASA Score- 2     Anesthesia Type- general with ASA Monitors.         Additional Monitors:     Airway Plan:     Comment: Prefers no narcotics.       Plan Factors-Exercise tolerance (METS): >4 METS.    Chart reviewed.   Existing labs reviewed. Patient summary reviewed.    Patient is not a current smoker.      Obstructive sleep apnea risk education given perioperatively.        Induction- intravenous.    Postoperative Plan-     Perioperative Resuscitation Plan - Level 1 - Full Code.       Informed Consent- Anesthetic plan and risks discussed with patient.

## 2024-12-19 NOTE — LETTER
[facility] OPERATING ROOM  [address] [address]  Dept: [phone]    December 19, 2024     Patient: Pb Suarez   YOB: 1985   Date of Visit: 12/19/2024       To Whom it May Concern:    Pb Suarez is under my professional care. She was seen in the hospital [facility]  12/19/24. She may return to work on 12/23/24 without limitations.    If you have any questions or concerns, please don't hesitate to call.         Sincerely,          Stephanie Zarate MD

## 2024-12-19 NOTE — H&P
H&P Exam  Pb Suarez 39 y.o. female MRN: [id number]  Unit/Bed#: OR POOL Encounter: [id number]      HPI:  Pb Suarez is a 39 y.o.  female who will be undergoing an EUA, D&C hysteroscopy, polypectomy due to AUB. She feels well, although nervous. She has no complaints.    OB History    Para Term  AB Living   8 7 5 2 1 6   SAB IAB Ectopic Multiple Live Births   1 0 0 0 7      # Outcome Date GA Lbr Janes/2nd Weight Sex Type Anes PTL Lv   8  17 34w3d 05:33 / 02:10 2863 g (6 lb 5 oz) F Vag-Spont EPI Y ND   7 Term 16 39w0d   M Vag-Spont   JURGEN   6 Term 14    M Vag-Spont   JURGEN   5 Term 12     Vag-Spont   JURGEN   4 Term 08/10/09 38w0d   F Vag-Spont   JURGEN   3 Term 04 39w0d   M Vag-Spont   JURGEN   2 SAB 02           1  10/17/01 36w0d   F Vag-Spont   JURGEN       Review of Systems   Constitutional:  Negative for chills and fever.   HENT:  Negative for ear pain and sore throat.    Eyes:  Negative for pain and visual disturbance.   Respiratory:  Negative for cough and shortness of breath.    Cardiovascular:  Negative for chest pain and palpitations.   Gastrointestinal:  Negative for abdominal pain and vomiting.   Genitourinary:  Negative for dysuria and hematuria.   Musculoskeletal:  Negative for arthralgias and back pain.   Skin:  Negative for color change and rash.   Neurological:  Negative for seizures and syncope.   All other systems reviewed and are negative.      Historical Information   Past Medical History:   Diagnosis Date    Anemia 2020    Anxiety     Avascular necrosis of scaphoid (HCC) 2022    Boil of groin 09/15/2020    Formatting of this note might be different from the original. Left side  Last Assessment & Plan:  Formatting of this note might be different from the original. Advised on warm compresses and antibiotics for now. No e/o abscess or sepsis. Precautions given.    Chlamydial infection 2017    Crohn's disease (HCC)      Depression     Disease of thyroid gland     Fibromyalgia     GERD (gastroesophageal reflux disease) 2012    Gestational diabetes     Gestational hypertension     previous pregnancy    Hashimoto's thyroiditis     HPV (human papilloma virus) infection     Liver disease     fatty liver    Oligohydramnios in galvez pregnancy in second trimester 09/12/2017    Post-viral cough syndrome 01/20/2023    Retained placenta     Schlatter-Osgood disease 04/21/2017    Urinary tract infection     Urogenital trichomoniasis 04/07/2017    Visual impairment      Past Surgical History:   Procedure Laterality Date    CHOLECYSTECTOMY      COLONOSCOPY      DILATION AND CURETTAGE OF UTERUS      EGD      HERNIA REPAIR      UMBILICAL HERNIA REPAIR       Social History   Social History     Substance and Sexual Activity   Alcohol Use No     Social History     Substance and Sexual Activity   Drug Use No     Social History     Tobacco Use   Smoking Status Former    Current packs/day: 1.00    Average packs/day: 1 pack/day for 10.0 years (10.0 ttl pk-yrs)    Types: Cigarettes    Passive exposure: Past   Smokeless Tobacco Never   Tobacco Comments    Pack per day over 20 yrs ago as of 2024     Family History: Family history non-contributory    Meds/Allergies      Facility-Administered Medications Prior to Admission:     ondansetron (ZOFRAN-ODT) dispersible tablet 4 mg    Medications Prior to Admission:     ergocalciferol (VITAMIN D2) 50,000 units    escitalopram (LEXAPRO) 20 mg tablet    metFORMIN (GLUCOPHAGE-XR) 500 mg 24 hr tablet    Synthroid 150 MCG tablet    Synthroid 175 MCG tablet    Cholecalciferol (Vitamin D3) 1.25 MG (66040 UT) CAPS    LORazepam (ATIVAN) 0.5 mg tablet    ondansetron (ZOFRAN) 4 mg tablet    Semaglutide-Weight Management (WEGOVY) 0.25 MG/0.5ML    valACYclovir (VALTREX) 1,000 mg tablet     Allergies   Allergen Reactions    Bactrim [Sulfamethoxazole-Trimethoprim] Hives    Other Hives     seafood    Shellfish-Derived Products  "- Food Allergy     Sulfamethoxazole Hives    Trimethoprim Hives       OBJECTIVE:    Vitals: Blood pressure 120/59, pulse 72, temperature 97.8 °F (36.6 °C), temperature source Temporal, resp. rate 16, height 5' 7\" (1.702 m), weight 97.8 kg (215 lb 9.8 oz), last menstrual period 2024, SpO2 97%, not currently breastfeeding.Body mass index is 33.77 kg/m².    Physical Exam  Constitutional:       Appearance: She is well-developed.   Cardiovascular:      Rate and Rhythm: Normal rate.   Pulmonary:      Effort: Pulmonary effort is normal. No respiratory distress.   Abdominal:      Palpations: Abdomen is soft.      Tenderness: There is no abdominal tenderness.   Musculoskeletal:         General: No deformity. Normal range of motion.   Skin:     General: Skin is warm and dry.   Neurological:      Mental Status: She is alert.         Assessment & Plan     ASSESSMENT:  38 yo  female who is scheduled for an EUA, D&C hysteroscopy, polypectomy on 24.    PLAN:    1) Reviewed post operative instructions and expectations; return to office in 2 weeks    Stephanie Zarate MD  2024  7:19 AM               "

## 2024-12-22 ENCOUNTER — NURSE TRIAGE (OUTPATIENT)
Dept: OTHER | Facility: OTHER | Age: 39
End: 2024-12-22

## 2024-12-22 NOTE — TELEPHONE ENCOUNTER
Patient calling in with concerns due to having lower abdominal/pelvic pressure, burning pain with urination, urinary frequency, lower back pain and chills. Stated her current pain is a 5/10, she has not taken any pain medication yet today. On call provider contacted, stated her symptoms can be normal after the procedure as well as her urination symptoms due to catheterization. Recommends the patient take Ibuprofen 600mg now and if she has no improvement in her symptoms would recommend she is evaluated in the ED. Patient made aware and verbalized understanding.   Reason for Disposition  • [1] Caller has URGENT question AND [2] triager unable to answer question    Protocols used: Post-Op Symptoms and Questions-Adult-

## 2024-12-22 NOTE — TELEPHONE ENCOUNTER
"Regarding: Post op pressure/ Burning with urination  ----- Message from Miriam VILLALBA sent at 12/22/2024  5:13 PM EST -----  Pt stated, \" I had a D& C done on Thursday and I am having a lot of pressure, I also am experiencing burning with urination. Is this normal?\"    "

## 2024-12-22 NOTE — TELEPHONE ENCOUNTER
"Answer Assessment - Initial Assessment Questions  1. SYMPTOM: \"What's the main symptom you're concerned about?\" (e.g., pain, fever, vomiting)      Pelvic and lower abdominal pressure       Burning pain with urination and frequency      Abdominal cramping       Lower back pain       Chills     2. ONSET: \"When did s/s  start?\"      Yesterday     3. SURGERY: \"What surgery did you have?\"      (D&C) W/ HYSTEROSCOPY, (Uterus)    4. DATE of SURGERY: \"When was the surgery?\"       12/19/24    5. ANESTHESIA: \"What type of anesthesia did you have?\" (e.g., general, spinal, epidural, local)      General     6. DRAINS: \"Were any drains place in or around the wound?\" (e.g., Hemovac, Alejandro-Merritt, Penrose)      N/a    7. PAIN: \"Is there any pain?\" If Yes, ask: \"How bad is it?\"  (Scale 1-10; or mild, moderate, severe)      Current pain 5/10        8. FEVER: \"Do you have a fever?\" If Yes, ask: \"What is your temperature, how was it measured, and when did it start?\"      Has chills- no fever to check for fever     9. VOMITING: \"Is there any vomiting?\" If Yes, ask: \"How many times?\"      Denies     10. BLEEDING: \"Is there any bleeding?\" If Yes, ask: \"How much?\" and \"Where?\"        Very scant vaginal bleeding with wiping     11. OTHER SYMPTOMS: \"Do you have any other symptoms?\" (e.g., drainage from wound, painful urination, constipation)        Denies    Protocols used: Post-Op Symptoms and Questions-Adult-[facility]    "

## 2024-12-23 ENCOUNTER — APPOINTMENT (OUTPATIENT)
Age: 39
End: 2024-12-23
Payer: COMMERCIAL

## 2024-12-23 ENCOUNTER — NURSE TRIAGE (OUTPATIENT)
Age: 39
End: 2024-12-23

## 2024-12-23 DIAGNOSIS — R30.0 DYSURIA: ICD-10-CM

## 2024-12-23 DIAGNOSIS — R30.0 DYSURIA: Primary | ICD-10-CM

## 2024-12-23 LAB
BACTERIA UR QL AUTO: NORMAL /HPF
BILIRUB UR QL STRIP: NEGATIVE
CLARITY UR: CLEAR
COLOR UR: NORMAL
GLUCOSE UR STRIP-MCNC: NEGATIVE MG/DL
HGB UR QL STRIP.AUTO: NEGATIVE
KETONES UR STRIP-MCNC: NEGATIVE MG/DL
LEUKOCYTE ESTERASE UR QL STRIP: NEGATIVE
NITRITE UR QL STRIP: NEGATIVE
NON-SQ EPI CELLS URNS QL MICRO: NORMAL /HPF
PH UR STRIP.AUTO: 7 [PH]
PROT UR STRIP-MCNC: NEGATIVE MG/DL
RBC #/AREA URNS AUTO: NORMAL /HPF
SP GR UR STRIP.AUTO: 1.01 (ref 1–1.03)
UROBILINOGEN UR STRIP-ACNC: <2 MG/DL
WBC #/AREA URNS AUTO: NORMAL /HPF

## 2024-12-23 PROCEDURE — 87086 URINE CULTURE/COLONY COUNT: CPT

## 2024-12-23 PROCEDURE — 87186 SC STD MICRODIL/AGAR DIL: CPT

## 2024-12-23 PROCEDURE — 81001 URINALYSIS AUTO W/SCOPE: CPT

## 2024-12-23 PROCEDURE — 87077 CULTURE AEROBIC IDENTIFY: CPT

## 2024-12-23 NOTE — TELEPHONE ENCOUNTER
"Patient reports UTI symptoms since 12/21. Had D&C with hysteroscopy 12/19. 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 She dipped her urine and it showed positive for UTI. Urinalysis/urine culture ordered per protocol and advised patient to obtain. Patient verbalized understanding.     Routing to provider for review.     Answer Assessment - Initial Assessment Questions  1. SYMPTOM: \"What's the main symptom you're concerned about?\" (e.g., frequency, incontinence)  Pelvic and lower abdominal pressure   Burning pain with urination and frequency  Abdominal cramping   Lower back pain   Chills - has since resolved since taking Tylenol    2. ONSET: \"When did the  s/s  start?\"      12/21  3. PAIN: \"Is there any pain?\" If Yes, ask: \"How bad is it?\" (Scale: 1-10; mild, moderate, severe)      Pain with urination  4. CAUSE: \"What do you think is causing the symptoms?\"      UTI  5. OTHER SYMPTOMS: \"Do you have any other symptoms?\" (e.g., blood in urine, fever, flank pain, pain with urination)      See above for symptoms    Protocols used: Urinary Symptoms-Adult-[facility]    "

## 2024-12-24 ENCOUNTER — RESULTS FOLLOW-UP (OUTPATIENT)
Dept: OBGYN CLINIC | Facility: CLINIC | Age: 39
End: 2024-12-24

## 2024-12-24 DIAGNOSIS — A49.8 KLEBSIELLA PNEUMONIAE INFECTION: Primary | ICD-10-CM

## 2024-12-25 LAB — BACTERIA UR CULT: ABNORMAL

## 2024-12-26 ENCOUNTER — OFFICE VISIT (OUTPATIENT)
Age: 39
End: 2024-12-26
Payer: COMMERCIAL

## 2024-12-26 ENCOUNTER — RESULTS FOLLOW-UP (OUTPATIENT)
Dept: OBGYN CLINIC | Facility: CLINIC | Age: 39
End: 2024-12-26

## 2024-12-26 VITALS
WEIGHT: 212 LBS | HEART RATE: 116 BPM | OXYGEN SATURATION: 98 % | DIASTOLIC BLOOD PRESSURE: 88 MMHG | BODY MASS INDEX: 33.27 KG/M2 | SYSTOLIC BLOOD PRESSURE: 120 MMHG | TEMPERATURE: 99.6 F | HEIGHT: 67 IN | RESPIRATION RATE: 16 BRPM

## 2024-12-26 DIAGNOSIS — J06.9 VIRAL URI WITH COUGH: Primary | ICD-10-CM

## 2024-12-26 PROCEDURE — 87636 SARSCOV2 & INF A&B AMP PRB: CPT | Performed by: STUDENT IN AN ORGANIZED HEALTH CARE EDUCATION/TRAINING PROGRAM

## 2024-12-26 PROCEDURE — 99213 OFFICE O/P EST LOW 20 MIN: CPT | Performed by: STUDENT IN AN ORGANIZED HEALTH CARE EDUCATION/TRAINING PROGRAM

## 2024-12-26 PROCEDURE — 88305 TISSUE EXAM BY PATHOLOGIST: CPT | Performed by: STUDENT IN AN ORGANIZED HEALTH CARE EDUCATION/TRAINING PROGRAM

## 2024-12-26 RX ORDER — NITROFURANTOIN 25; 75 MG/1; MG/1
100 CAPSULE ORAL 2 TIMES DAILY
Qty: 14 CAPSULE | Refills: 0 | Status: SHIPPED | OUTPATIENT
Start: 2024-12-26 | End: 2025-01-02

## 2024-12-26 NOTE — PROGRESS NOTES
"Name: Pb Suarez      : 1985      MRN: [id number]  Encounter Provider: Óscar Saini MD  Encounter Date: 2024   Encounter department: [facility]  :  Assessment & Plan  Viral URI with cough    Orders:  •  Covid/Flu- Office Collect Normal; Future  COVID/flu test ordered at today's visit, will follow-up results with patient.  Recommended supportive care at this time, including rest, plenty of hydration, and over-the-counter medicine as needed.  Advised patient to follow-up with office if symptoms worsen or fail to improve.         History of Present Illness     URI   This is a new problem. The current episode started yesterday. The problem has been gradually worsening. There has been no fever. Associated symptoms include congestion, coughing, diarrhea, headaches, nausea, rhinorrhea and a sore throat. Pertinent negatives include no abdominal pain, chest pain, dysuria or vomiting. She has tried NSAIDs for the symptoms. The treatment provided mild relief.     Review of Systems   Constitutional:  Positive for chills. Negative for fever.   HENT:  Positive for congestion, rhinorrhea and sore throat.    Respiratory:  Positive for cough. Negative for shortness of breath.    Cardiovascular:  Negative for chest pain and palpitations.   Gastrointestinal:  Positive for diarrhea and nausea. Negative for abdominal pain, constipation and vomiting.   Genitourinary:  Negative for difficulty urinating and dysuria.   Neurological:  Positive for headaches. Negative for dizziness.       Objective   /88 (BP Location: Left arm, Patient Position: Sitting, Cuff Size: Large)   Pulse (!) 116   Temp 99.6 °F (37.6 °C) (Temporal)   Resp 16   Ht 5' 7\" (1.702 m)   Wt 96.2 kg (212 lb)   SpO2 98%   BMI 33.20 kg/m²      Physical Exam  Constitutional:       General: She is not in acute distress.     Appearance: Normal appearance. She is not ill-appearing.   HENT:      Head: Normocephalic and " atraumatic.      Right Ear: Tympanic membrane and ear canal normal. There is no impacted cerumen.      Left Ear: Tympanic membrane and ear canal normal. There is no impacted cerumen.      Nose: Nose normal. No congestion.      Mouth/Throat:      Mouth: Mucous membranes are moist.      Pharynx: Oropharynx is clear. Posterior oropharyngeal erythema present. No oropharyngeal exudate.   Eyes:      General:         Right eye: No discharge.         Left eye: No discharge.      Conjunctiva/sclera: Conjunctivae normal.      Pupils: Pupils are equal, round, and reactive to light.   Cardiovascular:      Rate and Rhythm: Normal rate and regular rhythm.      Heart sounds: Normal heart sounds. No murmur heard.  Pulmonary:      Effort: Pulmonary effort is normal. No respiratory distress.      Breath sounds: Normal breath sounds.   Abdominal:      Palpations: Abdomen is soft.      Tenderness: There is no abdominal tenderness.   Musculoskeletal:      Right lower leg: No edema.      Left lower leg: No edema.   Neurological:      Mental Status: She is alert.

## 2024-12-27 ENCOUNTER — RESULTS FOLLOW-UP (OUTPATIENT)
Age: 39
End: 2024-12-27

## 2024-12-27 LAB
FLUAV RNA RESP QL NAA+PROBE: NEGATIVE
FLUBV RNA RESP QL NAA+PROBE: NEGATIVE
SARS-COV-2 RNA RESP QL NAA+PROBE: POSITIVE

## 2024-12-27 NOTE — TELEPHONE ENCOUNTER
Spoke to patient directly advise her of Dr. Saini's instructions. Pt verbalized she understands at this time. Advise to call us back if she needs anything.

## 2024-12-27 NOTE — TELEPHONE ENCOUNTER
Patient called to ask if it was normal to bleed after the D&C- thought she would not have her period or any bleeding for atleast a month. Bled for 1 day after the procedure on 12/19 and now today started with bleeding again. No heavy bleeding, small clots noted and mild cramping. Informed patient it is not uncommon to have some bleeding after the procedure and menstrual timing can vary after the D&C. Encouraged she continue to monitor and let the office know if any new or worsening symptoms. No further questions.

## 2024-12-27 NOTE — TELEPHONE ENCOUNTER
----- Message from Óscar Saini MD sent at 12/27/2024  2:32 PM EST -----  Can also consider Paxlovid therapy if symptoms are worsening or not improving.

## 2024-12-30 ENCOUNTER — HOSPITAL ENCOUNTER (EMERGENCY)
Facility: HOSPITAL | Age: 39
Discharge: HOME | End: 2024-12-30
Attending: EMERGENCY MEDICINE
Payer: COMMERCIAL

## 2024-12-30 VITALS
SYSTOLIC BLOOD PRESSURE: 146 MMHG | RESPIRATION RATE: 18 BRPM | DIASTOLIC BLOOD PRESSURE: 67 MMHG | BODY MASS INDEX: 33.46 KG/M2 | TEMPERATURE: 99.1 F | WEIGHT: 213.63 LBS | OXYGEN SATURATION: 98 % | HEART RATE: 94 BPM

## 2024-12-30 DIAGNOSIS — R09.81 SINUS CONGESTION: Primary | ICD-10-CM

## 2024-12-30 PROCEDURE — 99282 EMERGENCY DEPT VISIT SF MDM: CPT

## 2024-12-30 PROCEDURE — 99283 EMERGENCY DEPT VISIT LOW MDM: CPT | Performed by: PHYSICIAN ASSISTANT

## 2024-12-30 RX ORDER — OXYMETAZOLINE HYDROCHLORIDE 0.05 G/100ML
2 SPRAY NASAL ONCE
Status: COMPLETED | OUTPATIENT
Start: 2024-12-30 | End: 2024-12-30

## 2024-12-30 RX ORDER — PSEUDOEPHEDRINE HCL 30 MG/1
60 TABLET, FILM COATED ORAL EVERY 8 HOURS PRN
Qty: 15 TABLET | Refills: 0 | Status: SHIPPED | OUTPATIENT
Start: 2024-12-30 | End: 2025-01-02

## 2024-12-30 RX ADMIN — OXYMETAZOLINE HYDROCHLORIDE 2 SPRAY: 0.05 SPRAY NASAL at 04:14

## 2024-12-30 RX ADMIN — PSEUDOEPHEDRINE HYDROCHLORIDE 60 MG: 60 TABLET ORAL at 04:32

## 2024-12-30 NOTE — ED PROVIDER NOTES
Time reflects when diagnosis was documented in both MDM as applicable and the Disposition within this note       Time User Action Codes Description Comment    12/30/2024  3:35 AM Stephanie Stafford Add [R09.81] Sinus congestion           ED Disposition       ED Disposition   Discharge    Condition   Stable    Date/Time   Mon Dec 30, 2024  3:38 AM    Comment   Pb Suarez discharge to home/self care.             Assessment & Plan       Medical Decision Making      DDx including but not limited to: allergic rhinitis, URI, sinusitis, viral illness, nasal irritant.     The patient is stable and has a history and physical exam consistent with a viral illness.  I considered the patient's other medical conditions as applicable/noted above in my medical decision making.  The patient stable upon discharge. The plan is for supportive care at home.  Symptomatic therapy suggested: rest, increase fluids, OTC acetaminophen, ibuprofen, cough suppressant of choice, and call prn if symptoms persist or worsen.  Sudafed prescribed to the pharmacy.  Patient was discharged with Afrin.  Recommend using twice daily as needed for 3 days only, discussed rhinitis medicamentosa.  Call or go to PCP if these symptoms persist or fail to improve as anticipated. Return to ER precautions discussed as well.    Prior to discharge, discharge instructions were discussed with patient at bedside. Patient was provided both verbal and written instructions. Patient is understanding of the discharge instructions and is agreeable to plan of care. Return precautions were discussed with patient bedside, patient verbalized understanding of signs and symptoms that would necessitate return to the ED. All questions were answered. Patient was comfortable with the plan of care and discharged to home.     Dispo: discharge home with follow up to PCP. Patient stable, in no acute distress and non-toxic at discharge.    Problems Addressed:  Sinus congestion: acute  illness or injury    Risk  OTC drugs.             Medications   oxymetazoline (AFRIN) 0.05 % nasal spray 2 spray (2 sprays Each Nare Given 12/30/24 0414)   pseudoephedrine (SUDAFED) tablet 60 mg (60 mg Oral Given 12/30/24 0432)       ED Risk Strat Scores                          SBIRT 22yo+      Flowsheet Row Most Recent Value   Initial Alcohol Screen: US AUDIT-C     1. How often do you have a drink containing alcohol? 0 Filed at: 12/30/2024 0418   2. How many drinks containing alcohol do you have on a typical day you are drinking?  0 Filed at: 12/30/2024 0418   3b. FEMALE Any Age, or MALE 65+: How often do you have 4 or more drinks on one occassion? 0 Filed at: 12/30/2024 0418   Audit-C Score 0 Filed at: 12/30/2024 0418   LAKESHA: How many times in the past year have you...    Used an illegal drug or used a prescription medication for non-medical reasons? Never Filed at: 12/30/2024 0418                            History of Present Illness       Chief Complaint   Patient presents with    Nasal Congestion     Patient states she tested positive for Covid 5 days ago. Now she has head and nasal congestion that won't go away.       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 Left side  Last Assessment & Plan:  Formatting of this note might be different from the original. Advised on warm compresses and antibiotics for now. No e/o abscess or sepsis. Precautions given.    Chlamydial infection 04/07/2017    Crohn's disease (HCC)     Depression     Disease of thyroid gland     Fibromyalgia     GERD (gastroesophageal reflux disease) 2012    Gestational diabetes     Gestational hypertension     previous pregnancy    Hashimoto's thyroiditis     HPV (human papilloma virus) infection     Liver disease     fatty liver    Oligohydramnios in galvez pregnancy in second  trimester 09/12/2017    Post-viral cough syndrome 01/20/2023    Retained placenta     Schlatter-Osgood disease 04/21/2017    Urinary tract infection     Urogenital trichomoniasis 04/07/2017    Visual impairment       Past Surgical History:   Procedure Laterality Date    CHOLECYSTECTOMY      COLONOSCOPY      DILATION AND CURETTAGE OF UTERUS      EGD      HERNIA REPAIR      PA HYSTEROSCOPY BX ENDOMETRIUM&/POLYPC W/WO D&C N/A 12/19/2024    Procedure: (D&C) W/ HYSTEROSCOPY,;  Surgeon: Stephanie Zarate MD;  Location: [facility];  Service: Gynecology    UMBILICAL HERNIA REPAIR        Family History   Problem Relation Age of Onset    Anuerysm Mother     Thyroid disease Mother     Hepatitis Mother     Colon polyps Mother     Depression Father         Mom dad grndma aunt uncle cousins    Anxiety disorder Father     Bipolar disorder Father     Self-Injury Father     Lung cancer Maternal Grandmother     Arthritis Maternal Grandmother     Cancer Maternal Grandmother         Grandma on both sides    Colon cancer Maternal Grandfather     Diabetes Paternal Grandmother     ADD / ADHD Cousin     Breast cancer Neg Hx       Social History     Tobacco Use    Smoking status: Former     Current packs/day: 1.00     Average packs/day: 1 pack/day for 10.0 years (10.0 ttl pk-yrs)     Types: Cigarettes     Passive exposure: Past    Smokeless tobacco: Never    Tobacco comments:     Pack per day over 20 yrs ago as of 2024   Vaping Use    Vaping status: Never Used   Substance Use Topics    Alcohol use: No    Drug use: No      E-Cigarette/Vaping    E-Cigarette Use Never User       E-Cigarette/Vaping Substances    Nicotine No     THC No     CBD No     Flavoring No     Other No     Unknown No       I have reviewed and agree with the history as documented.     This is a 39-year-old female presenting to ED for nasal congestion.  Patient tested positive for COVID on 12/26/2024.  She states that she initially thought she was improving but is now  experiencing worsening nasal congestion.  She states that she is using Motrin as well as nasal saline spray for symptoms.  She endorses sinus pressure.  She continues to have low-grade fevers at home.  She endorses green mucus from the sinuses.      History provided by:  Patient   used: No        Review of Systems   Constitutional:  Positive for fever.   HENT:  Positive for congestion and rhinorrhea.    All other systems reviewed and are negative.          Objective       ED Triage Vitals [12/30/24 0229]   Temperature Pulse Blood Pressure Respirations SpO2 Patient Position - Orthostatic VS   99.1 °F (37.3 °C) 94 146/67 18 98 % Sitting      Temp Source Heart Rate Source BP Location FiO2 (%) Pain Score    Oral Monitor Right arm -- --      Vitals      Date and Time Temp Pulse SpO2 Resp BP Pain Score FACES Pain Rating User   12/30/24 0229 99.1 °F (37.3 °C) 94 98 % 18 146/67 -- -- [facility]            Physical Exam  Vitals reviewed.   Constitutional:       General: She is not in acute distress.     Appearance: Normal appearance. She is well-developed and well-groomed. She is not ill-appearing.   HENT:      Head: Normocephalic and atraumatic.      Right Ear: External ear normal.      Left Ear: External ear normal.      Nose: Mucosal edema and congestion present.      Mouth/Throat:      Lips: Pink.      Mouth: Mucous membranes are moist.      Pharynx: Oropharynx is clear. No pharyngeal swelling, posterior oropharyngeal erythema or uvula swelling.   Eyes:      General: No scleral icterus.     Conjunctiva/sclera: Conjunctivae normal.   Cardiovascular:      Rate and Rhythm: Normal rate and regular rhythm.   Pulmonary:      Effort: Pulmonary effort is normal.      Breath sounds: No stridor.   Abdominal:      General: There is no distension.   Musculoskeletal:         General: No deformity. Normal range of motion.      Cervical back: Normal range of motion.   Skin:     Coloration: Skin is not jaundiced or pale.       Findings: No lesion or rash.   Neurological:      Mental Status: She is alert and oriented to person, place, and time.   Psychiatric:         Mood and Affect: Mood normal.         Behavior: Behavior normal. Behavior is cooperative.         Results Reviewed       None            No orders to display       Procedures    ED Medication and Procedure Management   Prior to Admission Medications   Prescriptions Last Dose Informant Patient Reported? Taking?   LORazepam (ATIVAN) 0.5 mg tablet  Self No No   Sig: Take 1 pill 30 minutes before procedure   Semaglutide-Weight Management (WEGOVY) 0.25 MG/0.5ML  Self No No   Sig: Inject 0.5 mL (0.25 mg total) under the skin once a week   Patient not taking: Reported on 2024   Synthroid 150 MCG tablet  Self No No   Sig: Take 1 tablet (150 mcg total) by mouth daily   Synthroid 175 MCG tablet  Self No No   Sig: Take 1 pill  and Friday alternating with 150 other days   acetaminophen (TYLENOL) 650 mg CR tablet   No No   Sig: Take 1 tablet (650 mg total) by mouth every 8 (eight) hours as needed for mild pain   ergocalciferol (VITAMIN D2) 50,000 units   Yes No   escitalopram (LEXAPRO) 20 mg tablet  Self No No   Sig: Take 1 tablet (20 mg total) by mouth daily   ibuprofen (MOTRIN) 600 mg tablet   No No   Sig: Take 1 tablet (600 mg total) by mouth every 6 (six) hours as needed for mild pain   metFORMIN (GLUCOPHAGE-XR) 500 mg 24 hr tablet   No No   Sig: Take 1 tablet (500 mg total) by mouth daily with dinner   nitrofurantoin (MACROBID) 100 mg capsule   No No   Sig: Take 1 capsule (100 mg total) by mouth 2 (two) times a day for 7 days   ondansetron (ZOFRAN) 4 mg tablet  Self No No   Sig: Take 1 tablet (4 mg total) by mouth every 12 (twelve) hours as needed for nausea or vomiting   Patient not taking: Reported on 2024   valACYclovir (VALTREX) 1,000 mg tablet   No No   Si pills q 12 hours for 1 day      Facility-Administered Medications: None     Discharge  Medication List as of 12/30/2024  3:47 AM        START taking these medications    Details   pseudoephedrine (SUDAFED) 30 mg tablet Take 2 tablets (60 mg total) by mouth every 8 (eight) hours as needed for congestion for up to 5 days, Starting Mon 12/30/2024, Until Sat 1/4/2025 at 2359, Normal           CONTINUE these medications which have NOT CHANGED    Details   acetaminophen (TYLENOL) 650 mg CR tablet Take 1 tablet (650 mg total) by mouth every 8 (eight) hours as needed for mild pain, Starting Thu 12/19/2024, Normal      ergocalciferol (VITAMIN D2) 50,000 units Historical Med      escitalopram (LEXAPRO) 20 mg tablet Take 1 tablet (20 mg total) by mouth daily, Starting Fri 10/4/2024, Until Thu 1/2/2025, Normal      ibuprofen (MOTRIN) 600 mg tablet Take 1 tablet (600 mg total) by mouth every 6 (six) hours as needed for mild pain, Starting Thu 12/19/2024, Normal      LORazepam (ATIVAN) 0.5 mg tablet Take 1 pill 30 minutes before procedure, Normal      metFORMIN (GLUCOPHAGE-XR) 500 mg 24 hr tablet Take 1 tablet (500 mg total) by mouth daily with dinner, Starting Tue 11/19/2024, Normal      nitrofurantoin (MACROBID) 100 mg capsule Take 1 capsule (100 mg total) by mouth 2 (two) times a day for 7 days, Starting Thu 12/26/2024, Until Thu 1/2/2025, Normal      ondansetron (ZOFRAN) 4 mg tablet Take 1 tablet (4 mg total) by mouth every 12 (twelve) hours as needed for nausea or vomiting, Starting Fri 10/18/2024, Normal      Semaglutide-Weight Management (WEGOVY) 0.25 MG/0.5ML Inject 0.5 mL (0.25 mg total) under the skin once a week, Starting Mon 10/14/2024, Normal      !! Synthroid 150 MCG tablet Take 1 tablet (150 mcg total) by mouth daily, Starting Fri 8/16/2024, Normal      !! Synthroid 175 MCG tablet Take 1 pill Monday Wednesday and Friday alternating with 150 other days, Normal      valACYclovir (VALTREX) 1,000 mg tablet 2 pills q 12 hours for 1 day, Normal       !! - Potential duplicate medications found. Please  discuss with provider.        No discharge procedures on file.  ED SEPSIS DOCUMENTATION   Time reflects when diagnosis was documented in both MDM as applicable and the Disposition within this note       Time User Action Codes Description Comment    12/30/2024  3:35 AM Stephanie Stafford Add [R09.81] Sinus congestion                  Stephanie Stafford PA-C  12/30/24 0512

## 2024-12-30 NOTE — DISCHARGE INSTRUCTIONS
Use afrin twice daily as needed for congestion for 3 days only, do not use for more than 3 days.  Take Sudafed as prescribed to the pharmacy.  Take Tylenol or motrin if you develop a fever. Use as directed on the box.  Use over the counter cold and flu medicine for cough and congestion. Recommend Zarbees.   Use Flonase or nasal saline spray for nasal congestion.  Try using honey and warm water or tea for sore throat and cough.    Follow up with your family doctor if symptoms do not improve over the next couple of days.

## 2024-12-30 NOTE — Clinical Note
Pb Suarez was seen and treated in our emergency department on 12/30/2024.                Diagnosis: Covid/Sinus congestion    Pb  may return to work on return date.    She may return on this date: 01/01/2025         If you have any questions or concerns, please don't hesitate to call.      Stephanie Stafford PA-C    ______________________________           _______________          _______________  Hospital Representative                              Date                                Time

## 2025-01-02 ENCOUNTER — OFFICE VISIT (OUTPATIENT)
Age: 40
End: 2025-01-02
Payer: COMMERCIAL

## 2025-01-02 VITALS
OXYGEN SATURATION: 99 % | BODY MASS INDEX: 33.27 KG/M2 | WEIGHT: 212 LBS | SYSTOLIC BLOOD PRESSURE: 102 MMHG | RESPIRATION RATE: 16 BRPM | HEART RATE: 65 BPM | HEIGHT: 67 IN | TEMPERATURE: 97.8 F | DIASTOLIC BLOOD PRESSURE: 76 MMHG

## 2025-01-02 DIAGNOSIS — J20.9 ACUTE BRONCHITIS WITH BRONCHOSPASM: Primary | ICD-10-CM

## 2025-01-02 DIAGNOSIS — E06.3 HYPOTHYROIDISM DUE TO HASHIMOTO'S THYROIDITIS: ICD-10-CM

## 2025-01-02 DIAGNOSIS — U07.1 COVID: ICD-10-CM

## 2025-01-02 DIAGNOSIS — J01.00 ACUTE NON-RECURRENT MAXILLARY SINUSITIS: ICD-10-CM

## 2025-01-02 PROBLEM — Z86.32 HISTORY OF GESTATIONAL DIABETES MELLITUS: Status: RESOLVED | Noted: 2018-05-25 | Resolved: 2025-01-02

## 2025-01-02 PROCEDURE — 99213 OFFICE O/P EST LOW 20 MIN: CPT | Performed by: INTERNAL MEDICINE

## 2025-01-02 RX ORDER — LEVOTHYROXINE SODIUM 175 MCG
TABLET ORAL
Qty: 60 TABLET | Refills: 1 | Status: SHIPPED | OUTPATIENT
Start: 2025-01-02

## 2025-01-02 RX ORDER — AMOXICILLIN 500 MG/1
500 CAPSULE ORAL EVERY 8 HOURS SCHEDULED
Qty: 30 CAPSULE | Refills: 0 | Status: SHIPPED | OUTPATIENT
Start: 2025-01-02 | End: 2025-01-12

## 2025-01-02 RX ORDER — BENZONATATE 200 MG/1
200 CAPSULE ORAL 3 TIMES DAILY PRN
Qty: 20 CAPSULE | Refills: 0 | Status: SHIPPED | OUTPATIENT
Start: 2025-01-02

## 2025-01-02 RX ORDER — BUDESONIDE AND FORMOTEROL FUMARATE DIHYDRATE 160; 4.5 UG/1; UG/1
2 AEROSOL RESPIRATORY (INHALATION) 2 TIMES DAILY
Qty: 10.2 G | Refills: 5 | Status: SHIPPED | OUTPATIENT
Start: 2025-01-02

## 2025-01-02 NOTE — ASSESSMENT & PLAN NOTE
Orders:    Synthroid 175 MCG tablet; Take 1 pill Monday Wednesday and Friday alternating with 150 other days

## 2025-01-02 NOTE — PROGRESS NOTES
"Name: Pb Suarez      : 1985      MRN: [id number]  Encounter Provider: Aliya Hensley MD  Encounter Date: 2025   Encounter department: [facility]  :  Assessment & Plan  Acute bronchitis with bronchospasm    Orders:    budesonide-formoterol (SYMBICORT) 160-4.5 mcg/act inhaler; Inhale 2 puffs 2 (two) times a day Rinse mouth after use.    Acute non-recurrent maxillary sinusitis    Orders:    amoxicillin (AMOXIL) 500 mg capsule; Take 1 capsule (500 mg total) by mouth every 8 (eight) hours for 10 days    benzonatate (TESSALON) 200 MG capsule; Take 1 capsule (200 mg total) by mouth 3 (three) times a day as needed for cough    COVID         Hypothyroidism due to Hashimoto's thyroiditis    Orders:    Synthroid 175 MCG tablet; Take 1 pill  and Friday alternating with 150 other days           History of Present Illness     HPI  Patient is here to follow-up on upper respiratory infection has been going on for over a week.  She was tested positive for COVID on .  Since then her fever has broken up she still has mild cough but she has been having purulent greenish nasal discharge.  Her fatigue is getting better.  Vital signs stable today.  Review of Systems    Objective   /76 (BP Location: Left arm, Patient Position: Sitting, Cuff Size: Large)   Pulse 65   Temp 97.8 °F (36.6 °C) (Temporal)   Resp 16   Ht 5' 7\" (1.702 m)   Wt 96.2 kg (212 lb) Comment: verbal  LMP  (LMP Unknown)   SpO2 99%   BMI 33.20 kg/m²      Physical Exam  Constitutional:       Comments: Sounds congested   HENT:      Nose:      Comments: Tenderness on palpation maxillary sinuses  Pulmonary:      Effort: Pulmonary effort is normal. No respiratory distress.      Breath sounds: No wheezing or rales.      Comments: Coarse breath sounds  Neurological:      Mental Status: She is alert.      Cranial Nerves: Cranial nerve deficit present.         "

## 2025-01-06 DIAGNOSIS — B37.31 VAGINA, CANDIDIASIS: Primary | ICD-10-CM

## 2025-01-06 DIAGNOSIS — B37.31 VAGINAL CANDIDIASIS: ICD-10-CM

## 2025-01-06 RX ORDER — FLUCONAZOLE 150 MG/1
150 TABLET ORAL ONCE
Qty: 1 TABLET | Refills: 0 | Status: SHIPPED | OUTPATIENT
Start: 2025-01-06 | End: 2025-01-06

## 2025-01-16 DIAGNOSIS — R73.03 PREDIABETES: ICD-10-CM

## 2025-01-16 DIAGNOSIS — E06.3 HYPOTHYROIDISM DUE TO HASHIMOTO'S THYROIDITIS: ICD-10-CM

## 2025-01-16 DIAGNOSIS — D50.9 IRON DEFICIENCY ANEMIA, UNSPECIFIED IRON DEFICIENCY ANEMIA TYPE: Primary | ICD-10-CM

## 2025-01-17 ENCOUNTER — NURSE TRIAGE (OUTPATIENT)
Age: 40
End: 2025-01-17

## 2025-01-17 NOTE — TELEPHONE ENCOUNTER
"Incoming call from patient. Had D&C 12/19/24. Patient started to have first menses last night 1/16. This morning patient saturated pad in 1.5 hours. Denies feeling lightheaded, dizzy. Denies clots and abdominal pain. Patient advised to continue to monitor as this was first occurrence. Patient will call back if bleeding at this intensity continues, worsens or if patient has additional symptoms.     Reason for Disposition   Normal menstrual flow    Answer Assessment - Initial Assessment Questions  1. BLEEDING SEVERITY: \"Describe the bleeding that you are having.\" \"How much bleeding is there?\"       Bleeding started last night 1/16 - saturated pad in about 1.5 hours this morning  2. ONSET: \"When did the bleeding begin?\" \"Is it continuing now?\"      See above. continuing  3. MENSTRUAL PERIOD: \"When was the last normal menstrual period?\" \"How is this different than your period?\"      First menses since d&c  4. REGULARITY: \"How regular are your periods?\"      Did not assess  5. ABDOMEN PAIN: \"Do you have any pain?\" \"How bad is the pain?\"  (e.g., Scale 0-10; none, mild, moderate, or severe)      denies  6. PREGNANCY: \"Is there any chance you are pregnant?\" \"When was your last menstrual period?\"      denies  7. BREASTFEEDING: \"Are you breastfeeding?\"      denies  8. HORMONE MEDICINES: \"Are you taking any hormone medicines, prescription or over-the-counter?\" (e.g., birth control pills, estrogen)      denies  9. BLOOD THINNER MEDICINES: \"Do you take any blood thinners?\" (e.g., Coumadin / warfarin, Pradaxa / dabigatran, aspirin)      denies  10. CAUSE: \"What do you think is causing the bleeding?\" (e.g., recent gyn surgery, recent gyn procedure; known bleeding disorder, cervical cancer, polycystic ovarian disease, fibroids)          menses  11. HEMODYNAMIC STATUS: \"Are you weak or feeling lightheaded?\" If Yes, ask: \"Can you stand and walk normally?\"         denies  12. OTHER SYMPTOMS: \"What other symptoms are you having with " "the bleeding?\" (e.g., passed tissue, vaginal discharge, fever, menstrual-type cramps)        Denies    Protocols used: Vaginal Bleeding - Abnormal-Adult-OH    "

## 2025-01-20 ENCOUNTER — APPOINTMENT (OUTPATIENT)
Age: 40
End: 2025-01-20
Payer: COMMERCIAL

## 2025-01-20 DIAGNOSIS — R16.2 HEPATOSPLENOMEGALY: ICD-10-CM

## 2025-01-20 DIAGNOSIS — R73.03 PREDIABETES: ICD-10-CM

## 2025-01-20 DIAGNOSIS — E66.09 CLASS 1 OBESITY DUE TO EXCESS CALORIES WITH BODY MASS INDEX (BMI) OF 33.0 TO 33.9 IN ADULT, UNSPECIFIED WHETHER SERIOUS COMORBIDITY PRESENT: ICD-10-CM

## 2025-01-20 DIAGNOSIS — E66.811 CLASS 1 OBESITY DUE TO EXCESS CALORIES WITH BODY MASS INDEX (BMI) OF 33.0 TO 33.9 IN ADULT, UNSPECIFIED WHETHER SERIOUS COMORBIDITY PRESENT: ICD-10-CM

## 2025-01-20 DIAGNOSIS — K76.0 METABOLIC DYSFUNCTION-ASSOCIATED STEATOTIC LIVER DISEASE (MASLD): ICD-10-CM

## 2025-01-20 LAB
BASOPHILS # BLD AUTO: 0.05 THOUSANDS/ΜL (ref 0–0.1)
BASOPHILS NFR BLD AUTO: 1 % (ref 0–1)
EOSINOPHIL # BLD AUTO: 0.09 THOUSAND/ΜL (ref 0–0.61)
EOSINOPHIL NFR BLD AUTO: 2 % (ref 0–6)
ERYTHROCYTE [DISTWIDTH] IN BLOOD BY AUTOMATED COUNT: 16.8 % (ref 11.6–15.1)
EST. AVERAGE GLUCOSE BLD GHB EST-MCNC: 126 MG/DL
FERRITIN SERPL-MCNC: 3 NG/ML (ref 11–307)
HBA1C MFR BLD: 6 %
HCT VFR BLD AUTO: 35.7 % (ref 34.8–46.1)
HGB BLD-MCNC: 10.4 G/DL (ref 11.5–15.4)
IMM GRANULOCYTES # BLD AUTO: 0.02 THOUSAND/UL (ref 0–0.2)
IMM GRANULOCYTES NFR BLD AUTO: 0 % (ref 0–2)
INR PPP: 1.03 (ref 0.85–1.19)
IRON SATN MFR SERPL: 4 % (ref 15–50)
IRON SERPL-MCNC: 21 UG/DL (ref 50–212)
LYMPHOCYTES # BLD AUTO: 1.97 THOUSANDS/ΜL (ref 0.6–4.47)
LYMPHOCYTES NFR BLD AUTO: 36 % (ref 14–44)
MCH RBC QN AUTO: 22 PG (ref 26.8–34.3)
MCHC RBC AUTO-ENTMCNC: 29.1 G/DL (ref 31.4–37.4)
MCV RBC AUTO: 76 FL (ref 82–98)
MONOCYTES # BLD AUTO: 0.45 THOUSAND/ΜL (ref 0.17–1.22)
MONOCYTES NFR BLD AUTO: 8 % (ref 4–12)
NEUTROPHILS # BLD AUTO: 2.89 THOUSANDS/ΜL (ref 1.85–7.62)
NEUTS SEG NFR BLD AUTO: 53 % (ref 43–75)
NRBC BLD AUTO-RTO: 0 /100 WBCS
PLATELET # BLD AUTO: 248 THOUSANDS/UL (ref 149–390)
PMV BLD AUTO: 11.6 FL (ref 8.9–12.7)
PROTHROMBIN TIME: 13.8 SECONDS (ref 12.3–15)
RBC # BLD AUTO: 4.73 MILLION/UL (ref 3.81–5.12)
TIBC SERPL-MCNC: 588 UG/DL (ref 250–450)
TRANSFERRIN SERPL-MCNC: 420 MG/DL (ref 203–362)
TSH SERPL DL<=0.05 MIU/L-ACNC: 0.56 UIU/ML (ref 0.45–4.5)
UIBC SERPL-MCNC: 567 UG/DL (ref 155–355)
WBC # BLD AUTO: 5.47 THOUSAND/UL (ref 4.31–10.16)

## 2025-01-20 PROCEDURE — 86708 HEPATITIS A ANTIBODY: CPT

## 2025-01-20 PROCEDURE — 83540 ASSAY OF IRON: CPT | Performed by: INTERNAL MEDICINE

## 2025-01-20 PROCEDURE — 85610 PROTHROMBIN TIME: CPT

## 2025-01-20 PROCEDURE — 82728 ASSAY OF FERRITIN: CPT | Performed by: INTERNAL MEDICINE

## 2025-01-20 PROCEDURE — 83550 IRON BINDING TEST: CPT | Performed by: INTERNAL MEDICINE

## 2025-01-20 PROCEDURE — 36415 COLL VENOUS BLD VENIPUNCTURE: CPT

## 2025-01-20 PROCEDURE — 83036 HEMOGLOBIN GLYCOSYLATED A1C: CPT

## 2025-01-21 ENCOUNTER — RESULTS FOLLOW-UP (OUTPATIENT)
Age: 40
End: 2025-01-21

## 2025-01-21 DIAGNOSIS — D50.0 IRON DEFICIENCY ANEMIA DUE TO CHRONIC BLOOD LOSS: Primary | ICD-10-CM

## 2025-01-21 DIAGNOSIS — N93.9 ABNORMAL UTERINE BLEEDING (AUB): ICD-10-CM

## 2025-01-21 LAB
HAV AB SER QL IA: NORMAL
HBV CORE AB SER QL: NORMAL
HBV CORE IGM SER QL: NORMAL
HBV SURFACE AG SER QL: NORMAL
HCV AB SER QL: NORMAL

## 2025-01-21 RX ORDER — SODIUM CHLORIDE 9 MG/ML
20 INJECTION, SOLUTION INTRAVENOUS ONCE
Status: CANCELLED | OUTPATIENT
Start: 2025-01-24

## 2025-01-22 LAB
LIVER FIBR SCORE SERPL CALC.FIBROSURE: 8.64
MITOCHONDRIA M2 IGG SER-ACNC: <20 UNITS (ref 0–20)

## 2025-01-22 NOTE — PROGRESS NOTES
"Subjective    Patient is a 38 yo  who presents today to discuss definitive management of her AUB.    She reports a history of heavy bleeding after her most recent delivery, a vaginal delivery in  that unfortunately resulted in a  demise. She has tried hormonal options to control her bleeding, but it always negatively affected her thyroid. She declines endometrial ablation or UAE, and is interested in a hysterectomy for definitive management. She is currently receiving IV iron infusions for anemia related to her AUB; her most recent hemoglobin was 10.4. She is also being evaluated for hepatosplenomegaly that was detected on an abdominal ultrasound; as of now there is no concern for hepatitis or cirrhosis. Her most recent pap smear in 2021 was NILM/HPV neg. A D&C performed on 24 demonstrated benign endometrium. Her surgical history is notable for prior umbilical hernia repair; she does not think mesh was used.    OB History          8    Para   7    Term   5       2    AB   1    Living   6         SAB   1    IAB   0    Ectopic   0    Multiple   0    Live Births   7                        Objective    Vitals:    25 0753   BP: 120/70   BP Location: Left arm   Patient Position: Sitting   Cuff Size: Large   Pulse: 92   SpO2: 99%   Weight: 96.2 kg (212 lb)   Height: 5' 7\" (1.702 m)        Physical Exam  Constitutional:       Appearance: Normal appearance.   HENT:      Head: Normocephalic and atraumatic.   Cardiovascular:      Rate and Rhythm: Normal rate.   Pulmonary:      Effort: Pulmonary effort is normal. No respiratory distress.   Musculoskeletal:         General: Normal range of motion.   Neurological:      Mental Status: She is alert.   Skin:     General: Skin is warm and dry.          Assessment    Patient Active Problem List   Diagnosis    Thyroid nodule    Irritable bowel syndrome with diarrhea    PTSD (post-traumatic stress disorder)    Benign paroxysmal " positional vertigo due to bilateral vestibular disorder    Iron deficiency anemia    Stress incontinence of urine    Chronic arthralgias of knees and hips    Paresthesia    Iron deficiency anemia due to chronic blood loss    B12 deficiency    External hemorrhoid    Abnormal uterine bleeding (AUB)    Cervical high risk HPV (human papillomavirus) test positive    Fibromyalgia    History of cervical dysplasia    HSV-2 seropositive    Impaired fasting glucose    Mitral valve prolapse    Moderate episode of recurrent major depressive disorder (HCC)    Prediabetes    Hypothyroidism due to Hashimoto's thyroiditis    Class 2 obesity without serious comorbidity with body mass index (BMI) of 35.0 to 35.9 in adult    Generalized anxiety disorder with panic attacks    At risk for obstructive sleep apnea    Excessive daytime sleepiness    Vitamin D deficiency    Family history of aneurysm    MDD (major depressive disorder), recurrent episode, mild (HCC)    Obesity, morbid (HCC)    Myalgia    Metabolic dysfunction-associated steatotic liver disease (MASLD)    S/P dilation and curettage        Plan   - Reviewed management options, including medications, UAE, endometrial ablation, hysterectomy; patient also reports urinary incontinence, may want incontinence procedure at same time; Urogynecology referral also placed  - Patient considering hysterectomy vs endometrial ablation, will continue to consider her options

## 2025-01-23 ENCOUNTER — OFFICE VISIT (OUTPATIENT)
Dept: OBGYN CLINIC | Facility: CLINIC | Age: 40
End: 2025-01-23
Payer: COMMERCIAL

## 2025-01-23 VITALS
BODY MASS INDEX: 33.27 KG/M2 | SYSTOLIC BLOOD PRESSURE: 120 MMHG | HEART RATE: 92 BPM | HEIGHT: 67 IN | WEIGHT: 212 LBS | DIASTOLIC BLOOD PRESSURE: 70 MMHG | OXYGEN SATURATION: 99 %

## 2025-01-23 DIAGNOSIS — N93.9 ABNORMAL UTERINE BLEEDING (AUB): Primary | ICD-10-CM

## 2025-01-23 DIAGNOSIS — N39.3 STRESS INCONTINENCE OF URINE: Primary | ICD-10-CM

## 2025-01-23 DIAGNOSIS — F33.0 MDD (MAJOR DEPRESSIVE DISORDER), RECURRENT EPISODE, MILD (HCC): ICD-10-CM

## 2025-01-23 PROCEDURE — 99214 OFFICE O/P EST MOD 30 MIN: CPT | Performed by: OBSTETRICS & GYNECOLOGY

## 2025-01-23 RX ORDER — TRANEXAMIC ACID 650 MG/1
1300 TABLET ORAL 3 TIMES DAILY
Qty: 30 TABLET | Refills: 3 | Status: SHIPPED | OUTPATIENT
Start: 2025-01-23 | End: 2025-01-28

## 2025-01-23 RX ORDER — VENLAFAXINE HYDROCHLORIDE 37.5 MG/1
37.5 CAPSULE, EXTENDED RELEASE ORAL DAILY
Qty: 30 CAPSULE | Refills: 0 | Status: SHIPPED | OUTPATIENT
Start: 2025-01-23

## 2025-01-24 ENCOUNTER — HOSPITAL ENCOUNTER (OUTPATIENT)
Dept: INFUSION CENTER | Facility: CLINIC | Age: 40
End: 2025-01-24
Payer: COMMERCIAL

## 2025-01-24 VITALS
HEART RATE: 91 BPM | SYSTOLIC BLOOD PRESSURE: 105 MMHG | RESPIRATION RATE: 20 BRPM | DIASTOLIC BLOOD PRESSURE: 65 MMHG | TEMPERATURE: 97.5 F

## 2025-01-24 DIAGNOSIS — N93.9 ABNORMAL UTERINE BLEEDING (AUB): ICD-10-CM

## 2025-01-24 DIAGNOSIS — D50.0 IRON DEFICIENCY ANEMIA DUE TO CHRONIC BLOOD LOSS: Primary | ICD-10-CM

## 2025-01-24 PROCEDURE — 96365 THER/PROPH/DIAG IV INF INIT: CPT

## 2025-01-24 RX ORDER — SODIUM CHLORIDE 9 MG/ML
20 INJECTION, SOLUTION INTRAVENOUS ONCE
Status: COMPLETED | OUTPATIENT
Start: 2025-01-24 | End: 2025-01-24

## 2025-01-24 RX ORDER — SODIUM CHLORIDE 9 MG/ML
20 INJECTION, SOLUTION INTRAVENOUS ONCE
OUTPATIENT
Start: 2025-01-31

## 2025-01-24 RX ADMIN — SODIUM CHLORIDE 20 ML/HR: 0.9 INJECTION, SOLUTION INTRAVENOUS at 09:00

## 2025-01-24 RX ADMIN — IRON SUCROSE 100 MG: 20 INJECTION, SOLUTION INTRAVENOUS at 09:03

## 2025-01-24 NOTE — PROGRESS NOTES
Patient presents for Venofer infusion and is without complaints at this time. Patient tolerated infusion without incident. AVS printed and reviewed with patient. Aware of next appointment on 1/30 @ 130pm @  infusion center..

## 2025-01-25 ENCOUNTER — OFFICE VISIT (OUTPATIENT)
Dept: URGENT CARE | Age: 40
End: 2025-01-25
Payer: COMMERCIAL

## 2025-01-25 VITALS
TEMPERATURE: 98.1 F | SYSTOLIC BLOOD PRESSURE: 118 MMHG | OXYGEN SATURATION: 98 % | RESPIRATION RATE: 18 BRPM | HEART RATE: 72 BPM | DIASTOLIC BLOOD PRESSURE: 82 MMHG

## 2025-01-25 DIAGNOSIS — N39.0 URINARY TRACT INFECTION WITHOUT HEMATURIA, SITE UNSPECIFIED: Primary | ICD-10-CM

## 2025-01-25 LAB
SL AMB  POCT GLUCOSE, UA: NEGATIVE
SL AMB LEUKOCYTE ESTERASE,UA: ABNORMAL
SL AMB POCT BILIRUBIN,UA: NEGATIVE
SL AMB POCT BLOOD,UA: ABNORMAL
SL AMB POCT CLARITY,UA: ABNORMAL
SL AMB POCT COLOR,UA: YELLOW
SL AMB POCT KETONES,UA: NEGATIVE
SL AMB POCT NITRITE,UA: NEGATIVE
SL AMB POCT PH,UA: 6
SL AMB POCT SPECIFIC GRAVITY,UA: 1.03
SL AMB POCT URINE PROTEIN: 100
SL AMB POCT UROBILINOGEN: 0.2

## 2025-01-25 PROCEDURE — 99214 OFFICE O/P EST MOD 30 MIN: CPT | Performed by: EMERGENCY MEDICINE

## 2025-01-25 PROCEDURE — 81002 URINALYSIS NONAUTO W/O SCOPE: CPT | Performed by: EMERGENCY MEDICINE

## 2025-01-25 PROCEDURE — 87077 CULTURE AEROBIC IDENTIFY: CPT | Performed by: EMERGENCY MEDICINE

## 2025-01-25 PROCEDURE — 87086 URINE CULTURE/COLONY COUNT: CPT | Performed by: EMERGENCY MEDICINE

## 2025-01-25 PROCEDURE — 87186 SC STD MICRODIL/AGAR DIL: CPT | Performed by: EMERGENCY MEDICINE

## 2025-01-25 RX ORDER — CEPHALEXIN 500 MG/1
500 CAPSULE ORAL 2 TIMES DAILY
Qty: 14 CAPSULE | Refills: 0 | Status: SHIPPED | OUTPATIENT
Start: 2025-01-25 | End: 2025-02-01

## 2025-01-25 NOTE — PROGRESS NOTES
[facility]        NAME: Pb Suarez is a 39 y.o. female  : 1985    MRN: [id number]  DATE: 2025  TIME: 6:53 PM    Assessment and Plan   Urinary tract infection without hematuria, site unspecified [N39.0]  1. Urinary tract infection without hematuria, site unspecified  POCT urine dip    cephalexin (KEFLEX) 500 mg capsule            Patient Instructions       Follow up with PCP in 3-5 days.  Proceed to  ER if symptoms worsen.    If tests have been performed at [facility], our office will contact you with results if changes need to be made to the care plan discussed with you at the visit.  You can review your full results on [facility] MyChart.    Chief Complaint     Chief Complaint   Patient presents with    Urinary Tract Infection     Burning with urination. Urine is cloudy with an odor. Constant urination and feeling the need to urinate. Surgery Dec 19. Was on macrobid for UTI from her catheter, went away and now it is back. Sx started two days ago         History of Present Illness       39-year-old female with history of HSV-2, abnormal uterine bleeding status post recent D&C approximate 1 month ago presents with a chief complaint of 2 days of urinary frequency, hesitancy and burning on urination.  Patient states that she did have urinary tract infection last month shortly after surgery which was deemed to be a CAUTI.  Patient was treated with Macrobid with resolution in the interim.  She denies associated vaginal bleeding, discharge, abdominal or flank pain, fever, rigors or vomiting.    Urinary Tract Infection   This is a recurrent problem. The current episode started in the past 7 days. The problem occurs every urination. The problem has been unchanged. The quality of the pain is described as aching and burning. The pain is at a severity of 5/10. The pain is moderate. There has been no fever. Associated symptoms include frequency, hesitancy and urgency. Pertinent negatives include  no chills, discharge, flank pain, hematuria, nausea, possible pregnancy, sweats or vomiting. She has tried nothing for the symptoms. Her past medical history is significant for catheterization and recurrent UTIs.       Review of Systems   Review of Systems   Constitutional:  Negative for activity change, appetite change, chills, diaphoresis, fatigue, fever and unexpected weight change.   HENT:  Negative for ear pain and sore throat.    Eyes:  Negative for pain and visual disturbance.   Respiratory:  Negative for cough and shortness of breath.    Cardiovascular:  Negative for chest pain, palpitations and leg swelling.   Gastrointestinal:  Negative for abdominal distention, abdominal pain, anal bleeding, blood in stool, constipation, diarrhea, nausea, rectal pain and vomiting.   Genitourinary:  Positive for frequency, hesitancy and urgency. Negative for decreased urine volume, difficulty urinating, dyspareunia, dysuria, enuresis, flank pain, genital sores, hematuria, menstrual problem, pelvic pain, vaginal bleeding, vaginal discharge and vaginal pain.   Musculoskeletal:  Negative for arthralgias and back pain.   Skin:  Negative for color change and rash.   Neurological:  Negative for dizziness, tremors, seizures, syncope, facial asymmetry, speech difficulty, weakness, light-headedness, numbness and headaches.   All other systems reviewed and are negative.        Current Medications       Current Outpatient Medications:     budesonide-formoterol (SYMBICORT) 160-4.5 mcg/act inhaler, Inhale 2 puffs 2 (two) times a day Rinse mouth after use., Disp: 10.2 g, Rfl: 5    cephalexin (KEFLEX) 500 mg capsule, Take 1 capsule (500 mg total) by mouth 2 (two) times a day for 7 days, Disp: 14 capsule, Rfl: 0    metFORMIN (GLUCOPHAGE-XR) 500 mg 24 hr tablet, Take 1 tablet (500 mg total) by mouth daily with dinner, Disp: 30 tablet, Rfl: 3    Synthroid 150 MCG tablet, Take 1 tablet (150 mcg total) by mouth daily, Disp: 90 tablet, Rfl:  1    Synthroid 175 MCG tablet, Take 1 pill Monday Wednesday and Friday alternating with 150 other days, Disp: 60 tablet, Rfl: 1    Tranexamic Acid 650 MG TABS, Take 2 tablets (1,300 mg total) by mouth 3 (three) times a day for 5 days, Disp: 30 tablet, Rfl: 3    venlafaxine (EFFEXOR-XR) 37.5 mg 24 hr capsule, Take 1 capsule (37.5 mg total) by mouth daily, Disp: 30 capsule, Rfl: 0    acetaminophen (TYLENOL) 650 mg CR tablet, Take 1 tablet (650 mg total) by mouth every 8 (eight) hours as needed for mild pain (Patient not taking: Reported on 1/25/2025), Disp: 30 tablet, Rfl: 0    benzonatate (TESSALON) 200 MG capsule, Take 1 capsule (200 mg total) by mouth 3 (three) times a day as needed for cough (Patient not taking: Reported on 1/25/2025), Disp: 20 capsule, Rfl: 0    dextromethorphan 15 MG/5ML syrup, Take 10 mL by mouth 4 (four) times a day as needed for cough (Patient not taking: Reported on 1/25/2025), Disp: , Rfl:     ergocalciferol (VITAMIN D2) 50,000 units, , Disp: , Rfl:     escitalopram (LEXAPRO) 20 mg tablet, Take 1 tablet (20 mg total) by mouth daily (Patient not taking: Reported on 1/25/2025), Disp: 30 tablet, Rfl: 2    ibuprofen (MOTRIN) 600 mg tablet, Take 1 tablet (600 mg total) by mouth every 6 (six) hours as needed for mild pain (Patient not taking: Reported on 1/25/2025), Disp: 30 tablet, Rfl: 0    LORazepam (ATIVAN) 0.5 mg tablet, Take 1 pill 30 minutes before procedure (Patient not taking: Reported on 1/23/2025), Disp: 1 tablet, Rfl: 0    ondansetron (ZOFRAN) 4 mg tablet, Take 1 tablet (4 mg total) by mouth every 12 (twelve) hours as needed for nausea or vomiting (Patient not taking: Reported on 11/19/2024), Disp: 20 tablet, Rfl: 0    Semaglutide-Weight Management (WEGOVY) 0.25 MG/0.5ML, Inject 0.5 mL (0.25 mg total) under the skin once a week (Patient not taking: Reported on 11/19/2024), Disp: 2 mL, Rfl: 0    valACYclovir (VALTREX) 1,000 mg tablet, 2 pills q 12 hours for 1 day (Patient not taking:  Reported on 1/25/2025), Disp: 4 tablet, Rfl: 0    Current Allergies     Allergies as of 01/25/2025 - Reviewed 01/25/2025   Allergen Reaction Noted    Bactrim [sulfamethoxazole-trimethoprim] Hives 04/06/2016    Other Hives 04/06/2016    Shellfish-derived products - food allergy  09/07/2018    Sulfamethoxazole Hives 05/25/2018    Trimethoprim Hives 05/25/2018            The following portions of the patient's history were reviewed and updated as appropriate: allergies, current medications, past family history, past medical history, past social history, past surgical history and problem list.     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 Left side  Last Assessment & Plan:  Formatting of this note might be different from the original. Advised on warm compresses and antibiotics for now. No e/o abscess or sepsis. Precautions given.    Chlamydial infection 04/07/2017    Depression     Disease of thyroid gland     Fibromyalgia     GERD (gastroesophageal reflux disease) 2012    Gestational diabetes     Gestational hypertension     previous pregnancy    Hashimoto's thyroiditis     HPV (human papilloma virus) infection     Liver disease     fatty liver    Oligohydramnios in galvez pregnancy in second trimester 09/12/2017    Post-viral cough syndrome 01/20/2023    Retained placenta     Schlatter-Osgood disease 04/21/2017    Urinary tract infection     Urogenital trichomoniasis 04/07/2017    Visual impairment        Past Surgical History:   Procedure Laterality Date    CHOLECYSTECTOMY      COLONOSCOPY      DILATION AND CURETTAGE OF UTERUS      EGD      HERNIA REPAIR      WA HYSTEROSCOPY BX ENDOMETRIUM&/POLYPC W/WO D&C N/A 12/19/2024    Procedure: (D&C) W/ HYSTEROSCOPY,;  Surgeon: Stephanie Zarate MD;  Location: [facility];  Service: Gynecology    UMBILICAL  HERNIA REPAIR         Family History   Problem Relation Age of Onset    Anuerysm Mother     Thyroid disease Mother     Hepatitis Mother     Colon polyps Mother     Depression Father         Mom dad grndma aunt uncle cousins    Anxiety disorder Father     Bipolar disorder Father     Self-Injury Father     Lung cancer Maternal Grandmother     Arthritis Maternal Grandmother     Cancer Maternal Grandmother         Grandma on both sides    Colon cancer Maternal Grandfather     Diabetes Paternal Grandmother     ADD / ADHD Cousin     Breast cancer Neg Hx          Medications have been verified.        Objective   /82 (BP Location: Left arm, Patient Position: Sitting, Cuff Size: Large)   Pulse 72   Temp 98.1 °F (36.7 °C) (Tympanic)   Resp 18   LMP 01/13/2025 (Exact Date)   SpO2 98%   Patient's last menstrual period was 01/13/2025 (exact date).       Physical Exam     Physical Exam  Vitals and nursing note reviewed.   Constitutional:       General: She is not in acute distress.     Appearance: Normal appearance. She is normal weight. She is not ill-appearing, toxic-appearing or diaphoretic.   HENT:      Head: Normocephalic and atraumatic.      Right Ear: Tympanic membrane, ear canal and external ear normal. There is no impacted cerumen.      Left Ear: Tympanic membrane, ear canal and external ear normal. There is no impacted cerumen.      Nose: No congestion or rhinorrhea.      Mouth/Throat:      Mouth: Mucous membranes are moist.      Pharynx: No oropharyngeal exudate or posterior oropharyngeal erythema.   Eyes:      General: No scleral icterus.        Right eye: No discharge.         Left eye: No discharge.      Extraocular Movements: Extraocular movements intact.      Pupils: Pupils are equal, round, and reactive to light.   Cardiovascular:      Rate and Rhythm: Normal rate and regular rhythm.      Pulses: Normal pulses.           Carotid pulses are 2+ on the right side and 2+ on the left side.       Radial  pulses are 2+ on the right side and 2+ on the left side.      Heart sounds: No murmur heard.     No friction rub. No gallop.   Pulmonary:      Effort: Pulmonary effort is normal. No respiratory distress.      Breath sounds: Normal breath sounds. No stridor. No wheezing, rhonchi or rales.   Chest:      Chest wall: No tenderness.   Abdominal:      General: Abdomen is flat. There is no distension.      Palpations: Abdomen is soft. There is no mass.      Tenderness: There is no abdominal tenderness. There is no right CVA tenderness, left CVA tenderness, guarding or rebound.      Hernia: No hernia is present.   Musculoskeletal:      Cervical back: Normal range of motion and neck supple.   Skin:     General: Skin is warm and dry.   Neurological:      General: No focal deficit present.      Mental Status: She is alert and oriented to person, place, and time.   Psychiatric:         Mood and Affect: Mood normal.         Behavior: Behavior normal.

## 2025-01-27 ENCOUNTER — RESULTS FOLLOW-UP (OUTPATIENT)
Dept: URGENT CARE | Age: 40
End: 2025-01-27

## 2025-01-28 LAB — BACTERIA UR CULT: ABNORMAL

## 2025-02-28 ENCOUNTER — HOSPITAL ENCOUNTER (OUTPATIENT)
Dept: INFUSION CENTER | Facility: CLINIC | Age: 40
End: 2025-02-28

## 2025-03-14 ENCOUNTER — OFFICE VISIT (OUTPATIENT)
Age: 40
End: 2025-03-14
Payer: COMMERCIAL

## 2025-03-14 VITALS
WEIGHT: 209 LBS | HEIGHT: 68 IN | RESPIRATION RATE: 16 BRPM | HEART RATE: 68 BPM | DIASTOLIC BLOOD PRESSURE: 70 MMHG | TEMPERATURE: 96.9 F | BODY MASS INDEX: 31.67 KG/M2 | SYSTOLIC BLOOD PRESSURE: 120 MMHG | OXYGEN SATURATION: 98 %

## 2025-03-14 DIAGNOSIS — J02.9 PHARYNGITIS, UNSPECIFIED ETIOLOGY: Primary | ICD-10-CM

## 2025-03-14 DIAGNOSIS — R07.0 THROAT PAIN: ICD-10-CM

## 2025-03-14 PROCEDURE — 87070 CULTURE OTHR SPECIMN AEROBIC: CPT | Performed by: INTERNAL MEDICINE

## 2025-03-14 PROCEDURE — 99213 OFFICE O/P EST LOW 20 MIN: CPT | Performed by: INTERNAL MEDICINE

## 2025-03-14 RX ORDER — FLUCONAZOLE 150 MG/1
TABLET ORAL
COMMUNITY
Start: 2025-02-03

## 2025-03-14 RX ORDER — NITROFURANTOIN 25; 75 MG/1; MG/1
CAPSULE ORAL
COMMUNITY
Start: 2025-03-05

## 2025-03-14 NOTE — PROGRESS NOTES
"Name: Pb Suarez      : 1985      MRN: [id number]  Encounter Provider: Aliya Hensley MD  Encounter Date: 3/14/2025   Encounter department: [facility]  :  Assessment & Plan  Pharyngitis, unspecified etiology  Viral versus bacterial throat cultures will be drawn supportive treatment  Orders:    Throat culture; Future           History of Present Illness   HPI  Patient is seen for a sore throat that started 3 days ago.  She denies any fevers or chills.  Reports increased fatigue and exhaustion.  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  She is an upcoming surgical procedure.  Will check her for strep  Review of Systems    Objective   /70   Pulse 68   Temp (!) 96.9 °F (36.1 °C)   Resp 16   Ht 5' 8\" (1.727 m)   Wt 94.8 kg (209 lb)   LMP 2025 (Exact Date)   SpO2 98%   BMI 31.78 kg/m²      Physical Exam  HENT:      Mouth/Throat:      Pharynx: Posterior oropharyngeal erythema (Minimal erythema) present.   Lymphadenopathy:      Cervical: No cervical adenopathy.   Skin:     Coloration: Skin is pale.         "

## 2025-03-16 LAB — BACTERIA THROAT CULT: NORMAL

## 2025-03-19 ENCOUNTER — HOSPITAL ENCOUNTER (OUTPATIENT)
Dept: INFUSION CENTER | Facility: HOSPITAL | Age: 40
Discharge: HOME | End: 2025-03-19
Payer: COMMERCIAL

## 2025-03-19 VITALS
SYSTOLIC BLOOD PRESSURE: 119 MMHG | DIASTOLIC BLOOD PRESSURE: 73 MMHG | HEART RATE: 72 BPM | RESPIRATION RATE: 20 BRPM | TEMPERATURE: 97.4 F

## 2025-03-19 DIAGNOSIS — D50.0 IRON DEFICIENCY ANEMIA DUE TO CHRONIC BLOOD LOSS: ICD-10-CM

## 2025-03-19 DIAGNOSIS — N93.9 ABNORMAL UTERINE BLEEDING (AUB): ICD-10-CM

## 2025-03-19 DIAGNOSIS — D50.0 IRON DEFICIENCY ANEMIA DUE TO CHRONIC BLOOD LOSS: Primary | ICD-10-CM

## 2025-03-19 DIAGNOSIS — N93.9 ABNORMAL UTERINE BLEEDING (AUB): Primary | ICD-10-CM

## 2025-03-19 PROCEDURE — 96365 THER/PROPH/DIAG IV INF INIT: CPT

## 2025-03-19 RX ORDER — SODIUM CHLORIDE 9 MG/ML
20 INJECTION, SOLUTION INTRAVENOUS ONCE
Status: CANCELLED | OUTPATIENT
Start: 2025-03-26

## 2025-03-19 RX ORDER — SODIUM CHLORIDE 9 MG/ML
20 INJECTION, SOLUTION INTRAVENOUS ONCE
Status: COMPLETED | OUTPATIENT
Start: 2025-03-19 | End: 2025-03-19

## 2025-03-19 RX ADMIN — SODIUM CHLORIDE 20 ML/HR: 0.9 INJECTION, SOLUTION INTRAVENOUS at 14:02

## 2025-03-19 RX ADMIN — IRON SUCROSE 100 MG: 20 INJECTION, SOLUTION INTRAVENOUS at 14:30

## 2025-03-19 NOTE — PLAN OF CARE
Problem: Potential for Falls  Goal: Patient will remain free of falls  Description: INTERVENTIONS:  - Educate patient/family on patient safety including physical limitations  - Instruct patient to call for assistance with activity   - Consult OT/PT to assist with strengthening/mobility   - Keep Call bell within reach  - Keep bed low and locked with side rails adjusted as appropriate  - Keep care items and personal belongings within reach  - Initiate and maintain comfort rounds  - Make Fall Risk Sign visible to staff  - Apply yellow socks and bracelet for high fall risk patients  - Consider moving patient to room near nurses station  3/19/2025 1418 by Eliz Munoz, JAVIER  Outcome: Progressing  3/19/2025 1417 by Eliz Munoz, JAVIER  Outcome: Progressing

## 2025-03-19 NOTE — PROGRESS NOTES
Pb Suarez  tolerated venofer well with no complications. Aware of future appt on 3/26/25 at 1400. AVS declined. Patient left clinic ambulatory.

## 2025-03-20 DIAGNOSIS — E06.3 HYPOTHYROIDISM DUE TO HASHIMOTO'S THYROIDITIS: Primary | ICD-10-CM

## 2025-03-26 ENCOUNTER — TELEPHONE (OUTPATIENT)
Age: 40
End: 2025-03-26

## 2025-03-26 ENCOUNTER — HOSPITAL ENCOUNTER (OUTPATIENT)
Dept: INFUSION CENTER | Facility: HOSPITAL | Age: 40
Discharge: HOME | End: 2025-03-26
Payer: COMMERCIAL

## 2025-03-26 VITALS
DIASTOLIC BLOOD PRESSURE: 71 MMHG | SYSTOLIC BLOOD PRESSURE: 131 MMHG | HEART RATE: 66 BPM | RESPIRATION RATE: 18 BRPM | TEMPERATURE: 97.7 F

## 2025-03-26 DIAGNOSIS — N93.9 ABNORMAL UTERINE BLEEDING (AUB): Primary | ICD-10-CM

## 2025-03-26 DIAGNOSIS — D50.0 IRON DEFICIENCY ANEMIA DUE TO CHRONIC BLOOD LOSS: ICD-10-CM

## 2025-03-26 PROCEDURE — 96365 THER/PROPH/DIAG IV INF INIT: CPT

## 2025-03-26 RX ORDER — SODIUM CHLORIDE 9 MG/ML
20 INJECTION, SOLUTION INTRAVENOUS ONCE
Status: COMPLETED | OUTPATIENT
Start: 2025-03-26 | End: 2025-03-26

## 2025-03-26 RX ORDER — SODIUM CHLORIDE 9 MG/ML
20 INJECTION, SOLUTION INTRAVENOUS ONCE
Status: CANCELLED | OUTPATIENT
Start: 2025-04-02

## 2025-03-26 RX ADMIN — IRON SUCROSE 100 MG: 20 INJECTION, SOLUTION INTRAVENOUS at 14:22

## 2025-03-26 RX ADMIN — SODIUM CHLORIDE 20 ML/HR: 9 INJECTION, SOLUTION INTRAVENOUS at 14:22

## 2025-03-26 NOTE — TELEPHONE ENCOUNTER
Patient (EMPLOYEE) has been added to the Medication Management wait list without a referral. Pt was a prior pt of Dr Villeda. She has a Therapist thru [facility] but they do not offer MM.  Insurance: [facility]  Insurance Type:    Commercial [x]   Medicaid []   [facility] (if applicable)   Medicare []  Location Preference: [place]/Close to her office.  Provider Preference: Anyone  Virtual: Yes [] No [x]  Were outside resources sent: Yes [x] And [address] [employer].

## 2025-03-26 NOTE — PLAN OF CARE
Problem: Potential for Falls  Goal: Patient will remain free of falls  Description: INTERVENTIONS:- Educate patient/family on patient safety including physical limitations- Instruct patient to call for assistance with activity - Consult OT/PT to assist with strengthening/mobility - Keep Call bell within reach- Keep bed low and locked with side rails adjusted as appropriate- Keep care items and personal belongings within reach- Initiate and maintain comfort rounds- Make Fall Risk Sign visible to staff- Consider moving patient to room near nurses station  Outcome: Progressing

## 2025-03-26 NOTE — PROGRESS NOTES
Pb Suarez  tolerated venofer well with no complications. Aware of future appt on 4/2/25 at 1300. AVS declined. Patient left clinic ambulatory.

## 2025-04-02 ENCOUNTER — HOSPITAL ENCOUNTER (OUTPATIENT)
Dept: INFUSION CENTER | Facility: HOSPITAL | Age: 40
Discharge: HOME | End: 2025-04-02
Payer: COMMERCIAL

## 2025-04-02 VITALS — SYSTOLIC BLOOD PRESSURE: 110 MMHG | DIASTOLIC BLOOD PRESSURE: 70 MMHG | TEMPERATURE: 97.9 F | RESPIRATION RATE: 20 BRPM

## 2025-04-02 DIAGNOSIS — N93.9 ABNORMAL UTERINE BLEEDING (AUB): Primary | ICD-10-CM

## 2025-04-02 DIAGNOSIS — D50.0 IRON DEFICIENCY ANEMIA DUE TO CHRONIC BLOOD LOSS: ICD-10-CM

## 2025-04-02 PROCEDURE — 96365 THER/PROPH/DIAG IV INF INIT: CPT

## 2025-04-02 RX ORDER — SODIUM CHLORIDE 9 MG/ML
20 INJECTION, SOLUTION INTRAVENOUS ONCE
Status: CANCELLED | OUTPATIENT
Start: 2025-04-02

## 2025-04-02 RX ORDER — SODIUM CHLORIDE 9 MG/ML
20 INJECTION, SOLUTION INTRAVENOUS ONCE
Status: COMPLETED | OUTPATIENT
Start: 2025-04-02 | End: 2025-04-02

## 2025-04-02 RX ADMIN — SODIUM CHLORIDE 20 ML/HR: 0.9 INJECTION, SOLUTION INTRAVENOUS at 13:26

## 2025-04-02 RX ADMIN — IRON SUCROSE 100 MG: 20 INJECTION, SOLUTION INTRAVENOUS at 13:28

## 2025-04-02 NOTE — PLAN OF CARE
Problem: Potential for Falls  Goal: Patient will remain free of falls  Description: INTERVENTIONS:- Educate patient/family on patient safety including physical limitations- Instruct patient to call for assistance with activity - Consult OT/PT to assist with strengthening/mobility - Keep Call bell within reach- Keep bed low and locked with side rails adjusted as appropriate- Keep care items and personal belongings within reach- Initiate and maintain comfort rounds- Make Fall Risk Sign visible to staff- Offer Toileting every  Hours, in advance of need- Initiate/Maintain alarm- Obtain necessary fall risk management equipment: - Apply yellow socks and bracelet for high fall risk patients- Consider moving patient to room near nurses station  Outcome: Progressing     Problem: Knowledge Deficit  Goal: Patient/family/caregiver demonstrates understanding of disease process, treatment plan, medications, and discharge instructions  Description: Complete learning assessment and assess knowledge base.Interventions:- Provide teaching at level of understanding- Provide teaching via preferred learning methods  Outcome: Progressing

## 2025-04-02 NOTE — PROGRESS NOTES
Pt tolerated venofer infusion without difficulty.  No further infusions ordered at this time.  AVS declined.  Left ambulatory in stable condition.

## 2025-04-04 DIAGNOSIS — F33.0 MDD (MAJOR DEPRESSIVE DISORDER), RECURRENT EPISODE, MILD (HCC): ICD-10-CM

## 2025-04-04 RX ORDER — VENLAFAXINE HYDROCHLORIDE 37.5 MG/1
37.5 CAPSULE, EXTENDED RELEASE ORAL DAILY
Qty: 30 CAPSULE | Refills: 3 | Status: SHIPPED | OUTPATIENT
Start: 2025-04-04

## 2025-04-07 ENCOUNTER — APPOINTMENT (OUTPATIENT)
Age: 40
End: 2025-04-07
Payer: COMMERCIAL

## 2025-04-07 DIAGNOSIS — Z01.818 OTHER SPECIFIED PRE-OPERATIVE EXAMINATION: ICD-10-CM

## 2025-04-07 DIAGNOSIS — Z01.812 PRE-OPERATIVE LABORATORY EXAMINATION: ICD-10-CM

## 2025-04-07 DIAGNOSIS — N39.0 URINARY TRACT INFECTION WITHOUT HEMATURIA, SITE UNSPECIFIED: ICD-10-CM

## 2025-04-07 LAB
ANION GAP SERPL CALCULATED.3IONS-SCNC: 11 MMOL/L (ref 4–13)
BASOPHILS # BLD AUTO: 0.04 THOUSANDS/ÂΜL (ref 0–0.1)
BASOPHILS NFR BLD AUTO: 1 % (ref 0–1)
BUN SERPL-MCNC: 7 MG/DL (ref 5–25)
CALCIUM SERPL-MCNC: 9.4 MG/DL (ref 8.4–10.2)
CHLORIDE SERPL-SCNC: 104 MMOL/L (ref 96–108)
CO2 SERPL-SCNC: 24 MMOL/L (ref 21–32)
CREAT SERPL-MCNC: 0.71 MG/DL (ref 0.6–1.3)
EOSINOPHIL # BLD AUTO: 0.09 THOUSAND/ÂΜL (ref 0–0.61)
EOSINOPHIL NFR BLD AUTO: 2 % (ref 0–6)
ERYTHROCYTE [DISTWIDTH] IN BLOOD BY AUTOMATED COUNT: 19.5 % (ref 11.6–15.1)
GFR SERPL CREATININE-BSD FRML MDRD: 106 ML/MIN/1.73SQ M
GLUCOSE P FAST SERPL-MCNC: 114 MG/DL (ref 65–99)
HCT VFR BLD AUTO: 36.9 % (ref 34.8–46.1)
HGB BLD-MCNC: 11.1 G/DL (ref 11.5–15.4)
IMM GRANULOCYTES # BLD AUTO: 0.03 THOUSAND/UL (ref 0–0.2)
IMM GRANULOCYTES NFR BLD AUTO: 1 % (ref 0–2)
LYMPHOCYTES # BLD AUTO: 1.62 THOUSANDS/ÂΜL (ref 0.6–4.47)
LYMPHOCYTES NFR BLD AUTO: 33 % (ref 14–44)
MCH RBC QN AUTO: 23 PG (ref 26.8–34.3)
MCHC RBC AUTO-ENTMCNC: 30.1 G/DL (ref 31.4–37.4)
MCV RBC AUTO: 77 FL (ref 82–98)
MONOCYTES # BLD AUTO: 0.38 THOUSAND/ÂΜL (ref 0.17–1.22)
MONOCYTES NFR BLD AUTO: 8 % (ref 4–12)
NEUTROPHILS # BLD AUTO: 2.71 THOUSANDS/ÂΜL (ref 1.85–7.62)
NEUTS SEG NFR BLD AUTO: 55 % (ref 43–75)
NRBC BLD AUTO-RTO: 0 /100 WBCS
PLATELET # BLD AUTO: 269 THOUSANDS/UL (ref 149–390)
PMV BLD AUTO: 11.5 FL (ref 8.9–12.7)
POTASSIUM SERPL-SCNC: 3.5 MMOL/L (ref 3.5–5.3)
RBC # BLD AUTO: 4.82 MILLION/UL (ref 3.81–5.12)
SODIUM SERPL-SCNC: 139 MMOL/L (ref 135–147)
TSH SERPL DL<=0.05 MIU/L-ACNC: 4.38 UIU/ML (ref 0.45–4.5)
WBC # BLD AUTO: 4.87 THOUSAND/UL (ref 4.31–10.16)

## 2025-04-07 PROCEDURE — 80048 BASIC METABOLIC PNL TOTAL CA: CPT

## 2025-04-07 PROCEDURE — 36415 COLL VENOUS BLD VENIPUNCTURE: CPT

## 2025-04-07 PROCEDURE — 85025 COMPLETE CBC W/AUTO DIFF WBC: CPT

## 2025-04-07 PROCEDURE — 87077 CULTURE AEROBIC IDENTIFY: CPT

## 2025-04-07 PROCEDURE — 87186 SC STD MICRODIL/AGAR DIL: CPT

## 2025-04-07 PROCEDURE — 87086 URINE CULTURE/COLONY COUNT: CPT

## 2025-04-09 LAB — BACTERIA UR CULT: ABNORMAL

## 2025-04-10 ENCOUNTER — APPOINTMENT (OUTPATIENT)
Age: 40
End: 2025-04-10
Payer: COMMERCIAL

## 2025-04-10 DIAGNOSIS — Z11.1 SCREENING-PULMONARY TB: ICD-10-CM

## 2025-04-10 DIAGNOSIS — Z11.1 SCREENING-PULMONARY TB: Primary | ICD-10-CM

## 2025-04-10 PROCEDURE — 86480 TB TEST CELL IMMUN MEASURE: CPT

## 2025-04-10 PROCEDURE — 36415 COLL VENOUS BLD VENIPUNCTURE: CPT

## 2025-04-10 NOTE — PRE-PROCEDURE INSTRUCTIONS
Pre-Surgery Instructions:   Medication Instructions    Synthroid 150 MCG tablet Hold day of surgery.    Synthroid 175 MCG tablet Take day of surgery.    venlafaxine (EFFEXOR-XR) 37.5 mg 24 hr capsule Take night before surgery   Medication instructions for day of surgery reviewed. Please take all instructed medications with only a sip of water.       You will receive a call one business day prior to surgery with an arrival time and hospital directions. If your surgery is scheduled on a Monday, the hospital will be calling you on the Friday prior to your surgery. If you have not heard from anyone by 8pm, please call the hospital supervisor through the hospital  at [phone]. ([place] [phone] or [place] [phone]).    Do not eat or drink anything after midnight the night before your surgery, including candy, mints, lifesavers, or chewing gum. Do not drink alcohol 24hrs before your surgery. Try not to smoke at least 24hrs before your surgery.       Follow the pre surgery showering instructions as listed in the “My Surgical Experience Booklet” or otherwise provided by your surgeon's office. Do not use a blade to shave the surgical area 1 week before surgery. It is okay to use a clean electric clippers up to 24 hours before surgery. Do not apply any lotions, creams, including makeup, cologne, deodorant, or perfumes after showering on the day of your surgery. Do not use dry shampoo, hair spray, hair gel, or any type of hair products.     No contact lenses, eye make-up, or artificial eyelashes. Remove nail polish, including gel polish, and any artificial, gel, or acrylic nails if possible. Remove all jewelry including rings and body piercing jewelry.     Wear causal clothing that is easy to take on and off. Consider your type of surgery.    Keep any valuables, jewelry, piercings at home. Please bring any specially ordered equipment (sling, braces) if indicated.    Arrange for a responsible person to  drive you to and from the hospital on the day of your surgery. Please confirm the visitor policy for the day of your procedure when you receive your phone call with an arrival time.     Call the surgeon's office with any new illnesses, exposures, or additional questions prior to surgery.    Please reference your “My Surgical Experience Booklet” for additional information to prepare for your upcoming surgery.

## 2025-04-11 LAB
GAMMA INTERFERON BACKGROUND BLD IA-ACNC: 0.04 IU/ML
M TB IFN-G BLD-IMP: NEGATIVE
M TB IFN-G CD4+ BCKGRND COR BLD-ACNC: 0 IU/ML
M TB IFN-G CD4+ BCKGRND COR BLD-ACNC: 0.01 IU/ML
MITOGEN IGNF BCKGRD COR BLD-ACNC: 9.96 IU/ML

## 2025-04-15 ENCOUNTER — RESULTS FOLLOW-UP (OUTPATIENT)
Age: 40
End: 2025-04-15

## 2025-04-18 ENCOUNTER — ANESTHESIA EVENT (OUTPATIENT)
Dept: PERIOP | Facility: HOSPITAL | Age: 40
End: 2025-04-18
Payer: COMMERCIAL

## 2025-04-18 ENCOUNTER — OFFICE VISIT (OUTPATIENT)
Dept: FAMILY MEDICINE CLINIC | Facility: CLINIC | Age: 40
End: 2025-04-18
Payer: COMMERCIAL

## 2025-04-18 VITALS
HEART RATE: 68 BPM | HEIGHT: 68 IN | WEIGHT: 210.6 LBS | SYSTOLIC BLOOD PRESSURE: 130 MMHG | BODY MASS INDEX: 31.92 KG/M2 | OXYGEN SATURATION: 99 % | RESPIRATION RATE: 20 BRPM | TEMPERATURE: 97.2 F | DIASTOLIC BLOOD PRESSURE: 60 MMHG

## 2025-04-18 DIAGNOSIS — I34.1 MITRAL VALVE PROLAPSE: ICD-10-CM

## 2025-04-18 DIAGNOSIS — F33.0 MDD (MAJOR DEPRESSIVE DISORDER), RECURRENT EPISODE, MILD (HCC): ICD-10-CM

## 2025-04-18 DIAGNOSIS — Z82.49 FAMILY HISTORY OF ANEURYSM: ICD-10-CM

## 2025-04-18 DIAGNOSIS — F41.0 GENERALIZED ANXIETY DISORDER WITH PANIC ATTACKS: ICD-10-CM

## 2025-04-18 DIAGNOSIS — E06.3 HYPOTHYROIDISM DUE TO HASHIMOTO'S THYROIDITIS: ICD-10-CM

## 2025-04-18 DIAGNOSIS — F43.10 PTSD (POST-TRAUMATIC STRESS DISORDER): ICD-10-CM

## 2025-04-18 DIAGNOSIS — Z76.89 ENCOUNTER TO ESTABLISH CARE: Primary | ICD-10-CM

## 2025-04-18 DIAGNOSIS — N39.3 STRESS INCONTINENCE OF URINE: ICD-10-CM

## 2025-04-18 DIAGNOSIS — F41.1 GENERALIZED ANXIETY DISORDER WITH PANIC ATTACKS: ICD-10-CM

## 2025-04-18 DIAGNOSIS — D50.0 IRON DEFICIENCY ANEMIA DUE TO CHRONIC BLOOD LOSS: ICD-10-CM

## 2025-04-18 DIAGNOSIS — E53.8 B12 DEFICIENCY: ICD-10-CM

## 2025-04-18 DIAGNOSIS — E55.9 VITAMIN D DEFICIENCY: ICD-10-CM

## 2025-04-18 PROBLEM — E03.9 PRIMARY HYPOTHYROIDISM: Status: ACTIVE | Noted: 2025-04-18

## 2025-04-18 PROBLEM — H81.13 BENIGN PAROXYSMAL POSITIONAL VERTIGO DUE TO BILATERAL VESTIBULAR DISORDER: Status: RESOLVED | Noted: 2020-07-27 | Resolved: 2025-04-18

## 2025-04-18 PROCEDURE — 99204 OFFICE O/P NEW MOD 45 MIN: CPT

## 2025-04-18 RX ORDER — LEVOTHYROXINE SODIUM 150 MCG
150 TABLET ORAL DAILY
Qty: 90 TABLET | Refills: 1 | Status: SHIPPED | OUTPATIENT
Start: 2025-04-18

## 2025-04-18 RX ORDER — LEVOTHYROXINE SODIUM 175 MCG
TABLET ORAL
Qty: 60 TABLET | Refills: 1 | Status: SHIPPED | OUTPATIENT
Start: 2025-04-18

## 2025-04-18 RX ORDER — ESCITALOPRAM OXALATE 10 MG/1
10 TABLET ORAL DAILY
COMMUNITY
End: 2025-04-18 | Stop reason: SDUPTHER

## 2025-04-18 RX ORDER — ESCITALOPRAM OXALATE 10 MG/1
10 TABLET ORAL DAILY
Qty: 90 TABLET | Refills: 0 | Status: SHIPPED | OUTPATIENT
Start: 2025-04-18

## 2025-04-18 NOTE — ASSESSMENT & PLAN NOTE
History of vitamin D deficiency.  I have ordered repeat vitamin D level.  Orders:    Vitamin D 25 hydroxy; Future

## 2025-04-18 NOTE — DISCHARGE INSTR - AVS FIRST PAGE
Post-Urogynecologic Surgery Discharge Instructions:  1. Nothing in the vagina for six weeks  2. You may take stairs one at a time, touching each step with both feet for the first few days, then as tolerated.  3. Call the office for fever greater than 100.4'F, heavy vaginal bleeding, or increasing pain.  4. Activity as tolerated.  5. Please take the following for postoperative bowel regimen: colace 100 mg twice daily, miralax 17g daily, milk of magnesia as needed  6. Do not use topical estrogen until six weeks postoperatively    Post Operative Pain Management:  If you have cramping or mild pain you may take 600 mg Ibuprofen every 6 hours to relieve.     If you continue to have residual mild pain not entirely relieved by Ibuprofen then you may take 650 mg of tylenol every 6 hours.       If you have any questions regarding your prescriptions please call your doctor.    Urethral Sling Procedure   WHAT YOU NEED TO KNOW:   A bladder sling procedure is surgery to treat urinary incontinence in women. The sling acts as a hammock to keep your urethra in place and hold it closed when your bladder is full. You may have vaginal bleeding or discharge for up to a week after your surgery. Use sanitary pads. Do not use tampons. You may have some pelvic discomfort or trouble urinating.   DISCHARGE INSTRUCTIONS:   Call 911 for any of the following:   You have sudden trouble breathing.    Seek care immediately if:   Your bleeding gets worse.    You have yellow or foul smelling discharge from your vagina.    You cannot urinate, or you are urinating less than what is normal for you.    You feel confused.  Contact your healthcare provider if:   You have a fever.    You do not feel like you are able to empty your bladder completely when you urinate.    You feel the need to urinate very suddenly.    You have burning or stinging when you urinate.    You have blood in your urine.    Your skin is itchy, swollen, or you have a rash.    You have  questions or concerns about your condition or care.  Medicines:   Prescription pain medicine  may be given. Ask your how to take this medicine safely.    Take your medicine as directed.  Contact your healthcare provider if you think your medicine is not helping or if you have side effects. Tell him or her if you are allergic to any medicine. Keep a list of the medicines, vitamins, and herbs you take. Include the amounts, and when and why you take them. Bring the list or the pill bottles to follow-up visits. Carry your medicine list with you in case of an emergency.  Self-catheterization:  You may need to put a catheter into your bladder after you urinate to empty any remaining urine. A catheter is a small rubber tube used to drain urine. Healthcare providers will teach you how to put the catheter in safely. This may be needed until you are completely emptying your bladder.  Preston catheter:  You may have a Preston catheter for a short period of time. The Preston is a tube put into your bladder to drain urine into a bag. Keep the bag below your waist. This will prevent urine from flowing back into your bladder and causing an infection or other problems. Also, keep the tube free of kinks so the urine will drain properly. Do not pull on the catheter. This can cause pain and bleeding, and may cause the catheter to come out.   Activity:  Do not lift heavy objects for 6 weeks after your procedure. Do not have intercourse for 4 to 6 weeks. Do not use a tampon for 4 weeks. Ask your healthcare provider when you can return to work or your usual activities.  Do pelvic muscle exercises:  These are also called Kegel exercises. These exercises help strengthen your pelvic muscles and help prevent urine leakage. Tighten the muscles of your pelvis and hold them tight for 5 seconds, then relax for 5 seconds. Gradually work up to tightening them for 10 seconds and relaxing for 10 seconds. Do this 3 times each day.  Keep a record:  Keep a  record of when you urinate and if you leak any urine. Write down what you were doing when you leaked urine, such as coughing or sneezing. Bring the log to your follow-up visits.  Prevent constipation:  Drink liquids as directed. You may need to drink more water than usual to soften your bowel movements. Eat a variety of healthy foods, especially fruit and foods high in fiber. You may need to use an over-the-counter bowel movement softener.  Follow up with your healthcare provider as directed:  You may need a test to check how much urine remains in your bladder after you urinate. This will help show how the sling is working. Write down your questions so you remember to ask them during your visits.  © 2017 [employer] Information is for End User's use only and may not be sold, redistributed or otherwise used for commercial purposes. All illustrations and images included in CareNotes® are the copyrighted property of "[employer]. or [employer].  The above information is an  only. It is not intended as medical advice for individual conditions or treatments. Talk to your doctor, nurse or pharmacist before following any medical regimen to see if it is safe and effective for you.    Endometrial ablation    The Basics  Written by the doctors and editors at [facility]  What is endometrial ablation?  This is a procedure that makes your period much lighter or stops it completely. It works by causing scarring in the inner lining of the uterus (figure 1).  Why might I get endometrial ablation?  Your doctor might recommend it if you have heavy periods that do not get better with other treatments.  Signs that you have a heavy period include:  ? Bleeding that interferes with your daily activities (such as missing school or work, or bleeding through clothes or sheets)  ? Having to change a pad or tampon every 1 or 2 hours  ? Passing large lumps or clots of blood  Endometrial ablation might  "be an option if:  ? Medicines have not helped make your periods lighter, or you cannot take medicines.  ? You do not plan to get pregnant in the future and are willing to use birth control if you have sex.  ? You have not gone through menopause (when you stop having monthly periods).  What happens before endometrial ablation?  The doctor or nurse will tell you if you need to do anything special to prepare. In some cases, the doctor will give you medicine to help thin the lining of your uterus.  Before the procedure, your doctor will do an exam and perform other tests, such as:  ? Lab tests - These can check if you are pregnant. They can also check for low iron levels, a bleeding disorder, or other problems.  ? Ultrasound or other imaging tests - These take pictures of the uterus. The pictures can show if you have fibroids or other growths.  ? Endometrial biopsy - The doctor takes a sample of tissue from inside the uterus. Another doctor looks at the sample under a microscope to look for problems.  Your doctor will also ask you about your \"health history.\" This involves asking you questions about any health problems you have or had in the past, past surgeries, and any medicines you take. Tell them about:  ? Any medicines you are taking - This includes any prescription or \"over-the-counter\" medicines you use, plus any herbal supplements you take. It helps to write down and bring a list of any medicines you take, or bring a bag with all your medicines with you.  ? Any allergies you have  ? Any bleeding problems you have - Certain medicines, including some herbs and supplements, can increase the risk of bleeding. Some health conditions also increase this risk.  ? When your last period was  You will also get information about:  ? Eating and drinking before your procedure - You might need to \"fast\" before the procedure. This means not eating or drinking anything for a period of time. Or you might be allowed to eat or have " "liquids until a short time before the procedure. Whether you need to fast, and for how long, depends on the procedure.  ? What help you will need when you go home - For example, you might need to have someone else bring you home or stay with you for some time while you recover.  Ask the doctor or nurse if you have questions or if there is anything you do not understand.  What happens during endometrial ablation?  When it is time for the procedure:  ? You might get an \"IV,\" which is a thin tube that goes into a vein. This can be used to give you fluids and medicines.  ? You might be awake for the procedure, or you might get \"sedative\" medicines. These make you relax and feel sleepy.  ? You might get anesthesia medicines to help with pain during or after the procedure. In some cases, the doctor might suggest you take ibuprofen (sample brand names Motrin, Aleve) to help with pain.  ? The doctors and nurses will monitor your breathing, blood pressure, and heart rate during the procedure.  ? The doctor might use a \"hysteroscope\" to look at the inside of your uterus. They pass this through your cervix. (The cervix is the bottom part of the uterus, where it meets the vagina.)  ? They will pass an ablation device through your vagina and cervix into your uterus (figure 1). The device uses heat, cold, or a special kind of electrical energy to scar the lining of your uterus. All these methods work well.  In rare cases, endometrial ablation can cause heavy bleeding or a hole in the uterus.  What happens after endometrial ablation?  The staff will watch you closely.  As you recover:  ? Take all your medicines as instructed.  ? You might have light vaginal bleeding or pink vaginal discharge for 2 to 3 days.  ? You might have mild cramping for a few days. You can take a pain-relieving medicine such as acetaminophen (sample brand name: Tylenol) or ibuprofen (sample brand names: Advil, Motrin). You can also put a heating pad or hot " water bottle on your lower belly.  ? Your doctor or nurse will tell you when you can go back to your usual activities. They will also tell you when it is safe to have sex and put things, such as tampons, in your vagina.  ? Your doctor might recommend you do not drive or go to work for the rest of the day.  Most people have lighter periods after endometrial ablation, but you might have some irregular bleeding at first. Some people stop having periods completely.  In some cases, periods become heavy again over time, sometimes years after ablation. If this happens, your doctor might recommend treatment with another endometrial ablation or hysterectomy (surgery to remove the uterus).  What if I want to get pregnant?  Endometrial ablation is only recommended if you know you do not want to get pregnant in the future. That's because scarring in the uterus can cause problems with pregnancy.  However, it is still possible to get pregnant after endometrial ablation. So it's very important to use birth control if you have sex. Your doctor or nurse can talk to you about your options.  When should I call the doctor?  Call for advice if you have:  ? A fever of 100.4°F (38°C) or higher, or chills  ? Shortness of breath, or chest pain  ? Heavy vaginal bleeding for longer than an hour. (This means bleeding that is heavier than a period, or that completely soaks a large sanitary pad.)  ? Bad-smelling, green, or dark yellow vaginal discharge  ? Very bad belly pain  ? Trouble urinating  All topics are updated as new evidence becomes available and our peer review process is complete.  This topic retrieved from [employer] on: Mar 03, 2025.  Topic 30991 Version 11.0  Release: 33.1.2 - C33.61  © 2025 UpToDate, Inc. and/or its affiliates. All rights reserved.  figure 1: Female reproductive anatomy    Consumer Information Use and Disclaimer  Disclaimer: This generalized information is a limited summary of diagnosis, treatment, and/or  medication information. It is not meant to be comprehensive and should be used as a tool to help the user understand and/or assess potential diagnostic and treatment options. It does NOT include all information about conditions, treatments, medications, side effects, or risks that may apply to a specific patient. It is not intended to be medical advice or a substitute for the medical advice, diagnosis, or treatment of a health care provider based on the health care provider's examination and assessment of a patient's specific and unique circumstances. Patients must speak with a health care provider for complete information about their health, medical questions, and treatment options, including any risks or benefits regarding use of medications. This information does not endorse any treatments or medications as safe, effective, or approved for treating a specific patient. UpToDate, Inc. and its affiliates disclaim any warranty or liability relating to this information or the use thereof.The use of this information is governed by the Terms of Use, available at https://www.[employer].com/en/know/clinical-effectiveness-terms. 2025© UpToDate, Inc. and its affiliates and/or licensors. All rights reserved.  © 2025 UpToDate, Inc. and/or its affiliates. All rights reserved.

## 2025-04-18 NOTE — ASSESSMENT & PLAN NOTE
Lab Results   Component Value Date    BCQ1ZULTNUPM 4.376 04/07/2025    TSH 2.31 10/07/2023      Most recent TSH at normal level.  Continue with Synthroid 175 mcg Monday/Wednesday/Friday and Synthroid 150 mcg all remaining days.  Continue with periodic TSH every 6 months to a year.    Orders:    Synthroid 175 MCG tablet; Take 1 pill Monday Wednesday and Friday alternating with 150 other days    Synthroid 150 MCG tablet; Take 1 tablet (150 mcg total) by mouth daily

## 2025-04-18 NOTE — ASSESSMENT & PLAN NOTE
Lab Results   Component Value Date    [id number] 499 01/09/2023      Due for repeat B12 level.  Not currently using any supplementation.  Repeat level ordered.  Orders:    Vitamin B12; Future

## 2025-04-18 NOTE — PROGRESS NOTES
Name: Pb Suarez      : 1985      MRN: [id number]  Encounter Provider: Rex Will MD  Encounter Date: 2025   Encounter department: [facility]  :  Assessment & Plan  Encounter to establish care  EMR reviewed with patient and updated.  Return 3 months after surgery for annual physical    Last Pap smear with HPV cotesting completed on 2021: Negative  Last mammogram completed 2024: Negative  Last colonoscopy completed on 2021: Normal, repeat in 10 years       Family history of aneurysm  Patient has MRA head without contrast order placed.  She is planning on getting study done after upcoming surgery.  Will continue to monitor for now.       Mitral valve prolapse  Physical exam relatively benign.  Echo placed to properly evaluate anatomy.  Orders:    Echo complete w/ contrast if indicated; Future    Vitamin D deficiency  History of vitamin D deficiency.  I have ordered repeat vitamin D level.  Orders:    Vitamin D 25 hydroxy; Future    Generalized anxiety disorder with panic attacks  Refill for Lexapro 10 mg daily given.  Patient currently on psych waitlist but does continue to go to outside therapy.  No SI/HI and no history of self-harm.  ED precautions reviewed: SI/HI.  Orders:    escitalopram (LEXAPRO) 10 mg tablet; Take 1 tablet (10 mg total) by mouth daily    MDD (major depressive disorder), recurrent episode, mild (HCC)  Refill for Lexapro 10 mg daily given.  Patient currently on psych waitlist but does continue to go to outside therapy.  No SI/HI and no history of self-harm.  ED precautions reviewed: SI/HI.  Orders:    escitalopram (LEXAPRO) 10 mg tablet; Take 1 tablet (10 mg total) by mouth daily    PTSD (post-traumatic stress disorder)  Refill for Lexapro 10 mg daily given.  Patient currently on psych waitlist but does continue to go to outside therapy.  No SI/HI and no history of self-harm.  ED precautions  reviewed: SI/HI.  Orders:    escitalopram (LEXAPRO) 10 mg tablet; Take 1 tablet (10 mg total) by mouth daily    Hypothyroidism due to Hashimoto's thyroiditis  Lab Results   Component Value Date    QTB8FRYCJULC 4.376 04/07/2025    TSH 2.31 10/07/2023      Most recent TSH at normal level.  Continue with Synthroid 175 mcg Monday/Wednesday/Friday and Synthroid 150 mcg all remaining days.  Continue with periodic TSH every 6 months to a year.    Orders:    Synthroid 175 MCG tablet; Take 1 pill Monday Wednesday and Friday alternating with 150 other days    Synthroid 150 MCG tablet; Take 1 tablet (150 mcg total) by mouth daily    B12 deficiency  Lab Results   Component Value Date    EKLBPJGR73 499 01/09/2023      Due for repeat B12 level.  Not currently using any supplementation.  Repeat level ordered.  Orders:    Vitamin B12; Future    Iron deficiency anemia due to chronic blood loss  Lab Results   Component Value Date    HGB 11.1 (L) 04/07/2025     Lab Results   Component Value Date    IRON 21 (L) 01/20/2025    TIBC 588 (H) 01/20/2025    FERRITIN 3 (L) 01/20/2025      Scheduled for uterine ablation endometrial NovaSure, hysteroscopy, pubovaginal sling, cystoscopy on April 21, 2025.  Consider getting repeat CBC and iron studies 3 months post surgical intervention.         Stress incontinence of urine  Scheduled for uterine ablation endometrial NovaSure, hysteroscopy, pubovaginal sling, cystoscopy on April 21, 2025.                 History of Present Illness   Pb Suarez is a 39 y.o. female presenting to clinic to establish care.    PMH: Per EMR  Allergy: Per EMR  Surgeryhx: Per EMR  Familyhx: materna aunt with lupus/brain aneurysm, mother Hepc/COPD (emp)/kidney/skin cancer/brain aneurysm, maternal grandmother lung cancer, maternal grandfather colon cancer (unknown age of dx), bipolar/BPD  Social: currently working as MA in [employer], lives with bf and 5 kids, no smoking (quit 20+ years ago), no etoh, no other illicit  "drug use. Currently sexually active with single male partner and hx Chlamydia as a teen and positive HSV      Review of Systems   Constitutional:  Negative for chills and fever.   Respiratory:  Negative for shortness of breath.    Cardiovascular:  Negative for chest pain.   Gastrointestinal:  Negative for abdominal pain, blood in stool, diarrhea, nausea and vomiting.   Genitourinary:  Negative for dysuria and hematuria.       Objective   /60 (BP Location: Left arm, Patient Position: Sitting, Cuff Size: Standard)   Pulse 68   Temp (!) 97.2 °F (36.2 °C) (Temporal)   Resp 20   Ht 5' 8\" (1.727 m)   Wt 95.5 kg (210 lb 9.6 oz)   SpO2 99%   BMI 32.02 kg/m²      Physical Exam  Vitals and nursing note reviewed.   Constitutional:       General: She is not in acute distress.     Appearance: Normal appearance. She is not ill-appearing, toxic-appearing or diaphoretic.   Eyes:      Conjunctiva/sclera: Conjunctivae normal.   Cardiovascular:      Rate and Rhythm: Normal rate and regular rhythm.      Pulses: Normal pulses.      Heart sounds: Normal heart sounds.   Pulmonary:      Effort: Pulmonary effort is normal.      Breath sounds: Normal breath sounds.   Skin:     General: Skin is warm and dry.   Neurological:      General: No focal deficit present.      Mental Status: She is alert.   Psychiatric:         Mood and Affect: Mood normal.         Behavior: Behavior normal.       "

## 2025-04-18 NOTE — ASSESSMENT & PLAN NOTE
Patient has MRA head without contrast order placed.  She is planning on getting study done after upcoming surgery.  Will continue to monitor for now.

## 2025-04-18 NOTE — ASSESSMENT & PLAN NOTE
Physical exam relatively benign.  Echo placed to properly evaluate anatomy.  Orders:    Echo complete w/ contrast if indicated; Future

## 2025-04-18 NOTE — ASSESSMENT & PLAN NOTE
Refill for Lexapro 10 mg daily given.  Patient currently on psych waitlist but does continue to go to outside therapy.  No SI/HI and no history of self-harm.  ED precautions reviewed: SI/HI.  Orders:    escitalopram (LEXAPRO) 10 mg tablet; Take 1 tablet (10 mg total) by mouth daily

## 2025-04-18 NOTE — ASSESSMENT & PLAN NOTE
Scheduled for uterine ablation endometrial NovaSure, hysteroscopy, pubovaginal sling, cystoscopy on April 21, 2025.

## 2025-04-18 NOTE — ASSESSMENT & PLAN NOTE
Lab Results   Component Value Date    HGB 11.1 (L) 04/07/2025     Lab Results   Component Value Date    IRON 21 (L) 01/20/2025    TIBC 588 (H) 01/20/2025    FERRITIN 3 (L) 01/20/2025      Scheduled for uterine ablation endometrial NovaSure, hysteroscopy, pubovaginal sling, cystoscopy on April 21, 2025.  Consider getting repeat CBC and iron studies 3 months post surgical intervention.

## 2025-04-21 ENCOUNTER — ANESTHESIA (OUTPATIENT)
Dept: PERIOP | Facility: HOSPITAL | Age: 40
End: 2025-04-21
Payer: COMMERCIAL

## 2025-04-21 ENCOUNTER — HOSPITAL ENCOUNTER (OUTPATIENT)
Facility: HOSPITAL | Age: 40
Setting detail: OUTPATIENT SURGERY
Discharge: HOME | End: 2025-04-21
Attending: OBSTETRICS & GYNECOLOGY | Admitting: OBSTETRICS & GYNECOLOGY
Payer: COMMERCIAL

## 2025-04-21 VITALS
OXYGEN SATURATION: 99 % | HEART RATE: 63 BPM | DIASTOLIC BLOOD PRESSURE: 76 MMHG | BODY MASS INDEX: 31.61 KG/M2 | RESPIRATION RATE: 16 BRPM | TEMPERATURE: 97.1 F | WEIGHT: 208.56 LBS | HEIGHT: 68 IN | SYSTOLIC BLOOD PRESSURE: 129 MMHG

## 2025-04-21 DIAGNOSIS — N36.41 HYPERMOBILITY OF URETHRA: ICD-10-CM

## 2025-04-21 DIAGNOSIS — N39.3 STRESS INCONTINENCE (FEMALE) (MALE): ICD-10-CM

## 2025-04-21 LAB
EXT PREGNANCY TEST URINE: NEGATIVE
EXT. CONTROL: NORMAL

## 2025-04-21 PROCEDURE — 51798 US URINE CAPACITY MEASURE: CPT | Performed by: OBSTETRICS & GYNECOLOGY

## 2025-04-21 PROCEDURE — 81025 URINE PREGNANCY TEST: CPT | Performed by: ANESTHESIOLOGY

## 2025-04-21 PROCEDURE — C1771 REP DEV, URINARY, W/SLING: HCPCS | Performed by: OBSTETRICS & GYNECOLOGY

## 2025-04-21 PROCEDURE — 88305 TISSUE EXAM BY PATHOLOGIST: CPT | Performed by: PATHOLOGY

## 2025-04-21 DEVICE — SINGLE INCISION SLING SYSTEM
Type: IMPLANTABLE DEVICE | Site: BLADDER | Status: FUNCTIONAL
Brand: ALTIS

## 2025-04-21 RX ORDER — ONDANSETRON 2 MG/ML
4 INJECTION INTRAMUSCULAR; INTRAVENOUS EVERY 6 HOURS PRN
Status: DISCONTINUED | OUTPATIENT
Start: 2025-04-21 | End: 2025-04-21 | Stop reason: HOSPADM

## 2025-04-21 RX ORDER — PROPOFOL 10 MG/ML
INJECTION, EMULSION INTRAVENOUS CONTINUOUS PRN
Status: DISCONTINUED | OUTPATIENT
Start: 2025-04-21 | End: 2025-04-21

## 2025-04-21 RX ORDER — FENTANYL CITRATE/PF 50 MCG/ML
50 SYRINGE (ML) INJECTION
Status: DISCONTINUED | OUTPATIENT
Start: 2025-04-21 | End: 2025-04-21 | Stop reason: HOSPADM

## 2025-04-21 RX ORDER — SODIUM CHLORIDE, SODIUM LACTATE, POTASSIUM CHLORIDE, CALCIUM CHLORIDE 600; 310; 30; 20 MG/100ML; MG/100ML; MG/100ML; MG/100ML
125 INJECTION, SOLUTION INTRAVENOUS CONTINUOUS
Status: DISCONTINUED | OUTPATIENT
Start: 2025-04-21 | End: 2025-04-21 | Stop reason: HOSPADM

## 2025-04-21 RX ORDER — MEPERIDINE HYDROCHLORIDE 25 MG/ML
12.5 INJECTION INTRAMUSCULAR; INTRAVENOUS; SUBCUTANEOUS ONCE AS NEEDED
Status: DISCONTINUED | OUTPATIENT
Start: 2025-04-21 | End: 2025-04-21 | Stop reason: HOSPADM

## 2025-04-21 RX ORDER — HYDROMORPHONE HCL/PF 1 MG/ML
0.5 SYRINGE (ML) INJECTION
Status: DISCONTINUED | OUTPATIENT
Start: 2025-04-21 | End: 2025-04-21 | Stop reason: HOSPADM

## 2025-04-21 RX ORDER — MIDAZOLAM HYDROCHLORIDE 2 MG/2ML
INJECTION, SOLUTION INTRAMUSCULAR; INTRAVENOUS AS NEEDED
Status: DISCONTINUED | OUTPATIENT
Start: 2025-04-21 | End: 2025-04-21

## 2025-04-21 RX ORDER — LIDOCAINE HYDROCHLORIDE 20 MG/ML
INJECTION, SOLUTION EPIDURAL; INFILTRATION; INTRACAUDAL; PERINEURAL AS NEEDED
Status: DISCONTINUED | OUTPATIENT
Start: 2025-04-21 | End: 2025-04-21

## 2025-04-21 RX ORDER — CEFAZOLIN SODIUM 2 G/50ML
2000 SOLUTION INTRAVENOUS ONCE
Status: COMPLETED | OUTPATIENT
Start: 2025-04-21 | End: 2025-04-21

## 2025-04-21 RX ORDER — KETAMINE HCL IN NACL, ISO-OSM 100MG/10ML
SYRINGE (ML) INJECTION AS NEEDED
Status: DISCONTINUED | OUTPATIENT
Start: 2025-04-21 | End: 2025-04-21

## 2025-04-21 RX ORDER — PROMETHAZINE HYDROCHLORIDE 25 MG/ML
6.25 INJECTION, SOLUTION INTRAMUSCULAR; INTRAVENOUS ONCE AS NEEDED
Status: DISCONTINUED | OUTPATIENT
Start: 2025-04-21 | End: 2025-04-21 | Stop reason: HOSPADM

## 2025-04-21 RX ORDER — ACETAMINOPHEN 325 MG/1
975 TABLET ORAL EVERY 6 HOURS PRN
Status: DISCONTINUED | OUTPATIENT
Start: 2025-04-21 | End: 2025-04-21 | Stop reason: HOSPADM

## 2025-04-21 RX ORDER — FENTANYL CITRATE 50 UG/ML
INJECTION, SOLUTION INTRAMUSCULAR; INTRAVENOUS AS NEEDED
Status: DISCONTINUED | OUTPATIENT
Start: 2025-04-21 | End: 2025-04-21

## 2025-04-21 RX ORDER — IBUPROFEN 600 MG/1
600 TABLET, FILM COATED ORAL EVERY 6 HOURS PRN
Status: DISCONTINUED | OUTPATIENT
Start: 2025-04-21 | End: 2025-04-21 | Stop reason: HOSPADM

## 2025-04-21 RX ORDER — ONDANSETRON 2 MG/ML
INJECTION INTRAMUSCULAR; INTRAVENOUS AS NEEDED
Status: DISCONTINUED | OUTPATIENT
Start: 2025-04-21 | End: 2025-04-21

## 2025-04-21 RX ORDER — MAGNESIUM HYDROXIDE 1200 MG/15ML
LIQUID ORAL AS NEEDED
Status: DISCONTINUED | OUTPATIENT
Start: 2025-04-21 | End: 2025-04-21 | Stop reason: HOSPADM

## 2025-04-21 RX ORDER — DEXAMETHASONE SODIUM PHOSPHATE 10 MG/ML
INJECTION, SOLUTION INTRAMUSCULAR; INTRAVENOUS AS NEEDED
Status: DISCONTINUED | OUTPATIENT
Start: 2025-04-21 | End: 2025-04-21

## 2025-04-21 RX ORDER — ACETAMINOPHEN 325 MG/1
975 TABLET ORAL ONCE
Status: COMPLETED | OUTPATIENT
Start: 2025-04-21 | End: 2025-04-21

## 2025-04-21 RX ORDER — ONDANSETRON 2 MG/ML
4 INJECTION INTRAMUSCULAR; INTRAVENOUS ONCE AS NEEDED
Status: DISCONTINUED | OUTPATIENT
Start: 2025-04-21 | End: 2025-04-21 | Stop reason: HOSPADM

## 2025-04-21 RX ADMIN — MIDAZOLAM 2 MG: 1 INJECTION INTRAMUSCULAR; INTRAVENOUS at 09:01

## 2025-04-21 RX ADMIN — IBUPROFEN 600 MG: 600 TABLET, FILM COATED ORAL at 11:12

## 2025-04-21 RX ADMIN — SODIUM CHLORIDE, SODIUM LACTATE, POTASSIUM CHLORIDE, AND CALCIUM CHLORIDE 125 ML/HR: .6; .31; .03; .02 INJECTION, SOLUTION INTRAVENOUS at 08:13

## 2025-04-21 RX ADMIN — PROPOFOL 40 MG: 10 INJECTION, EMULSION INTRAVENOUS at 09:09

## 2025-04-21 RX ADMIN — LIDOCAINE HYDROCHLORIDE 60 MG: 20 INJECTION, SOLUTION EPIDURAL; INFILTRATION; INTRACAUDAL at 09:09

## 2025-04-21 RX ADMIN — DEXAMETHASONE SODIUM PHOSPHATE 10 MG: 10 INJECTION, SOLUTION INTRAMUSCULAR; INTRAVENOUS at 09:18

## 2025-04-21 RX ADMIN — Medication 10 MG: at 09:15

## 2025-04-21 RX ADMIN — ACETAMINOPHEN 975 MG: 325 TABLET, FILM COATED ORAL at 07:57

## 2025-04-21 RX ADMIN — CEFAZOLIN SODIUM 2000 MG: 2 SOLUTION INTRAVENOUS at 09:05

## 2025-04-21 RX ADMIN — FENTANYL CITRATE 50 MCG: 50 INJECTION INTRAMUSCULAR; INTRAVENOUS at 09:08

## 2025-04-21 RX ADMIN — ONDANSETRON 4 MG: 2 INJECTION INTRAMUSCULAR; INTRAVENOUS at 09:23

## 2025-04-21 RX ADMIN — PROPOFOL 120 MCG/KG/MIN: 10 INJECTION, EMULSION INTRAVENOUS at 09:10

## 2025-04-21 RX ADMIN — FENTANYL CITRATE 25 MCG: 50 INJECTION INTRAMUSCULAR; INTRAVENOUS at 09:21

## 2025-04-21 RX ADMIN — Medication 10 MG: at 09:13

## 2025-04-21 RX ADMIN — FENTANYL CITRATE 25 MCG: 50 INJECTION INTRAMUSCULAR; INTRAVENOUS at 09:49

## 2025-04-21 RX ADMIN — DEXMEDETOMIDINE 4 MCG: 100 INJECTION, SOLUTION INTRAVENOUS at 09:02

## 2025-04-21 NOTE — ANESTHESIA PREPROCEDURE EVALUATION
Procedure:  UTERINE ABLATION ENDOMETRIAL NOVASURE; HYSTEROSCOPY; EUA (Uterus)  PUBOVAGINAL SLING (Vagina )  CYSTOSCOPY (Bladder)    Relevant Problems   CARDIO   (+) External hemorrhoid   (+) Mitral valve prolapse      ENDO   (+) Hypothyroidism due to Hashimoto's thyroiditis      GI/HEPATIC   (+) Metabolic dysfunction-associated steatotic liver disease (MASLD)      HEMATOLOGY   (+) Iron deficiency anemia due to chronic blood loss      MUSCULOSKELETAL   (+) Fibromyalgia      NEURO/PSYCH   (+) Fibromyalgia   (+) Generalized anxiety disorder with panic attacks   (+) MDD (major depressive disorder), recurrent episode, mild (HCC)   (+) PTSD (post-traumatic stress disorder)   (+) Paresthesia      Obstetrics/Gynecology   (+) Abnormal uterine bleeding (AUB)      Other   (+) At risk for obstructive sleep apnea   (+) Class 2 obesity without serious comorbidity with body mass index (BMI) of 35.0 to 35.9 in adult   (+) Prediabetes   4/2025 Echo ordered, not done     Physical Exam    Airway    Mallampati score: II  TM Distance: >3 FB       Dental    upper dentures    Cardiovascular  Rhythm: regular, Rate: normal    Pulmonary   Breath sounds clear to auscultation    Other Findings  post-pubertal.      Anesthesia Plan  ASA Score- 2     Anesthesia Type- IV sedation with anesthesia with ASA Monitors.         Additional Monitors:     Airway Plan:     Comment: Claustrophobia, took stairs to OK last time, will try elevator today  Desires no opioids, parents with hx opioid abuse, PO tylenol orderted, discussed toradol, will have PRN opioids if needed and OK wit that.       Plan Factors-Exercise tolerance (METS): >4 METS.    Chart reviewed.   Existing labs reviewed.     Patient is not a current smoker.      Obstructive sleep apnea risk education given perioperatively.        Induction- intravenous.    Postoperative Plan-         Informed Consent- Anesthetic plan and risks discussed with patient.        NPO Status:  No vitals data found  for the desired time range.

## 2025-04-21 NOTE — ANESTHESIA POSTPROCEDURE EVALUATION
Post-Op Assessment Note    CV Status:  Stable    Pain management: adequate       Mental Status:  Alert and awake   Hydration Status:  Euvolemic   PONV Controlled:  Controlled   Airway Patency:  Patent     Post Op Vitals Reviewed: Yes    No anethesia notable event occurred.    Staff: Anesthesiologist, CRNA           Last Filed PACU Vitals:  Vitals Value Taken Time   Temp     Pulse     /59 04/21/25 1001   Resp     SpO2     Vitals shown include unfiled device data.

## 2025-04-21 NOTE — OP NOTE
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  External genitalia grossly normal in appearance.  No ulcerations, no lacerations, no lesions.    2.  Vagina and cervix were  grossly normal in appearance without any lacerations or lesions.   3. Uterus sounded to 9 cm.  4. Hysteroscopic examination revealed scant proliferative endometrial lining. No polyps or fibroids visualized. Bilateral ostia were  visualized.  5. Endometrial ablation performed at a width of 4.6 cm, length of 6.0 cm, for 78 seconds at 152 singleton   6. Mid urethral sling placed without difficulty  7. Cystoscopy revealed small amount of metaplasia just medial to bilateral ureteral orifices, efflux appreciated from bilateral ureters, bubble appreciated at dome of bladder, no lacerations or lesion of the lower urinary tract     Procedure and Technique:  Patient was identified in the preop holding area as well as the operating suite. Procedure was reviewed and  patient consented verbally once again.  She underwent successful induction of general anesthesia using LMA.  She was placed in the dorsal lithotomy position using yellowfin stirrups.  She had pneumatic compression boots in place.  She had a chlorhexidine vaginal prep and was draped in sterile fashion.  A operative timeout was accomplished.  Exam under anesthesia revealed no vaginal or cervical lesions. A vyas catheter was placed in a sterile fashion for intermittent bladder drainage intraoperatively. Ancef 2 grams was administered for surgical ppx.      The bladder was drained using a straight catheter. The anterior lip of the cervix was grasped using a single-tooth tenaculum.  Uterus sounded in midposition fashion to 9 cm.  The endocervix was dilated to accommodate the 7.5 mm operating hysteroscope.     Hysteroscopic examination revealed the above mentioned findings. Hysteroscope was removed and a sharp curettage was performed. Specimen was sent for pathology.     The NovaSure ablation device was inserted through the cervix and seated into the endometrial cavity. Device was deployed and rotated in all directions to maximize expansion of the bipolar electrode. Uterine length was determined to be 6 cm and uterine width was determined to be 4.6 cm. A test of the system was performed and the uterine cavity was insufflated with CO2 to ensure cavity integrity and proper placement of the device. No leakage of gas was appreciated. After successful testing, the Novasure system was activated and bipolar cauterization with a Moisture Transport Vacuum System facilitated ablation of the endometrium in approximately 78 seconds under 152 singleton of power. The Novasure system was completely retracted prior to it's removal from the uterine cavity. Inspection of the bipolar electrode showed evidence of burnt endometrial tissue.    The tenaculum was removed. The patient had excellent hemostasis at this time.      We turned our  attention for performance of the single incision sling.     At this time, the mid urethral zone was identified in reference to the Preston catheter and urethral meatus and local anesthetic solution was injected into the anterior vaginal wall at the mid urethral level for hydrodissection and vasoconstriction. Next, 10 mL was injected at the midline. An additional 7 mL were injected to the left and right of midline directing the infiltration laterally towards the cephalad aspect of the inferior pubic ramus bilaterally. Care was taken to ensure that the sulcus was flattened and free of infiltration to minimize chance for buttonholing or tapping too close to the anterolateral sulcus.     After completion of hydrodissection, 2 Allis clamps were used to grasp the anterior vaginal wall for traction. A 1.5 cm incision was made to the midline. Two Allis clamps were then placed on each cut edge of the incision for stabilization. Tenotomy scissors were used to create a small vaginal tunnel with sharp and blunt dissection above the anterior vaginal wall directed laterally towards the cephalad aspect of the inferior pubic ramus bilaterally. Dissection was carried out to the edge of the bone itself but no dissection into the obturator internus muscle.     Once the dissection was complete, the sling was placed. The Altis system was used. An index finger was placed in the vagina for guidance. The Altis trocar was placed into the pre-dissected tract. The handle was held in horizontal slight upward canting to avoid buttonholing of the sulcus. Cephalad drift was used to allow passage around the inferior pubic ramus. A thumb was placed on the heel of the introducer to allow a push/pivot maneuver to place a fixed anchor into the obturator internus muscle membrane complex on the patient's left side. Proper handle deviation confirmed proper anchor placement, as well as a gentle tugging on the sling. Once the introducer was removed, it also  confirmed proper anchor placement. The exact same sequence of steps was repeated on the patient's right side with adjustable anchor. Once it was complete, the introducer was removed and gentle tugging again confirmed proper anchor placement.     The Vyas catheter was then used to drain the bladder and then it was removed and a diagnostic cystoscopy was performed by instilling 300 mL of fluid into the bladder. Both ureters were effluxing normally and there were no lacerations in the lower urinary tract. We used Crede maneuver to elicit loss of fluid from the urethral meatus and there was loss of fluid noted. The tensioning suture was used to adjust the tape until there was minimal to no leakage with Crede. After appropriate tensioning, the tensioning suture was cut and the vaginal incision was closed with 2-0 Vicryl suture in a running locked stitch. At this time, the vyas was used to drain the bladder at the conclusion of the case. There were no complications.     Dr. Blevins was present for the entire procedure. The sponge, needle and instrument count were correct x2. The patient tolerated the procedure well and went to the recovery room in stable condition and she is stable at the moment of this dictation.      Patient Disposition:  PACU              SIGNATURE: April Yañez MD  DATE: April 21, 2025  TIME: 10:05 AM

## 2025-04-21 NOTE — ANESTHESIA POSTPROCEDURE EVALUATION
Post-Op Assessment Note    CV Status:  Stable    Pain management: adequate       Mental Status:  Alert and awake   Hydration Status:  Euvolemic   PONV Controlled:  Controlled   Airway Patency:  Patent     Post Op Vitals Reviewed: Yes    No anethesia notable event occurred.    Staff: Anesthesiologist           Last Filed PACU Vitals:  Vitals Value Taken Time   Temp 98 °F (36.7 °C) 04/21/25 1001   Pulse 58 04/21/25 1036   /63 04/21/25 1031   Resp 16 04/21/25 1031   SpO2 97 % 04/21/25 1036   Vitals shown include unfiled device data.    Modified Catalina:     Vitals Value Taken Time   Activity 2 04/21/25 1001   Respiration 2 04/21/25 1001   Circulation 2 04/21/25 1001   Consciousness 1 04/21/25 1001   Oxygen Saturation 2 04/21/25 1001     Modified Catalina Score: 9

## 2025-04-23 PROCEDURE — 88305 TISSUE EXAM BY PATHOLOGIST: CPT | Performed by: PATHOLOGY

## 2025-05-02 ENCOUNTER — APPOINTMENT (OUTPATIENT)
Age: 40
End: 2025-05-02
Attending: OBSTETRICS & GYNECOLOGY
Payer: COMMERCIAL

## 2025-05-02 DIAGNOSIS — N39.0 URINARY TRACT INFECTION WITHOUT HEMATURIA, SITE UNSPECIFIED: ICD-10-CM

## 2025-05-02 PROCEDURE — 87086 URINE CULTURE/COLONY COUNT: CPT

## 2025-05-04 LAB — BACTERIA UR CULT: NORMAL

## 2025-05-10 ENCOUNTER — OFFICE VISIT (OUTPATIENT)
Dept: URGENT CARE | Age: 40
End: 2025-05-10
Payer: COMMERCIAL

## 2025-05-10 VITALS
BODY MASS INDEX: 31.98 KG/M2 | OXYGEN SATURATION: 98 % | SYSTOLIC BLOOD PRESSURE: 118 MMHG | HEART RATE: 82 BPM | DIASTOLIC BLOOD PRESSURE: 76 MMHG | RESPIRATION RATE: 16 BRPM | WEIGHT: 211 LBS | TEMPERATURE: 96.4 F | HEIGHT: 68 IN

## 2025-05-10 DIAGNOSIS — R42 VERTIGO: Primary | ICD-10-CM

## 2025-05-10 PROCEDURE — 99213 OFFICE O/P EST LOW 20 MIN: CPT | Performed by: PHYSICIAN ASSISTANT

## 2025-05-10 RX ORDER — CIPROFLOXACIN 500 MG/1
500 TABLET, FILM COATED ORAL EVERY 12 HOURS SCHEDULED
COMMUNITY
Start: 2025-05-06

## 2025-05-10 RX ORDER — MECLIZINE HCL 12.5 MG 12.5 MG/1
12.5 TABLET ORAL EVERY 8 HOURS SCHEDULED
Qty: 20 TABLET | Refills: 0 | Status: SHIPPED | OUTPATIENT
Start: 2025-05-10

## 2025-05-10 NOTE — PROGRESS NOTES
"[facility]        NAME: Pb Suarez is a 39 y.o. female  : 1985    MRN: [id number]  DATE: May 10, 2025  TIME: 9:42 AM    /76   Pulse 82   Temp (!) 96.4 °F (35.8 °C)   Resp 16   Ht 5' 8\" (1.727 m)   Wt 95.7 kg (211 lb)   SpO2 98%   BMI 32.08 kg/m²     Assessment and Plan   Vertigo [R42]  1. Vertigo  meclizine (ANTIVERT) 12.5 MG tablet            Patient Instructions       Follow up with PCP in 3-5 days.  Proceed to  ER if symptoms worsen.    Chief Complaint     Chief Complaint   Patient presents with    Dizziness     Patient reports vertigo X 1 week.         History of Present Illness       Pt with intermittent dizziness, for past 1-2 weeks  , with tilting head up, has had in the past , no other symptoms     Dizziness        Review of Systems   Review of Systems   Constitutional: Negative.    HENT: Negative.     Eyes: Negative.    Respiratory: Negative.     Cardiovascular: Negative.    Gastrointestinal: Negative.    Endocrine: Negative.    Genitourinary: Negative.    Musculoskeletal: Negative.    Skin: Negative.    Allergic/Immunologic: Negative.    Neurological:  Positive for dizziness.   Hematological: Negative.    Psychiatric/Behavioral: Negative.     All other systems reviewed and are negative.        Current Medications       Current Outpatient Medications:     ciprofloxacin (CIPRO) 500 mg tablet, Take 500 mg by mouth every 12 (twelve) hours, Disp: , Rfl:     escitalopram (LEXAPRO) 10 mg tablet, Take 1 tablet (10 mg total) by mouth daily, Disp: 90 tablet, Rfl: 0    meclizine (ANTIVERT) 12.5 MG tablet, Take 1 tablet (12.5 mg total) by mouth every 8 (eight) hours, Disp: 20 tablet, Rfl: 0    Synthroid 150 MCG tablet, Take 1 tablet (150 mcg total) by mouth daily, Disp: 90 tablet, Rfl: 1    Synthroid 175 MCG tablet, Take 1 pill  and Friday alternating with 150 other days, Disp: 60 tablet, Rfl: 1    LORazepam (ATIVAN) 0.5 mg tablet, Take 1 pill 30 minutes before " procedure (Patient not taking: Reported on 1/23/2025), Disp: 1 tablet, Rfl: 0    Current Allergies     Allergies as of 05/10/2025 - Reviewed 05/10/2025   Allergen Reaction Noted    Bactrim [sulfamethoxazole-trimethoprim] Hives 04/06/2016    Other Hives 04/06/2016    Shellfish-derived products - food allergy Other (See Comments) 09/07/2018            The following portions of the patient's history were reviewed and updated as appropriate: allergies, current medications, past family history, past medical history, past social history, past surgical history and problem list.     Past Medical History:   Diagnosis Date    Allergic     Anemia 7/2020    Anxiety     Avascular necrosis of scaphoid (HCC) 12/19/2022    Benign paroxysmal positional vertigo due to bilateral vestibular disorder 07/27/2020    Boil of groin 09/15/2020    Formatting of this note might be different from the original. Left side  Last Assessment & Plan:  Formatting of this note might be different from the original. Advised on warm compresses and antibiotics for now. No e/o abscess or sepsis. Precautions given.    Chlamydial infection 04/07/2017    Depression     Disease of thyroid gland     Fibromyalgia     GERD (gastroesophageal reflux disease) 2012    Gestational diabetes     Gestational hypertension     previous pregnancy    Hashimoto's thyroiditis     Hepatic steatosis     HPV (human papilloma virus) infection     Immune deficiency disorder (HCC)     Hasimotos    Irritable bowel syndrome 2004    Liver disease     fatty liver    Obesity     Oligohydramnios in galvez pregnancy in second trimester 09/12/2017    Post-viral cough syndrome 01/20/2023    Retained placenta     Schlatter-Osgood disease 04/21/2017    Splenomegaly     Urinary tract infection     Urogenital trichomoniasis 04/07/2017    Visual impairment        Past Surgical History:   Procedure Laterality Date    CHOLECYSTECTOMY  2005    COLONOSCOPY      DILATION AND CURETTAGE OF UTERUS   "2002    EGD      CO CYSTOURETHROSCOPY N/A 4/21/2025    Procedure: CYSTOSCOPY;  Surgeon: Tod Blevins MD;  Location: [facility];  Service: UroGynecology           CO HYSTEROSCOPY BX ENDOMETRIUM&/POLYPC W/WO D&C N/A 12/19/2024    Procedure: (D&C) W/ HYSTEROSCOPY,;  Surgeon: Stephanie Zarate MD;  [facility]: [facility];  Service: Gynecology    CO HYSTEROSCOPY ENDOMETRIAL ABLATION N/A 4/21/2025    Procedure: UTERINE ABLATION ENDOMETRIAL NOVASURE; HYSTEROSCOPY; EUA;  Surgeon: Tod Blevins MD;  [facility];  Service: UroGynecology           CO SLING OPERATION STRESS INCONTINENCE N/A 4/21/2025    Procedure: PUBOVAGINAL SLING;  Surgeon: Tod Blevins MD;  [facility];  Service: UroGynecology           UMBILICAL HERNIA REPAIR  1997    LVH       Family History   Problem Relation Age of Onset    Anuerysm Mother     Thyroid disease Mother     Hepatitis Mother     Colon polyps Mother     Hypothyroidism Mother     Depression Father         Mom dad grndma aunt uncle cousins    Anxiety disorder Father     Bipolar disorder Father     Self-Injury Father     Addiction problem Father     Miscarriages / Stillbirths Father     Lung cancer Maternal Grandmother     Arthritis Maternal Grandmother     Cancer Maternal Grandmother         Grandma on both sides    Colon cancer Maternal Grandfather     Diabetes Paternal Grandmother     ADD / ADHD Cousin     Learning disabilities Cousin     Breast cancer Neg Hx          Medications have been verified.        Objective   /76   Pulse 82   Temp (!) 96.4 °F (35.8 °C)   Resp 16   Ht 5' 8\" (1.727 m)   Wt 95.7 kg (211 lb)   SpO2 98%   BMI 32.08 kg/m²        Physical Exam     Physical Exam  Vitals and nursing note reviewed.   Constitutional:       Appearance: Normal appearance. She is normal weight.   HENT:      Head: Normocephalic and atraumatic.      Right Ear: Tympanic membrane, ear canal and external ear normal.      Left Ear: Tympanic " membrane, ear canal and external ear normal.      Nose: Nose normal.      Mouth/Throat:      Mouth: Mucous membranes are moist.   Eyes:      Extraocular Movements: Extraocular movements intact.      Conjunctiva/sclera: Conjunctivae normal.      Pupils: Pupils are equal, round, and reactive to light.   Cardiovascular:      Rate and Rhythm: Normal rate and regular rhythm.      Pulses: Normal pulses.      Heart sounds: Normal heart sounds.   Pulmonary:      Effort: Pulmonary effort is normal.      Breath sounds: Normal breath sounds.   Abdominal:      General: Bowel sounds are normal.   Musculoskeletal:         General: Normal range of motion.      Cervical back: Normal range of motion and neck supple.   Skin:     General: Skin is warm.      Capillary Refill: Capillary refill takes less than 2 seconds.   Neurological:      General: No focal deficit present.      Mental Status: She is alert and oriented to person, place, and time.   Psychiatric:         Mood and Affect: Mood normal.

## 2025-05-19 ENCOUNTER — TELEPHONE (OUTPATIENT)
Age: 40
End: 2025-05-19

## 2025-05-19 NOTE — TELEPHONE ENCOUNTER
LVM for pt to confirm appt for 05/21 with Charlene in our [place] office. Advised pt to callback to r/s or cancel this appt if needed.

## 2025-05-21 ENCOUNTER — CONSULT (OUTPATIENT)
Age: 40
End: 2025-05-21

## 2025-05-21 VITALS — BODY MASS INDEX: 32.23 KG/M2 | WEIGHT: 212 LBS | TEMPERATURE: 97.9 F

## 2025-05-21 DIAGNOSIS — L82.1 SEBORRHEIC KERATOSIS: ICD-10-CM

## 2025-05-21 DIAGNOSIS — D48.5 NEOPLASM OF UNCERTAIN BEHAVIOR OF SKIN: ICD-10-CM

## 2025-05-21 DIAGNOSIS — D18.01 CHERRY ANGIOMA: ICD-10-CM

## 2025-05-21 DIAGNOSIS — L81.4 LENTIGINES: ICD-10-CM

## 2025-05-21 DIAGNOSIS — D22.9 MULTIPLE MELANOCYTIC NEVI: ICD-10-CM

## 2025-05-21 DIAGNOSIS — Z12.83 SKIN EXAM, SCREENING FOR CANCER: Primary | ICD-10-CM

## 2025-05-21 PROCEDURE — 88341 IMHCHEM/IMCYTCHM EA ADD ANTB: CPT | Performed by: STUDENT IN AN ORGANIZED HEALTH CARE EDUCATION/TRAINING PROGRAM

## 2025-05-21 PROCEDURE — 88342 IMHCHEM/IMCYTCHM 1ST ANTB: CPT | Performed by: STUDENT IN AN ORGANIZED HEALTH CARE EDUCATION/TRAINING PROGRAM

## 2025-05-21 PROCEDURE — 88305 TISSUE EXAM BY PATHOLOGIST: CPT | Performed by: STUDENT IN AN ORGANIZED HEALTH CARE EDUCATION/TRAINING PROGRAM

## 2025-05-21 NOTE — PROGRESS NOTES
"[facility]at[facility] Note     Patient Name: Pb Suarez  Encounter Date: 5/21/2025     Have you been cared for by a [facility] Dermatologist in the last 3 years and, if so, which description applies to you? NO. I am considered a \"new\" patient and must complete all patient intake questions. I am of child-bearing potential.     REVIEW OF SYSTEMS:  Have you recently had or currently have any of the following? Recent fever or chills? No  Any non-healing wound? No  Are you pregnant or planning to become pregnant? No  Are you currently or planning to be nursing or breast feeding? No   PAST MEDICAL HISTORY:  Have you personally ever had or currently have any of the following?  If \"YES,\" then please provide more detail. Skin cancer (such as Melanoma, Basal Cell Carcinoma, Squamous Cell Carcinoma?  No  Tuberculosis, HIV/AIDS, Hepatitis B or C: No  Radiation Treatment No   HISTORY OF IMMUNOSUPPRESSION:   Do you have a history of any of the following:  Systemic Immunosuppression such as Diabetes, Biologic or Immunotherapy, Chemotherapy, Organ Transplantation, Bone Marrow Transplantation or Prednsione?  No    Answering \"YES\" requires the addition of the dotphrase \"IMMUNOSUPPRESSED\" as the first diagnosis of the patient's visit.   FAMILY HISTORY:  Any \"first degree relatives\" (parent, brother, sister, or child) with the following?    Skin Cancer, Pancreatic or Other Cancer? YES, MOTHER - Melanoma   PATIENT EXPERIENCE:    Do you want the Dermatologist to perform a COMPLETE skin exam today including a clinical examination under the \"bra and underwear\" areas?  Yes  If necessary, do we have your permission to call and leave a detailed message on your Preferred Phone number that includes your specific medical information?  Yes      Allergies[1] Current Medications[2]          Whom besides the patient is providing clinical information about today's encounter?   NO ADDITIONAL HISTORIAN (patient alone provided " "history)    Physical Exam and Assessment/Plan by Diagnosis:    MELANOCYTIC NEVI (\"Moles\")    Physical Exam:  Anatomic Location Affected:   Mostly on sun-exposed areas of the trunk and extremities   Morphological Description:  Scattered, 1-4mm round to ovoid, symmetrical-appearing, even bordered, skin colored to dark brown macules/papules    Additional History of Present Condition:  Present on exam. Denies any pain, itch, bleeding. Present for years. Denies actively changing or growing moles.     Assessment and Plan:  Based on a thorough discussion of this condition and the management approach to it (including a comprehensive discussion of the known risks, side effects and potential benefits of treatment), the patient (family) agrees to implement the following specific plan:  Reassured benign.   Monitor for changes. ABCDE's of melanoma handout provided.   Practice sun protection. Apply broad spectrum (UVA and UVB) sunscreen, SPF 30 or higher every 2 hours. Wear sun protective clothing, hats, and sunglasses.       LENTIGO    Physical Exam:  Anatomic Location Affected:  sun exposed areas of face, trunk and extremities   Morphological Description:  multiple scattered tan to brown evenly pigmented macules      Additional History of Present Condition:  Present on exam.     Assessment and Plan:  Based on a thorough discussion of this condition and the management approach to it (including a comprehensive discussion of the known risks, side effects and potential benefits of treatment), the patient (family) agrees to implement the following specific plan:  Reassured benign.   Practice sun protection. Apply broad spectrum (UVA and UVB) sunscreen, SPF 30 or higher every 2 hours. Wear sun protective clothing, hats, and sunglasses.     SEBORRHEIC KERATOSIS; NON-INFLAMED    Physical Exam:  Anatomic Location Affected:  trunk and extremities   Morphological Description:  Flat and raised, waxy, smooth to warty textured, yellow to " "brownish-grey to dark brown to blackish, discrete, \"stuck-on\" appearing papules.    Additional History of Present Condition:  Present on exam. Patient denies any itching.     Assessment and Plan:  Based on a thorough discussion of this condition and the management approach to it (including a comprehensive discussion of the known risks, side effects and potential benefits of treatment), the patient (family) agrees to implement the following specific plan:  Reassured benign.         ANGIOMA (\"CHERRY ANGIOMA\")     Physical Exam:  Anatomic Location: scattered across trunk and extremities   Morphologic Description: 1-2 mm firm red to maroon to purples to blue discrete papule, on dermoscopy lacunae  by white septae seen     Additional History of Present Condition:  Present on exam.     Plan:  Reassured benign.       NEOPLASM OF UNCERTAIN BEHAVIOR OF SKIN    Physical Exam:  (Anatomic Location); (Size and Morphological Description); (Differential Diagnosis):  A- Central mid back; 0.4cmx0.3cm irregularly pigmented macule; DDX: benign vs atypical nevus, rule out melanoma           Additional History of Present Condition:  noted on exam.     Assessment and Plan:  I have discussed with the patient that a sample of skin via a \"skin biopsy” would be potentially helpful to further make a specific diagnosis under the microscope.  Based on a thorough discussion of this condition and the management approach to it (including a comprehensive discussion of the known risks, side effects and potential benefits of treatment), the patient (family) agrees to implement the following specific plan:    Procedure:  Skin Biopsy.  After a thorough discussion of treatment options and risk/benefits/alternatives (including but not limited to local pain, scarring, dyspigmentation, blistering, possible superinfection, and inability to confirm a diagnosis via histopathology), verbal and written consent were obtained and portion of the rash was " "biopsied for tissue sample.  See below for consent that was obtained from patient and subsequent Procedure Note.  Will call with results.     PROCEDURE TANGENTIAL (SHAVE) BIOPSY NOTE:    Performing Physician: Charlene Jon PA-C  Anatomic Location; Clinical Description with size (cm); Pre-Op Diagnosis:   A- Central mid back; 0.4cmx0.3cm irregularly pigmented macule;DDX: benign vs atypical nevus, rule out melanoma  Post-op diagnosis: Same     Local anesthesia: 1% xylocaine with epi      Topical anesthesia: None    Hemostasis: Aluminum chloride       After obtaining informed consent  at which time there was a discussion about the purpose of biopsy  and low risks of infection and bleeding.  The area was prepped and draped in the usual fashion. Anesthesia was obtained with 1% lidocaine with epinephrine. A shave biopsy to an appropriate sampling depth was obtained by Shave (Dermablade or 15 blade) The resulting wound was covered with surgical ointment and bandaged appropriately.     The patient tolerated the procedure well without complications and was without signs of functional compromise.      Specimen has been sent for review by Dermatopathology.    Standard post-procedure care has been explained and has been included in written form within the patient's copy of Informed Consent.    INFORMED CONSENT DISCUSSION AND POST-OPERATIVE INSTRUCTIONS FOR PATIENT    I.  RATIONALE FOR PROCEDURE  I understand that a skin biopsy allows the Dermatologist to test a lesion or rash under the microscope to obtain a diagnosis.  It usually involves numbing the area with numbing medication and removing a small piece of skin; sometimes the area will be closed with sutures. In this specific procedure, sutures are not usually needed.  If any sutures are placed, then they are usually need to be removed in 2 weeks or less.    I understand that my Dermatologist recommends that a skin \"shave\" biopsy be performed today.  A local anesthetic, " "similar to the kind that a dentist uses when filling a cavity, will be injected with a very small needle into the skin area to be sampled.  The injected skin and tissue underneath \"will go to sleep” and become numb so no pain should be felt afterwards.  An instrument shaped like a tiny \"razor blade\" (shave biopsy instrument) will be used to cut a small piece of tissue and skin from the area so that a sample of tissue can be taken and examined more closely under the microscope.  A slight amount of bleeding will occur, but it will be stopped with direct pressure and a pressure bandage and any other appropriate methods.  I understands that a scar will form where the wound was created.  Surgical ointment will be applied to help protect the wound.  Sutures are not usually needed.    II.  RISKS AND POTENTIAL COMPLICATIONS   I understand the risks and potential complications of a skin biopsy include but are not limited to the following:  Bleeding  Infection  Pain  Scar/keloid  Skin discoloration  Incomplete Removal  Recurrence  Nerve Damage/Numbness/Loss of Function  Allergic Reaction to Anesthesia  Biopsies are diagnostic procedures and based on findings additional treatment or evaluation may be required  Loss or destruction of specimen resulting in no additional findings    My Dermatologist has explained to me the nature of the condition, the nature of the procedure, and the benefits to be reasonably expected compared with alternative approaches.  My Dermatologist has discussed the likelihood of major risks or complications of this procedure including the specific risks listed above, such as bleeding, infection, and scarring/keloid.  I understand that a scar is expected after this procedure.  I understand that my physician cannot predict if the scar will form a \"keloid,\" which extends beyond the borders of the wound that is created.  A keloid is a thick, painful, and bumpy scar.  A keloid can be difficult to treat, as it " "does not always respond well to therapy, which includes injecting cortisone directly into the keloid every few weeks.  While this usually reduces the pain and size of the scar, it does not eliminate it.      I understand that photographs may be taken before and after the procedure.  These will be maintained as part of the medical providers confidential records and may not be made available to me.  I further authorize the medical provider to use the photographs for teaching purposes or to illustrate scientific papers, books, or lectures if in his/her judgment, medical research, education, or science may benefit from its use.    I have had an opportunity to fully inquire about the risks and benefits of this procedure and its alternatives.   I have been given ample time and opportunity to ask questions and to seek a second opinion if I wished to do so.  I acknowledge that there have specifically been no guarantees as to the cosmetic results from the procedure.  I am aware that with any procedure there is always the possibility of an unexpected complication.    III. POST-PROCEDURAL CARE (WHAT YOU WILL NEED TO DO \"AFTER THE BIOPSY\" TO OPTIMIZE HEALING)    Keep the area clean and dry.  Try NOT to remove the bandage or get it wet for the first 24 hours.    Gently clean the area and apply surgical ointment (such as Vaseline petrolatum ointment, which is available \"over the counter\" and not a prescription) to the biopsy site for up to 2 weeks straight.  This acts to protect the wound from the outside world.      Sutures are not usually placed in this procedure.  If any sutures were placed, return for suture removal as instructed (generally 1 week for the face, 2 weeks for the body).      Take Acetaminophen (Tylenol) for discomfort, if no contraindications.  Ibuprofen or aspirin could make bleeding worse.    Call our office immediately for signs of infection: fever, chills, increased redness, warmth, tenderness, " discomfort/pain, or pus or foul smell coming from the wound.    WHAT TO DO IF THERE IS ANY BLEEDING?  If a small amount of bleeding is noticed, place a clean cloth over the area and apply firm pressure for ten minutes.  Check the wound after 10 minutes of direct pressure.  If bleeding persists, try one more time for an additional 10 minutes of direct pressure on the area.  If the bleeding becomes heavier or does not stop after the second attempt, or if you have any other questions about this procedure, then please call your [facility]'s Dermatologist by calling [phone] (SKIN).     I hereby acknowledge that I have reviewed and verified the site with my Dermatologist and have requested and authorized my Dermatologist to proceed with the procedure.    Scribe Attestation      I,:  Pk Estrada MA am acting as a scribe while in the presence of the attending physician.:       I,:  Charlene Jon PA-C personally performed the services described in this documentation    as scribed in my presence.:                  [1]   Allergies  Allergen Reactions    Bactrim [Sulfamethoxazole-Trimethoprim] Hives    Other Hives     seafood    Shellfish-Derived Products - Food Allergy Other (See Comments)   [2]   Current Outpatient Medications:     ciprofloxacin (CIPRO) 500 mg tablet, Take 500 mg by mouth every 12 (twelve) hours, Disp: , Rfl:     escitalopram (LEXAPRO) 10 mg tablet, Take 1 tablet (10 mg total) by mouth daily, Disp: 90 tablet, Rfl: 0    LORazepam (ATIVAN) 0.5 mg tablet, Take 1 pill 30 minutes before procedure (Patient not taking: Reported on 1/23/2025), Disp: 1 tablet, Rfl: 0    meclizine (ANTIVERT) 12.5 MG tablet, Take 1 tablet (12.5 mg total) by mouth every 8 (eight) hours, Disp: 20 tablet, Rfl: 0    Synthroid 150 MCG tablet, Take 1 tablet (150 mcg total) by mouth daily, Disp: 90 tablet, Rfl: 1    Synthroid 175 MCG tablet, Take 1 pill Monday Wednesday and Friday alternating with 150 other days, Disp: 60 tablet,  Rfl: 1

## 2025-05-28 PROCEDURE — 88342 IMHCHEM/IMCYTCHM 1ST ANTB: CPT | Performed by: STUDENT IN AN ORGANIZED HEALTH CARE EDUCATION/TRAINING PROGRAM

## 2025-05-28 PROCEDURE — 88341 IMHCHEM/IMCYTCHM EA ADD ANTB: CPT | Performed by: STUDENT IN AN ORGANIZED HEALTH CARE EDUCATION/TRAINING PROGRAM

## 2025-05-28 PROCEDURE — 88305 TISSUE EXAM BY PATHOLOGIST: CPT | Performed by: STUDENT IN AN ORGANIZED HEALTH CARE EDUCATION/TRAINING PROGRAM

## 2025-05-29 ENCOUNTER — RESULTS FOLLOW-UP (OUTPATIENT)
Dept: DERMATOLOGY | Facility: CLINIC | Age: 40
End: 2025-05-29

## 2025-06-17 ENCOUNTER — TELEMEDICINE (OUTPATIENT)
Dept: FAMILY MEDICINE CLINIC | Facility: CLINIC | Age: 40
End: 2025-06-17
Payer: COMMERCIAL

## 2025-06-17 VITALS — WEIGHT: 208 LBS | BODY MASS INDEX: 31.63 KG/M2

## 2025-06-17 DIAGNOSIS — F41.0 GENERALIZED ANXIETY DISORDER WITH PANIC ATTACKS: Primary | ICD-10-CM

## 2025-06-17 DIAGNOSIS — F33.0 MDD (MAJOR DEPRESSIVE DISORDER), RECURRENT EPISODE, MILD (HCC): ICD-10-CM

## 2025-06-17 DIAGNOSIS — F43.10 PTSD (POST-TRAUMATIC STRESS DISORDER): ICD-10-CM

## 2025-06-17 DIAGNOSIS — F41.1 GENERALIZED ANXIETY DISORDER WITH PANIC ATTACKS: Primary | ICD-10-CM

## 2025-06-17 PROCEDURE — 99214 OFFICE O/P EST MOD 30 MIN: CPT

## 2025-06-17 RX ORDER — HYDROXYZINE HYDROCHLORIDE 25 MG/1
25 TABLET, FILM COATED ORAL EVERY 6 HOURS PRN
Qty: 120 TABLET | Refills: 0 | Status: SHIPPED | OUTPATIENT
Start: 2025-06-17 | End: 2025-07-17

## 2025-06-17 RX ORDER — ESCITALOPRAM OXALATE 20 MG/1
20 TABLET ORAL DAILY
Qty: 30 TABLET | Refills: 0 | Status: SHIPPED | OUTPATIENT
Start: 2025-06-17 | End: 2025-07-17

## 2025-06-17 NOTE — PROGRESS NOTES
Virtual Regular Visit  Name: bP Suarez      : 1985      MRN: [id number]  Encounter Provider: Rex Will MD  Encounter Date: 2025   Encounter department: [facility]  :  Assessment & Plan  Generalized anxiety disorder with panic attacks  SHANNEN-7 Flowsheet Screening      Flowsheet Row Most Recent Value   Over the last two weeks, how often have you been bothered by the following problems?     Feeling nervous, anxious, or on edge 3   Not being able to stop or control worrying 3   Worrying too much about different things 3   Trouble relaxing  3   Being so restless that it's hard to sit still 3   Becoming easily annoyed or irritable  3   Feeling afraid as if something awful might happen 1   How difficult have these problems made it for you to do your work, take care of things at home, or get along with other people?  Very difficult   SHANNEN Score  19           Acute exacerbation of SHANNEN.  Will increase escitalopram to 20 mg daily and initiate therapy with hydroxyzine 25 mg every 6 as needed for anxiety.  Previously tried Xanax and Ativan with some improvement of acute attacks.  Atarax chosen due to lower medication side effects.  Patient will be following with psych in the next couple of days.  Orders:    escitalopram (LEXAPRO) 20 mg tablet; Take 1 tablet (20 mg total) by mouth daily    hydrOXYzine HCL (ATARAX) 25 mg tablet; Take 1 tablet (25 mg total) by mouth every 6 (six) hours as needed for anxiety    MDD (major depressive disorder), recurrent episode, mild (HCC)  Depression Screening Follow-up Plan: Patient's depression screening was positive with a PHQ-9 score of 22. Continue regular follow-up with their psychologist/therapist/psychiatrist who is managing their mental health condition(s).  Also recommend increasing escitalopram to 20 mg daily given acute worsening of symptoms.    Orders:    escitalopram (LEXAPRO) 20 mg tablet; Take 1 tablet (20 mg  total) by mouth daily    PTSD (post-traumatic stress disorder)    Orders:    escitalopram (LEXAPRO) 20 mg tablet; Take 1 tablet (20 mg total) by mouth daily        History of Present Illness     39-year-old female with history of MDD, SHANNEN, PTSD, hypothyroidism, and iron deficiency anemia presenting for virtual visit for evaluation of worsening anxiety.  Patient notes significant life stressors that have worsened her panic attacks.  Specifically life stressors are recent self-harm by patient's daughter, partner infidelity, and residential issues with other daughter.  She is describing increased feeling of pressure in her chest, facial flushing, and severe anxiety.  Fortunately panic attacks improve but she always has that feeling of anxiety throughout the day.  She is denying any fevers, chills, shortness of breath, chest pain, and palpitations outside of acute attacks and again self resolve within a few moments.  Symptoms are interfering with her ability to complete tasks while at work.  Remaining ROS as noted below      Review of Systems   Constitutional:  Negative for chills and fever.   HENT:          Facial flushing   Respiratory:  Negative for shortness of breath.         Chest pressure   Cardiovascular:  Negative for chest pain and palpitations.       Objective   Wt 94.3 kg (208 lb)   BMI 31.63 kg/m²     Physical Exam  Nursing note reviewed.   Constitutional:       Appearance: Normal appearance.     Neurological:      Mental Status: She is alert.     Psychiatric:         Mood and Affect: Mood is anxious. Mood is not depressed or elated. Affect is not labile, blunt, flat, angry, tearful or inappropriate.         Speech: Speech normal.         Behavior: Behavior normal. Behavior is cooperative.         Thought Content: Thought content does not include homicidal or suicidal ideation. Thought content does not include homicidal or suicidal plan.         Administrative Statements   Encounter provider Rex LARA  Zia Will MD    The Patient is located at Other - work and in the following state in which I hold an active license [place].    The patient was identified by name and date of birth. Pb Suarez was informed that this is a telemedicine visit and that the visit is being conducted through the Epic Embedded platform. She agrees to proceed..  My office door was closed. No one else was in the room.  She acknowledged consent and understanding of privacy and security of the video platform. The patient has agreed to participate and understands they can discontinue the visit at any time.    I have spent a total time of 9 minutes in caring for this patient on the day of the visit/encounter including Risks and benefits of tx options, Instructions for management, Impressions, Documenting in the medical record, Reviewing/placing orders in the medical record (including tests, medications, and/or procedures), and Obtaining or reviewing history  , not including the time spent for establishing the audio/video connection.

## 2025-06-17 NOTE — ASSESSMENT & PLAN NOTE
SHANNEN-7 Flowsheet Screening      Flowsheet Row Most Recent Value   Over the last two weeks, how often have you been bothered by the following problems?     Feeling nervous, anxious, or on edge 3   Not being able to stop or control worrying 3   Worrying too much about different things 3   Trouble relaxing  3   Being so restless that it's hard to sit still 3   Becoming easily annoyed or irritable  3   Feeling afraid as if something awful might happen 1   How difficult have these problems made it for you to do your work, take care of things at home, or get along with other people?  Very difficult   SHANNEN Score  19           Acute exacerbation of SHANNEN.  Will increase escitalopram to 20 mg daily and initiate therapy with hydroxyzine 25 mg every 6 as needed for anxiety.  Previously tried Xanax and Ativan with some improvement of acute attacks.  Atarax chosen due to lower medication side effects.  Patient will be following with psych in the next couple of days.  Orders:    escitalopram (LEXAPRO) 20 mg tablet; Take 1 tablet (20 mg total) by mouth daily    hydrOXYzine HCL (ATARAX) 25 mg tablet; Take 1 tablet (25 mg total) by mouth every 6 (six) hours as needed for anxiety

## 2025-06-17 NOTE — ASSESSMENT & PLAN NOTE
Depression Screening Follow-up Plan: Patient's depression screening was positive with a PHQ-9 score of 22. Continue regular follow-up with their psychologist/therapist/psychiatrist who is managing their mental health condition(s).  Also recommend increasing escitalopram to 20 mg daily given acute worsening of symptoms.    Orders:    escitalopram (LEXAPRO) 20 mg tablet; Take 1 tablet (20 mg total) by mouth daily

## 2025-06-23 ENCOUNTER — PATIENT MESSAGE (OUTPATIENT)
Dept: OBGYN CLINIC | Facility: CLINIC | Age: 40
End: 2025-06-23

## 2025-06-23 NOTE — PATIENT COMMUNICATION
Incoming call from patient - states she had ablation and bladder sling surgery 4/21/25. Patient was cleared at 6 weeks postop for intercourse. The first 2 times patient had intercourse she had spotting. 3 additional times she had increased bleeding more like period flow. This lasted through intercourse and turned into spotting afterwards which resolved. Denies pain. Requesting appointment for evaluation. Patient offered appointment for tomorrow, 6/24 and Wednesday 6/25. Declines due to work. Next available appointment scheduled for patient 7/10.

## 2025-06-25 ENCOUNTER — NURSE TRIAGE (OUTPATIENT)
Age: 40
End: 2025-06-25

## 2025-06-25 NOTE — TELEPHONE ENCOUNTER
"REASON FOR CONVERSATION: Vaginal Bleeding    SYMPTOMS: bleeding after intercourse     OTHER HEALTH INFORMATION: Patient called office complainig of bleeding after intercourse that started after she was cleared to have intercourse from her surgery in April. She had UTERINE ABLATION ENDOMETRIAL NOVASURE; HYSTEROSCOPY; EUA (Uterus) PUBOVAGINAL SLING (Vagina ) CYSTOSCOPY (Bladder).  She states the first few times she had mild / moderate bleeding that stopped after a few minutes and then a couple times she had spotting. She denies having abdominal pain, clots, weakness or any other symptoms to note. Schedules appointment for 6/27.     PROTOCOL DISPOSITION: See Within 3 Days in Office    CARE ADVICE PROVIDED: Advised she call back if bleeding worsens. Abdominal pain, fever weakness. Advised nothing in the vagina and no intercourse until office visit.     PRACTICE FOLLOW-UP: none needed.       Reason for Disposition   Bleeding or spotting after procedure (e.g., biopsy) or pelvic examination (e.g., pap smear) that lasts > 7 days    Answer Assessment - Initial Assessment Questions  1. BLEEDING SEVERITY: \"Describe the bleeding that you are having.\" \"How much bleeding is there?\"       Spotting mild/ moderate lasting a few minutes.   2. ONSET: \"When did the bleeding begin?\" \"Is it continuing now?\"      First time having intercourse after surgery ( 3 weeks ago)   3. MENSTRUAL PERIOD: \"When was the last normal menstrual period?\" \"How is this different than your period?\"      LMP: April 4. REGULARITY: \"How regular are your periods?\"      Doesn't get period anymore   5. ABDOMEN PAIN: \"Do you have any pain?\" \"How bad is the pain?\"  (e.g., Scale 0-10; none, mild, moderate, or severe)      No   8. HORMONE MEDICINES: \"Are you taking any hormone medicines, prescription or over-the-counter?\" (e.g., birth control pills, estrogen)      No   9. BLOOD THINNER MEDICINES: \"Do you take any blood thinners?\" (e.g., Coumadin / warfarin, Pradaxa " "/ dabigatran, aspirin)      No   10. CAUSE: \"What do you think is causing the bleeding?\" (e.g., recent gyn surgery, recent gyn procedure; known bleeding disorder, cervical cancer, polycystic ovarian disease, fibroids)          Uterine ablation hysteroscopy bladder sling, polypectomy   11. HEMODYNAMIC STATUS: \"Are you weak or feeling lightheaded?\" If Yes, ask: \"Can you stand and walk normally?\"         No   12. OTHER SYMPTOMS: \"What other symptoms are you having with the bleeding?\" (e.g., passed tissue, vaginal discharge, fever, menstrual-type cramps)        No    Protocols used: Vaginal Bleeding - Abnormal-Adult-[place]    "

## 2025-06-27 ENCOUNTER — OFFICE VISIT (OUTPATIENT)
Dept: OBGYN CLINIC | Facility: CLINIC | Age: 40
End: 2025-06-27
Payer: COMMERCIAL

## 2025-06-27 VITALS
HEIGHT: 68 IN | WEIGHT: 215 LBS | SYSTOLIC BLOOD PRESSURE: 114 MMHG | BODY MASS INDEX: 32.58 KG/M2 | DIASTOLIC BLOOD PRESSURE: 72 MMHG

## 2025-06-27 DIAGNOSIS — R35.0 URINARY FREQUENCY: ICD-10-CM

## 2025-06-27 DIAGNOSIS — Z11.3 SCREENING EXAMINATION FOR STD (SEXUALLY TRANSMITTED DISEASE): ICD-10-CM

## 2025-06-27 DIAGNOSIS — N93.9 ABNORMAL UTERINE BLEEDING (AUB): ICD-10-CM

## 2025-06-27 DIAGNOSIS — N93.0 POSTCOITAL BLEEDING: Primary | ICD-10-CM

## 2025-06-27 LAB
SL AMB  POCT GLUCOSE, UA: NEGATIVE
SL AMB LEUKOCYTE ESTERASE,UA: NEGATIVE
SL AMB POCT BILIRUBIN,UA: NEGATIVE
SL AMB POCT BLOOD,UA: NEGATIVE
SL AMB POCT CLARITY,UA: CLEAR
SL AMB POCT COLOR,UA: NORMAL
SL AMB POCT KETONES,UA: NEGATIVE
SL AMB POCT NITRITE,UA: NEGATIVE
SL AMB POCT PH,UA: 6
SL AMB POCT SPECIFIC GRAVITY,UA: 1
SL AMB POCT URINE PROTEIN: NEGATIVE
SL AMB POCT UROBILINOGEN: 0.2

## 2025-06-27 PROCEDURE — 99213 OFFICE O/P EST LOW 20 MIN: CPT | Performed by: NURSE PRACTITIONER

## 2025-06-27 PROCEDURE — 87510 GARDNER VAG DNA DIR PROBE: CPT | Performed by: NURSE PRACTITIONER

## 2025-06-27 PROCEDURE — 99459 PELVIC EXAMINATION: CPT | Performed by: NURSE PRACTITIONER

## 2025-06-27 PROCEDURE — 87491 CHLMYD TRACH DNA AMP PROBE: CPT | Performed by: NURSE PRACTITIONER

## 2025-06-27 PROCEDURE — 87591 N.GONORRHOEAE DNA AMP PROB: CPT | Performed by: NURSE PRACTITIONER

## 2025-06-27 PROCEDURE — 87660 TRICHOMONAS VAGIN DIR PROBE: CPT | Performed by: NURSE PRACTITIONER

## 2025-06-27 PROCEDURE — 81002 URINALYSIS NONAUTO W/O SCOPE: CPT | Performed by: NURSE PRACTITIONER

## 2025-06-27 PROCEDURE — 87480 CANDIDA DNA DIR PROBE: CPT | Performed by: NURSE PRACTITIONER

## 2025-06-27 NOTE — PROGRESS NOTES
Assessment / Plan    Assessment & Plan  Postcoital bleeding    Orders:    Chlamydia/GC amplified DNA by PCR    VAGINOSIS DNA PROBE    Urinary frequency  Urine dip negative.    Orders:    POCT urine dip    Screening examination for STD (sexually transmitted disease)    Orders:    Chlamydia/GC amplified DNA by PCR    VAGINOSIS DNA PROBE    Abnormal uterine bleeding (AUB)         Will rule out any vaginal/cervical infection.  If all negative, observe bleeding patterns for another month, as suggested by Urogyn, and if ongoing follow up with Dr. Blevins.        Subjective   PROBLEM VISIT     Pb Suarez is a 39 y.o. female    39-year-old G8, P5 presents with complaint of bleeding with intercourse and urinary symptoms.  She also requests STD testing.  Believes her partner has been unfaithful.    She reports that since her EMA, which was done 2 months ago, she has been having bleeding every time she has sex.  A couple times is was scant but a couple times there was a lot of blood.  Only happens during sex.  Denies abn vaginal discharge or odor.  Some urinary frequency.    She was last seen by Dr. Zarate 2025 for management of AUB.    At that time she discussed hysterectomy versus endometrial ablation.  She ultimately had a NovaSure ablation with a pubovaginal sling on 2025 with Dr. Blevins.  She states that she called their office to report the bleeding after sex and that they recommended observing for another month.    2021 Pap with HPV negative  2022 chlamydia and gonorrhea negative      Menstrual History:  OB History          8    Para   7    Term   5       2    AB   1    Living   6         SAB   1    IAB   0    Ectopic   0    Multiple   0    Live Births   7                  No LMP recorded. Patient has had an ablation.       The following portions of the patient's history were reviewed and updated as appropriate: allergies, current medications, past family history, past medical history,  "past social history, past surgical history, and problem list.    Review of Systems      Review of Systems   Constitutional:  Negative for chills and fever.   Genitourinary:  Positive for frequency and vaginal bleeding (after sex only; occasional mild cramps). Negative for difficulty urinating, dysuria, genital sores, hematuria, pelvic pain, urgency and vaginal discharge.       Objective     Visit Vitals  /72 (BP Location: Right arm, Patient Position: Sitting, Cuff Size: Adult)   Ht 5' 8\" (1.727 m)   Wt 97.5 kg (215 lb)   BMI 32.69 kg/m²   OB Status Ablation   Smoking Status Former   BSA 2.11 m²     *patient agrees to exam without a chaperone present.     /72 (BP Location: Right arm, Patient Position: Sitting, Cuff Size: Adult)   Ht 5' 8\" (1.727 m)   Wt 97.5 kg (215 lb)   BMI 32.69 kg/m²   Physical Exam  Constitutional:       General: She is not in acute distress.     Appearance: Normal appearance. She is well-developed. She is not ill-appearing or diaphoretic.      Comments: Body mass index is 32.69 kg/m².     HENT:      Head: Normocephalic and atraumatic.     Eyes:      Pupils: Pupils are equal, round, and reactive to light.     Pulmonary:      Effort: Pulmonary effort is normal.   Abdominal:      General: Abdomen is flat.      Palpations: Abdomen is soft.   Genitourinary:     General: Normal vulva.      Exam position: Lithotomy position.      Labia:         Right: No rash, tenderness, lesion or injury.         Left: No rash, tenderness, lesion or injury.       Vagina: No signs of injury and foreign body. No vaginal discharge, erythema, tenderness or bleeding.      Cervix: No cervical motion tenderness, discharge or friability.      Uterus: Not enlarged and not tender.       Adnexa:         Right: No mass or tenderness.          Left: No mass or tenderness.        Comments: No evidence of lesions or cervical friability.  No blood noted.    Skin:     General: Skin is warm and dry.     Neurological:    "   General: No focal deficit present.      Mental Status: She is alert and oriented to person, place, and time.     Psychiatric:         Mood and Affect: Mood normal.         Behavior: Behavior normal.         Thought Content: Thought content normal.         Judgment: Judgment normal.

## 2025-06-28 LAB
CANDIDA RRNA VAG QL PROBE: NOT DETECTED
G VAGINALIS RRNA GENITAL QL PROBE: NOT DETECTED
T VAGINALIS RRNA GENITAL QL PROBE: NOT DETECTED

## 2025-06-30 LAB
C TRACH DNA SPEC QL NAA+PROBE: NEGATIVE
N GONORRHOEA DNA SPEC QL NAA+PROBE: NEGATIVE

## 2025-07-01 ENCOUNTER — HOSPITAL ENCOUNTER (OUTPATIENT)
Dept: NON INVASIVE DIAGNOSTICS | Facility: HOSPITAL | Age: 40
Discharge: HOME | End: 2025-07-01
Payer: COMMERCIAL

## 2025-07-01 VITALS
DIASTOLIC BLOOD PRESSURE: 72 MMHG | SYSTOLIC BLOOD PRESSURE: 114 MMHG | HEIGHT: 68 IN | BODY MASS INDEX: 32.58 KG/M2 | WEIGHT: 215 LBS | HEART RATE: 82 BPM

## 2025-07-01 DIAGNOSIS — I34.1 MITRAL VALVE PROLAPSE: ICD-10-CM

## 2025-07-01 LAB
AORTIC ROOT: 2.5 CM
ASCENDING AORTA: 2.5 CM
BSA FOR ECHO PROCEDURE: 2.11 M2
E WAVE DECELERATION TIME: 231 MS
E/A RATIO: 1.11
FRACTIONAL SHORTENING: 36 (ref 28–44)
INTERVENTRICULAR SEPTUM IN DIASTOLE (PARASTERNAL SHORT AXIS VIEW): 0.9 CM
INTERVENTRICULAR SEPTUM: 0.9 CM (ref 0.6–1.1)
LAAS-AP2: 20.1 CM2
LAAS-AP4: 22.4 CM2
LEFT ATRIUM SIZE: 4.3 CM
LEFT ATRIUM VOLUME (MOD BIPLANE): 74 ML
LEFT ATRIUM VOLUME INDEX (MOD BIPLANE): 35.1 ML/M2
LEFT INTERNAL DIMENSION IN SYSTOLE: 3 CM (ref 2.1–4)
LEFT VENTRICLE DIASTOLIC VOLUME (MOD BIPLANE): 113 ML
LEFT VENTRICLE DIASTOLIC VOLUME INDEX (MOD BIPLANE): 53.6 ML/M2
LEFT VENTRICLE SYSTOLIC VOLUME (MOD BIPLANE): 44 ML
LEFT VENTRICLE SYSTOLIC VOLUME INDEX (MOD BIPLANE): 20.9 ML/M2
LEFT VENTRICULAR INTERNAL DIMENSION IN DIASTOLE: 4.7 CM (ref 3.5–6)
LEFT VENTRICULAR POSTERIOR WALL IN END DIASTOLE: 0.9 CM
LEFT VENTRICULAR STROKE VOLUME: 68 ML
LV EF BIPLANE MOD: 61 %
LV EF US.2D.A4C+ESTIMATED: 57 %
LVSV (TEICH): 68 ML
MV E'TISSUE VEL-SEP: 12 CM/S
MV PEAK A VEL: 0.72 M/S
MV PEAK E VEL: 80 CM/S
MV STENOSIS PRESSURE HALF TIME: 67 MS
MV VALVE AREA P 1/2 METHOD: 3.28
RA PRESSURE ESTIMATED: 3 MMHG
RIGHT ATRIUM AREA SYSTOLE A4C: 15.1 CM2
RIGHT VENTRICLE ID DIMENSION: 3.7 CM
SL CV LEFT ATRIUM LENGTH A2C: 4.8 CM
SL CV LV EF: 61
SL CV PED ECHO LEFT VENTRICLE DIASTOLIC VOLUME (MOD BIPLANE) 2D: 103 ML
SL CV PED ECHO LEFT VENTRICLE SYSTOLIC VOLUME (MOD BIPLANE) 2D: 35 ML
TRICUSPID ANNULAR PLANE SYSTOLIC EXCURSION: 2.9 CM

## 2025-07-01 PROCEDURE — 93306 TTE W/DOPPLER COMPLETE: CPT

## 2025-07-03 ENCOUNTER — TELEPHONE (OUTPATIENT)
Dept: FAMILY MEDICINE CLINIC | Facility: CLINIC | Age: 40
End: 2025-07-03

## 2025-07-21 ENCOUNTER — APPOINTMENT (OUTPATIENT)
Dept: URGENT CARE | Age: 40
End: 2025-07-21

## 2025-07-27 ENCOUNTER — OFFICE VISIT (OUTPATIENT)
Dept: URGENT CARE | Age: 40
End: 2025-07-27
Payer: COMMERCIAL

## 2025-07-27 VITALS
SYSTOLIC BLOOD PRESSURE: 130 MMHG | TEMPERATURE: 97.1 F | OXYGEN SATURATION: 98 % | DIASTOLIC BLOOD PRESSURE: 70 MMHG | RESPIRATION RATE: 20 BRPM | HEART RATE: 67 BPM

## 2025-07-27 DIAGNOSIS — L02.229 BOIL OF TRUNK: Primary | ICD-10-CM

## 2025-07-27 PROCEDURE — 99213 OFFICE O/P EST LOW 20 MIN: CPT | Performed by: STUDENT IN AN ORGANIZED HEALTH CARE EDUCATION/TRAINING PROGRAM

## 2025-07-27 RX ORDER — CEFADROXIL 500 MG/1
500 CAPSULE ORAL EVERY 12 HOURS SCHEDULED
Qty: 10 CAPSULE | Refills: 0 | Status: SHIPPED | OUTPATIENT
Start: 2025-07-27 | End: 2025-08-01

## (undated) DEVICE — STRL ALLENTOWN HYSTEROSCOPY PK: Brand: CARDINAL HEALTH

## (undated) DEVICE — CYSTO TUBING SINGLE IRRIGATION

## (undated) DEVICE — TISSUE REMOVAL SYSTEM FLUID MANAGEMENT ACCESSORIES: Brand: SYMPHION

## (undated) DEVICE — GLOVE PI ULTRA TOUCH SZ.8.0

## (undated) DEVICE — DRAPE EQUIPMENT RF WAND

## (undated) DEVICE — GLOVE INDICATOR PI UNDERGLOVE SZ 7 BLUE

## (undated) DEVICE — UNDER BUTTOCKS DRAPE W/FLUID CONTROL POUCH: Brand: CONVERTORS

## (undated) DEVICE — STERILE 8 INCH PROCTO SWAB: Brand: CARDINAL HEALTH

## (undated) DEVICE — NOVASURE ADVANCED DEVICE

## (undated) DEVICE — NEEDLE HYPO 23G X 1-1/2 IN

## (undated) DEVICE — 2000CC GUARDIAN II: Brand: GUARDIAN

## (undated) DEVICE — ALLENTOWN DR  LUCENTE S LAP PK: Brand: CARDINAL HEALTH

## (undated) DEVICE — SCD SEQUENTIAL COMPRESSION COMFORT SLEEVE MEDIUM KNEE LENGTH: Brand: KENDALL SCD

## (undated) DEVICE — PREMIUM DRY TRAY LF: Brand: MEDLINE INDUSTRIES, INC.

## (undated) DEVICE — INTENDED FOR TISSUE SEPARATION, AND OTHER PROCEDURES THAT REQUIRE A SHARP SURGICAL BLADE TO PUNCTURE OR CUT.: Brand: BARD-PARKER SAFETY BLADES SIZE 11, STERILE

## (undated) DEVICE — IV FLUSH NSS 10ML POSIFLUSH

## (undated) DEVICE — PVC URETHRAL CATHETER: Brand: DOVER

## (undated) DEVICE — GLOVE PI ULTRA TOUCH SZ.6.5

## (undated) DEVICE — CATH FOLEY 18FR 5ML 2 WAY UNCOATED SILICONE

## (undated) DEVICE — EXIDINE 4 PCT

## (undated) DEVICE — IV SET EXT 30 IN